# Patient Record
Sex: FEMALE | Race: WHITE | NOT HISPANIC OR LATINO | Employment: OTHER | URBAN - METROPOLITAN AREA
[De-identification: names, ages, dates, MRNs, and addresses within clinical notes are randomized per-mention and may not be internally consistent; named-entity substitution may affect disease eponyms.]

---

## 2021-01-01 ENCOUNTER — APPOINTMENT (OUTPATIENT)
Dept: GENERAL RADIOLOGY | Facility: HOSPITAL | Age: 86
End: 2021-01-01

## 2021-01-01 ENCOUNTER — HOSPITAL ENCOUNTER (OUTPATIENT)
Facility: HOSPITAL | Age: 86
Setting detail: OBSERVATION
Discharge: HOME | End: 2021-11-01
Attending: EMERGENCY MEDICINE | Admitting: PHYSICIAN ASSISTANT

## 2021-01-01 ENCOUNTER — APPOINTMENT (OUTPATIENT)
Dept: CARDIOLOGY | Facility: HOSPITAL | Age: 86
End: 2021-01-01

## 2021-01-01 VITALS
RESPIRATION RATE: 24 BRPM | OXYGEN SATURATION: 98 % | HEIGHT: 62 IN | SYSTOLIC BLOOD PRESSURE: 163 MMHG | TEMPERATURE: 98.3 F | DIASTOLIC BLOOD PRESSURE: 94 MMHG | HEART RATE: 65 BPM | WEIGHT: 196.21 LBS | BODY MASS INDEX: 36.11 KG/M2

## 2021-01-01 DIAGNOSIS — D64.9 MILD ANEMIA: ICD-10-CM

## 2021-01-01 DIAGNOSIS — R55 SYNCOPE, UNSPECIFIED SYNCOPE TYPE: Primary | ICD-10-CM

## 2021-01-01 LAB
ALBUMIN SERPL-MCNC: 3.8 G/DL (ref 3.5–5.2)
ALBUMIN/GLOB SERPL: 1.2 G/DL
ALP SERPL-CCNC: 125 U/L (ref 39–117)
ALT SERPL W P-5'-P-CCNC: 24 U/L (ref 1–33)
ANION GAP SERPL CALCULATED.3IONS-SCNC: 10.5 MMOL/L (ref 5–15)
ANION GAP SERPL CALCULATED.3IONS-SCNC: 10.9 MMOL/L (ref 5–15)
AORTIC DIMENSIONLESS INDEX: 0.6 (DI)
ASCENDING AORTA: 3.2 CM
AST SERPL-CCNC: 34 U/L (ref 1–32)
BASOPHILS # BLD AUTO: 0.06 10*3/MM3 (ref 0–0.2)
BASOPHILS NFR BLD AUTO: 0.9 % (ref 0–1.5)
BH CV ECHO MEAS - ACS: 1.7 CM
BH CV ECHO MEAS - AO ACC TIME: 0.1 SEC
BH CV ECHO MEAS - AO MAX PG (FULL): 8.6 MMHG
BH CV ECHO MEAS - AO MAX PG: 12.7 MMHG
BH CV ECHO MEAS - AO MEAN PG (FULL): 4.4 MMHG
BH CV ECHO MEAS - AO MEAN PG: 6.2 MMHG
BH CV ECHO MEAS - AO ROOT AREA (BSA CORRECTED): 1.2
BH CV ECHO MEAS - AO ROOT AREA: 4.1 CM^2
BH CV ECHO MEAS - AO ROOT DIAM: 2.3 CM
BH CV ECHO MEAS - AO V2 MAX: 178 CM/SEC
BH CV ECHO MEAS - AO V2 MEAN: 116.6 CM/SEC
BH CV ECHO MEAS - AO V2 VTI: 43.5 CM
BH CV ECHO MEAS - ASC AORTA: 3.2 CM
BH CV ECHO MEAS - AVA(I,A): 1.7 CM^2
BH CV ECHO MEAS - AVA(I,D): 1.7 CM^2
BH CV ECHO MEAS - AVA(V,A): 1.7 CM^2
BH CV ECHO MEAS - AVA(V,D): 1.7 CM^2
BH CV ECHO MEAS - BSA(HAYCOCK): 2 M^2
BH CV ECHO MEAS - BSA: 1.9 M^2
BH CV ECHO MEAS - BZI_BMI: 36.1 KILOGRAMS/M^2
BH CV ECHO MEAS - BZI_METRIC_HEIGHT: 157 CM
BH CV ECHO MEAS - BZI_METRIC_WEIGHT: 89 KG
BH CV ECHO MEAS - EDV(CUBED): 43.2 ML
BH CV ECHO MEAS - EDV(MOD-SP2): 112 ML
BH CV ECHO MEAS - EDV(MOD-SP4): 103 ML
BH CV ECHO MEAS - EDV(TEICH): 51.2 ML
BH CV ECHO MEAS - EF(CUBED): 66.3 %
BH CV ECHO MEAS - EF(MOD-BP): 58.7 %
BH CV ECHO MEAS - EF(MOD-SP2): 59.8 %
BH CV ECHO MEAS - EF(MOD-SP4): 60.2 %
BH CV ECHO MEAS - EF(TEICH): 58.9 %
BH CV ECHO MEAS - ESV(CUBED): 14.6 ML
BH CV ECHO MEAS - ESV(MOD-SP2): 45 ML
BH CV ECHO MEAS - ESV(MOD-SP4): 41 ML
BH CV ECHO MEAS - ESV(TEICH): 21.1 ML
BH CV ECHO MEAS - FS: 30.4 %
BH CV ECHO MEAS - IVS/LVPW: 0.89
BH CV ECHO MEAS - IVSD: 0.98 CM
BH CV ECHO MEAS - LAT PEAK E' VEL: 6.1 CM/SEC
BH CV ECHO MEAS - LV DIASTOLIC VOL/BSA (35-75): 54.4 ML/M^2
BH CV ECHO MEAS - LV MASS(C)D: 110.5 GRAMS
BH CV ECHO MEAS - LV MASS(C)DI: 58.4 GRAMS/M^2
BH CV ECHO MEAS - LV MAX PG: 4.1 MMHG
BH CV ECHO MEAS - LV MEAN PG: 1.9 MMHG
BH CV ECHO MEAS - LV SYSTOLIC VOL/BSA (12-30): 21.7 ML/M^2
BH CV ECHO MEAS - LV V1 MAX: 101 CM/SEC
BH CV ECHO MEAS - LV V1 MEAN: 59.6 CM/SEC
BH CV ECHO MEAS - LV V1 VTI: 25 CM
BH CV ECHO MEAS - LVIDD: 3.5 CM
BH CV ECHO MEAS - LVIDS: 2.4 CM
BH CV ECHO MEAS - LVLD AP2: 8.1 CM
BH CV ECHO MEAS - LVLD AP4: 7.9 CM
BH CV ECHO MEAS - LVLS AP2: 7.3 CM
BH CV ECHO MEAS - LVLS AP4: 6.5 CM
BH CV ECHO MEAS - LVOT AREA (M): 2.8 CM^2
BH CV ECHO MEAS - LVOT AREA: 3 CM^2
BH CV ECHO MEAS - LVOT DIAM: 1.9 CM
BH CV ECHO MEAS - LVPWD: 1.1 CM
BH CV ECHO MEAS - MED PEAK E' VEL: 6 CM/SEC
BH CV ECHO MEAS - MV A DUR: 0.15 SEC
BH CV ECHO MEAS - MV A MAX VEL: 66.7 CM/SEC
BH CV ECHO MEAS - MV DEC SLOPE: 440 CM/SEC^2
BH CV ECHO MEAS - MV DEC TIME: 222 SEC
BH CV ECHO MEAS - MV E MAX VEL: 122 CM/SEC
BH CV ECHO MEAS - MV E/A: 1.8
BH CV ECHO MEAS - MV MAX PG: 6.8 MMHG
BH CV ECHO MEAS - MV MEAN PG: 2 MMHG
BH CV ECHO MEAS - MV P1/2T MAX VEL: 131.3 CM/SEC
BH CV ECHO MEAS - MV P1/2T: 87.4 MSEC
BH CV ECHO MEAS - MV V2 MAX: 130.3 CM/SEC
BH CV ECHO MEAS - MV V2 MEAN: 66.4 CM/SEC
BH CV ECHO MEAS - MV V2 VTI: 41.9 CM
BH CV ECHO MEAS - MVA P1/2T LCG: 1.7 CM^2
BH CV ECHO MEAS - MVA(P1/2T): 2.5 CM^2
BH CV ECHO MEAS - MVA(VTI): 1.8 CM^2
BH CV ECHO MEAS - PA ACC TIME: 0.09 SEC
BH CV ECHO MEAS - PA MAX PG (FULL): 3.4 MMHG
BH CV ECHO MEAS - PA MAX PG: 4.6 MMHG
BH CV ECHO MEAS - PA PR(ACCEL): 37.4 MMHG
BH CV ECHO MEAS - PA V2 MAX: 107.5 CM/SEC
BH CV ECHO MEAS - PI END-D VEL: 132 CM/SEC
BH CV ECHO MEAS - PULM A REVS VEL: 59.5 CM/SEC
BH CV ECHO MEAS - PULM DIAS VEL: 59.5 CM/SEC
BH CV ECHO MEAS - PULM S/D: 0.62
BH CV ECHO MEAS - PULM SYS VEL: 36.7 CM/SEC
BH CV ECHO MEAS - PVA(V,A): 1.4 CM^2
BH CV ECHO MEAS - PVA(V,D): 1.4 CM^2
BH CV ECHO MEAS - QP/QS: 0.53
BH CV ECHO MEAS - RAP SYSTOLE: 3 MMHG
BH CV ECHO MEAS - RV MAX PG: 1.2 MMHG
BH CV ECHO MEAS - RV MEAN PG: 0.71 MMHG
BH CV ECHO MEAS - RV V1 MAX: 55.3 CM/SEC
BH CV ECHO MEAS - RV V1 MEAN: 39.3 CM/SEC
BH CV ECHO MEAS - RV V1 VTI: 14.9 CM
BH CV ECHO MEAS - RVOT AREA: 2.6 CM^2
BH CV ECHO MEAS - RVOT DIAM: 1.8 CM
BH CV ECHO MEAS - RVSP: 38.2 MMHG
BH CV ECHO MEAS - SI(AO): 93.7 ML/M^2
BH CV ECHO MEAS - SI(CUBED): 15.1 ML/M^2
BH CV ECHO MEAS - SI(LVOT): 39.2 ML/M^2
BH CV ECHO MEAS - SI(MOD-SP2): 35.4 ML/M^2
BH CV ECHO MEAS - SI(MOD-SP4): 32.8 ML/M^2
BH CV ECHO MEAS - SI(TEICH): 15.9 ML/M^2
BH CV ECHO MEAS - SUP REN AO DIAM: 1.9 CM
BH CV ECHO MEAS - SV(AO): 177.3 ML
BH CV ECHO MEAS - SV(CUBED): 28.7 ML
BH CV ECHO MEAS - SV(LVOT): 74.1 ML
BH CV ECHO MEAS - SV(MOD-SP2): 67 ML
BH CV ECHO MEAS - SV(MOD-SP4): 62 ML
BH CV ECHO MEAS - SV(RVOT): 39.6 ML
BH CV ECHO MEAS - SV(TEICH): 30.1 ML
BH CV ECHO MEAS - TAPSE (>1.6): 2 CM
BH CV ECHO MEAS - TR MAX VEL: 296.8 CM/SEC
BH CV ECHO MEASUREMENTS AVERAGE E/E' RATIO: 20.17
BH CV VAS BP LEFT ARM: NORMAL MMHG
BH CV XLRA - RV BASE: 2.6 CM
BH CV XLRA - RV LENGTH: 7.7 CM
BH CV XLRA - RV MID: 2.2 CM
BH CV XLRA - TDI S': 11 CM/SEC
BH CV XLRA MEAS LEFT CCA RATIO VEL: -59.8 CM/SEC
BH CV XLRA MEAS LEFT DIST CCA EDV: -11 CM/SEC
BH CV XLRA MEAS LEFT DIST CCA PSV: -59.8 CM/SEC
BH CV XLRA MEAS LEFT DIST ICA EDV: 15.4 CM/SEC
BH CV XLRA MEAS LEFT DIST ICA PSV: 51.9 CM/SEC
BH CV XLRA MEAS LEFT ICA RATIO VEL: 80.4 CM/SEC
BH CV XLRA MEAS LEFT ICA/CCA RATIO: -1.3
BH CV XLRA MEAS LEFT MID ICA EDV: -14.1 CM/SEC
BH CV XLRA MEAS LEFT MID ICA PSV: -52.9 CM/SEC
BH CV XLRA MEAS LEFT PROX CCA EDV: 9.3 CM/SEC
BH CV XLRA MEAS LEFT PROX CCA PSV: 67.5 CM/SEC
BH CV XLRA MEAS LEFT PROX ECA EDV: -21.7 CM/SEC
BH CV XLRA MEAS LEFT PROX ECA PSV: -129 CM/SEC
BH CV XLRA MEAS LEFT PROX ICA EDV: 18.9 CM/SEC
BH CV XLRA MEAS LEFT PROX ICA PSV: 80.4 CM/SEC
BH CV XLRA MEAS LEFT PROX SCLA PSV: 128.8 CM/SEC
BH CV XLRA MEAS LEFT VERTEBRAL A EDV: 8.8 CM/SEC
BH CV XLRA MEAS LEFT VERTEBRAL A PSV: 56 CM/SEC
BH CV XLRA MEAS RIGHT CCA RATIO VEL: -55.7 CM/SEC
BH CV XLRA MEAS RIGHT DIST CCA EDV: -5.5 CM/SEC
BH CV XLRA MEAS RIGHT DIST CCA PSV: -55.7 CM/SEC
BH CV XLRA MEAS RIGHT DIST ICA EDV: -8.3 CM/SEC
BH CV XLRA MEAS RIGHT DIST ICA PSV: -37.3 CM/SEC
BH CV XLRA MEAS RIGHT ICA RATIO VEL: 83.1 CM/SEC
BH CV XLRA MEAS RIGHT ICA/CCA RATIO: -1.5
BH CV XLRA MEAS RIGHT MID ICA EDV: -7.5 CM/SEC
BH CV XLRA MEAS RIGHT MID ICA PSV: -42.1 CM/SEC
BH CV XLRA MEAS RIGHT PROX CCA EDV: 9.8 CM/SEC
BH CV XLRA MEAS RIGHT PROX CCA PSV: 71.3 CM/SEC
BH CV XLRA MEAS RIGHT PROX ECA PSV: -79.1 CM/SEC
BH CV XLRA MEAS RIGHT PROX ICA EDV: 11.3 CM/SEC
BH CV XLRA MEAS RIGHT PROX ICA PSV: 83.1 CM/SEC
BH CV XLRA MEAS RIGHT PROX SCLA PSV: 192.9 CM/SEC
BH CV XLRA MEAS RIGHT VERTEBRAL A EDV: 9.8 CM/SEC
BH CV XLRA MEAS RIGHT VERTEBRAL A PSV: 27.6 CM/SEC
BILIRUB SERPL-MCNC: 1.2 MG/DL (ref 0–1.2)
BILIRUB UR QL STRIP: NEGATIVE
BUN SERPL-MCNC: 16 MG/DL (ref 8–23)
BUN SERPL-MCNC: 16 MG/DL (ref 8–23)
BUN/CREAT SERPL: 11 (ref 7–25)
BUN/CREAT SERPL: 11.9 (ref 7–25)
CALCIUM SPEC-SCNC: 8.9 MG/DL (ref 8.6–10.5)
CALCIUM SPEC-SCNC: 9 MG/DL (ref 8.6–10.5)
CHLORIDE SERPL-SCNC: 108 MMOL/L (ref 98–107)
CHLORIDE SERPL-SCNC: 108 MMOL/L (ref 98–107)
CLARITY UR: CLEAR
CO2 SERPL-SCNC: 23.1 MMOL/L (ref 22–29)
CO2 SERPL-SCNC: 24.5 MMOL/L (ref 22–29)
COLOR UR: YELLOW
CREAT SERPL-MCNC: 1.34 MG/DL (ref 0.57–1)
CREAT SERPL-MCNC: 1.45 MG/DL (ref 0.57–1)
DEPRECATED RDW RBC AUTO: 55.4 FL (ref 37–54)
DEPRECATED RDW RBC AUTO: 56.4 FL (ref 37–54)
EOSINOPHIL # BLD AUTO: 0.24 10*3/MM3 (ref 0–0.4)
EOSINOPHIL NFR BLD AUTO: 3.6 % (ref 0.3–6.2)
ERYTHROCYTE [DISTWIDTH] IN BLOOD BY AUTOMATED COUNT: 14.6 % (ref 12.3–15.4)
ERYTHROCYTE [DISTWIDTH] IN BLOOD BY AUTOMATED COUNT: 14.7 % (ref 12.3–15.4)
GFR SERPL CREATININE-BSD FRML MDRD: 34 ML/MIN/1.73
GFR SERPL CREATININE-BSD FRML MDRD: 38 ML/MIN/1.73
GLOBULIN UR ELPH-MCNC: 3.3 GM/DL
GLUCOSE BLDC GLUCOMTR-MCNC: 107 MG/DL (ref 70–130)
GLUCOSE SERPL-MCNC: 117 MG/DL (ref 65–99)
GLUCOSE SERPL-MCNC: 122 MG/DL (ref 65–99)
GLUCOSE UR STRIP-MCNC: NEGATIVE MG/DL
HCT VFR BLD AUTO: 33.5 % (ref 34–46.6)
HCT VFR BLD AUTO: 33.7 % (ref 34–46.6)
HGB BLD-MCNC: 11.4 G/DL (ref 12–15.9)
HGB BLD-MCNC: 11.5 G/DL (ref 12–15.9)
HGB UR QL STRIP.AUTO: NEGATIVE
HOLD SPECIMEN: NORMAL
HOLD SPECIMEN: NORMAL
IMM GRANULOCYTES # BLD AUTO: 0.02 10*3/MM3 (ref 0–0.05)
IMM GRANULOCYTES NFR BLD AUTO: 0.3 % (ref 0–0.5)
KETONES UR QL STRIP: NEGATIVE
LEFT ATRIUM VOLUME INDEX: 19.8 ML/M2
LEUKOCYTE ESTERASE UR QL STRIP.AUTO: NEGATIVE
LYMPHOCYTES # BLD AUTO: 1.54 10*3/MM3 (ref 0.7–3.1)
LYMPHOCYTES NFR BLD AUTO: 23.4 % (ref 19.6–45.3)
MCH RBC QN AUTO: 35.5 PG (ref 26.6–33)
MCH RBC QN AUTO: 35.9 PG (ref 26.6–33)
MCHC RBC AUTO-ENTMCNC: 33.8 G/DL (ref 31.5–35.7)
MCHC RBC AUTO-ENTMCNC: 34.3 G/DL (ref 31.5–35.7)
MCV RBC AUTO: 104.7 FL (ref 79–97)
MCV RBC AUTO: 105 FL (ref 79–97)
MONOCYTES # BLD AUTO: 0.76 10*3/MM3 (ref 0.1–0.9)
MONOCYTES NFR BLD AUTO: 11.5 % (ref 5–12)
NEUTROPHILS NFR BLD AUTO: 3.97 10*3/MM3 (ref 1.7–7)
NEUTROPHILS NFR BLD AUTO: 60.3 % (ref 42.7–76)
NITRITE UR QL STRIP: NEGATIVE
NRBC BLD AUTO-RTO: 0.2 /100 WBC (ref 0–0.2)
NT-PROBNP SERPL-MCNC: 603.4 PG/ML (ref 0–1800)
PH UR STRIP.AUTO: 6 [PH] (ref 5–8)
PLATELET # BLD AUTO: 169 10*3/MM3 (ref 140–450)
PLATELET # BLD AUTO: 176 10*3/MM3 (ref 140–450)
PMV BLD AUTO: 9.4 FL (ref 6–12)
PMV BLD AUTO: 9.7 FL (ref 6–12)
POTASSIUM SERPL-SCNC: 4 MMOL/L (ref 3.5–5.2)
POTASSIUM SERPL-SCNC: 4.4 MMOL/L (ref 3.5–5.2)
PROT SERPL-MCNC: 7.1 G/DL (ref 6–8.5)
PROT UR QL STRIP: ABNORMAL
QT INTERVAL: 439 MS
RBC # BLD AUTO: 3.2 10*6/MM3 (ref 3.77–5.28)
RBC # BLD AUTO: 3.21 10*6/MM3 (ref 3.77–5.28)
SARS-COV-2 RNA PNL SPEC NAA+PROBE: NOT DETECTED
SINUS: 2.7 CM
SODIUM SERPL-SCNC: 142 MMOL/L (ref 136–145)
SODIUM SERPL-SCNC: 143 MMOL/L (ref 136–145)
SP GR UR STRIP: 1.01 (ref 1–1.03)
STJ: 2.6 CM
TROPONIN T SERPL-MCNC: <0.01 NG/ML (ref 0–0.03)
TROPONIN T SERPL-MCNC: <0.01 NG/ML (ref 0–0.03)
TSH SERPL DL<=0.05 MIU/L-ACNC: 2.93 UIU/ML (ref 0.27–4.2)
UROBILINOGEN UR QL STRIP: ABNORMAL
WBC # BLD AUTO: 5.85 10*3/MM3 (ref 3.4–10.8)
WBC # BLD AUTO: 6.59 10*3/MM3 (ref 3.4–10.8)
WHOLE BLOOD HOLD SPECIMEN: NORMAL
WHOLE BLOOD HOLD SPECIMEN: NORMAL

## 2021-01-01 PROCEDURE — 93306 TTE W/DOPPLER COMPLETE: CPT

## 2021-01-01 PROCEDURE — 93005 ELECTROCARDIOGRAM TRACING: CPT | Performed by: EMERGENCY MEDICINE

## 2021-01-01 PROCEDURE — G0378 HOSPITAL OBSERVATION PER HR: HCPCS

## 2021-01-01 PROCEDURE — 84484 ASSAY OF TROPONIN QUANT: CPT | Performed by: EMERGENCY MEDICINE

## 2021-01-01 PROCEDURE — 85027 COMPLETE CBC AUTOMATED: CPT | Performed by: PHYSICIAN ASSISTANT

## 2021-01-01 PROCEDURE — 71045 X-RAY EXAM CHEST 1 VIEW: CPT

## 2021-01-01 PROCEDURE — 93880 EXTRACRANIAL BILAT STUDY: CPT

## 2021-01-01 PROCEDURE — 84484 ASSAY OF TROPONIN QUANT: CPT | Performed by: PHYSICIAN ASSISTANT

## 2021-01-01 PROCEDURE — 99284 EMERGENCY DEPT VISIT MOD MDM: CPT

## 2021-01-01 PROCEDURE — 80053 COMPREHEN METABOLIC PANEL: CPT | Performed by: EMERGENCY MEDICINE

## 2021-01-01 PROCEDURE — 85025 COMPLETE CBC W/AUTO DIFF WBC: CPT | Performed by: EMERGENCY MEDICINE

## 2021-01-01 PROCEDURE — 93005 ELECTROCARDIOGRAM TRACING: CPT

## 2021-01-01 PROCEDURE — 84443 ASSAY THYROID STIM HORMONE: CPT | Performed by: PHYSICIAN ASSISTANT

## 2021-01-01 PROCEDURE — C9803 HOPD COVID-19 SPEC COLLECT: HCPCS

## 2021-01-01 PROCEDURE — 81003 URINALYSIS AUTO W/O SCOPE: CPT | Performed by: EMERGENCY MEDICINE

## 2021-01-01 PROCEDURE — 99285 EMERGENCY DEPT VISIT HI MDM: CPT

## 2021-01-01 PROCEDURE — 93010 ELECTROCARDIOGRAM REPORT: CPT | Performed by: INTERNAL MEDICINE

## 2021-01-01 PROCEDURE — 87635 SARS-COV-2 COVID-19 AMP PRB: CPT | Performed by: EMERGENCY MEDICINE

## 2021-01-01 PROCEDURE — 83880 ASSAY OF NATRIURETIC PEPTIDE: CPT | Performed by: EMERGENCY MEDICINE

## 2021-01-01 RX ORDER — ATORVASTATIN CALCIUM 80 MG/1
80 TABLET, FILM COATED ORAL NIGHTLY
COMMUNITY

## 2021-01-01 RX ORDER — CHOLECALCIFEROL (VITAMIN D3) 125 MCG
1000 CAPSULE ORAL DAILY
Status: DISCONTINUED | OUTPATIENT
Start: 2021-01-01 | End: 2021-01-01 | Stop reason: HOSPADM

## 2021-01-01 RX ORDER — ATORVASTATIN CALCIUM 80 MG/1
80 TABLET, FILM COATED ORAL NIGHTLY
Status: DISCONTINUED | OUTPATIENT
Start: 2021-01-01 | End: 2021-01-01 | Stop reason: HOSPADM

## 2021-01-01 RX ORDER — SODIUM CHLORIDE 0.9 % (FLUSH) 0.9 %
10 SYRINGE (ML) INJECTION EVERY 12 HOURS SCHEDULED
Status: DISCONTINUED | OUTPATIENT
Start: 2021-01-01 | End: 2021-01-01 | Stop reason: HOSPADM

## 2021-01-01 RX ORDER — SODIUM CHLORIDE 0.9 % (FLUSH) 0.9 %
10 SYRINGE (ML) INJECTION AS NEEDED
Status: DISCONTINUED | OUTPATIENT
Start: 2021-01-01 | End: 2021-01-01 | Stop reason: HOSPADM

## 2021-01-01 RX ORDER — POTASSIUM CHLORIDE 750 MG/1
20 TABLET, FILM COATED, EXTENDED RELEASE ORAL DAILY
COMMUNITY

## 2021-01-01 RX ORDER — FUROSEMIDE 40 MG/1
40 TABLET ORAL DAILY
Status: DISCONTINUED | OUTPATIENT
Start: 2021-01-01 | End: 2021-01-01 | Stop reason: HOSPADM

## 2021-01-01 RX ORDER — LEVOTHYROXINE SODIUM 0.05 MG/1
50 TABLET ORAL
Status: DISCONTINUED | OUTPATIENT
Start: 2021-01-01 | End: 2021-01-01 | Stop reason: HOSPADM

## 2021-01-01 RX ORDER — METOPROLOL SUCCINATE 25 MG/1
25 TABLET, EXTENDED RELEASE ORAL EVERY 12 HOURS SCHEDULED
Status: DISCONTINUED | OUTPATIENT
Start: 2021-01-01 | End: 2021-01-01 | Stop reason: HOSPADM

## 2021-01-01 RX ORDER — ALBUTEROL SULFATE 2.5 MG/3ML
2.5 SOLUTION RESPIRATORY (INHALATION) EVERY 6 HOURS PRN
Status: DISCONTINUED | OUTPATIENT
Start: 2021-01-01 | End: 2021-01-01 | Stop reason: HOSPADM

## 2021-01-01 RX ORDER — NITROGLYCERIN 0.4 MG/1
0.4 TABLET SUBLINGUAL
Status: DISCONTINUED | OUTPATIENT
Start: 2021-01-01 | End: 2021-01-01 | Stop reason: HOSPADM

## 2021-01-01 RX ORDER — PAROXETINE 10 MG/1
10 TABLET, FILM COATED ORAL DAILY
Status: DISCONTINUED | OUTPATIENT
Start: 2021-01-01 | End: 2021-01-01 | Stop reason: HOSPADM

## 2021-01-01 RX ORDER — POTASSIUM CHLORIDE 750 MG/1
20 TABLET, FILM COATED, EXTENDED RELEASE ORAL DAILY
Status: DISCONTINUED | OUTPATIENT
Start: 2021-01-01 | End: 2021-01-01 | Stop reason: HOSPADM

## 2021-01-01 RX ORDER — ALLOPURINOL 300 MG/1
300 TABLET ORAL DAILY
Status: DISCONTINUED | OUTPATIENT
Start: 2021-01-01 | End: 2021-01-01 | Stop reason: HOSPADM

## 2021-01-01 RX ADMIN — ATORVASTATIN CALCIUM 80 MG: 80 TABLET, FILM COATED ORAL at 23:21

## 2021-01-01 RX ADMIN — LEVOTHYROXINE SODIUM 50 MCG: 50 TABLET ORAL at 06:13

## 2021-01-01 RX ADMIN — METOPROLOL SUCCINATE 25 MG: 25 TABLET, EXTENDED RELEASE ORAL at 12:31

## 2021-01-01 RX ADMIN — APIXABAN 5 MG: 5 TABLET, FILM COATED ORAL at 12:30

## 2021-01-01 RX ADMIN — POTASSIUM CHLORIDE 20 MEQ: 750 TABLET, EXTENDED RELEASE ORAL at 12:31

## 2021-01-01 RX ADMIN — APIXABAN 5 MG: 5 TABLET, FILM COATED ORAL at 23:21

## 2021-01-01 RX ADMIN — SODIUM CHLORIDE, PRESERVATIVE FREE 10 ML: 5 INJECTION INTRAVENOUS at 23:21

## 2021-01-01 RX ADMIN — PAROXETINE HYDROCHLORIDE HEMIHYDRATE 10 MG: 10 TABLET, FILM COATED ORAL at 12:30

## 2021-01-01 RX ADMIN — SODIUM CHLORIDE, PRESERVATIVE FREE 10 ML: 5 INJECTION INTRAVENOUS at 12:31

## 2021-01-01 RX ADMIN — ALLOPURINOL 300 MG: 300 TABLET ORAL at 12:30

## 2021-01-01 RX ADMIN — FUROSEMIDE 40 MG: 40 TABLET ORAL at 12:30

## 2021-01-01 RX ADMIN — Medication 1000 MCG: at 12:30

## 2021-04-04 ENCOUNTER — APPOINTMENT (OUTPATIENT)
Dept: CT IMAGING | Facility: HOSPITAL | Age: 86
End: 2021-04-04

## 2021-04-04 ENCOUNTER — HOSPITAL ENCOUNTER (OUTPATIENT)
Facility: HOSPITAL | Age: 86
Setting detail: OBSERVATION
Discharge: HOME | End: 2021-04-07
Attending: EMERGENCY MEDICINE | Admitting: INTERNAL MEDICINE

## 2021-04-04 ENCOUNTER — APPOINTMENT (OUTPATIENT)
Dept: GENERAL RADIOLOGY | Facility: HOSPITAL | Age: 86
End: 2021-04-04

## 2021-04-04 DIAGNOSIS — J96.01 ACUTE RESPIRATORY FAILURE WITH HYPOXIA (HCC): ICD-10-CM

## 2021-04-04 DIAGNOSIS — I50.9 ACUTE ON CHRONIC CONGESTIVE HEART FAILURE, UNSPECIFIED HEART FAILURE TYPE (HCC): Primary | ICD-10-CM

## 2021-04-04 DIAGNOSIS — N18.9 CHRONIC RENAL IMPAIRMENT, UNSPECIFIED CKD STAGE: ICD-10-CM

## 2021-04-04 PROBLEM — I48.91 A-FIB: Status: ACTIVE | Noted: 2021-04-04

## 2021-04-04 PROBLEM — R09.02 HYPOXIA: Status: ACTIVE | Noted: 2021-04-04

## 2021-04-04 PROBLEM — N18.32 STAGE 3B CHRONIC KIDNEY DISEASE: Status: ACTIVE | Noted: 2021-04-04

## 2021-04-04 PROBLEM — Z79.01 CHRONIC ANTICOAGULATION: Status: ACTIVE | Noted: 2021-04-04

## 2021-04-04 PROBLEM — E03.9 HYPOTHYROIDISM (ACQUIRED): Status: ACTIVE | Noted: 2021-04-04

## 2021-04-04 LAB
ALBUMIN SERPL-MCNC: 3.2 G/DL (ref 3.5–5.2)
ALBUMIN/GLOB SERPL: 0.9 G/DL
ALP SERPL-CCNC: 69 U/L (ref 39–117)
ALT SERPL W P-5'-P-CCNC: 13 U/L (ref 1–33)
ANION GAP SERPL CALCULATED.3IONS-SCNC: 10.1 MMOL/L (ref 5–15)
ANION GAP SERPL CALCULATED.3IONS-SCNC: 13.5 MMOL/L (ref 5–15)
AST SERPL-CCNC: 20 U/L (ref 1–32)
BASOPHILS # BLD MANUAL: 0.08 10*3/MM3 (ref 0–0.2)
BASOPHILS NFR BLD AUTO: 1 % (ref 0–1.5)
BILIRUB SERPL-MCNC: 2.2 MG/DL (ref 0–1.2)
BUN SERPL-MCNC: 23 MG/DL (ref 8–23)
BUN SERPL-MCNC: 25 MG/DL (ref 8–23)
BUN/CREAT SERPL: 14.8 (ref 7–25)
BUN/CREAT SERPL: 16.8 (ref 7–25)
CALCIUM SPEC-SCNC: 8.6 MG/DL (ref 8.6–10.5)
CALCIUM SPEC-SCNC: 8.7 MG/DL (ref 8.6–10.5)
CHLORIDE SERPL-SCNC: 104 MMOL/L (ref 98–107)
CHLORIDE SERPL-SCNC: 105 MMOL/L (ref 98–107)
CO2 SERPL-SCNC: 22.5 MMOL/L (ref 22–29)
CO2 SERPL-SCNC: 24.9 MMOL/L (ref 22–29)
CREAT SERPL-MCNC: 1.49 MG/DL (ref 0.57–1)
CREAT SERPL-MCNC: 1.55 MG/DL (ref 0.57–1)
D DIMER PPP FEU-MCNC: 0.73 MCGFEU/ML (ref 0–0.49)
D-LACTATE SERPL-SCNC: 1.4 MMOL/L (ref 0.5–2)
DEPRECATED RDW RBC AUTO: 58.4 FL (ref 37–54)
ERYTHROCYTE [DISTWIDTH] IN BLOOD BY AUTOMATED COUNT: 14.6 % (ref 12.3–15.4)
GFR SERPL CREATININE-BSD FRML MDRD: 32 ML/MIN/1.73
GFR SERPL CREATININE-BSD FRML MDRD: 33 ML/MIN/1.73
GLOBULIN UR ELPH-MCNC: 3.4 GM/DL
GLUCOSE SERPL-MCNC: 117 MG/DL (ref 65–99)
GLUCOSE SERPL-MCNC: 125 MG/DL (ref 65–99)
HCT VFR BLD AUTO: 33.3 % (ref 34–46.6)
HGB BLD-MCNC: 11.4 G/DL (ref 12–15.9)
INR PPP: 1.34 (ref 0.9–1.1)
LYMPHOCYTES # BLD MANUAL: 0.53 10*3/MM3 (ref 0.7–3.1)
LYMPHOCYTES NFR BLD MANUAL: 7 % (ref 19.6–45.3)
LYMPHOCYTES NFR BLD MANUAL: 7 % (ref 5–12)
MACROCYTES BLD QL SMEAR: ABNORMAL
MCH RBC QN AUTO: 37.4 PG (ref 26.6–33)
MCHC RBC AUTO-ENTMCNC: 34.2 G/DL (ref 31.5–35.7)
MCV RBC AUTO: 109.2 FL (ref 79–97)
MONOCYTES # BLD AUTO: 0.53 10*3/MM3 (ref 0.1–0.9)
NEUTROPHILS # BLD AUTO: 6.42 10*3/MM3 (ref 1.7–7)
NEUTROPHILS NFR BLD MANUAL: 85 % (ref 42.7–76)
NRBC BLD AUTO-RTO: 0 /100 WBC (ref 0–0.2)
NT-PROBNP SERPL-MCNC: 1206 PG/ML (ref 0–1800)
PLAT MORPH BLD: NORMAL
PLATELET # BLD AUTO: 151 10*3/MM3 (ref 140–450)
PMV BLD AUTO: 9.7 FL (ref 6–12)
POTASSIUM SERPL-SCNC: 4 MMOL/L (ref 3.5–5.2)
POTASSIUM SERPL-SCNC: 4.2 MMOL/L (ref 3.5–5.2)
PROCALCITONIN SERPL-MCNC: 0.12 NG/ML (ref 0–0.25)
PROT SERPL-MCNC: 6.6 G/DL (ref 6–8.5)
PROTHROMBIN TIME: 16.4 SECONDS (ref 11.7–14.2)
QT INTERVAL: 477 MS
RBC # BLD AUTO: 3.05 10*6/MM3 (ref 3.77–5.28)
SARS-COV-2 RNA RESP QL NAA+PROBE: NOT DETECTED
SODIUM SERPL-SCNC: 140 MMOL/L (ref 136–145)
SODIUM SERPL-SCNC: 140 MMOL/L (ref 136–145)
TROPONIN T SERPL-MCNC: <0.01 NG/ML (ref 0–0.03)
WBC # BLD AUTO: 7.55 10*3/MM3 (ref 3.4–10.8)
WBC MORPH BLD: NORMAL

## 2021-04-04 PROCEDURE — 85025 COMPLETE CBC W/AUTO DIFF WBC: CPT | Performed by: EMERGENCY MEDICINE

## 2021-04-04 PROCEDURE — C9803 HOPD COVID-19 SPEC COLLECT: HCPCS

## 2021-04-04 PROCEDURE — G0378 HOSPITAL OBSERVATION PER HR: HCPCS

## 2021-04-04 PROCEDURE — 83605 ASSAY OF LACTIC ACID: CPT | Performed by: EMERGENCY MEDICINE

## 2021-04-04 PROCEDURE — 99285 EMERGENCY DEPT VISIT HI MDM: CPT

## 2021-04-04 PROCEDURE — 80053 COMPREHEN METABOLIC PANEL: CPT | Performed by: EMERGENCY MEDICINE

## 2021-04-04 PROCEDURE — 85379 FIBRIN DEGRADATION QUANT: CPT | Performed by: EMERGENCY MEDICINE

## 2021-04-04 PROCEDURE — 71045 X-RAY EXAM CHEST 1 VIEW: CPT

## 2021-04-04 PROCEDURE — 25010000002 FUROSEMIDE PER 20 MG: Performed by: EMERGENCY MEDICINE

## 2021-04-04 PROCEDURE — 94640 AIRWAY INHALATION TREATMENT: CPT

## 2021-04-04 PROCEDURE — 96374 THER/PROPH/DIAG INJ IV PUSH: CPT

## 2021-04-04 PROCEDURE — 83880 ASSAY OF NATRIURETIC PEPTIDE: CPT | Performed by: EMERGENCY MEDICINE

## 2021-04-04 PROCEDURE — 36415 COLL VENOUS BLD VENIPUNCTURE: CPT | Performed by: HOSPITALIST

## 2021-04-04 PROCEDURE — 85610 PROTHROMBIN TIME: CPT | Performed by: EMERGENCY MEDICINE

## 2021-04-04 PROCEDURE — 96376 TX/PRO/DX INJ SAME DRUG ADON: CPT

## 2021-04-04 PROCEDURE — 85007 BL SMEAR W/DIFF WBC COUNT: CPT | Performed by: EMERGENCY MEDICINE

## 2021-04-04 PROCEDURE — 84145 PROCALCITONIN (PCT): CPT | Performed by: EMERGENCY MEDICINE

## 2021-04-04 PROCEDURE — 87040 BLOOD CULTURE FOR BACTERIA: CPT | Performed by: EMERGENCY MEDICINE

## 2021-04-04 PROCEDURE — 71275 CT ANGIOGRAPHY CHEST: CPT

## 2021-04-04 PROCEDURE — 93005 ELECTROCARDIOGRAM TRACING: CPT | Performed by: EMERGENCY MEDICINE

## 2021-04-04 PROCEDURE — 25010000002 FUROSEMIDE PER 20 MG: Performed by: HOSPITALIST

## 2021-04-04 PROCEDURE — U0005 INFEC AGEN DETEC AMPLI PROBE: HCPCS | Performed by: EMERGENCY MEDICINE

## 2021-04-04 PROCEDURE — 94799 UNLISTED PULMONARY SVC/PX: CPT

## 2021-04-04 PROCEDURE — 0 IOPAMIDOL PER 1 ML: Performed by: EMERGENCY MEDICINE

## 2021-04-04 PROCEDURE — 84484 ASSAY OF TROPONIN QUANT: CPT | Performed by: EMERGENCY MEDICINE

## 2021-04-04 PROCEDURE — 93010 ELECTROCARDIOGRAM REPORT: CPT | Performed by: INTERNAL MEDICINE

## 2021-04-04 PROCEDURE — U0003 INFECTIOUS AGENT DETECTION BY NUCLEIC ACID (DNA OR RNA); SEVERE ACUTE RESPIRATORY SYNDROME CORONAVIRUS 2 (SARS-COV-2) (CORONAVIRUS DISEASE [COVID-19]), AMPLIFIED PROBE TECHNIQUE, MAKING USE OF HIGH THROUGHPUT TECHNOLOGIES AS DESCRIBED BY CMS-2020-01-R: HCPCS | Performed by: EMERGENCY MEDICINE

## 2021-04-04 RX ORDER — ACETAMINOPHEN 325 MG/1
650 TABLET ORAL EVERY 6 HOURS PRN
Status: DISCONTINUED | OUTPATIENT
Start: 2021-04-04 | End: 2021-04-07 | Stop reason: HOSPADM

## 2021-04-04 RX ORDER — ONDANSETRON 4 MG/1
4 TABLET, FILM COATED ORAL EVERY 6 HOURS PRN
Status: DISCONTINUED | OUTPATIENT
Start: 2021-04-04 | End: 2021-04-07 | Stop reason: HOSPADM

## 2021-04-04 RX ORDER — ATORVASTATIN CALCIUM 20 MG/1
10 TABLET, FILM COATED ORAL DAILY
Status: DISCONTINUED | OUTPATIENT
Start: 2021-04-04 | End: 2021-04-07 | Stop reason: HOSPADM

## 2021-04-04 RX ORDER — CHOLECALCIFEROL (VITAMIN D3) 125 MCG
1000 CAPSULE ORAL DAILY
Status: DISCONTINUED | OUTPATIENT
Start: 2021-04-04 | End: 2021-04-07 | Stop reason: HOSPADM

## 2021-04-04 RX ORDER — LEVOTHYROXINE SODIUM 0.05 MG/1
50 TABLET ORAL
Status: DISCONTINUED | OUTPATIENT
Start: 2021-04-05 | End: 2021-04-07 | Stop reason: HOSPADM

## 2021-04-04 RX ORDER — ALLOPURINOL 300 MG/1
300 TABLET ORAL DAILY
COMMUNITY

## 2021-04-04 RX ORDER — LANOLIN ALCOHOL/MO/W.PET/CERES
1000 CREAM (GRAM) TOPICAL DAILY
COMMUNITY
End: 2022-01-01 | Stop reason: HOSPADM

## 2021-04-04 RX ORDER — FUROSEMIDE 10 MG/ML
40 INJECTION INTRAMUSCULAR; INTRAVENOUS ONCE
Status: COMPLETED | OUTPATIENT
Start: 2021-04-04 | End: 2021-04-04

## 2021-04-04 RX ORDER — IPRATROPIUM BROMIDE AND ALBUTEROL SULFATE 2.5; .5 MG/3ML; MG/3ML
3 SOLUTION RESPIRATORY (INHALATION) EVERY 6 HOURS PRN
Status: DISCONTINUED | OUTPATIENT
Start: 2021-04-04 | End: 2021-04-07 | Stop reason: HOSPADM

## 2021-04-04 RX ORDER — LEVOTHYROXINE SODIUM 0.05 MG/1
50 TABLET ORAL DAILY
COMMUNITY

## 2021-04-04 RX ORDER — SODIUM CHLORIDE 0.9 % (FLUSH) 0.9 %
10 SYRINGE (ML) INJECTION EVERY 12 HOURS SCHEDULED
Status: DISCONTINUED | OUTPATIENT
Start: 2021-04-04 | End: 2021-04-07 | Stop reason: HOSPADM

## 2021-04-04 RX ORDER — SIMVASTATIN 10 MG
10 TABLET ORAL NIGHTLY
COMMUNITY
End: 2021-04-24 | Stop reason: HOSPADM

## 2021-04-04 RX ORDER — SODIUM CHLORIDE 0.9 % (FLUSH) 0.9 %
10 SYRINGE (ML) INJECTION AS NEEDED
Status: DISCONTINUED | OUTPATIENT
Start: 2021-04-04 | End: 2021-04-07 | Stop reason: HOSPADM

## 2021-04-04 RX ORDER — ALBUTEROL SULFATE 90 UG/1
2 AEROSOL, METERED RESPIRATORY (INHALATION) EVERY 4 HOURS PRN
COMMUNITY

## 2021-04-04 RX ORDER — ALLOPURINOL 300 MG/1
300 TABLET ORAL DAILY
Status: DISCONTINUED | OUTPATIENT
Start: 2021-04-04 | End: 2021-04-07 | Stop reason: HOSPADM

## 2021-04-04 RX ORDER — IPRATROPIUM BROMIDE AND ALBUTEROL SULFATE 2.5; .5 MG/3ML; MG/3ML
3 SOLUTION RESPIRATORY (INHALATION) ONCE
Status: COMPLETED | OUTPATIENT
Start: 2021-04-04 | End: 2021-04-04

## 2021-04-04 RX ORDER — FUROSEMIDE 10 MG/ML
40 INJECTION INTRAMUSCULAR; INTRAVENOUS EVERY 12 HOURS
Status: DISCONTINUED | OUTPATIENT
Start: 2021-04-04 | End: 2021-04-06

## 2021-04-04 RX ORDER — PAROXETINE 10 MG/1
10 TABLET, FILM COATED ORAL DAILY
COMMUNITY

## 2021-04-04 RX ORDER — ONDANSETRON 2 MG/ML
4 INJECTION INTRAMUSCULAR; INTRAVENOUS EVERY 6 HOURS PRN
Status: DISCONTINUED | OUTPATIENT
Start: 2021-04-04 | End: 2021-04-07 | Stop reason: HOSPADM

## 2021-04-04 RX ORDER — PAROXETINE 10 MG/1
10 TABLET, FILM COATED ORAL DAILY
Status: DISCONTINUED | OUTPATIENT
Start: 2021-04-04 | End: 2021-04-07 | Stop reason: HOSPADM

## 2021-04-04 RX ADMIN — SODIUM CHLORIDE, PRESERVATIVE FREE 10 ML: 5 INJECTION INTRAVENOUS at 20:48

## 2021-04-04 RX ADMIN — APIXABAN 2.5 MG: 2.5 TABLET, FILM COATED ORAL at 20:47

## 2021-04-04 RX ADMIN — PAROXETINE HYDROCHLORIDE HEMIHYDRATE 10 MG: 10 TABLET, FILM COATED ORAL at 20:47

## 2021-04-04 RX ADMIN — IPRATROPIUM BROMIDE AND ALBUTEROL SULFATE 3 ML: 2.5; .5 SOLUTION RESPIRATORY (INHALATION) at 08:53

## 2021-04-04 RX ADMIN — IOPAMIDOL 100 ML: 755 INJECTION, SOLUTION INTRAVENOUS at 11:04

## 2021-04-04 RX ADMIN — FUROSEMIDE 40 MG: 10 INJECTION, SOLUTION INTRAMUSCULAR; INTRAVENOUS at 11:45

## 2021-04-04 RX ADMIN — METOPROLOL TARTRATE 25 MG: 25 TABLET, FILM COATED ORAL at 18:34

## 2021-04-04 RX ADMIN — ALLOPURINOL 300 MG: 300 TABLET ORAL at 20:47

## 2021-04-04 RX ADMIN — ATORVASTATIN CALCIUM 10 MG: 20 TABLET, FILM COATED ORAL at 17:54

## 2021-04-04 RX ADMIN — Medication 1000 MCG: at 20:47

## 2021-04-04 RX ADMIN — FUROSEMIDE 40 MG: 10 INJECTION, SOLUTION INTRAMUSCULAR; INTRAVENOUS at 17:56

## 2021-04-04 RX ADMIN — SODIUM CHLORIDE 500 ML: 9 INJECTION, SOLUTION INTRAVENOUS at 11:20

## 2021-04-05 ENCOUNTER — APPOINTMENT (OUTPATIENT)
Dept: CARDIOLOGY | Facility: HOSPITAL | Age: 86
End: 2021-04-05

## 2021-04-05 LAB
ANION GAP SERPL CALCULATED.3IONS-SCNC: 12.6 MMOL/L (ref 5–15)
AORTIC DIMENSIONLESS INDEX: 0.6 (DI)
BH CV ECHO MEAS - AO MAX PG (FULL): 5.7 MMHG
BH CV ECHO MEAS - AO MAX PG: 8.9 MMHG
BH CV ECHO MEAS - AO MEAN PG (FULL): 3 MMHG
BH CV ECHO MEAS - AO MEAN PG: 5 MMHG
BH CV ECHO MEAS - AO ROOT AREA (BSA CORRECTED): 1.5
BH CV ECHO MEAS - AO ROOT AREA: 6.6 CM^2
BH CV ECHO MEAS - AO ROOT DIAM: 2.9 CM
BH CV ECHO MEAS - AO V2 MAX: 149 CM/SEC
BH CV ECHO MEAS - AO V2 MEAN: 106 CM/SEC
BH CV ECHO MEAS - AO V2 VTI: 34.9 CM
BH CV ECHO MEAS - ASC AORTA: 3 CM
BH CV ECHO MEAS - AVA(I,A): 1.6 CM^2
BH CV ECHO MEAS - AVA(I,D): 1.6 CM^2
BH CV ECHO MEAS - AVA(V,A): 1.7 CM^2
BH CV ECHO MEAS - AVA(V,D): 1.7 CM^2
BH CV ECHO MEAS - BSA(HAYCOCK): 2 M^2
BH CV ECHO MEAS - BSA: 1.9 M^2
BH CV ECHO MEAS - BZI_BMI: 35.7 KILOGRAMS/M^2
BH CV ECHO MEAS - BZI_METRIC_HEIGHT: 157.5 CM
BH CV ECHO MEAS - BZI_METRIC_WEIGHT: 88.5 KG
BH CV ECHO MEAS - EDV(CUBED): 91.1 ML
BH CV ECHO MEAS - EDV(MOD-SP2): 67 ML
BH CV ECHO MEAS - EDV(MOD-SP4): 47 ML
BH CV ECHO MEAS - EDV(TEICH): 92.4 ML
BH CV ECHO MEAS - EF(CUBED): 80.7 %
BH CV ECHO MEAS - EF(MOD-BP): 60.6 %
BH CV ECHO MEAS - EF(MOD-SP2): 64.2 %
BH CV ECHO MEAS - EF(MOD-SP4): 57.4 %
BH CV ECHO MEAS - EF(TEICH): 73.4 %
BH CV ECHO MEAS - ESV(CUBED): 17.6 ML
BH CV ECHO MEAS - ESV(MOD-SP2): 24 ML
BH CV ECHO MEAS - ESV(MOD-SP4): 20 ML
BH CV ECHO MEAS - ESV(TEICH): 24.6 ML
BH CV ECHO MEAS - FS: 42.2 %
BH CV ECHO MEAS - IVS/LVPW: 1.1
BH CV ECHO MEAS - IVSD: 1 CM
BH CV ECHO MEAS - LAT PEAK E' VEL: 4.7 CM/SEC
BH CV ECHO MEAS - LV DIASTOLIC VOL/BSA (35-75): 24.9 ML/M^2
BH CV ECHO MEAS - LV MASS(C)D: 142.9 GRAMS
BH CV ECHO MEAS - LV MASS(C)DI: 75.6 GRAMS/M^2
BH CV ECHO MEAS - LV MAX PG: 3.2 MMHG
BH CV ECHO MEAS - LV MEAN PG: 2 MMHG
BH CV ECHO MEAS - LV SYSTOLIC VOL/BSA (12-30): 10.6 ML/M^2
BH CV ECHO MEAS - LV V1 MAX: 89.7 CM/SEC
BH CV ECHO MEAS - LV V1 MEAN: 62.5 CM/SEC
BH CV ECHO MEAS - LV V1 VTI: 20.1 CM
BH CV ECHO MEAS - LVIDD: 4.5 CM
BH CV ECHO MEAS - LVIDS: 2.6 CM
BH CV ECHO MEAS - LVLD AP2: 6.9 CM
BH CV ECHO MEAS - LVLD AP4: 6.9 CM
BH CV ECHO MEAS - LVLS AP2: 6.1 CM
BH CV ECHO MEAS - LVLS AP4: 5.9 CM
BH CV ECHO MEAS - LVOT AREA (M): 2.8 CM^2
BH CV ECHO MEAS - LVOT AREA: 2.8 CM^2
BH CV ECHO MEAS - LVOT DIAM: 1.9 CM
BH CV ECHO MEAS - LVPWD: 0.9 CM
BH CV ECHO MEAS - MED PEAK E' VEL: 7.4 CM/SEC
BH CV ECHO MEAS - MV A MAX VEL: 41.9 CM/SEC
BH CV ECHO MEAS - MV DEC SLOPE: 458 CM/SEC^2
BH CV ECHO MEAS - MV DEC TIME: 224 SEC
BH CV ECHO MEAS - MV E MAX VEL: 128 CM/SEC
BH CV ECHO MEAS - MV E/A: 3.1
BH CV ECHO MEAS - MV MAX PG: 5.6 MMHG
BH CV ECHO MEAS - MV MEAN PG: 2 MMHG
BH CV ECHO MEAS - MV P1/2T MAX VEL: 118 CM/SEC
BH CV ECHO MEAS - MV P1/2T: 75.5 MSEC
BH CV ECHO MEAS - MV V2 MAX: 118 CM/SEC
BH CV ECHO MEAS - MV V2 MEAN: 63.4 CM/SEC
BH CV ECHO MEAS - MV V2 VTI: 33.8 CM
BH CV ECHO MEAS - MVA P1/2T LCG: 1.9 CM^2
BH CV ECHO MEAS - MVA(P1/2T): 2.9 CM^2
BH CV ECHO MEAS - MVA(VTI): 1.7 CM^2
BH CV ECHO MEAS - PA ACC TIME: 0.06 SEC
BH CV ECHO MEAS - PA MAX PG (FULL): 3.4 MMHG
BH CV ECHO MEAS - PA MAX PG: 4.8 MMHG
BH CV ECHO MEAS - PA PR(ACCEL): 51.6 MMHG
BH CV ECHO MEAS - PA V2 MAX: 109 CM/SEC
BH CV ECHO MEAS - RAP SYSTOLE: 3 MMHG
BH CV ECHO MEAS - RV MAX PG: 1.3 MMHG
BH CV ECHO MEAS - RV MEAN PG: 1 MMHG
BH CV ECHO MEAS - RV V1 MAX: 57.4 CM/SEC
BH CV ECHO MEAS - RV V1 MEAN: 47.4 CM/SEC
BH CV ECHO MEAS - RV V1 VTI: 12.6 CM
BH CV ECHO MEAS - SI(AO): 121.9 ML/M^2
BH CV ECHO MEAS - SI(CUBED): 38.9 ML/M^2
BH CV ECHO MEAS - SI(LVOT): 30.1 ML/M^2
BH CV ECHO MEAS - SI(MOD-SP2): 22.7 ML/M^2
BH CV ECHO MEAS - SI(MOD-SP4): 14.3 ML/M^2
BH CV ECHO MEAS - SI(TEICH): 35.9 ML/M^2
BH CV ECHO MEAS - SV(AO): 230.5 ML
BH CV ECHO MEAS - SV(CUBED): 73.5 ML
BH CV ECHO MEAS - SV(LVOT): 57 ML
BH CV ECHO MEAS - SV(MOD-SP2): 43 ML
BH CV ECHO MEAS - SV(MOD-SP4): 27 ML
BH CV ECHO MEAS - SV(TEICH): 67.8 ML
BH CV ECHO MEAS - TAPSE (>1.6): 1.7 CM
BH CV ECHO MEASUREMENTS AVERAGE E/E' RATIO: 21.16
BH CV LOWER VASCULAR LEFT COMMON FEMORAL AUGMENT: NORMAL
BH CV LOWER VASCULAR LEFT COMMON FEMORAL COMPETENT: NORMAL
BH CV LOWER VASCULAR LEFT COMMON FEMORAL COMPRESS: NORMAL
BH CV LOWER VASCULAR LEFT COMMON FEMORAL PHASIC: NORMAL
BH CV LOWER VASCULAR LEFT COMMON FEMORAL SPONT: NORMAL
BH CV LOWER VASCULAR LEFT DISTAL FEMORAL COMPRESS: NORMAL
BH CV LOWER VASCULAR LEFT GASTRONEMIUS COMPRESS: NORMAL
BH CV LOWER VASCULAR LEFT GREATER SAPH AK COMPRESS: NORMAL
BH CV LOWER VASCULAR LEFT GREATER SAPH BK COMPRESS: NORMAL
BH CV LOWER VASCULAR LEFT LESSER SAPH COMPRESS: NORMAL
BH CV LOWER VASCULAR LEFT MID FEMORAL AUGMENT: NORMAL
BH CV LOWER VASCULAR LEFT MID FEMORAL COMPETENT: NORMAL
BH CV LOWER VASCULAR LEFT MID FEMORAL COMPRESS: NORMAL
BH CV LOWER VASCULAR LEFT MID FEMORAL PHASIC: NORMAL
BH CV LOWER VASCULAR LEFT MID FEMORAL SPONT: NORMAL
BH CV LOWER VASCULAR LEFT PERONEAL COMPRESS: NORMAL
BH CV LOWER VASCULAR LEFT POPLITEAL AUGMENT: NORMAL
BH CV LOWER VASCULAR LEFT POPLITEAL COMPETENT: NORMAL
BH CV LOWER VASCULAR LEFT POPLITEAL COMPRESS: NORMAL
BH CV LOWER VASCULAR LEFT POPLITEAL PHASIC: NORMAL
BH CV LOWER VASCULAR LEFT POPLITEAL SPONT: NORMAL
BH CV LOWER VASCULAR LEFT POSTERIOR TIBIAL COMPRESS: NORMAL
BH CV LOWER VASCULAR LEFT PROFUNDA FEMORAL COMPRESS: NORMAL
BH CV LOWER VASCULAR LEFT PROXIMAL FEMORAL COMPRESS: NORMAL
BH CV LOWER VASCULAR LEFT SAPHENOFEMORAL JUNCTION COMPRESS: NORMAL
BH CV LOWER VASCULAR RIGHT COMMON FEMORAL AUGMENT: NORMAL
BH CV LOWER VASCULAR RIGHT COMMON FEMORAL COMPETENT: NORMAL
BH CV LOWER VASCULAR RIGHT COMMON FEMORAL COMPRESS: NORMAL
BH CV LOWER VASCULAR RIGHT COMMON FEMORAL PHASIC: NORMAL
BH CV LOWER VASCULAR RIGHT COMMON FEMORAL SPONT: NORMAL
BH CV LOWER VASCULAR RIGHT DISTAL FEMORAL COMPRESS: NORMAL
BH CV LOWER VASCULAR RIGHT GASTRONEMIUS COMPRESS: NORMAL
BH CV LOWER VASCULAR RIGHT GREATER SAPH AK COMPRESS: NORMAL
BH CV LOWER VASCULAR RIGHT GREATER SAPH BK COMPRESS: NORMAL
BH CV LOWER VASCULAR RIGHT LESSER SAPH COMPRESS: NORMAL
BH CV LOWER VASCULAR RIGHT MID FEMORAL AUGMENT: NORMAL
BH CV LOWER VASCULAR RIGHT MID FEMORAL COMPETENT: NORMAL
BH CV LOWER VASCULAR RIGHT MID FEMORAL COMPRESS: NORMAL
BH CV LOWER VASCULAR RIGHT MID FEMORAL PHASIC: NORMAL
BH CV LOWER VASCULAR RIGHT MID FEMORAL SPONT: NORMAL
BH CV LOWER VASCULAR RIGHT PERONEAL COMPRESS: NORMAL
BH CV LOWER VASCULAR RIGHT POPLITEAL AUGMENT: NORMAL
BH CV LOWER VASCULAR RIGHT POPLITEAL COMPETENT: NORMAL
BH CV LOWER VASCULAR RIGHT POPLITEAL COMPRESS: NORMAL
BH CV LOWER VASCULAR RIGHT POPLITEAL PHASIC: NORMAL
BH CV LOWER VASCULAR RIGHT POPLITEAL SPONT: NORMAL
BH CV LOWER VASCULAR RIGHT POSTERIOR TIBIAL COMPRESS: NORMAL
BH CV LOWER VASCULAR RIGHT PROFUNDA FEMORAL COMPRESS: NORMAL
BH CV LOWER VASCULAR RIGHT PROXIMAL FEMORAL COMPRESS: NORMAL
BH CV LOWER VASCULAR RIGHT SAPHENOFEMORAL JUNCTION COMPRESS: NORMAL
BH CV XLRA - RV BASE: 3.1 CM
BH CV XLRA - RV LENGTH: 6.7 CM
BH CV XLRA - RV MID: 2.6 CM
BH CV XLRA - TDI S': 12.7 CM/SEC
BUN SERPL-MCNC: 20 MG/DL (ref 8–23)
BUN/CREAT SERPL: 13.3 (ref 7–25)
CALCIUM SPEC-SCNC: 8.3 MG/DL (ref 8.6–10.5)
CHLORIDE SERPL-SCNC: 100 MMOL/L (ref 98–107)
CO2 SERPL-SCNC: 24.4 MMOL/L (ref 22–29)
CREAT SERPL-MCNC: 1.5 MG/DL (ref 0.57–1)
GFR SERPL CREATININE-BSD FRML MDRD: 33 ML/MIN/1.73
GLUCOSE SERPL-MCNC: 114 MG/DL (ref 65–99)
LEFT ATRIUM VOLUME INDEX: 24 ML/M2
MAXIMAL PREDICTED HEART RATE: 137 BPM
POTASSIUM SERPL-SCNC: 3.2 MMOL/L (ref 3.5–5.2)
QT INTERVAL: 477 MS
SODIUM SERPL-SCNC: 137 MMOL/L (ref 136–145)
STRESS TARGET HR: 116 BPM
TSH SERPL DL<=0.05 MIU/L-ACNC: 2.88 UIU/ML (ref 0.27–4.2)

## 2021-04-05 PROCEDURE — 80048 BASIC METABOLIC PNL TOTAL CA: CPT | Performed by: HOSPITALIST

## 2021-04-05 PROCEDURE — 93970 EXTREMITY STUDY: CPT

## 2021-04-05 PROCEDURE — 94762 N-INVAS EAR/PLS OXIMTRY CONT: CPT

## 2021-04-05 PROCEDURE — 93306 TTE W/DOPPLER COMPLETE: CPT

## 2021-04-05 PROCEDURE — G0378 HOSPITAL OBSERVATION PER HR: HCPCS

## 2021-04-05 PROCEDURE — 84443 ASSAY THYROID STIM HORMONE: CPT | Performed by: NURSE PRACTITIONER

## 2021-04-05 PROCEDURE — 93010 ELECTROCARDIOGRAM REPORT: CPT | Performed by: INTERNAL MEDICINE

## 2021-04-05 PROCEDURE — 96376 TX/PRO/DX INJ SAME DRUG ADON: CPT

## 2021-04-05 PROCEDURE — 25010000002 FUROSEMIDE PER 20 MG: Performed by: HOSPITALIST

## 2021-04-05 PROCEDURE — 93005 ELECTROCARDIOGRAM TRACING: CPT | Performed by: NURSE PRACTITIONER

## 2021-04-05 RX ORDER — POTASSIUM CHLORIDE 1.5 G/1.77G
40 POWDER, FOR SOLUTION ORAL AS NEEDED
Status: DISCONTINUED | OUTPATIENT
Start: 2021-04-05 | End: 2021-04-07 | Stop reason: HOSPADM

## 2021-04-05 RX ORDER — POTASSIUM CHLORIDE 750 MG/1
10 TABLET, FILM COATED, EXTENDED RELEASE ORAL DAILY
Status: DISCONTINUED | OUTPATIENT
Start: 2021-04-05 | End: 2021-04-05

## 2021-04-05 RX ORDER — POTASSIUM CHLORIDE 750 MG/1
40 TABLET, FILM COATED, EXTENDED RELEASE ORAL AS NEEDED
Status: DISCONTINUED | OUTPATIENT
Start: 2021-04-05 | End: 2021-04-07 | Stop reason: HOSPADM

## 2021-04-05 RX ORDER — MAGNESIUM SULFATE HEPTAHYDRATE 40 MG/ML
2 INJECTION, SOLUTION INTRAVENOUS AS NEEDED
Status: DISCONTINUED | OUTPATIENT
Start: 2021-04-05 | End: 2021-04-07 | Stop reason: HOSPADM

## 2021-04-05 RX ORDER — POTASSIUM CHLORIDE 750 MG/1
10 TABLET, FILM COATED, EXTENDED RELEASE ORAL 2 TIMES DAILY WITH MEALS
Status: DISCONTINUED | OUTPATIENT
Start: 2021-04-05 | End: 2021-04-07 | Stop reason: HOSPADM

## 2021-04-05 RX ORDER — MAGNESIUM SULFATE HEPTAHYDRATE 40 MG/ML
4 INJECTION, SOLUTION INTRAVENOUS AS NEEDED
Status: DISCONTINUED | OUTPATIENT
Start: 2021-04-05 | End: 2021-04-07 | Stop reason: HOSPADM

## 2021-04-05 RX ORDER — POTASSIUM CHLORIDE 7.45 MG/ML
10 INJECTION INTRAVENOUS
Status: DISCONTINUED | OUTPATIENT
Start: 2021-04-05 | End: 2021-04-07 | Stop reason: HOSPADM

## 2021-04-05 RX ORDER — POTASSIUM CHLORIDE 750 MG/1
20 TABLET, FILM COATED, EXTENDED RELEASE ORAL 2 TIMES DAILY WITH MEALS
Status: DISCONTINUED | OUTPATIENT
Start: 2021-04-05 | End: 2021-04-05

## 2021-04-05 RX ORDER — POTASSIUM CHLORIDE 750 MG/1
30 TABLET, FILM COATED, EXTENDED RELEASE ORAL ONCE
Status: COMPLETED | OUTPATIENT
Start: 2021-04-05 | End: 2021-04-05

## 2021-04-05 RX ADMIN — POTASSIUM CHLORIDE 10 MEQ: 750 TABLET, EXTENDED RELEASE ORAL at 17:10

## 2021-04-05 RX ADMIN — APIXABAN 2.5 MG: 2.5 TABLET, FILM COATED ORAL at 20:00

## 2021-04-05 RX ADMIN — ALLOPURINOL 300 MG: 300 TABLET ORAL at 08:41

## 2021-04-05 RX ADMIN — ATORVASTATIN CALCIUM 10 MG: 20 TABLET, FILM COATED ORAL at 08:41

## 2021-04-05 RX ADMIN — Medication 1000 MCG: at 08:41

## 2021-04-05 RX ADMIN — FUROSEMIDE 40 MG: 10 INJECTION, SOLUTION INTRAMUSCULAR; INTRAVENOUS at 19:54

## 2021-04-05 RX ADMIN — SODIUM CHLORIDE, PRESERVATIVE FREE 10 ML: 5 INJECTION INTRAVENOUS at 08:41

## 2021-04-05 RX ADMIN — LEVOTHYROXINE SODIUM 50 MCG: 0.05 TABLET ORAL at 08:41

## 2021-04-05 RX ADMIN — POTASSIUM CHLORIDE 30 MEQ: 750 TABLET, EXTENDED RELEASE ORAL at 12:59

## 2021-04-05 RX ADMIN — FUROSEMIDE 40 MG: 10 INJECTION, SOLUTION INTRAMUSCULAR; INTRAVENOUS at 08:40

## 2021-04-05 RX ADMIN — METOPROLOL TARTRATE 25 MG: 25 TABLET, FILM COATED ORAL at 20:00

## 2021-04-05 RX ADMIN — METOPROLOL TARTRATE 25 MG: 25 TABLET, FILM COATED ORAL at 08:41

## 2021-04-05 RX ADMIN — SODIUM CHLORIDE, PRESERVATIVE FREE 10 ML: 5 INJECTION INTRAVENOUS at 20:00

## 2021-04-05 RX ADMIN — PAROXETINE HYDROCHLORIDE HEMIHYDRATE 10 MG: 10 TABLET, FILM COATED ORAL at 08:41

## 2021-04-05 RX ADMIN — APIXABAN 2.5 MG: 2.5 TABLET, FILM COATED ORAL at 08:41

## 2021-04-06 LAB
ANION GAP SERPL CALCULATED.3IONS-SCNC: 14.8 MMOL/L (ref 5–15)
BASOPHILS # BLD AUTO: 0.04 10*3/MM3 (ref 0–0.2)
BASOPHILS NFR BLD AUTO: 0.7 % (ref 0–1.5)
BUN SERPL-MCNC: 29 MG/DL (ref 8–23)
BUN/CREAT SERPL: 19.6 (ref 7–25)
CALCIUM SPEC-SCNC: 8.8 MG/DL (ref 8.6–10.5)
CHLORIDE SERPL-SCNC: 99 MMOL/L (ref 98–107)
CO2 SERPL-SCNC: 25.2 MMOL/L (ref 22–29)
CREAT SERPL-MCNC: 1.48 MG/DL (ref 0.57–1)
DEPRECATED RDW RBC AUTO: 55 FL (ref 37–54)
EOSINOPHIL # BLD AUTO: 0.23 10*3/MM3 (ref 0–0.4)
EOSINOPHIL NFR BLD AUTO: 4 % (ref 0.3–6.2)
ERYTHROCYTE [DISTWIDTH] IN BLOOD BY AUTOMATED COUNT: 14.1 % (ref 12.3–15.4)
GFR SERPL CREATININE-BSD FRML MDRD: 34 ML/MIN/1.73
GLUCOSE SERPL-MCNC: 82 MG/DL (ref 65–99)
HCT VFR BLD AUTO: 33.7 % (ref 34–46.6)
HGB BLD-MCNC: 11.7 G/DL (ref 12–15.9)
LYMPHOCYTES # BLD AUTO: 1.2 10*3/MM3 (ref 0.7–3.1)
LYMPHOCYTES NFR BLD AUTO: 20.6 % (ref 19.6–45.3)
MCH RBC QN AUTO: 36.9 PG (ref 26.6–33)
MCHC RBC AUTO-ENTMCNC: 34.7 G/DL (ref 31.5–35.7)
MCV RBC AUTO: 106.3 FL (ref 79–97)
MONOCYTES # BLD AUTO: 0.78 10*3/MM3 (ref 0.1–0.9)
MONOCYTES NFR BLD AUTO: 13.4 % (ref 5–12)
NEUTROPHILS NFR BLD AUTO: 3.54 10*3/MM3 (ref 1.7–7)
NEUTROPHILS NFR BLD AUTO: 60.8 % (ref 42.7–76)
PLATELET # BLD AUTO: 174 10*3/MM3 (ref 140–450)
PMV BLD AUTO: 10.4 FL (ref 6–12)
POTASSIUM SERPL-SCNC: 3.7 MMOL/L (ref 3.5–5.2)
RBC # BLD AUTO: 3.17 10*6/MM3 (ref 3.77–5.28)
SODIUM SERPL-SCNC: 139 MMOL/L (ref 136–145)
WBC # BLD AUTO: 5.82 10*3/MM3 (ref 3.4–10.8)

## 2021-04-06 PROCEDURE — 94618 PULMONARY STRESS TESTING: CPT

## 2021-04-06 PROCEDURE — 96376 TX/PRO/DX INJ SAME DRUG ADON: CPT

## 2021-04-06 PROCEDURE — 80048 BASIC METABOLIC PNL TOTAL CA: CPT | Performed by: HOSPITALIST

## 2021-04-06 PROCEDURE — 25010000002 FUROSEMIDE PER 20 MG: Performed by: HOSPITALIST

## 2021-04-06 PROCEDURE — 85025 COMPLETE CBC W/AUTO DIFF WBC: CPT | Performed by: INTERNAL MEDICINE

## 2021-04-06 PROCEDURE — G0378 HOSPITAL OBSERVATION PER HR: HCPCS

## 2021-04-06 RX ORDER — FUROSEMIDE 40 MG/1
40 TABLET ORAL DAILY
Status: DISCONTINUED | OUTPATIENT
Start: 2021-04-06 | End: 2021-04-07 | Stop reason: HOSPADM

## 2021-04-06 RX ORDER — FUROSEMIDE 40 MG/1
40 TABLET ORAL DAILY
Qty: 30 TABLET | Refills: 0 | Status: SHIPPED | OUTPATIENT
Start: 2021-04-07 | End: 2022-01-01 | Stop reason: HOSPADM

## 2021-04-06 RX ORDER — POTASSIUM CHLORIDE 20 MEQ/1
20 TABLET, EXTENDED RELEASE ORAL DAILY
Qty: 30 TABLET | Refills: 0 | Status: SHIPPED | OUTPATIENT
Start: 2021-04-06 | End: 2021-05-06

## 2021-04-06 RX ADMIN — METOPROLOL TARTRATE 25 MG: 25 TABLET, FILM COATED ORAL at 08:21

## 2021-04-06 RX ADMIN — ATORVASTATIN CALCIUM 10 MG: 20 TABLET, FILM COATED ORAL at 08:21

## 2021-04-06 RX ADMIN — SODIUM CHLORIDE, PRESERVATIVE FREE 10 ML: 5 INJECTION INTRAVENOUS at 08:22

## 2021-04-06 RX ADMIN — APIXABAN 2.5 MG: 2.5 TABLET, FILM COATED ORAL at 08:22

## 2021-04-06 RX ADMIN — METOPROLOL TARTRATE 25 MG: 25 TABLET, FILM COATED ORAL at 20:41

## 2021-04-06 RX ADMIN — POTASSIUM CHLORIDE 10 MEQ: 750 TABLET, EXTENDED RELEASE ORAL at 08:21

## 2021-04-06 RX ADMIN — ALLOPURINOL 300 MG: 300 TABLET ORAL at 08:22

## 2021-04-06 RX ADMIN — Medication 1000 MCG: at 08:22

## 2021-04-06 RX ADMIN — POTASSIUM CHLORIDE 10 MEQ: 750 TABLET, EXTENDED RELEASE ORAL at 17:05

## 2021-04-06 RX ADMIN — LEVOTHYROXINE SODIUM 50 MCG: 0.05 TABLET ORAL at 05:07

## 2021-04-06 RX ADMIN — FUROSEMIDE 40 MG: 10 INJECTION, SOLUTION INTRAMUSCULAR; INTRAVENOUS at 08:22

## 2021-04-06 RX ADMIN — APIXABAN 2.5 MG: 2.5 TABLET, FILM COATED ORAL at 20:41

## 2021-04-06 RX ADMIN — PAROXETINE HYDROCHLORIDE HEMIHYDRATE 10 MG: 10 TABLET, FILM COATED ORAL at 08:22

## 2021-04-07 VITALS
WEIGHT: 193.12 LBS | RESPIRATION RATE: 18 BRPM | OXYGEN SATURATION: 92 % | TEMPERATURE: 97.8 F | SYSTOLIC BLOOD PRESSURE: 132 MMHG | DIASTOLIC BLOOD PRESSURE: 97 MMHG | HEIGHT: 62 IN | HEART RATE: 55 BPM | BODY MASS INDEX: 35.54 KG/M2

## 2021-04-07 LAB
ANION GAP SERPL CALCULATED.3IONS-SCNC: 10.5 MMOL/L (ref 5–15)
ANISOCYTOSIS BLD QL: ABNORMAL
BUN SERPL-MCNC: 31 MG/DL (ref 8–23)
BUN/CREAT SERPL: 18.8 (ref 7–25)
CALCIUM SPEC-SCNC: 8.8 MG/DL (ref 8.6–10.5)
CHLORIDE SERPL-SCNC: 101 MMOL/L (ref 98–107)
CO2 SERPL-SCNC: 26.5 MMOL/L (ref 22–29)
CREAT SERPL-MCNC: 1.65 MG/DL (ref 0.57–1)
DEPRECATED RDW RBC AUTO: 59 FL (ref 37–54)
EOSINOPHIL # BLD MANUAL: 0.22 10*3/MM3 (ref 0–0.4)
EOSINOPHIL NFR BLD MANUAL: 4 % (ref 0.3–6.2)
ERYTHROCYTE [DISTWIDTH] IN BLOOD BY AUTOMATED COUNT: 14.5 % (ref 12.3–15.4)
GFR SERPL CREATININE-BSD FRML MDRD: 30 ML/MIN/1.73
GLUCOSE SERPL-MCNC: 105 MG/DL (ref 65–99)
HCT VFR BLD AUTO: 37.5 % (ref 34–46.6)
HGB BLD-MCNC: 12.6 G/DL (ref 12–15.9)
LYMPHOCYTES # BLD MANUAL: 0.76 10*3/MM3 (ref 0.7–3.1)
LYMPHOCYTES NFR BLD MANUAL: 14.1 % (ref 19.6–45.3)
LYMPHOCYTES NFR BLD MANUAL: 6.1 % (ref 5–12)
MCH RBC QN AUTO: 37.1 PG (ref 26.6–33)
MCHC RBC AUTO-ENTMCNC: 33.6 G/DL (ref 31.5–35.7)
MCV RBC AUTO: 110.3 FL (ref 79–97)
MONOCYTES # BLD AUTO: 0.33 10*3/MM3 (ref 0.1–0.9)
NEUTROPHILS # BLD AUTO: 4.11 10*3/MM3 (ref 1.7–7)
NEUTROPHILS NFR BLD MANUAL: 75.8 % (ref 42.7–76)
PLAT MORPH BLD: NORMAL
PLATELET # BLD AUTO: 193 10*3/MM3 (ref 140–450)
PMV BLD AUTO: 9.6 FL (ref 6–12)
POTASSIUM SERPL-SCNC: 3.8 MMOL/L (ref 3.5–5.2)
RBC # BLD AUTO: 3.4 10*6/MM3 (ref 3.77–5.28)
SODIUM SERPL-SCNC: 138 MMOL/L (ref 136–145)
WBC # BLD AUTO: 5.42 10*3/MM3 (ref 3.4–10.8)
WBC MORPH BLD: NORMAL

## 2021-04-07 PROCEDURE — 85025 COMPLETE CBC W/AUTO DIFF WBC: CPT | Performed by: INTERNAL MEDICINE

## 2021-04-07 PROCEDURE — 80048 BASIC METABOLIC PNL TOTAL CA: CPT | Performed by: HOSPITALIST

## 2021-04-07 PROCEDURE — G0378 HOSPITAL OBSERVATION PER HR: HCPCS

## 2021-04-07 PROCEDURE — 97161 PT EVAL LOW COMPLEX 20 MIN: CPT

## 2021-04-07 PROCEDURE — 85007 BL SMEAR W/DIFF WBC COUNT: CPT | Performed by: INTERNAL MEDICINE

## 2021-04-07 PROCEDURE — 97110 THERAPEUTIC EXERCISES: CPT

## 2021-04-07 PROCEDURE — 36415 COLL VENOUS BLD VENIPUNCTURE: CPT | Performed by: HOSPITALIST

## 2021-04-07 RX ADMIN — ALLOPURINOL 300 MG: 300 TABLET ORAL at 09:05

## 2021-04-07 RX ADMIN — FUROSEMIDE 40 MG: 40 TABLET ORAL at 09:05

## 2021-04-07 RX ADMIN — METOPROLOL TARTRATE 25 MG: 25 TABLET, FILM COATED ORAL at 09:06

## 2021-04-07 RX ADMIN — APIXABAN 2.5 MG: 2.5 TABLET, FILM COATED ORAL at 09:05

## 2021-04-07 RX ADMIN — PAROXETINE HYDROCHLORIDE HEMIHYDRATE 10 MG: 10 TABLET, FILM COATED ORAL at 13:04

## 2021-04-07 RX ADMIN — Medication 1000 MCG: at 09:05

## 2021-04-07 RX ADMIN — SODIUM CHLORIDE, PRESERVATIVE FREE 10 ML: 5 INJECTION INTRAVENOUS at 09:06

## 2021-04-07 RX ADMIN — POTASSIUM CHLORIDE 10 MEQ: 750 TABLET, EXTENDED RELEASE ORAL at 09:05

## 2021-04-07 RX ADMIN — ATORVASTATIN CALCIUM 10 MG: 20 TABLET, FILM COATED ORAL at 09:05

## 2021-04-07 RX ADMIN — LEVOTHYROXINE SODIUM 50 MCG: 0.05 TABLET ORAL at 06:15

## 2021-04-08 ENCOUNTER — READMISSION MANAGEMENT (OUTPATIENT)
Dept: CALL CENTER | Facility: HOSPITAL | Age: 86
End: 2021-04-08

## 2021-04-08 NOTE — OUTREACH NOTE
Prep Survey      Responses   [facility] patient discharged from?  [place]   Is LACE score < 7 ?  No   Emergency Room discharge w/ pulse ox?  No   Eligibility  Not Eligible   What are the reasons patient is not eligible?  [facility]   Does the patient have one of the following disease processes/diagnoses(primary or secondary)?  CHF   Prep survey completed?  Yes          Carol Patel RN

## 2021-04-09 LAB
BACTERIA SPEC AEROBE CULT: NORMAL
BACTERIA SPEC AEROBE CULT: NORMAL

## 2021-04-21 ENCOUNTER — HOSPITAL ENCOUNTER (INPATIENT)
Facility: HOSPITAL | Age: 86
LOS: 2 days | Discharge: HOME HEALTH | End: 2021-04-24
Attending: EMERGENCY MEDICINE | Admitting: HOSPITALIST

## 2021-04-21 ENCOUNTER — APPOINTMENT (OUTPATIENT)
Dept: GENERAL RADIOLOGY | Facility: HOSPITAL | Age: 86
End: 2021-04-21

## 2021-04-21 DIAGNOSIS — Z79.01 ON ANTICOAGULANT THERAPY: ICD-10-CM

## 2021-04-21 DIAGNOSIS — R29.898 RIGHT ARM WEAKNESS: Primary | ICD-10-CM

## 2021-04-21 DIAGNOSIS — I48.19 PERSISTENT ATRIAL FIBRILLATION (HCC): ICD-10-CM

## 2021-04-21 PROCEDURE — 82962 GLUCOSE BLOOD TEST: CPT

## 2021-04-21 PROCEDURE — 84484 ASSAY OF TROPONIN QUANT: CPT | Performed by: PHYSICIAN ASSISTANT

## 2021-04-21 PROCEDURE — 85730 THROMBOPLASTIN TIME PARTIAL: CPT | Performed by: PHYSICIAN ASSISTANT

## 2021-04-21 PROCEDURE — 80053 COMPREHEN METABOLIC PANEL: CPT | Performed by: PHYSICIAN ASSISTANT

## 2021-04-21 PROCEDURE — 93005 ELECTROCARDIOGRAM TRACING: CPT | Performed by: PHYSICIAN ASSISTANT

## 2021-04-21 PROCEDURE — 71045 X-RAY EXAM CHEST 1 VIEW: CPT

## 2021-04-21 PROCEDURE — 85025 COMPLETE CBC W/AUTO DIFF WBC: CPT | Performed by: PHYSICIAN ASSISTANT

## 2021-04-21 PROCEDURE — 93010 ELECTROCARDIOGRAM REPORT: CPT | Performed by: INTERNAL MEDICINE

## 2021-04-21 PROCEDURE — 99284 EMERGENCY DEPT VISIT MOD MDM: CPT

## 2021-04-21 PROCEDURE — 99285 EMERGENCY DEPT VISIT HI MDM: CPT

## 2021-04-21 PROCEDURE — 85610 PROTHROMBIN TIME: CPT | Performed by: PHYSICIAN ASSISTANT

## 2021-04-21 RX ORDER — SODIUM CHLORIDE 0.9 % (FLUSH) 0.9 %
10 SYRINGE (ML) INJECTION AS NEEDED
Status: DISCONTINUED | OUTPATIENT
Start: 2021-04-21 | End: 2021-04-24 | Stop reason: HOSPADM

## 2021-04-22 ENCOUNTER — APPOINTMENT (OUTPATIENT)
Dept: CT IMAGING | Facility: HOSPITAL | Age: 86
End: 2021-04-22

## 2021-04-22 ENCOUNTER — APPOINTMENT (OUTPATIENT)
Dept: CARDIOLOGY | Facility: HOSPITAL | Age: 86
End: 2021-04-22

## 2021-04-22 ENCOUNTER — APPOINTMENT (OUTPATIENT)
Dept: MRI IMAGING | Facility: HOSPITAL | Age: 86
End: 2021-04-22

## 2021-04-22 PROBLEM — I50.32 CHRONIC DIASTOLIC CHF (CONGESTIVE HEART FAILURE) (HCC): Status: ACTIVE | Noted: 2021-04-22

## 2021-04-22 PROBLEM — R29.898 RIGHT ARM WEAKNESS: Status: ACTIVE | Noted: 2021-04-22

## 2021-04-22 LAB
ALBUMIN SERPL-MCNC: 3.6 G/DL (ref 3.5–5.2)
ALBUMIN SERPL-MCNC: 3.7 G/DL (ref 3.5–5.2)
ALBUMIN/GLOB SERPL: 0.9 G/DL
ALBUMIN/GLOB SERPL: 0.9 G/DL
ALP SERPL-CCNC: 78 U/L (ref 39–117)
ALP SERPL-CCNC: 81 U/L (ref 39–117)
ALT SERPL W P-5'-P-CCNC: 12 U/L (ref 1–33)
ALT SERPL W P-5'-P-CCNC: 15 U/L (ref 1–33)
ANION GAP SERPL CALCULATED.3IONS-SCNC: 11.5 MMOL/L (ref 5–15)
ANION GAP SERPL CALCULATED.3IONS-SCNC: 15.2 MMOL/L (ref 5–15)
AORTIC DIMENSIONLESS INDEX: 0.7 (DI)
APTT PPP: 39.2 SECONDS (ref 22.7–35.4)
AST SERPL-CCNC: 21 U/L (ref 1–32)
AST SERPL-CCNC: 25 U/L (ref 1–32)
BASOPHILS # BLD AUTO: 0.06 10*3/MM3 (ref 0–0.2)
BASOPHILS NFR BLD AUTO: 0.9 % (ref 0–1.5)
BH CV ECHO MEAS - ACS: 1.4 CM
BH CV ECHO MEAS - AO MAX PG (FULL): 4.4 MMHG
BH CV ECHO MEAS - AO MAX PG: 6.4 MMHG
BH CV ECHO MEAS - AO MEAN PG (FULL): 3 MMHG
BH CV ECHO MEAS - AO MEAN PG: 4 MMHG
BH CV ECHO MEAS - AO ROOT AREA (BSA CORRECTED): 1.4
BH CV ECHO MEAS - AO ROOT AREA: 5.7 CM^2
BH CV ECHO MEAS - AO ROOT DIAM: 2.7 CM
BH CV ECHO MEAS - AO V2 MAX: 126 CM/SEC
BH CV ECHO MEAS - AO V2 MEAN: 89.6 CM/SEC
BH CV ECHO MEAS - AO V2 VTI: 25.1 CM
BH CV ECHO MEAS - AVA(I,A): 1.9 CM^2
BH CV ECHO MEAS - AVA(I,D): 1.9 CM^2
BH CV ECHO MEAS - AVA(V,A): 1.6 CM^2
BH CV ECHO MEAS - AVA(V,D): 1.6 CM^2
BH CV ECHO MEAS - BSA(HAYCOCK): 2 M^2
BH CV ECHO MEAS - BSA: 1.9 M^2
BH CV ECHO MEAS - BZI_BMI: 33.8 KILOGRAMS/M^2
BH CV ECHO MEAS - BZI_METRIC_HEIGHT: 160 CM
BH CV ECHO MEAS - BZI_METRIC_WEIGHT: 86.6 KG
BH CV ECHO MEAS - EDV(CUBED): 42.9 ML
BH CV ECHO MEAS - EDV(MOD-SP2): 61 ML
BH CV ECHO MEAS - EDV(MOD-SP4): 76 ML
BH CV ECHO MEAS - EDV(TEICH): 50.9 ML
BH CV ECHO MEAS - EF(CUBED): 59 %
BH CV ECHO MEAS - EF(MOD-BP): 56 %
BH CV ECHO MEAS - EF(MOD-SP2): 59 %
BH CV ECHO MEAS - EF(MOD-SP4): 52.6 %
BH CV ECHO MEAS - EF(TEICH): 51.6 %
BH CV ECHO MEAS - ESV(CUBED): 17.6 ML
BH CV ECHO MEAS - ESV(MOD-SP2): 25 ML
BH CV ECHO MEAS - ESV(MOD-SP4): 36 ML
BH CV ECHO MEAS - ESV(TEICH): 24.6 ML
BH CV ECHO MEAS - FS: 25.7 %
BH CV ECHO MEAS - IVS/LVPW: 0.83
BH CV ECHO MEAS - IVSD: 1 CM
BH CV ECHO MEAS - LAT PEAK E' VEL: 12.7 CM/SEC
BH CV ECHO MEAS - LV DIASTOLIC VOL/BSA (35-75): 40.1 ML/M^2
BH CV ECHO MEAS - LV MASS(C)D: 119 GRAMS
BH CV ECHO MEAS - LV MASS(C)DI: 62.8 GRAMS/M^2
BH CV ECHO MEAS - LV MAX PG: 1.9 MMHG
BH CV ECHO MEAS - LV MEAN PG: 1 MMHG
BH CV ECHO MEAS - LV SYSTOLIC VOL/BSA (12-30): 19 ML/M^2
BH CV ECHO MEAS - LV V1 MAX: 69.8 CM/SEC
BH CV ECHO MEAS - LV V1 MEAN: 55.6 CM/SEC
BH CV ECHO MEAS - LV V1 VTI: 16.7 CM
BH CV ECHO MEAS - LVIDD: 3.5 CM
BH CV ECHO MEAS - LVIDS: 2.6 CM
BH CV ECHO MEAS - LVLD AP2: 6.9 CM
BH CV ECHO MEAS - LVLD AP4: 6.9 CM
BH CV ECHO MEAS - LVLS AP2: 5.8 CM
BH CV ECHO MEAS - LVLS AP4: 5.9 CM
BH CV ECHO MEAS - LVOT AREA (M): 2.8 CM^2
BH CV ECHO MEAS - LVOT AREA: 2.8 CM^2
BH CV ECHO MEAS - LVOT DIAM: 1.9 CM
BH CV ECHO MEAS - LVPWD: 1.2 CM
BH CV ECHO MEAS - MED PEAK E' VEL: 9.7 CM/SEC
BH CV ECHO MEAS - MV A DUR: 0.09 SEC
BH CV ECHO MEAS - MV A MAX VEL: 44.1 CM/SEC
BH CV ECHO MEAS - MV DEC SLOPE: 454 CM/SEC^2
BH CV ECHO MEAS - MV DEC TIME: 250 SEC
BH CV ECHO MEAS - MV E MAX VEL: 98.1 CM/SEC
BH CV ECHO MEAS - MV E/A: 2.2
BH CV ECHO MEAS - MV MAX PG: 5.3 MMHG
BH CV ECHO MEAS - MV MEAN PG: 1 MMHG
BH CV ECHO MEAS - MV P1/2T MAX VEL: 118 CM/SEC
BH CV ECHO MEAS - MV P1/2T: 76.1 MSEC
BH CV ECHO MEAS - MV V2 MAX: 115 CM/SEC
BH CV ECHO MEAS - MV V2 MEAN: 53.7 CM/SEC
BH CV ECHO MEAS - MV V2 VTI: 26.3 CM
BH CV ECHO MEAS - MVA P1/2T LCG: 1.9 CM^2
BH CV ECHO MEAS - MVA(P1/2T): 2.9 CM^2
BH CV ECHO MEAS - MVA(VTI): 1.8 CM^2
BH CV ECHO MEAS - PA ACC TIME: 0.06 SEC
BH CV ECHO MEAS - PA MAX PG (FULL): 3.2 MMHG
BH CV ECHO MEAS - PA MAX PG: 4.2 MMHG
BH CV ECHO MEAS - PA PR(ACCEL): 50.7 MMHG
BH CV ECHO MEAS - PA V2 MAX: 103 CM/SEC
BH CV ECHO MEAS - PVA(V,A): 2.5 CM^2
BH CV ECHO MEAS - PVA(V,D): 2.5 CM^2
BH CV ECHO MEAS - QP/QS: 1.2
BH CV ECHO MEAS - RAP SYSTOLE: 3 MMHG
BH CV ECHO MEAS - RV MAX PG: 1.1 MMHG
BH CV ECHO MEAS - RV MEAN PG: 1 MMHG
BH CV ECHO MEAS - RV V1 MAX: 52.2 CM/SEC
BH CV ECHO MEAS - RV V1 MEAN: 37 CM/SEC
BH CV ECHO MEAS - RV V1 VTI: 11.3 CM
BH CV ECHO MEAS - RVOT AREA: 4.9 CM^2
BH CV ECHO MEAS - RVOT DIAM: 2.5 CM
BH CV ECHO MEAS - RVSP: 32 MMHG
BH CV ECHO MEAS - SI(AO): 75.8 ML/M^2
BH CV ECHO MEAS - SI(CUBED): 13.3 ML/M^2
BH CV ECHO MEAS - SI(LVOT): 25 ML/M^2
BH CV ECHO MEAS - SI(MOD-SP2): 19 ML/M^2
BH CV ECHO MEAS - SI(MOD-SP4): 21.1 ML/M^2
BH CV ECHO MEAS - SI(TEICH): 13.8 ML/M^2
BH CV ECHO MEAS - SV(AO): 143.7 ML
BH CV ECHO MEAS - SV(CUBED): 25.3 ML
BH CV ECHO MEAS - SV(LVOT): 47.3 ML
BH CV ECHO MEAS - SV(MOD-SP2): 36 ML
BH CV ECHO MEAS - SV(MOD-SP4): 40 ML
BH CV ECHO MEAS - SV(RVOT): 55.5 ML
BH CV ECHO MEAS - SV(TEICH): 26.3 ML
BH CV ECHO MEAS - TAPSE (>1.6): 1.3 CM
BH CV ECHO MEAS - TR MAX VEL: 269 CM/SEC
BH CV ECHO MEASUREMENTS AVERAGE E/E' RATIO: 8.76
BH CV VAS BP RIGHT ARM: NORMAL MMHG
BH CV XLRA - RV BASE: 3.2 CM
BH CV XLRA - RV LENGTH: 6.3 CM
BH CV XLRA - RV MID: 2.4 CM
BH CV XLRA - TDI S': 8.4 CM/SEC
BILIRUB SERPL-MCNC: 0.7 MG/DL (ref 0–1.2)
BILIRUB SERPL-MCNC: 0.7 MG/DL (ref 0–1.2)
BUN SERPL-MCNC: 30 MG/DL (ref 8–23)
BUN SERPL-MCNC: 30 MG/DL (ref 8–23)
BUN/CREAT SERPL: 16.7 (ref 7–25)
BUN/CREAT SERPL: 18.4 (ref 7–25)
CALCIUM SPEC-SCNC: 9.5 MG/DL (ref 8.6–10.5)
CALCIUM SPEC-SCNC: 9.7 MG/DL (ref 8.6–10.5)
CHLORIDE SERPL-SCNC: 104 MMOL/L (ref 98–107)
CHLORIDE SERPL-SCNC: 106 MMOL/L (ref 98–107)
CHOLEST SERPL-MCNC: 156 MG/DL (ref 0–200)
CO2 SERPL-SCNC: 20.8 MMOL/L (ref 22–29)
CO2 SERPL-SCNC: 26.5 MMOL/L (ref 22–29)
CREAT SERPL-MCNC: 1.63 MG/DL (ref 0.57–1)
CREAT SERPL-MCNC: 1.8 MG/DL (ref 0.57–1)
DEPRECATED RDW RBC AUTO: 53 FL (ref 37–54)
DEPRECATED RDW RBC AUTO: 56.4 FL (ref 37–54)
EOSINOPHIL # BLD AUTO: 0.17 10*3/MM3 (ref 0–0.4)
EOSINOPHIL NFR BLD AUTO: 2.6 % (ref 0.3–6.2)
ERYTHROCYTE [DISTWIDTH] IN BLOOD BY AUTOMATED COUNT: 13.6 % (ref 12.3–15.4)
ERYTHROCYTE [DISTWIDTH] IN BLOOD BY AUTOMATED COUNT: 14.4 % (ref 12.3–15.4)
GFR SERPL CREATININE-BSD FRML MDRD: 27 ML/MIN/1.73
GFR SERPL CREATININE-BSD FRML MDRD: 30 ML/MIN/1.73
GLOBULIN UR ELPH-MCNC: 3.8 GM/DL
GLOBULIN UR ELPH-MCNC: 3.9 GM/DL
GLUCOSE BLDC GLUCOMTR-MCNC: 107 MG/DL (ref 70–130)
GLUCOSE BLDC GLUCOMTR-MCNC: 119 MG/DL (ref 70–130)
GLUCOSE SERPL-MCNC: 101 MG/DL (ref 65–99)
GLUCOSE SERPL-MCNC: 115 MG/DL (ref 65–99)
HBA1C MFR BLD: 5.87 % (ref 4.8–5.6)
HCT VFR BLD AUTO: 40.8 % (ref 34–46.6)
HCT VFR BLD AUTO: 41 % (ref 34–46.6)
HDLC SERPL-MCNC: 37 MG/DL (ref 40–60)
HGB BLD-MCNC: 13.6 G/DL (ref 12–15.9)
HGB BLD-MCNC: 14.1 G/DL (ref 12–15.9)
INR PPP: 1.29 (ref 0.9–1.1)
LDLC SERPL CALC-MCNC: 97 MG/DL (ref 0–100)
LDLC/HDLC SERPL: 2.57 {RATIO}
LEFT ATRIUM VOLUME INDEX: 20 ML/M2
LYMPHOCYTES # BLD AUTO: 1.82 10*3/MM3 (ref 0.7–3.1)
LYMPHOCYTES NFR BLD AUTO: 27.7 % (ref 19.6–45.3)
MAXIMAL PREDICTED HEART RATE: 137 BPM
MCH RBC QN AUTO: 35.9 PG (ref 26.6–33)
MCH RBC QN AUTO: 37.5 PG (ref 26.6–33)
MCHC RBC AUTO-ENTMCNC: 33.2 G/DL (ref 31.5–35.7)
MCHC RBC AUTO-ENTMCNC: 34.6 G/DL (ref 31.5–35.7)
MCV RBC AUTO: 108.2 FL (ref 79–97)
MCV RBC AUTO: 108.5 FL (ref 79–97)
MONOCYTES # BLD AUTO: 0.77 10*3/MM3 (ref 0.1–0.9)
MONOCYTES NFR BLD AUTO: 11.7 % (ref 5–12)
NEUTROPHILS NFR BLD AUTO: 3.72 10*3/MM3 (ref 1.7–7)
NEUTROPHILS NFR BLD AUTO: 56.8 % (ref 42.7–76)
PLATELET # BLD AUTO: 225 10*3/MM3 (ref 140–450)
PLATELET # BLD AUTO: 266 10*3/MM3 (ref 140–450)
PMV BLD AUTO: 9.6 FL (ref 6–12)
PMV BLD AUTO: 9.6 FL (ref 6–12)
POTASSIUM SERPL-SCNC: 4.3 MMOL/L (ref 3.5–5.2)
POTASSIUM SERPL-SCNC: 4.3 MMOL/L (ref 3.5–5.2)
PROT SERPL-MCNC: 7.4 G/DL (ref 6–8.5)
PROT SERPL-MCNC: 7.6 G/DL (ref 6–8.5)
PROTHROMBIN TIME: 15.9 SECONDS (ref 11.7–14.2)
QT INTERVAL: 455 MS
RBC # BLD AUTO: 3.76 10*6/MM3 (ref 3.77–5.28)
RBC # BLD AUTO: 3.79 10*6/MM3 (ref 3.77–5.28)
SARS-COV-2 ORF1AB RESP QL NAA+PROBE: NOT DETECTED
SODIUM SERPL-SCNC: 142 MMOL/L (ref 136–145)
SODIUM SERPL-SCNC: 142 MMOL/L (ref 136–145)
STRESS TARGET HR: 116 BPM
TRIGL SERPL-MCNC: 119 MG/DL (ref 0–150)
TROPONIN T SERPL-MCNC: <0.01 NG/ML (ref 0–0.03)
TSH SERPL DL<=0.05 MIU/L-ACNC: 2.79 UIU/ML (ref 0.27–4.2)
VLDLC SERPL-MCNC: 22 MG/DL (ref 5–40)
WBC # BLD AUTO: 6.56 10*3/MM3 (ref 3.4–10.8)
WBC # BLD AUTO: 6.88 10*3/MM3 (ref 3.4–10.8)

## 2021-04-22 PROCEDURE — U0004 COV-19 TEST NON-CDC HGH THRU: HCPCS | Performed by: HOSPITALIST

## 2021-04-22 PROCEDURE — 80061 LIPID PANEL: CPT | Performed by: NURSE PRACTITIONER

## 2021-04-22 PROCEDURE — U0005 INFEC AGEN DETEC AMPLI PROBE: HCPCS | Performed by: HOSPITALIST

## 2021-04-22 PROCEDURE — 83036 HEMOGLOBIN GLYCOSYLATED A1C: CPT | Performed by: NURSE PRACTITIONER

## 2021-04-22 PROCEDURE — 93306 TTE W/DOPPLER COMPLETE: CPT

## 2021-04-22 PROCEDURE — 80053 COMPREHEN METABOLIC PANEL: CPT | Performed by: NURSE PRACTITIONER

## 2021-04-22 PROCEDURE — 85027 COMPLETE CBC AUTOMATED: CPT | Performed by: NURSE PRACTITIONER

## 2021-04-22 PROCEDURE — 97535 SELF CARE MNGMENT TRAINING: CPT

## 2021-04-22 PROCEDURE — 70450 CT HEAD/BRAIN W/O DYE: CPT

## 2021-04-22 PROCEDURE — 70551 MRI BRAIN STEM W/O DYE: CPT

## 2021-04-22 PROCEDURE — 84443 ASSAY THYROID STIM HORMONE: CPT | Performed by: NURSE PRACTITIONER

## 2021-04-22 PROCEDURE — 82962 GLUCOSE BLOOD TEST: CPT

## 2021-04-22 PROCEDURE — 36415 COLL VENOUS BLD VENIPUNCTURE: CPT | Performed by: NURSE PRACTITIONER

## 2021-04-22 PROCEDURE — 99221 1ST HOSP IP/OBS SF/LOW 40: CPT | Performed by: PSYCHIATRY & NEUROLOGY

## 2021-04-22 PROCEDURE — 97165 OT EVAL LOW COMPLEX 30 MIN: CPT

## 2021-04-22 PROCEDURE — 93306 TTE W/DOPPLER COMPLETE: CPT | Performed by: INTERNAL MEDICINE

## 2021-04-22 RX ORDER — ASPIRIN 81 MG/1
81 TABLET ORAL DAILY
Status: DISCONTINUED | OUTPATIENT
Start: 2021-04-23 | End: 2021-04-23

## 2021-04-22 RX ORDER — ACETAMINOPHEN 650 MG/1
650 SUPPOSITORY RECTAL EVERY 4 HOURS PRN
Status: DISCONTINUED | OUTPATIENT
Start: 2021-04-22 | End: 2021-04-24 | Stop reason: HOSPADM

## 2021-04-22 RX ORDER — LEVOTHYROXINE SODIUM 0.05 MG/1
50 TABLET ORAL
Status: DISCONTINUED | OUTPATIENT
Start: 2021-04-22 | End: 2021-04-24 | Stop reason: HOSPADM

## 2021-04-22 RX ORDER — BISACODYL 10 MG
10 SUPPOSITORY, RECTAL RECTAL DAILY PRN
Status: DISCONTINUED | OUTPATIENT
Start: 2021-04-22 | End: 2021-04-24 | Stop reason: HOSPADM

## 2021-04-22 RX ORDER — ACETAMINOPHEN 325 MG/1
650 TABLET ORAL EVERY 4 HOURS PRN
Status: DISCONTINUED | OUTPATIENT
Start: 2021-04-22 | End: 2021-04-24 | Stop reason: HOSPADM

## 2021-04-22 RX ORDER — FUROSEMIDE 40 MG/1
40 TABLET ORAL DAILY
Status: DISCONTINUED | OUTPATIENT
Start: 2021-04-22 | End: 2021-04-24 | Stop reason: HOSPADM

## 2021-04-22 RX ORDER — ONDANSETRON 2 MG/ML
4 INJECTION INTRAMUSCULAR; INTRAVENOUS EVERY 6 HOURS PRN
Status: DISCONTINUED | OUTPATIENT
Start: 2021-04-22 | End: 2021-04-24 | Stop reason: HOSPADM

## 2021-04-22 RX ORDER — ALBUTEROL SULFATE 2.5 MG/3ML
2.5 SOLUTION RESPIRATORY (INHALATION) EVERY 6 HOURS PRN
Status: DISCONTINUED | OUTPATIENT
Start: 2021-04-22 | End: 2021-04-24 | Stop reason: HOSPADM

## 2021-04-22 RX ORDER — PAROXETINE 10 MG/1
10 TABLET, FILM COATED ORAL DAILY
Status: DISCONTINUED | OUTPATIENT
Start: 2021-04-22 | End: 2021-04-24 | Stop reason: HOSPADM

## 2021-04-22 RX ORDER — SODIUM CHLORIDE 9 MG/ML
75 INJECTION, SOLUTION INTRAVENOUS CONTINUOUS
Status: DISCONTINUED | OUTPATIENT
Start: 2021-04-22 | End: 2021-04-22

## 2021-04-22 RX ORDER — ASPIRIN 300 MG/1
300 SUPPOSITORY RECTAL DAILY
Status: DISCONTINUED | OUTPATIENT
Start: 2021-04-22 | End: 2021-04-22

## 2021-04-22 RX ORDER — ATORVASTATIN CALCIUM 80 MG/1
80 TABLET, FILM COATED ORAL NIGHTLY
Status: DISCONTINUED | OUTPATIENT
Start: 2021-04-22 | End: 2021-04-24 | Stop reason: HOSPADM

## 2021-04-22 RX ORDER — ASPIRIN 325 MG
325 TABLET ORAL ONCE
Status: DISCONTINUED | OUTPATIENT
Start: 2021-04-22 | End: 2021-04-22

## 2021-04-22 RX ORDER — ALLOPURINOL 300 MG/1
300 TABLET ORAL DAILY
Status: DISCONTINUED | OUTPATIENT
Start: 2021-04-22 | End: 2021-04-24 | Stop reason: HOSPADM

## 2021-04-22 RX ORDER — ASPIRIN 325 MG
325 TABLET ORAL DAILY
Status: DISCONTINUED | OUTPATIENT
Start: 2021-04-22 | End: 2021-04-22

## 2021-04-22 RX ORDER — POTASSIUM CHLORIDE 750 MG/1
20 TABLET, FILM COATED, EXTENDED RELEASE ORAL DAILY
Refills: 0 | Status: DISCONTINUED | OUTPATIENT
Start: 2021-04-22 | End: 2021-04-24 | Stop reason: HOSPADM

## 2021-04-22 RX ADMIN — ATORVASTATIN CALCIUM 80 MG: 80 TABLET, FILM COATED ORAL at 20:07

## 2021-04-22 RX ADMIN — PAROXETINE HYDROCHLORIDE HEMIHYDRATE 10 MG: 10 TABLET, FILM COATED ORAL at 10:01

## 2021-04-22 RX ADMIN — ALLOPURINOL 300 MG: 300 TABLET ORAL at 10:01

## 2021-04-22 RX ADMIN — APIXABAN 2.5 MG: 2.5 TABLET, FILM COATED ORAL at 08:57

## 2021-04-22 RX ADMIN — METOPROLOL TARTRATE 25 MG: 25 TABLET, FILM COATED ORAL at 08:57

## 2021-04-22 RX ADMIN — FUROSEMIDE 40 MG: 40 TABLET ORAL at 08:57

## 2021-04-22 RX ADMIN — POTASSIUM CHLORIDE 20 MEQ: 750 TABLET, EXTENDED RELEASE ORAL at 08:57

## 2021-04-22 RX ADMIN — ASPIRIN 325 MG: 325 TABLET ORAL at 08:57

## 2021-04-22 RX ADMIN — SODIUM CHLORIDE 75 ML/HR: 9 INJECTION, SOLUTION INTRAVENOUS at 04:15

## 2021-04-22 RX ADMIN — METOPROLOL TARTRATE 25 MG: 25 TABLET, FILM COATED ORAL at 20:07

## 2021-04-22 RX ADMIN — APIXABAN 5 MG: 5 TABLET, FILM COATED ORAL at 20:07

## 2021-04-22 NOTE — PLAN OF CARE
Goal Outcome Evaluation:  Plan of Care Reviewed With: patient     Outcome Summary: Pt is an 86 y/o F admit for R hand weakness. CT HEAD (-). MRI BRAIN pending. Pt was recently admitted for CHF exacerbation, hypoxia and SOA on 4/4/21. Pt resides at [facility]isted [facility] and reports indep with ADLs/mobility with RW. Pt appears mildly below ADL baseline, limited by impaired activity tolerance, impaired balance, generalized weakness and acuity of illness. OT recommends continued treatment at 5x/week and d/c home with HH OT.    Patient was wearing a face mask during this therapy encounter. Therapist used appropriate personal protective equipment including mask, gloves, and eye protection.  Mask used was standard procedure mask. Appropriate PPE was worn during the entire therapy session. Hand hygiene was completed before and after therapy session. Patient is not in enhanced droplet precautions.

## 2021-04-22 NOTE — ED TRIAGE NOTES
To ER via EMS from [facility].  Approx 1 1/2 hrs ago suddenly felt weak and unable to  with right hand.  No other neuro deficits      Pt in mask at time of triage.  Triage staff in appropriate PPE.

## 2021-04-22 NOTE — ED PROVIDER NOTES
The LETY and I have discussed this patients history, physical exam, and treatment plan. I have reviewed the documentation and personally had a face to face interaction with the patient. I affirm the documentation and agree with the treatment and plan.  The following note describes my personal findings    This patient is an 85-year-old female presenting to the emergency room today secondary to sudden onset of right hand weakness that began just before arrival to the emergency room.  The patient is anticoagulated with Eliquis.  She denies any other extremity weakness or sensory changes, or any difficulty with speech.    Exam: This patient is resting comfortably and in no distress, without gross neurological deficit.  On my examination I cannot elicit any motor nor sensory deficits.  The patient is speaking clearly without signs of aphasia and without cranial nerve deficit.  Cardiovascular exam shows a regular rate and rhythm without murmur.    Plan: We will obtain labs as well as a noncontrast CT scan of the head.  We will discuss the case with neurology/stroke on-call.  We will reassess following.      The patient was wearing a facemask upon entrance into the room and remained in such throughout their visit.  I was wearing PPE including a facemask, eye protection, as well as gloves at any point entering the room and throughout the visit     Esteban Sutton MD  04/22/21 6196

## 2021-04-22 NOTE — ED NOTES
"Nursing report ED to floor  Yazmin Javier  85 y.o.  female    HPI (triage note):   Chief Complaint   Patient presents with   • rt hand weakness       Admitting doctor:   Jr Montes De Oca MD    Admitting diagnosis:   The primary encounter diagnosis was Right arm weakness. Diagnoses of On anticoagulant therapy and Persistent atrial fibrillation (CMS/HCC) were also pertinent to this visit.    Code status:   Current Code Status     Date Active Code Status Order ID Comments User Context       4/22/2021 0115 CPR [id number]  Liz Mackey APRN ED     Advance Care Planning Activity      Questions for Current Code Status     Question Answer Comment    Code Status CPR     Medical Interventions (Level of Support Prior to Arrest) Full     Level Of Support Discussed With Patient           Allergies:   Patient has no known allergies.    Weight:       04/21/21 2314   Weight: 87 kg (191 lb 11.2 oz)       Most recent vitals:   Vitals:    04/21/21 2314 04/21/21 2315 04/21/21 2330 04/22/21 0030   BP:    143/91   Pulse:  71 70 69   Resp:       Temp: 97.3 °F (36.3 °C)      TempSrc: Tympanic      SpO2:  98% 97% 98%   Weight: 87 kg (191 lb 11.2 oz)      Height: 160 cm (63\")          Active LDAs/IV Access:   Lines, Drains & Airways    Active LDAs     Name:   Placement date:   Placement time:   Site:   Days:    Peripheral IV 04/21/21 Right Antecubital   04/21/21    --    Antecubital   1    Peripheral IV 04/21/21 2311 Left Antecubital   04/21/21 2311    Antecubital   less than 1                Labs (abnormal labs have a star):   Labs Reviewed   COMPREHENSIVE METABOLIC PANEL - Abnormal; Notable for the following components:       Result Value    Glucose 115 (*)     BUN 30 (*)     Creatinine 1.80 (*)     eGFR Non  Amer 27 (*)     All other components within normal limits    Narrative:     GFR Normal >60  Chronic Kidney Disease <60  Kidney Failure <15     CBC WITH AUTO DIFFERENTIAL - Abnormal; Notable for the following " components:    .2 (*)     MCH 35.9 (*)     RDW-SD 56.4 (*)     All other components within normal limits   APTT - Abnormal; Notable for the following components:    PTT 39.2 (*)     All other components within normal limits   PROTIME-INR - Abnormal; Notable for the following components:    Protime 15.9 (*)     INR 1.29 (*)     All other components within normal limits   TROPONIN (IN-HOUSE) - Normal    Narrative:     Troponin T Reference Range:  <= 0.03 ng/mL-   Negative for AMI  >0.03 ng/mL-     Abnormal for myocardial necrosis.  Clinicians would have to utilize clinical acumen, EKG, Troponin and serial changes to determine if it is an Acute Myocardial Infarction or myocardial injury due to an underlying chronic condition.       Results may be falsely decreased if patient taking Biotin.     CBC (NO DIFF)   COMPREHENSIVE METABOLIC PANEL   HEMOGLOBIN A1C   LIPID PANEL   TSH   POCT GLUCOSE FINGERSTICK   POCT GLUCOSE FINGERSTICK   POCT GLUCOSE FINGERSTICK   POCT GLUCOSE FINGERSTICK   CBC AND DIFFERENTIAL    Narrative:     The following orders were created for panel order CBC & Differential.  Procedure                               Abnormality         Status                     ---------                               -----------         ------                     CBC Auto Differential[[id number]]        Abnormal            Final result                 Please view results for these tests on the individual orders.       EKG:   ECG 12 Lead   Preliminary Result   HEART RATE= 68  bpm   RR Interval= 882  ms   [place] Interval=   ms   P Horizontal Axis=   deg   P Front Axis=   deg   QRSD Interval= 100  ms   QT Interval= 455  ms   QRS Axis= 46  deg   T Wave Axis= 19  deg   - ABNORMAL ECG -   Atrial fibrillation   Minimal ST depression, inferior leads   Electronically Signed By:    Date and Time of Study: 2021-04-21 23:39:00          Meds given in ED:   Medications   sodium chloride 0.9 % flush 10 mL (has no administration in  time range)   aspirin tablet 325 mg (has no administration in time range)   sodium chloride 0.9 % infusion (has no administration in time range)   acetaminophen (TYLENOL) tablet 650 mg (has no administration in time range)     Or   acetaminophen (TYLENOL) suppository 650 mg (has no administration in time range)   aspirin tablet 325 mg (has no administration in time range)     Or   aspirin suppository 300 mg (has no administration in time range)   atorvastatin (LIPITOR) tablet 80 mg (has no administration in time range)   ondansetron (ZOFRAN) injection 4 mg (has no administration in time range)   bisacodyl (DULCOLAX) suppository 10 mg (has no administration in time range)       Imaging results:  CT Head Without Contrast    Result Date: 4/22/2021   1. No acute intracranial findings. 2. Air-fluid level noted within the right sphenoid sinus. Correlation with any evidence of sinusitis is recommended.  Radiation dose reduction techniques were utilized, including automated exposure control and exposure modulation based on body size.  This report was finalized on 4/22/2021 12:30 AM by Dr. Joanna Gracia M.D.      XR Chest 1 View    Result Date: 4/22/2021  No acute findings.  This report was finalized on 4/22/2021 12:19 AM by Dr. Joanna Gracia M.D.        Ambulatory status:   - walker    Social issues:   Social History     Socioeconomic History   • Marital status: Single     Spouse name: Not on file   • Number of children: Not on file   • Years of education: Not on file   • Highest education level: Not on file   Tobacco Use   • Smoking status: Never Smoker   • Smokeless tobacco: Never Used    Nursing report ED to floor     Shantell Otero RN  04/22/21 0227

## 2021-04-22 NOTE — ED PROVIDER NOTES
EMERGENCY DEPARTMENT ENCOUNTER    Room Number:  07/07  Date of encounter:  4/22/2021  PCP: Morenita Silverio MD  Historian: Patient      HPI:  Chief Complaint: Sudden onset of right hand weakness  A complete HPI/ROS/PMH/PSH/SH/FH are unobtainable due to: Nothing    Context: Yazmin Javier is a 85 y.o. female who presents to the ED c/o sudden onset of right hand weakness that began approximately an hour and a half PTA.  The patient denies headache, palpitations, fever, chills, recent injury.  She further denies aphasia, dysarthria, facial droop, unsteady gait associated with her symptoms.  She does endorse some mild tingling in the right upper extremity this seems to be improving.  She is anticoagulated with Eliquis for A. Fib.    She was last admitted to this emergency department in 4 April 2021 for shortness of breath and acute on chronic CHF and acute respiratory failure with hypoxia.  She remained in the hospital through 4/6/2021.  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    PAST MEDICAL HISTORY  Active Ambulatory Problems     Diagnosis Date Noted   • Acute on chronic congestive heart failure (CMS/HCC) 04/04/2021   • Hypoxia 04/04/2021   • A-fib (CMS/[facility]) 04/04/2021   • Chronic anticoagulation 04/04/2021   • Hypothyroidism (acquired) 04/04/2021   • Stage 3b chronic kidney disease (CMS/[facility]) 04/04/2021     Resolved Ambulatory Problems     Diagnosis Date Noted   • No Resolved Ambulatory Problems     No Additional Past Medical History         PAST SURGICAL HISTORY  No past surgical history on file.      FAMILY HISTORY  No family history on file.      SOCIAL HISTORY  Social History     Socioeconomic History   • Marital status: Single     Spouse name: Not on file   • Number of children: Not on file   • Years of education: Not on file   • Highest education level: Not on file   Tobacco Use   • Smoking status: Never Smoker   • Smokeless tobacco: Never Used          ALLERGIES  Patient has no known allergies.        REVIEW OF SYSTEMS  Review of Systems   Constitutional: Negative for chills and fever.   HENT: Negative.    Respiratory: Negative for cough and shortness of breath.    Cardiovascular: Negative for chest pain and leg swelling.   Gastrointestinal: Positive for abdominal pain. Negative for nausea and vomiting.   Genitourinary: Negative for dysuria.   Musculoskeletal: Negative for back pain and neck pain.   Skin: Negative for rash.   Neurological: Positive for weakness and numbness.        All systems reviewed and negative except for those discussed in HPI.       PHYSICAL EXAM    I have reviewed the triage vital signs and nursing notes.    ED Triage Vitals   Temp Heart Rate Resp BP SpO2   04/21/21 2314 04/21/21 2309 04/21/21 2309 04/21/21 2309 04/21/21 2309   97.3 °F (36.3 °C) 70 18 130/80 98 %      Temp src Heart Rate Source Patient Position BP Location FiO2 (%)   04/21/21 2314 04/21/21 2309 -- -- --   Tympanic Monitor          Physical Exam  GENERAL: Chronically ill-appearing, anxious  HENT: nares patent, face symmetric  EYES: no scleral icterus, PERRL, EOMs intact, visual fields appear intact  CV: Irregularly irregular and not tachycardic, palpable radial pulses bilaterally, palpable pedal pulses bilaterally.  RESPIRATORY: normal effort, lungs CTA B, SPO2 98% on RA  ABDOMEN: soft, nontender  MUSCULOSKELETAL: no deformity.  Strength of  in the right upper extremity is 4/5 when compared to the left  NEURO: alert and oriented x3, moves all extremities, follows commands, sharp sensation appears intact diffusely, no drift, no ataxia, cranial nerves II through XII are grossly intact.  SKIN: warm, dry        LAB RESULTS  Recent Results (from the past 24 hour(s))   Comprehensive Metabolic Panel    Collection Time: 04/21/21 11:31 PM    Specimen: Blood   Result Value Ref Range    Glucose 115 (H) 65 - 99 mg/dL    BUN 30 (H) 8 - 23 mg/dL    Creatinine 1.80 (H) 0.57 -  1.00 mg/dL    Sodium 142 136 - 145 mmol/L    Potassium 4.3 3.5 - 5.2 mmol/L    Chloride 104 98 - 107 mmol/L    CO2 26.5 22.0 - 29.0 mmol/L    Calcium 9.5 8.6 - 10.5 mg/dL    Total Protein 7.6 6.0 - 8.5 g/dL    Albumin 3.70 3.50 - 5.20 g/dL    ALT (SGPT) 15 1 - 33 U/L    AST (SGOT) 21 1 - 32 U/L    Alkaline Phosphatase 81 39 - 117 U/L    Total Bilirubin 0.7 0.0 - 1.2 mg/dL    eGFR Non African Amer 27 (L) >60 mL/min/1.73    Globulin 3.9 gm/dL    A/G Ratio 0.9 g/dL    BUN/Creatinine Ratio 16.7 7.0 - 25.0    Anion Gap 11.5 5.0 - 15.0 mmol/L   Troponin    Collection Time: 04/21/21 11:31 PM    Specimen: Blood   Result Value Ref Range    Troponin T <0.010 0.000 - 0.030 ng/mL   CBC Auto Differential    Collection Time: 04/21/21 11:31 PM    Specimen: Blood   Result Value Ref Range    WBC 6.56 3.40 - 10.80 10*3/mm3    RBC 3.79 3.77 - 5.28 10*6/mm3    Hemoglobin 13.6 12.0 - 15.9 g/dL    Hematocrit 41.0 34.0 - 46.6 %    .2 (H) 79.0 - 97.0 fL    MCH 35.9 (H) 26.6 - 33.0 pg    MCHC 33.2 31.5 - 35.7 g/dL    RDW 14.4 12.3 - 15.4 %    RDW-SD 56.4 (H) 37.0 - 54.0 fl    MPV 9.6 6.0 - 12.0 fL    Platelets 266 140 - 450 10*3/mm3    Neutrophil % 56.8 42.7 - 76.0 %    Lymphocyte % 27.7 19.6 - 45.3 %    Monocyte % 11.7 5.0 - 12.0 %    Eosinophil % 2.6 0.3 - 6.2 %    Basophil % 0.9 0.0 - 1.5 %    Neutrophils, Absolute 3.72 1.70 - 7.00 10*3/mm3    Lymphocytes, Absolute 1.82 0.70 - 3.10 10*3/mm3    Monocytes, Absolute 0.77 0.10 - 0.90 10*3/mm3    Eosinophils, Absolute 0.17 0.00 - 0.40 10*3/mm3    Basophils, Absolute 0.06 0.00 - 0.20 10*3/mm3   aPTT    Collection Time: 04/21/21 11:31 PM    Specimen: Blood   Result Value Ref Range    PTT 39.2 (H) 22.7 - 35.4 seconds   Protime-INR    Collection Time: 04/21/21 11:31 PM    Specimen: Blood   Result Value Ref Range    Protime 15.9 (H) 11.7 - 14.2 Seconds    INR 1.29 (H) 0.90 - 1.10   ECG 12 Lead    Collection Time: 04/21/21 11:39 PM   Result Value Ref Range    QT Interval 455 ms       Ordered  the above labs and independently reviewed the results.        RADIOLOGY  CT Head Without Contrast    Result Date: 4/22/2021  CT HEAD WITHOUT CONTRAST  HISTORY: Right hand weakness. The patient is on blood thinners.  COMPARISON: None available.  TECHNIQUE: Axial CT imaging was obtained through the brain. No IV contrast was administered.  FINDINGS: There is diffuse atrophy. There is periventricular and deep white matter microangiopathic change. No acute intracranial hemorrhage is seen. There is no midline shift or mass effect. There are bilateral basal ganglia calcifications. Air-fluid level is noted within the right sphenoid sinus. Correlation with any evidence of sinusitis is recommended. There is partial opacification of the left mastoid air cells.       1. No acute intracranial findings. 2. Air-fluid level noted within the right sphenoid sinus. Correlation with any evidence of sinusitis is recommended.  Radiation dose reduction techniques were utilized, including automated exposure control and exposure modulation based on body size.  This report was finalized on 4/22/2021 12:30 AM by Dr. Joanna Gracia M.D.      XR Chest 1 View    Result Date: 4/22/2021  SINGLE VIEW OF THE CHEST  HISTORY: Strokelike symptoms  COMPARISON: 04/04/2021  FINDINGS: Cardiomegaly is present. No pneumothorax, pleural effusion, or acute infiltrate is seen. There is calcification of the aorta.      No acute findings.  This report was finalized on 4/22/2021 12:19 AM by Dr. Joanna Gracia M.D.        I ordered the above noted radiological studies. Reviewed by me and discussed with radiologist.  See dictation for official radiology interpretation.      PROCEDURES    Procedures  Interval: baseline  1a. Level of Consciousness: 0-->Alert, keenly responsive  1b. LOC Questions: 0-->Answers both questions correctly  1c. LOC Commands: 0-->Performs both tasks correctly  2. Best Gaze: 0-->Normal  3. Visual: 0-->No visual loss  4. Facial Palsy:  0-->Normal symmetrical movements  5a. Motor Arm, Left: 0-->No drift, limb holds 90 (or 45) degrees for full 10 secs  5b. Motor Arm, Right: 0-->No drift, limb holds 90 (or 45) degrees for full 10 secs  6a. Motor Leg, Left: 0-->No drift, leg holds 30 degree position for full 5 secs  6b. Motor Leg, Right: 0-->No drift, leg holds 30 degree position for full 5 secs  7. Limb Ataxia: 1-->Present in one limb  8. Sensory: 0-->Normal, no sensory loss  9. Best Language: 0-->No aphasia, normal  10. Dysarthria: 0-->Normal  11. Extinction and Inattention (formerly Neglect): 0-->No abnormality    Total (NIH Stroke Scale): 1    MEDICATIONS GIVEN IN ER    Medications   sodium chloride 0.9 % flush 10 mL (has no administration in time range)   aspirin tablet 325 mg (has no administration in time range)   sodium chloride 0.9 % infusion (has no administration in time range)   acetaminophen (TYLENOL) tablet 650 mg (has no administration in time range)     Or   acetaminophen (TYLENOL) suppository 650 mg (has no administration in time range)   aspirin tablet 325 mg (has no administration in time range)     Or   aspirin suppository 300 mg (has no administration in time range)   atorvastatin (LIPITOR) tablet 80 mg (has no administration in time range)   ondansetron (ZOFRAN) injection 4 mg (has no administration in time range)   bisacodyl (DULCOLAX) suppository 10 mg (has no administration in time range)         PROGRESS, DATA ANALYSIS, CONSULTS, AND MEDICAL DECISION MAKING    All labs have been independently reviewed by me.  All radiology studies have been reviewed by me and discussed with radiologist dictating the report.   EKG's independently viewed and interpreted by me.  Discussion below represents my analysis of pertinent findings related to patient's condition, differential diagnosis, treatment plan and final disposition.    DDx includes but is not limited to: Thrombotic stroke, intercranial hemorrhage, TIA, carpal tunnel, nerve  entrapment.  Please see below for course of care.    ED Course as of Apr 22 0129   Wed Apr 21, 2021 2327 Discussed patient's case with Dr. Martinez to include NIH of 1, vitals, and the patients eliquis.  Patient is not a TPA Candidate.  To obtain non-con CT Head.  If neg to give 325 ASA and admit for obs.  He will se in consult.    [RC]   2332 BP: 130/80 [RC]   2332 Temp: 97.3 °F (36.3 °C) [RC]   2332 Heart Rate: 70 [RC]   2332 Resp: 18 [RC]   2332 RA   SpO2: 98 % [RC]   Thu Apr 22, 2021   0035 Noncon CT of the head shows no acute intercranial hemorrhage.  We will give the patient 325 ASA at this time please call to the hospitalist to discuss observation.    [RC]   0113 Discussed patient's case with JADON Delgado with A.  To place the patient in observation under Dr. Montes De Oca's care.    [RC]      ED Course User Index  [RC] Josh Berry III, PA       Patient was placed in face mask in first look. Patient was wearing facemask when I entered the room and throughout our encounter. I wore full protective equipment throughout this patient encounter including a face mask, eye shield, gown and gloves. Hand hygiene was performed before donning protective equipment and after removal when leaving the room.    AS OF 01:29 EDT VITALS:    BP - 143/91  HR - 69  TEMP - 97.3 °F (36.3 °C) (Tympanic)  O2 SATS - 98%        DIAGNOSIS  Final diagnoses:   Right arm weakness   On anticoagulant therapy   Persistent atrial fibrillation (CMS/HCC)         DISPOSITION  ADMISSION    Discussed treatment plan and reason for admission with pt/family and admitting physician.  Pt/family voiced understanding of the plan for admission for further testing/treatment as needed.              Josh Berry III, PA  04/22/21 0129

## 2021-04-22 NOTE — H&P
Patient Name:  Yazmin Javier  YOB: 1935  MRN:  [id number]  Admit Date:  4/21/2021  Patient Care Team:  Morenita Silverio MD as PCP - General (Internal Medicine)      Subjective   History Present Illness     Chief Complaint   Patient presents with   • rt hand weakness       Ms. Javier is a 85 y.o. non-smoker with a history of chronic diastolic congestive heart failure, atrial fibrillation, hypothyroidism, and stage 3b chronic kidney disease that presents to [facility] complaining of right hand weakness.  She reports a sudden onset generalized weakness at her nursing facility last night around 11 PM.  She reports she felt so weak she had to have assistance to a chair.  She states she then had continued weakness of the right hand.  She reports difficulty grasping and holding items in the right hand and states the symptoms have improved but have not completely resolved.  She denies headache, paresthesias, speech disturbance, visual changes, and facial asymmetry.  She denies fever, chills, chest pain, and shortness of breath.  A CT of the head was negative for an acute intracranial process.  She received Aspirin in the ED.        History of Present Illness  Review of Systems   Constitutional: Negative for chills and fever.   HENT: Negative for congestion and sore throat.    Eyes: Negative for photophobia and visual disturbance.   Respiratory: Negative for cough and shortness of breath.    Cardiovascular: Negative for chest pain, palpitations and leg swelling.   Gastrointestinal: Negative for abdominal pain, nausea and vomiting.   Endocrine: Negative for polydipsia, polyphagia and polyuria.   Genitourinary: Negative for decreased urine volume, dysuria, flank pain, frequency and urgency.   Musculoskeletal: Negative for back pain and neck pain.   Skin: Negative for rash and wound.   Neurological: Positive for weakness. Negative for dizziness, facial asymmetry, speech difficulty,  light-headedness, numbness and headaches.        Personal History     No past medical history on file.  No past surgical history on file.  No family history on file.  Social History     Tobacco Use   • Smoking status: Never Smoker   • Smokeless tobacco: Never Used   Substance Use Topics   • Alcohol use: Not on file   • Drug use: Not on file     Medications Prior to Admission   Medication Sig Dispense Refill Last Dose   • albuterol sulfate  (90 Base) MCG/ACT inhaler Inhale 2 puffs Every 4 (Four) Hours As Needed for Wheezing.      • allopurinol (ZYLOPRIM) 300 MG tablet Take 300 mg by mouth Daily.      • apixaban (ELIQUIS) 2.5 MG tablet tablet Take 2.5 mg by mouth 2 (Two) Times a Day.      • furosemide (LASIX) 40 MG tablet Take 1 tablet by mouth Daily for 30 days. 30 tablet 0    • levothyroxine (SYNTHROID, LEVOTHROID) 50 MCG tablet Take 50 mcg by mouth Daily.      • metoprolol tartrate (LOPRESSOR) 25 MG tablet Take 25 mg by mouth 2 (Two) Times a Day.      • PARoxetine (PAXIL) 10 MG tablet Take 10 mg by mouth Daily.      • potassium chloride (K-DUR,KLOR-CON) 20 MEQ CR tablet Take 1 tablet by mouth Daily for 30 days. 30 tablet 0    • simvastatin (ZOCOR) 10 MG tablet Take 10 mg by mouth Every Night.      • vitamin B-12 (CYANOCOBALAMIN) 1000 MCG tablet Take 1,000 mcg by mouth Daily.        Allergies:  No Known Allergies    Objective    Objective     Vital Signs  Temp:  [97.3 °F (36.3 °C)] 97.3 °F (36.3 °C)  Heart Rate:  [60-72] 62  Resp:  [18] 18  BP: (130-159)/(80-91) 142/91  SpO2:  [92 %-98 %] 98 %  on   ;      Body mass index is 33.96 kg/m².    Physical Exam  Vitals and nursing note reviewed.   Constitutional:       Appearance: Normal appearance.   HENT:      Head: Normocephalic and atraumatic.      Nose: Nose normal.      Mouth/Throat:      Mouth: Mucous membranes are moist.      Pharynx: Oropharynx is clear.   Eyes:      Extraocular Movements: Extraocular movements intact.      Conjunctiva/sclera: Conjunctivae  normal.   Cardiovascular:      Rate and Rhythm: Normal rate. Rhythm irregularly irregular.      Pulses: Normal pulses.      Heart sounds: Normal heart sounds.   Pulmonary:      Effort: Pulmonary effort is normal.      Breath sounds: Normal breath sounds.   Abdominal:      General: Bowel sounds are normal. There is no distension.      Palpations: Abdomen is soft.      Tenderness: There is no abdominal tenderness.   Musculoskeletal:         General: Normal range of motion.      Cervical back: Normal range of motion and neck supple.   Skin:     General: Skin is warm and dry.   Neurological:      General: No focal deficit present.      Mental Status: She is alert and oriented to person, place, and time.      Motor: Weakness (right hand) present.   Psychiatric:         Mood and Affect: Mood normal.         Behavior: Behavior normal.         Results Review:  I reviewed the patient's new clinical results.  I reviewed the patient's new imaging results and agree with the interpretation.  I reviewed the patient's other test results and agree with the interpretation  I personally viewed and interpreted the patient's EKG/Telemetry data  Discussed with ED provider.    Lab Results (last 24 hours)     Procedure Component Value Units Date/Time    CBC & Differential [[id number]]  (Abnormal) Collected: 04/21/21 2331    Specimen: Blood Updated: 04/22/21 0011    Narrative:      The following orders were created for panel order CBC & Differential.  Procedure                               Abnormality         Status                     ---------                               -----------         ------                     CBC Auto Differential[[id number]]        Abnormal            Final result                 Please view results for these tests on the individual orders.    Comprehensive Metabolic Panel [[id number]]  (Abnormal) Collected: 04/21/21 2331    Specimen: Blood Updated: 04/22/21 0031     Glucose 115 mg/dL      BUN 30 mg/dL       Creatinine 1.80 mg/dL      Sodium 142 mmol/L      Potassium 4.3 mmol/L      Comment: Slight hemolysis detected by analyzer. Results may be affected.        Chloride 104 mmol/L      CO2 26.5 mmol/L      Calcium 9.5 mg/dL      Total Protein 7.6 g/dL      Albumin 3.70 g/dL      ALT (SGPT) 15 U/L      AST (SGOT) 21 U/L      Alkaline Phosphatase 81 U/L      Total Bilirubin 0.7 mg/dL      eGFR Non African Amer 27 mL/min/1.73      Globulin 3.9 gm/dL      A/G Ratio 0.9 g/dL      BUN/Creatinine Ratio 16.7     Anion Gap 11.5 mmol/L     Narrative:      GFR Normal >60  Chronic Kidney Disease <60  Kidney Failure <15      Troponin [[id number]]  (Normal) Collected: 04/21/21 2331    Specimen: Blood Updated: 04/22/21 0031     Troponin T <0.010 ng/mL     Narrative:      Troponin T Reference Range:  <= 0.03 ng/mL-   Negative for AMI  >0.03 ng/mL-     Abnormal for myocardial necrosis.  Clinicians would have to utilize clinical acumen, EKG, Troponin and serial changes to determine if it is an Acute Myocardial Infarction or myocardial injury due to an underlying chronic condition.       Results may be falsely decreased if patient taking Biotin.      CBC Auto Differential [[id number]]  (Abnormal) Collected: 04/21/21 2331    Specimen: Blood Updated: 04/22/21 0011     WBC 6.56 10*3/mm3      RBC 3.79 10*6/mm3      Hemoglobin 13.6 g/dL      Hematocrit 41.0 %      .2 fL      MCH 35.9 pg      MCHC 33.2 g/dL      RDW 14.4 %      RDW-SD 56.4 fl      MPV 9.6 fL      Platelets 266 10*3/mm3      Neutrophil % 56.8 %      Lymphocyte % 27.7 %      Monocyte % 11.7 %      Eosinophil % 2.6 %      Basophil % 0.9 %      Neutrophils, Absolute 3.72 10*3/mm3      Lymphocytes, Absolute 1.82 10*3/mm3      Monocytes, Absolute 0.77 10*3/mm3      Eosinophils, Absolute 0.17 10*3/mm3      Basophils, Absolute 0.06 10*3/mm3     aPTT [[id number]]  (Abnormal) Collected: 04/21/21 2331    Specimen: Blood Updated: 04/22/21 0019     PTT 39.2 seconds     Protime-INR  [[id number]]  (Abnormal) Collected: 04/21/21 2331    Specimen: Blood Updated: 04/22/21 0019     Protime 15.9 Seconds      INR 1.29    COVID PRE-OP / PRE-PROCEDURE SCREENING ORDER (NO ISOLATION) - Swab, Nasopharynx [[id number]] Collected: 04/22/21 0236    Specimen: Swab from Nasopharynx Updated: 04/22/21 0258    Narrative:      The following orders were created for panel order COVID PRE-OP / PRE-PROCEDURE SCREENING ORDER (NO ISOLATION) - Swab, Nasopharynx.  Procedure                               Abnormality         Status                     ---------                               -----------         ------                     COVID-19,APTIMA PANTHER,...[[id number]]                      In process                   Please view results for these tests on the individual orders.    COVID-19,APTIMA PANTHER,[facility] IN-HOUSE, NP/OP SWAB IN UTM/VTM/SALINE TRANSPORT MEDIA,24 HR TAT - Swab, Nasopharynx [[id number]] Collected: 04/22/21 0236    Specimen: Swab from Nasopharynx Updated: 04/22/21 0258          Imaging Results (Last 24 Hours)     Procedure Component Value Units Date/Time    CT Head Without Contrast [[id number]] Collected: 04/22/21 0027     Updated: 04/22/21 0033    Narrative:      CT HEAD WITHOUT CONTRAST     HISTORY: Right hand weakness. The patient is on blood thinners.     COMPARISON: None available.     TECHNIQUE: Axial CT imaging was obtained through the brain. No IV  contrast was administered.      FINDINGS:  There is diffuse atrophy. There is periventricular and deep white matter  microangiopathic change. No acute intracranial hemorrhage is seen. There  is no midline shift or mass effect. There are bilateral basal ganglia  calcifications. Air-fluid level is noted within the right sphenoid  sinus. Correlation with any evidence of sinusitis is recommended. There  is partial opacification of the left mastoid air cells.       Impression:         1. No acute intracranial findings.  2. Air-fluid level noted within the  right sphenoid sinus. Correlation  with any evidence of sinusitis is recommended.     Radiation dose reduction techniques were utilized, including automated  exposure control and exposure modulation based on body size.     This report was finalized on 4/22/2021 12:30 AM by Dr. Joanna Gracia M.D.       XR Chest 1 View [[id number]] Collected: 04/22/21 0019     Updated: 04/22/21 0022    Narrative:      SINGLE VIEW OF THE CHEST     HISTORY: Strokelike symptoms     COMPARISON: 04/04/2021     FINDINGS:  Cardiomegaly is present. No pneumothorax, pleural effusion, or acute  infiltrate is seen. There is calcification of the aorta.       Impression:      No acute findings.     This report was finalized on 4/22/2021 12:19 AM by Dr. Joanna Gracia M.D.             Results for orders placed during the hospital encounter of 04/04/21    Adult Transthoracic Echo Complete W/ Cont if Necessary Per Protocol    Interpretation Summary  · Estimated left ventricular EF was in disagreement with the calculated left ventricular EF. Left ventricular ejection fraction appears to be 46 - 50%. Left ventricular systolic function is low normal.  · Left ventricular diastolic function is consistent with (grade II w/high LAP) pseudonormalization.    Low normal biventricular function may be seen with LILIANA.  EF less accurate, due to significant ectopy.  Likely high LVEDP.    Mild MR, TR, AI, with normal right-sided pressures.      ECG 12 Lead   Preliminary Result   HEART RATE= 68  bpm   RR Interval= 882  ms   NM Interval=   ms   P Horizontal Axis=   deg   P Front Axis=   deg   QRSD Interval= 100  ms   QT Interval= 455  ms   QRS Axis= 46  deg   T Wave Axis= 19  deg   - ABNORMAL ECG -   Atrial fibrillation   Minimal ST depression, inferior leads   Electronically Signed By:    Date and Time of Study: 2021-04-21 23:39:00           Assessment/Plan     Active Hospital Problems    Diagnosis  POA   • **Right arm weakness [R29.898]  Yes   • Chronic  diastolic CHF (congestive heart failure) (CMS/[facility]) [I50.32]  Unknown   • A-fib (CMS/[facility]) [I48.91]  Yes   • Chronic anticoagulation [Z79.01]  Not Applicable   • Hypothyroidism (acquired) [E03.9]  Yes   • Stage 3b chronic kidney disease (CMS/[facility]) [N18.32]  Yes       Right Arm Weakness  -She has residual weakness of the right hand on exam  -Admit to a neuro telemetry unit and work up for stroke  -CT head negative. Check MRI, CT head/neck, and echo  -Check hgb A1C, TSH, and lipid panel to assess for secondary risk factors  -Neurology consult  -Neuro checks/NIHSS  -Initiate   -PT/OT consults    Atrial Fibrillation  -Rate controlled. Continue Metoprolol for rate control and Eliquis for stroke prophylaxis    Chronic Diastolic Congestive Heart Failure  -She does not appear volume overloaded on exam, no pulmonary edema seen on chest x-ray  -Continue home diuretic  -Daily weights  -Strict I&O    Hypothyroidism  -Check TSH  -Continue Levothyroxine    Stage 3b Chronic Kidney Disease  -Creat 1.80, baseline around 1.56  -Slow IVF overnight  -Avoid nephrotoxins  -Repeat lab work in AM    -Will address and continue any further appropriate home medications once med rec is completed    -I discussed the patients findings and my recommendations with patient.    VTE Prophylaxis - Eliquis (home med).  Code Status - Full code.       JADON Vera  [facility]  04/22/21  03:24 EDT

## 2021-04-22 NOTE — CONSULTS
Neurology Note    Patient:  Yazmin Javier    YOB: 1935    REFERRING PHYSICIAN:  Jr Montes De Oca MD    CHIEF COMPLAINT:    Right arm weakness    HISTORY OF PRESENT ILLNESS:   The patient is a 85 y.o. female with PAF, on Eliquis 2.5 mg bid, developed sudden onset of right arm weakness yday early am, no other focal symptoms. In ED /80, in AF R68, T97.3, NIHSS 1, CTH with chronic changes, started on aspirin. Her arm weakness has improved, able to eat, get around with a walker.    Past Medical History:  PAF, CHF, CKD, hypothyroidism    Past Surgical History:  No past surgical history on file.    Social History:   Social History     Socioeconomic History   • Marital status: Single     Spouse name: Not on file   • Number of children: Not on file   • Years of education: Not on file   • Highest education level: Not on file   Tobacco Use   • Smoking status: Never Smoker   • Smokeless tobacco: Never Used        Family History:   No family history on file.    Medications Prior to Admission:    Prior to Admission medications    Medication Sig Start Date End Date Taking? Authorizing Provider   allopurinol (ZYLOPRIM) 300 MG tablet Take 300 mg by mouth Daily.   Yes Case Buchanan MD   apixaban (ELIQUIS) 2.5 MG tablet tablet Take 2.5 mg by mouth 2 (Two) Times a Day.   Yes Case Buchanan MD   furosemide (LASIX) 40 MG tablet Take 1 tablet by mouth Daily for 30 days. 4/7/21 5/19/21 Yes Jeremiah Cartagena MD   levothyroxine (SYNTHROID, LEVOTHROID) 50 MCG tablet Take 50 mcg by mouth Daily.   Yes Case Buchanan MD   metoprolol tartrate (LOPRESSOR) 25 MG tablet Take 25 mg by mouth 2 (Two) Times a Day.   Yes Case Buchanan MD   PARoxetine (PAXIL) 10 MG tablet Take 10 mg by mouth Daily.   Yes Case Buchanan MD   potassium chloride (K-DUR,KLOR-CON) 20 MEQ CR tablet Take 1 tablet by mouth Daily for 30 days. 4/6/21 5/19/21 Yes Jeremiah Cartagena MD   simvastatin (ZOCOR) 10 MG  tablet Take 10 mg by mouth Every Night.   Yes ProviderCase MD   vitamin B-12 (CYANOCOBALAMIN) 1000 MCG tablet Take 1,000 mcg by mouth Daily.   Yes ProviderCase MD   albuterol sulfate  (90 Base) MCG/ACT inhaler Inhale 2 puffs Every 4 (Four) Hours As Needed for Wheezing.    Provider, MD Case       Allergies:  Patient has no known allergies.      Review of system  Review of Systems   Neurological: Positive for weakness.   All other systems reviewed and are negative.      Vitals:    04/22/21 1317   BP: 114/89   Pulse: 66   Resp: 18   Temp: 98 °F (36.7 °C)   SpO2: 91%       Physical exam  Physical Exam  Constitutional:       Appearance: She is well-developed.   HENT:      Head: Normocephalic and atraumatic.   Eyes:      Extraocular Movements: Extraocular movements intact.      Pupils: Pupils are equal, round, and reactive to light.   Cardiovascular:      Rate and Rhythm: Normal rate and regular rhythm.   Pulmonary:      Effort: Pulmonary effort is normal.   Neurological:      Mental Status: She is alert and oriented to person, place, and time.      Deep Tendon Reflexes: Reflexes are normal and symmetric. Babinski sign absent on the right side. Babinski sign absent on the left side.      Comments: Speech clear, VFF, no facial droop, no limb drift, PRINCESS slower in RUE, reports sensation to touch, cold, vibration as equal, gets up with minimal helps.   Psychiatric:         Behavior: Behavior normal.         Thought Content: Thought content normal.           Lab Results   Component Value Date    WBC 6.88 04/22/2021    HGB 14.1 04/22/2021    HCT 40.8 04/22/2021    .5 (H) 04/22/2021     04/22/2021     Lab Results   Component Value Date    GLUCOSE 101 (H) 04/22/2021    BUN 30 (H) 04/22/2021    CREATININE 1.63 (H) 04/22/2021    EGFRIFNONA 30 (L) 04/22/2021    BCR 18.4 04/22/2021    CO2 20.8 (L) 04/22/2021    CALCIUM 9.7 04/22/2021    ALBUMIN 3.60 04/22/2021    LABIL2 1.1 06/12/2020     AST 25 2021    ALT 12 2021     ECG 12 Lead  Order: [id number]  Status:  Final result   Visible to patient:  No (scheduled for 2021  9:48 AM) Next appt:  None  Component   Ref Range & Units 21 2339 21 1254 21 0848   QT Interval   ms 455  477  477       Narrative & Impression    HEART RATE= 68  bpm  RR Interval= 882  ms  ND Interval=   ms  P Horizontal Axis=   deg  P Front Axis=   deg  QRSD Interval= 100  ms  QT Interval= 455  ms  QRS Axis= 46  deg  T Wave Axis= 19  deg  - ABNORMAL ECG -  Atrial fibrillation new vs previous  Minimal ST depression, inferior leads  Electronically Signed By: Jay Jay Nieves ([facility]) 2021 09:47:49  Date and Time of Study: 2021 23:39:00      Specimen Collected: 21 23:39           Echo Complete w/Doppler and Color Flow  Order# [id number]  Reading physician: Kb López MD Ordering physician: Liz Mackey APRN Study date: 21   Patient Information    Patient Name   Yazmin Javier MRN   [id number] Legal Sex   Female  (Age)   1935 (85 y.o.)   Admission Information    Admission Date/Time Discharge Date/Time Room/Bed   21  11:12 PM  P585/1   PACS Images     Show images for Adult transthoracic echo complete    Sedation Narrator Report    Sedation Narrator Report      Interpretation Summary    · Left ventricular ejection fraction appears to be 61 - 65%.  · Left ventricular wall thickness is consistent with borderline concentric hypertrophy.  · Left ventricular diastolic function was normal.  · The right ventricular cavity is mildly dilated. Normal right ventricular systolic function noted.  · There is a prominent moderator band in the right ventricle  · The left atrial cavity is mildly dilated.  · Saline test results are negative.  · The aortic valve leaflets are moderately calcified (aortic sclerosis)  · Mild tricuspid valve regurgitation is present.  · Calculated right ventricular systolic pressure from tricuspid  regurgitation is 32 mmHg.  · There is no evidence of pericardial effusion.            Radiological Studies:    Adult transthoracic echo complete    Result Date: 4/22/2021  · Left ventricular ejection fraction appears to be 61 - 65%. · Left ventricular wall thickness is consistent with borderline concentric hypertrophy. · Left ventricular diastolic function was normal. · The right ventricular cavity is mildly dilated. Normal right ventricular systolic function noted. · There is a prominent moderator band in the right ventricle · The left atrial cavity is mildly dilated. · Saline test results are negative. · The aortic valve leaflets are moderately calcified (aortic sclerosis) · Mild tricuspid valve regurgitation is present. · Calculated right ventricular systolic pressure from tricuspid regurgitation is 32 mmHg. · There is no evidence of pericardial effusion.      Adult Transthoracic Echo Complete W/ Cont if Necessary Per Protocol    Result Date: 4/5/2021  · Estimated left ventricular EF was in disagreement with the calculated left ventricular EF. Left ventricular ejection fraction appears to be 46 - 50%. Left ventricular systolic function is low normal. · Left ventricular diastolic function is consistent with (grade II w/high LAP) pseudonormalization.  Low normal biventricular function may be seen with LILIANA.  EF less accurate, due to significant ectopy.  Likely high LVEDP. Mild MR, TR, AI, with normal right-sided pressures.     CT Head Without Contrast    Result Date: 4/22/2021  CT HEAD WITHOUT CONTRAST  HISTORY: Right hand weakness. The patient is on blood thinners.  COMPARISON: None available.  TECHNIQUE: Axial CT imaging was obtained through the brain. No IV contrast was administered.  FINDINGS: There is diffuse atrophy. There is periventricular and deep white matter microangiopathic change. No acute intracranial hemorrhage is seen. There is no midline shift or mass effect. There are bilateral basal ganglia  calcifications. Air-fluid level is noted within the right sphenoid sinus. Correlation with any evidence of sinusitis is recommended. There is partial opacification of the left mastoid air cells.       1. No acute intracranial findings. 2. Air-fluid level noted within the right sphenoid sinus. Correlation with any evidence of sinusitis is recommended.  Radiation dose reduction techniques were utilized, including automated exposure control and exposure modulation based on body size.  This report was finalized on 4/22/2021 12:30 AM by Dr. Joanna Gracia M.D.      XR Chest 1 View    Result Date: 4/22/2021  SINGLE VIEW OF THE CHEST  HISTORY: Strokelike symptoms  COMPARISON: 04/04/2021  FINDINGS: Cardiomegaly is present. No pneumothorax, pleural effusion, or acute infiltrate is seen. There is calcification of the aorta.      No acute findings.  This report was finalized on 4/22/2021 12:19 AM by Dr. Joanna Gracia M.D.      XR Chest 1 View    Result Date: 4/4/2021  ONE-VIEW PORTABLE CHEST  HISTORY: Shortness of breath and wheezing.  FINDINGS: There is cardiomegaly with some slight interstitial prominence potentially related to borderline CHF. There is no focal infiltrate.  This report was finalized on 4/4/2021 10:58 AM by Dr. Ab Garcia M.D.      Duplex Venous Lower Extremity - Bilateral CAR    Result Date: 4/5/2021  · Normal bilateral lower extremity venous duplex scan.      CT Angiogram Chest    Result Date: 4/4/2021  CT ANGIOGRAPHY CHEST WITH INTRAVENOUS CONTRAST AND 3-D RECONSTRUCTIONS  HISTORY: Shortness of breath. Positive D-dimer.  TECHNIQUE/FINDINGS: The CT scan was performed as an emergency procedure with CT angiography protocol using intravenous contrast and 3-D reconstructions. The following findings are present: 1. The pulmonary arteries are well-opacified and there is no evidence of pulmonary embolus. 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 Allowing for the timing of contrast, no definite aortic dissection is seen. 2. There are small bilateral pleural effusions layering posteriorly, right larger than left, and associated with fairly diffuse scattered ground glass opacity. The findings are nonspecific but most consistent with congestive heart failure. 3. There is no mediastinal or hilar or axillary adenopathy. There is no pericardial effusion. The CT images through the upper liver, spleen, and both adrenal glands are unremarkable. There are multiple small gallstones within the gallbladder.      Radiation dose reduction techniques were utilized, including automated exposure control and exposure modulation based on body size.  This report was finalized on 4/4/2021 3:54 PM by Dr. Ab Garcia M.D.          During this visit the following were done:  Labs Reviewed [x]    Labs Ordered []    Radiology Reports Reviewed [x]    Radiology Ordered []    EKG, echo, and/or stress test reviewed [x]    EEG results reviewed  []    EEG reviewed and interpreted per myself   []    Discussed case with neurointerventionalist or neuroradiologist []    Referring Provider Records Reviewed []    ER Records Reviewed []    Hospital Records Reviewed []    History Obtained From Family []    Radiological images view and Interpreted per myself [x]    Case Discussed with referring provider []     Decision to obtain and request outside records  []        Assessment and Plan     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 No TPA on Eliquis therapy.   - Observation on telemetry.   - Increase Eliquis dose to 5 mg bid.   - Add low dose aspirin.   - Carotid duplex.   - ST, PT, OT.    Thanks,                 Electronically signed by David Martinez MD on 4/22/2021 at 15:33 EDT

## 2021-04-22 NOTE — THERAPY EVALUATION
Patient Name: Yazmin Javier  : 1935    MRN: [id number]                              Today's Date: 2021       Admit Date: 2021    Visit Dx:     ICD-10-CM ICD-9-CM   1. Right arm weakness  R29.898 729.89   2. On anticoagulant therapy  Z79.01 V58.61   3. Persistent atrial fibrillation (CMS/HCC)  I48.19 427.31     Patient Active Problem List   Diagnosis   • Acute on chronic congestive heart failure (CMS/HCC)   • Hypoxia   • A-fib (CMS/HCC)   • Chronic anticoagulation   • Hypothyroidism (acquired)   • Stage 3b chronic kidney disease (CMS/HCC)   • Right arm weakness   • Chronic diastolic CHF (congestive heart failure) (CMS/HCC)     No past medical history on file.  No past surgical history on file.  General Information     Row Name 21 1310          OT Time and Intention    Document Type  evaluation  -DANNY     Mode of Treatment  individual therapy;occupational therapy  -DANNY     Row Name 21 1310          General Information    Patient Profile Reviewed  yes  -DANNY     Prior Level of Function  independent:;ADL's;all household mobility  -DANNY     Existing Precautions/Restrictions  fall  -DANNY     Row Name 21 1310          Occupational Profile    Reason for Services/Referral (Occupational Profile)  Pt is an 84 y/o F admit for R hand weakness. CT HEAD (-). MRI BRAIN pending. Pt was recently admitted for CHF exacerbation, hypoxia and SOA on 21. Pt resides at Assisted Living and reports indep with ADLs/mobility with RW.  -DANNY     Row Name 21 1310          Living Environment    Lives With  alone;other (see comments) assisted living  -DANNY     Row Name 21 131          Cognition    Orientation Status (Cognition)  oriented x 4  -DANNY     Row Name 21 1310          Safety Issues, Functional Mobility    Impairments Affecting Function (Mobility)  endurance/activity tolerance;strength;balance  -DANNY     Comment, Safety Issues/Impairments (Mobility)  gait belt and non skid socks used for safety   -       User Key  (r) = Recorded By, (t) = Taken By, (c) = Cosigned By    Initials Name Provider Type    Neela Rivas OT Occupational Therapist          Mobility/ADL's     Row Name 04/22/21 1313          Bed Mobility    Bed Mobility  supine-sit  -     Supine-Sit [place] (Bed Mobility)  contact guard  -     Row Name 04/22/21 1313          Transfers    Transfers  toilet transfer  -     [place] Level (Toilet Transfer)  standby assist  -     Assistive Device (Toilet Transfer)  commode;grab bars/safety frame;walker, front-wheeled  -     Row Name 04/22/21 1313          Toilet Transfer    Type (Toilet Transfer)  stand-sit;sit-stand  -[facility] Name 04/22/21 1313          Functional Mobility    Functional Mobility- Ind. Level  1 person;standby assist  -     Functional Mobility- Device  rolling walker  -     Functional Mobility-Distance (Feet)  15  -[facility] Name 04/22/21 1313          Activities of Daily Living    BADL Assessment/Intervention  toileting;grooming  -[facility] Name 04/22/21 1313          Toileting Assessment/Training    [place] Level (Toileting)  perform perineal hygiene;adjust/manage clothing;change pad/brief;minimum assist (75% patient effort)  -     Assistive Devices (Toileting)  commode;grab bar/safety frame  -     Position (Toileting)  supported sitting;supported standing  -[facility] Name 04/22/21 1313          Grooming Assessment/Training    [place] Level (Grooming)  wash face, hands;set up  -     Position (Grooming)  supported sitting  -       User Key  (r) = Recorded By, (t) = Taken By, (c) = Cosigned By    Initials Name Provider Type    Neela Rivas OT Occupational Therapist        Obj/Interventions     Row Name 04/22/21 1315          Sensory Assessment (Somatosensory)    Sensory Assessment (Somatosensory)  sensation intact  -     Row Name 04/22/21 1315          Vision Assessment/Intervention    Visual Impairment/Limitations   WFL;corrective lenses for reading  -DANNY     Row Name 04/22/21 1315          Range of Motion Comprehensive    General Range of Motion  no range of motion deficits identified  -DANNY     Row Name 04/22/21 1315          Strength Comprehensive (MMT)    General Manual Muscle Testing (MMT) Assessment  no strength deficits identified  -DANNY     Comment, General Manual Muscle Testing (MMT) Assessment  generalized weakness in BUEs (R>L), but WFL.  -DANNY     Row Name 04/22/21 1315          Balance    Balance Assessment  sitting static balance;standing static balance;standing dynamic balance  -DANNY     Static Sitting Balance  WFL;unsupported;sitting, edge of bed  -DANNY     Static Standing Balance  WFL  -DANNY     Dynamic Standing Balance  WFL  -DANNY     Balance Interventions  occupation based/functional task  -DANNY       User Key  (r) = Recorded By, (t) = Taken By, (c) = Cosigned By    Initials Name Provider Type    Neela Rivas, OT Occupational Therapist        Goals/Plan     [facility] Name 04/22/21 1318          Transfer Goal 1 (OT)    Activity/Assistive Device (Transfer Goal 1, OT)  transfers, all  -DANNY     Time Frame (Transfer Goal 1, OT)  short term goal (STG);2 weeks  -[facility] Name 04/22/21 1318          Bathing Goal 1 (OT)    Activity/Device (Bathing Goal 1, OT)  bathing skills, all  -DANNY     [place] Level/Cues Needed (Bathing Goal 1, OT)  modified independence  -DANNY     Time Frame (Bathing Goal 1, OT)  short term goal (STG);2 weeks  -[facility] Name 04/22/21 1318          Dressing Goal 1 (OT)    Activity/Device (Dressing Goal 1, OT)  dressing skills, all  -DANNY     [place]/Cues Needed (Dressing Goal 1, OT)  modified independence  -DANNY     Time Frame (Dressing Goal 1, OT)  short term goal (STG);2 weeks  -[facility] Name 04/22/21 1318          Therapy Assessment/Plan (OT)    Planned Therapy Interventions (OT)  activity tolerance training;functional balance retraining;occupation/activity based interventions;ROM/therapeutic  exercise;strengthening exercise;transfer/mobility retraining;patient/caregiver education/training;neuromuscular control/coordination retraining;cognitive/visual perception retraining;edema control/reduction;BADL retraining;adaptive equipment training  -       User Key  (r) = Recorded By, (t) = Taken By, (c) = Cosigned By    Initials Name Provider Type    Neela Rivas, TOM Occupational Therapist        Clinical Impression     [facility] Name 04/22/21 1316          Pain Assessment    Additional Documentation  Pain Scale: FACES Pre/Post-Treatment (Group)  -[facility] Name 04/22/21 1316          Pain Scale: Numbers Pre/Post-Treatment    Pain Intervention(s)  Repositioned;Ambulation/increased activity;Emotional support  -[facility] Name 04/22/21 1316          Pain Scale: FACES Pre/Post-Treatment    Pain: FACES Scale, Pretreatment  0-->no hurt  -     Posttreatment Pain Rating  0-->no hurt  -[facility] Name 04/22/21 1316          Plan of Care Review    Plan of Care Reviewed With  patient  -     Outcome Summary  Pt is an 84 y/o F admit for R hand weakness. CT HEAD (-). MRI BRAIN pending. Pt was recently admitted for CHF exacerbation, hypoxia and SOA on 4/4/21. Pt resides at Assisted Living and reports indep with ADLs/mobility with RW. Pt appears mildly below ADL baseline, limited by impaired activity tolerance, impaired balance, generalized weakness and acuity of illness. OT recommends continued treatment at 5x/week and d/c home with  OT.  -[facility] Name 04/22/21 1316          Therapy Assessment/Plan (OT)    Criteria for Skilled Therapeutic Interventions Met (OT)  yes;skilled treatment is necessary  -     Therapy Frequency (OT)  5 times/wk  -[facility] Name 04/22/21 1316          Therapy Plan Review/Discharge Plan (OT)    Anticipated Discharge Disposition (OT)  home with home health  -[facility] Name 04/22/21 1316          Positioning and Restraints    Pre-Treatment Position  in bed  -     Post Treatment  Position  chair  -DANNY     In Chair  notified nsg;call light within reach;encouraged to call for assist;exit alarm on;reclined  -       User Key  (r) = Recorded By, (t) = Taken By, (c) = Cosigned By    Initials Name Provider Type    Neela Rivas OT Occupational Therapist        Outcome Measures     Row Name 04/22/21 1319          How much help from another is currently needed...    Putting on and taking off regular lower body clothing?  3  -DANNY     Bathing (including washing, rinsing, and drying)  3  -DANNY     Toileting (which includes using toilet bed pan or urinal)  3  -DANNY     Putting on and taking off regular upper body clothing  4  -DANNY     Taking care of personal grooming (such as brushing teeth)  4  -DANNY     Eating meals  4  -DANNY     AM-PAC 6 Clicks Score (OT)  21  -     Row Name 04/22/21 1319          Modified [place] Scale    Modified [place] Scale  3 - Moderate disability.  Requiring some help, but able to walk without assistance.  -     Row Name 04/22/21 1319          Functional Assessment    Outcome Measure Options  AM-PAC 6 Clicks Daily Activity (OT);Modified [place]  -       User Key  (r) = Recorded By, (t) = Taken By, (c) = Cosigned By    Initials Name Provider Type    Neela Rivas OT Occupational Therapist        Occupational Therapy Education                 Title: PT OT SLP Therapies (Done)     Topic: Occupational Therapy (Done)     Point: ADL training (Done)     Description:   Instruct learner(s) on proper safety adaptation and remediation techniques during self care or transfers.   Instruct in proper use of assistive devices.              Learning Progress Summary           Patient Acceptance, E,TB, VU by DANNY at 4/22/2021 1320                               User Key     Initials Effective Dates Name Provider Type Discipline    DANNY 07/15/20 -  Neela Thorpe OT Occupational Therapist OT              OT Recommendation and Plan  Planned Therapy Interventions (OT): activity  tolerance training, functional balance retraining, occupation/activity based interventions, ROM/therapeutic exercise, strengthening exercise, transfer/mobility retraining, patient/caregiver education/training, neuromuscular control/coordination retraining, cognitive/visual perception retraining, edema control/reduction, BADL retraining, adaptive equipment training  Therapy Frequency (OT): 5 times/wk  Plan of Care Review  Plan of Care Reviewed With: patient  Outcome Summary: Pt is an 84 y/o F admit for R hand weakness. CT HEAD (-). MRI BRAIN pending. Pt was recently admitted for CHF exacerbation, hypoxia and SOA on 4/4/21. Pt resides at Assisted Living and reports indep with ADLs/mobility with RW. Pt appears mildly below ADL baseline, limited by impaired activity tolerance, impaired balance, generalized weakness and acuity of illness. OT recommends continued treatment at 5x/week and d/c home with  OT.     Time Calculation:   Time Calculation- OT     Row Name 04/22/21 1320             Time Calculation- OT    OT Start Time  0922  -DANNY      OT Stop Time  1016  -DANNY      OT Time Calculation (min)  54 min  -DANNY      Total Timed Code Minutes- OT  44 minute(s)  -DANNY      OT Received On  04/22/21  -DANNY      OT - Next Appointment  04/23/21  -DANNY      OT Goal Re-Cert Due Date  05/06/21  -DANNY         Timed Charges    44322 - OT Self Care/Mgmt Minutes  44  -DANNY         Total Minutes    Timed Charges Total Minutes  44  -DANNY       Total Minutes  44  -DANNY        User Key  (r) = Recorded By, (t) = Taken By, (c) = Cosigned By    Initials Name Provider Type    DANNY Neela Thorpe OT Occupational Therapist        Therapy Charges for Today     Code Description Service Date Service Provider Modifiers Qty    [id number]  OT EVAL LOW COMPLEXITY 2 4/22/2021 Neela Thorpe OT GO 1    [id number]  OT SELF CARE/MGMT/TRAIN EA 15 MIN 4/22/2021 Neela Thorpe OT GO 3               Neela Thorpe OT  4/22/2021

## 2021-04-22 NOTE — PROGRESS NOTES
Discharge Planning Assessment  [facility]     Patient Name: Yazmin Javier  MRN: [id number]  Today's Date: 4/22/2021    Admit Date: 4/21/2021    Discharge Needs Assessment     Row Name 04/22/21 1551       Living Environment    Lives With  facility resident    Current Living Arrangements  independent/assisted living facility    Provides Primary Care For  no one    Family Caregiver if Needed  other relative(s)    Family Caregiver Names  [facility]ath[employer] surrogate on file, Anant Mulligan, [phone]    Quality of Family Relationships  helpful;involved;supportive    Able to Return to Prior Arrangements  yes       Resource/Environmental Concerns    Resource/Environmental Concerns  none       Transition Planning    Patient/Family Anticipates Transition to  home with help/services    Patient/Family Anticipated Services at Transition  home health care    Transportation Anticipated  family or friend will provide       Discharge Needs Assessment    Current Outpatient/Agency/Support Group  homecare agency;assisted living facility    Equipment Currently Used at Home  walker, rolling;commode;shower chair    Concerns to be Addressed  discharge planning    Outpatient/Agency/Support Group Needs  assisted living facility;homecare agency    Discharge Facility/Level of Care Needs  home with home health;assisted living facility    Provided Post Acute Provider List?  Refused    Refused Provider List Comment  Pt reports current with HH, awaiting agency name    Discharge Coordination/Progress  Return to [facility] with continued HH        Discharge Plan     Row Name 04/22/21 1557       Plan    Plan  Return to [facility] with continued HH    Patient/Family in Agreement with Plan  yes    Plan Comments  CCP met with pt at bedside to verify information and discuss d/c planning. Pt resides in assisted living at [facility] and reports she is current with home health but cannot recall agency name (awaiting return contact from [facility],  Stephy, [phone] to confirm HH and also pharmacy used). Pt reports recently completing sub-acute rehab at [facility], and currently uses walker, shower chair, and 3-in-1 commode at AL. Pt hopes to return to [facility] with continued HH. CCP awaiting return contact from [facility] to confirm pt may return. Rose Marie Tsai LCSW        Continued Care and Services - Admitted Since 4/21/2021    Coordination has not been started for this encounter.     Selected Continued Care - Prior Encounters Includes selections from prior encounters from 1/21/2021 to 4/22/2021    Discharged on 4/7/2021 Admission date: 4/4/2021 - Discharge disposition: Home or Self Care    Destination     Service Provider Selected Services Address Phone Fax Patient Preferred    [facility]  Skilled Nursing [address] [address] [phone] --          Durable Medical Equipment     Service Provider Selected Services Address Phone Fax Patient Preferred    [facility]  Durable Medical Equipment [address] [address] [phone] --                      Demographic Summary     Row Name 04/22/21 1552       General Information    Admission Type  observation    Arrived From  home    Required Notices Provided  Observation Status Notice    Referral Source  admission list    Reason for Consult  discharge planning    Preferred Language  English        Functional Status     Row Name 04/22/21 1552       Functional Status    Usual Activity Tolerance  good    Current Activity Tolerance  moderate       Functional Status, IADL    Medications  assistive equipment and person    Meal Preparation  assistive equipment and person    Housekeeping  assistive equipment and person    Laundry  assistive equipment and person    Shopping  assistive equipment and person       Mental Status Summary    Recent Changes in Mental Status/Cognitive Functioning  no changes        Psychosocial     No documentation.       Abuse/Neglect    No documentation.       Legal    No documentation.       Substance Abuse    No documentation.       Patient Forms    No documentation.           Neela Tsai LCSW

## 2021-04-22 NOTE — CONSULTS
Acute rehab referral received via stroke order set. Patient symptoms limited to right hand. Will sign off.    Thank you!    Tevin Veronica RN  p

## 2021-04-23 ENCOUNTER — APPOINTMENT (OUTPATIENT)
Dept: CARDIOLOGY | Facility: HOSPITAL | Age: 86
End: 2021-04-23

## 2021-04-23 LAB
ANION GAP SERPL CALCULATED.3IONS-SCNC: 11.6 MMOL/L (ref 5–15)
BH CV XLRA MEAS LEFT DIST CCA EDV: -12.3 CM/SEC
BH CV XLRA MEAS LEFT DIST CCA PSV: -36.5 CM/SEC
BH CV XLRA MEAS LEFT DIST ICA EDV: -20.1 CM/SEC
BH CV XLRA MEAS LEFT DIST ICA PSV: -50.6 CM/SEC
BH CV XLRA MEAS LEFT MID ICA EDV: -16.7 CM/SEC
BH CV XLRA MEAS LEFT MID ICA PSV: -40.4 CM/SEC
BH CV XLRA MEAS LEFT PROX CCA EDV: 8.1 CM/SEC
BH CV XLRA MEAS LEFT PROX CCA PSV: 44.6 CM/SEC
BH CV XLRA MEAS LEFT PROX ECA EDV: -7.6 CM/SEC
BH CV XLRA MEAS LEFT PROX ECA PSV: -47.4 CM/SEC
BH CV XLRA MEAS LEFT PROX ICA EDV: 22.3 CM/SEC
BH CV XLRA MEAS LEFT PROX ICA PSV: 55.5 CM/SEC
BH CV XLRA MEAS LEFT PROX SCLA PSV: 81.6 CM/SEC
BH CV XLRA MEAS LEFT VERTEBRAL A EDV: 5.9 CM/SEC
BH CV XLRA MEAS LEFT VERTEBRAL A PSV: 28.4 CM/SEC
BH CV XLRA MEAS RIGHT DIST CCA EDV: -12.8 CM/SEC
BH CV XLRA MEAS RIGHT DIST CCA PSV: -44.6 CM/SEC
BH CV XLRA MEAS RIGHT DIST ICA EDV: -14.8 CM/SEC
BH CV XLRA MEAS RIGHT DIST ICA PSV: -42.8 CM/SEC
BH CV XLRA MEAS RIGHT MID ICA EDV: -14.2 CM/SEC
BH CV XLRA MEAS RIGHT MID ICA PSV: -42 CM/SEC
BH CV XLRA MEAS RIGHT PROX CCA EDV: 17 CM/SEC
BH CV XLRA MEAS RIGHT PROX CCA PSV: 85.6 CM/SEC
BH CV XLRA MEAS RIGHT PROX ECA EDV: -9.5 CM/SEC
BH CV XLRA MEAS RIGHT PROX ECA PSV: -73.5 CM/SEC
BH CV XLRA MEAS RIGHT PROX ICA EDV: -16.1 CM/SEC
BH CV XLRA MEAS RIGHT PROX ICA PSV: -45.5 CM/SEC
BH CV XLRA MEAS RIGHT PROX SCLA PSV: 98.6 CM/SEC
BH CV XLRA MEAS RIGHT VERTEBRAL A EDV: 11.4 CM/SEC
BH CV XLRA MEAS RIGHT VERTEBRAL A PSV: 28.4 CM/SEC
BUN SERPL-MCNC: 33 MG/DL (ref 8–23)
BUN/CREAT SERPL: 22.9 (ref 7–25)
CALCIUM SPEC-SCNC: 8.9 MG/DL (ref 8.6–10.5)
CHLORIDE SERPL-SCNC: 106 MMOL/L (ref 98–107)
CO2 SERPL-SCNC: 22.4 MMOL/L (ref 22–29)
CREAT SERPL-MCNC: 1.44 MG/DL (ref 0.57–1)
DEPRECATED RDW RBC AUTO: 54.6 FL (ref 37–54)
ERYTHROCYTE [DISTWIDTH] IN BLOOD BY AUTOMATED COUNT: 14.1 % (ref 12.3–15.4)
GFR SERPL CREATININE-BSD FRML MDRD: 35 ML/MIN/1.73
GLUCOSE BLDC GLUCOMTR-MCNC: 117 MG/DL (ref 70–130)
GLUCOSE SERPL-MCNC: 107 MG/DL (ref 65–99)
HCT VFR BLD AUTO: 36.4 % (ref 34–46.6)
HGB BLD-MCNC: 12.3 G/DL (ref 12–15.9)
LEFT ARM BP: NORMAL MMHG
MCH RBC QN AUTO: 35.9 PG (ref 26.6–33)
MCHC RBC AUTO-ENTMCNC: 33.8 G/DL (ref 31.5–35.7)
MCV RBC AUTO: 106.1 FL (ref 79–97)
PLATELET # BLD AUTO: 202 10*3/MM3 (ref 140–450)
PMV BLD AUTO: 9.5 FL (ref 6–12)
POTASSIUM SERPL-SCNC: 3.8 MMOL/L (ref 3.5–5.2)
RBC # BLD AUTO: 3.43 10*6/MM3 (ref 3.77–5.28)
RIGHT ARM BP: NORMAL MMHG
SODIUM SERPL-SCNC: 140 MMOL/L (ref 136–145)
WBC # BLD AUTO: 5.55 10*3/MM3 (ref 3.4–10.8)

## 2021-04-23 PROCEDURE — 97535 SELF CARE MNGMENT TRAINING: CPT

## 2021-04-23 PROCEDURE — 97162 PT EVAL MOD COMPLEX 30 MIN: CPT

## 2021-04-23 PROCEDURE — 82962 GLUCOSE BLOOD TEST: CPT

## 2021-04-23 PROCEDURE — 85027 COMPLETE CBC AUTOMATED: CPT | Performed by: STUDENT IN AN ORGANIZED HEALTH CARE EDUCATION/TRAINING PROGRAM

## 2021-04-23 PROCEDURE — 80048 BASIC METABOLIC PNL TOTAL CA: CPT | Performed by: STUDENT IN AN ORGANIZED HEALTH CARE EDUCATION/TRAINING PROGRAM

## 2021-04-23 PROCEDURE — 99232 SBSQ HOSP IP/OBS MODERATE 35: CPT | Performed by: NURSE PRACTITIONER

## 2021-04-23 PROCEDURE — 97530 THERAPEUTIC ACTIVITIES: CPT

## 2021-04-23 PROCEDURE — 93880 EXTRACRANIAL BILAT STUDY: CPT

## 2021-04-23 RX ADMIN — ALLOPURINOL 300 MG: 300 TABLET ORAL at 09:14

## 2021-04-23 RX ADMIN — APIXABAN 5 MG: 5 TABLET, FILM COATED ORAL at 09:14

## 2021-04-23 RX ADMIN — LEVOTHYROXINE SODIUM 50 MCG: 0.05 TABLET ORAL at 05:38

## 2021-04-23 RX ADMIN — PAROXETINE HYDROCHLORIDE HEMIHYDRATE 10 MG: 10 TABLET, FILM COATED ORAL at 09:13

## 2021-04-23 RX ADMIN — APIXABAN 5 MG: 5 TABLET, FILM COATED ORAL at 20:09

## 2021-04-23 RX ADMIN — METOPROLOL TARTRATE 25 MG: 25 TABLET ORAL at 20:22

## 2021-04-23 RX ADMIN — ASPIRIN 81 MG: 81 TABLET, COATED ORAL at 09:14

## 2021-04-23 RX ADMIN — METOPROLOL TARTRATE 25 MG: 25 TABLET ORAL at 13:50

## 2021-04-23 RX ADMIN — FUROSEMIDE 40 MG: 40 TABLET ORAL at 09:14

## 2021-04-23 RX ADMIN — POTASSIUM CHLORIDE 20 MEQ: 750 TABLET, EXTENDED RELEASE ORAL at 09:13

## 2021-04-23 RX ADMIN — METOPROLOL TARTRATE 25 MG: 25 TABLET, FILM COATED ORAL at 09:14

## 2021-04-23 RX ADMIN — ATORVASTATIN CALCIUM 80 MG: 80 TABLET, FILM COATED ORAL at 20:09

## 2021-04-23 NOTE — DISCHARGE PLACEMENT REQUEST
"Yazmin Javier (85 y.o. Female)     Date of Birth Social Security Number Address Home Phone MRN    1935  [id number]    [place] [address] [phone] [id number]    Latter day Marital Status          Mosque Single       Admission Date Admission Type Admitting Provider Attending Provider Department, Room/Bed    4/21/21 Emergency Jr Montes De Oca MD Snyder, Perry, MD [address], P585/1    Discharge Date Discharge Disposition Discharge Destination                       Attending Provider: Norbert Hidalgo MD    Allergies: No Known Allergies    Isolation: None   Infection: None   Code Status: CPR    Ht: 160 cm (63\")   Wt: 86.6 kg (191 lb)    Admission Cmt: None   Principal Problem: Right arm weakness [R29.898]                 Active Insurance as of 4/21/2021     Primary Coverage     Payor Plan Insurance Group Employer/Plan Group    MEDICARE MEDICARE A & B      Payor Plan Address Payor Plan Phone Number Payor Plan Fax Number Effective Dates    [address] [phone]  6/1/2002 - None Entered    [facility] [id number]       Subscriber Name Subscriber Birth Date Member ID       YAZMIN JAVIER 1935 [id number]           Secondary Coverage     Payor Plan Insurance Group Employer/Plan Group    ANTH BLUE CROSS [facility] SUPP [id number]     Payor Plan Address Payor Plan Phone Number Payor Plan Fax Number Effective Dates    [address]   12/1/2016 - None Entered    [facility] [id number]       Subscriber Name Subscriber Birth Date Member ID       YAZMIN JAVIER 1935 [id number]                 Emergency Contacts      (Rel.) Home Phone Work Phone Mobile Phone    WALTER SERVIN (Surrogate) [phone] -- [phone]            "

## 2021-04-23 NOTE — PLAN OF CARE
Goal Outcome Evaluation:  Plan of Care Reviewed With: patient  Progress: improving  Outcome Summary: Pt is stable.  NIH 0.  Denies any pain.  Will continue to work PT OT.

## 2021-04-23 NOTE — PROGRESS NOTES
"DOS: 2021  NAME: Yazmin Javier   : 1935  PCP: Morenita Silverio MD  Chief Complaint   Patient presents with   • rt hand weakness     Stroke    Subjective: Patient notes ongoing r hand weakness but no arm/leg weakness or speech difficulty. Pt seen in follow up today, however the problem is new to the examiner.      Objective:  Vital signs: /89 (BP Location: Left arm, Patient Position: Sitting)   Pulse 118   Temp 97.4 °F (36.3 °C) (Oral)   Resp 18   Ht 160 cm (63\")   Wt 86.6 kg (191 lb)   SpO2 99%   BMI 33.83 kg/m²       General appearance: Well developed, well nourished, well groomed, alert and cooperative.Obese.  HEENT: Normocephalic.   Neck and spine: Normal range of motion. Normal alignment. No mass or tenderness.    Cardiac: Regular rate and rhythm.  Peripheral Vasculature: Radial pulses are equal and symmetric.  Chest Exam: Clear to auscultation bilaterally, no wheezes, no rhonchi.  Extremities: Normal, no edema.   Skin: No rashes or birthmarks.     Higher integrative function: Oriented to time, place, person, intact recent and remote memory, attention span, concentration and language. Spontaneous speech, fund of vocabulary are normal.   CN II: Normal visual fields.   CN III IV VI: Extraocular movements are full without nystagmus. Pupils are equal, round, and reactive to light.  CN V: Normal facial sensation.  CN VII: Facial movements are symmetric, no weakness.   CN VIII: Auditory acuity is normal.   CN IX & X: Symmetric palatal movement.   CN XI: Sternocleidomastoid and trapezius are normal. No weakness.   CN XII: The tongue is midline. No atrophy or fasciculations.   Motor: Normal muscle strength, bulk, and tone in upper and lower extremities except mild weakness noted in R hand grasp. No fasciculations, rigidity, spasticity or abnormal movements.   Sensation: Intact to LT in all extremities.   Station and gait: N/A  Muscle stretch reflexes: Plantar reflexes are flexor bilaterally. "   Coordination: Finger to nose test showed no dysmetria.       Scheduled Meds:allopurinol, 300 mg, Oral, Daily  apixaban, 5 mg, Oral, Q12H  aspirin, 81 mg, Oral, Daily  atorvastatin, 80 mg, Oral, Nightly  furosemide, 40 mg, Oral, Daily  levothyroxine, 50 mcg, Oral, Q AM  metoprolol tartrate, 25 mg, Oral, Q6H  PARoxetine, 10 mg, Oral, Daily  potassium chloride, 20 mEq, Oral, Daily      Continuous Infusions:   PRN Meds:.•  acetaminophen **OR** acetaminophen  •  albuterol  •  bisacodyl  •  ondansetron  •  [COMPLETED] Insert peripheral IV **AND** sodium chloride    Laboratory results:  Lab Results   Component Value Date    GLUCOSE 107 (H) 04/23/2021    CALCIUM 8.9 04/23/2021     04/23/2021    K 3.8 04/23/2021    CO2 22.4 04/23/2021     04/23/2021    BUN 33 (H) 04/23/2021    CREATININE 1.44 (H) 04/23/2021    EGFRIFNONA 35 (L) 04/23/2021    BCR 22.9 04/23/2021    ANIONGAP 11.6 04/23/2021     Lab Results   Component Value Date    WBC 5.55 04/23/2021    HGB 12.3 04/23/2021    HCT 36.4 04/23/2021    .1 (H) 04/23/2021     04/23/2021     Lab Results   Component Value Date    CHOL 156 04/22/2021     Lab Results   Component Value Date    HDL 37 (L) 04/22/2021     Lab Results   Component Value Date    LDL 97 04/22/2021     Lab Results   Component Value Date    TRIG 119 04/22/2021         Lab 04/22/21  0353   HEMOGLOBIN A1C 5.87*      Review and interpretation of imaging: MRI brain images viewed by me, shows few scattered small left MCA strokes.  MRI OF THE BRAIN WITHOUT CONTRAST     CLINICAL HISTORY: Right upper extremity weakness which is improving.      TECHNIQUE: MRI of the brain was obtained with sagittal T1, axial T1,  axial FLAIR, axial T2, axial diffusion, and axial gradient echo images.     FINDINGS:     There are foci of restricted diffusion involving the lateral aspect of  the left posterior frontal lobe including the left post central gyrus,  compatible with areas of acute infarction within  the left MCA  distribution. The largest of these foci of restricted diffusion  measuring up to approximately 11 mm in diameter. There are no abnormal  areas of susceptibility artifact. There are mild-to-moderate changes of  chronic small vessel ischemic phenomena. There are small foci of  encephalomalacia within the lateral aspect of the left occipital lobe  measuring up to 5 mm in diameter and the lateral aspect of the left  middle frontal gyrus also measuring to 5 mm in diameter, compatible with  small chronic infarcts within the left MCA distribution.     Incidental note is made of bilateral mastoid effusions, left larger than  right. Mucosal thickening is appreciated within the sphenoid sinus and  the ethmoid air cells.     IMPRESSION:     There are small foci of acute infarction within the left MCA  distribution involving the lateral aspect of the left frontal lobe  including the left precentral gyrus. The largest of these foci of acute  infarction measures up to 11 mm in diameter. There is no evidence for  hemorrhagic transformation. These findings were discussed with Deysi Sutherland RN, the nurse caring for this patient on 04/20/2021 at  approximately 3:30 PM. She was instructed to notify the physician caring  for this patient with this result     Additionally, there are findings compatible with small foci of chronic  infarction within the lateral aspect of the left frontal and left  occipital lobes within the left MCA distribution.     Mild changes of chronic small vessel ischemic phenomena are identified.     Echocardiogram Findings    Left Ventricle Calculated left ventricular EF = 56% Estimated left ventricular EF was in disagreement with the calculated left ventricular EF. Left ventricular ejection fraction appears to be 61 - 65%. Left ventricular systolic function is normal.   Normal left ventricular cavity size noted. Left ventricular wall thickness is consistent with borderline concentric  hypertrophy. All left ventricular wall segments contract normally. Left ventricular diastolic function was normal. No evidence of a left ventricular thrombus present.   Right Ventricle The right ventricular cavity is mildly dilated. Normal right ventricular systolic function noted. There is a prominent moderator band in the right ventricle   Left Atrium The left atrial cavity is mildly dilated.  Saline test results are negative.   Right Atrium The right atrial cavity is mildly dilated.   Aortic Valve The aortic valve is abnormal in structure. The aortic valve appears trileaflet. Trace aortic valve regurgitation is present. No aortic valve stenosis is present. The aortic valve leaflets are moderately calcified (aortic sclerosis)   Mitral Valve Trace mitral valve regurgitation is present. No significant mitral valve stenosis is present. There is severe mitral annular calcification   Tricuspid Valve The tricuspid valve is structurally normal with no significant stenosis present. Mild tricuspid valve regurgitation is present. Estimated right ventricular systolic pressure from tricuspid regurgitation is normal (<35 mmHg). Calculated right ventricular systolic pressure from tricuspid regurgitation is 32 mmHg.   Pulmonic Valve The pulmonic valve is structurally normal with no significant stenosis present. There is trace pulmonic valve regurgitation present.   Greater Vessels No dilation of the aortic root is present.   Pericardium There is no evidence of pericardial effusion. .     Study Findings    •     Right CCA Prox:  No plaque visualized.    •      Right CCA Mid:  No plaque visualized.   •     Right ICA Prox:  No plaque visualized.   •     Right ECA:  No plaque visualized.   •     Right Vertebral:  Antegrade flow noted.       •     Left CCA Prox:  No plaque visualized.   •    Left CCA Mid:  No plaque visualized.   •     Left ICA Prox:  Plaque present.   •     Left ECA:  No plaque visualized.   •     Left Vertebral:   Antegrade flow noted.   Study Impression    •    Right CCA Dist:  Imaging indicates patent flow.   •    Right ICA Prox:  Imaging indicates patent flow.    •    Right ICA Mid:  Imaging indicates patent flow.     •    Left ICA Prox:  Imaging indicates 16-49% stenosis.       Impression:  This patient is an 85-year-old female with PAF, on Eliquis 2.5 mg twice daily, CHF, CKD, and hypothyroidism who presented 4/21 with acute onset of right arm weakness without other focal symptoms.  In the ED /80, and A. fib R 68, T 97.3, NIHSS 1, CT head showed chronic changes.  She was on antiplatelet or statin prior to arrival      Work-up:  4/22 MRI brain without: Small foci of acute infarct in left MCA distribution involving the left frontal lobe including left precentral gyrus without hemorrhagic transformation.  4/20 two 2D echo: EF 61 to 65%, prominent moderator band in the right ventricle, mildly dilated left atrial cavity, saline test results are negative, moderately calcified aortic valve leaflets.  Bilateral carotid ultrasound: Left ICA 16 to 49% stenosis, right ICA without significant stenosis.  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  Follow-up outpatient as needed.  Discussed with Dr. Martinez today.  Will sign off, please call further questions or concerns.

## 2021-04-23 NOTE — PLAN OF CARE
Goal Outcome Evaluation:  Plan of Care Reviewed With: patient  Progress: improving  Outcome Summary: Patient is a 84 yo female who presented  with R arm weakness and was found to have small foci acute infarct L MCA. The patient states she lives in [place] and is independent with rwx at baseline. She states she is ind with ADLs. The patient presents today with decreased activity tolerance, decreased strength, decreased standing balance. The patient completed supine to sitting with S, STS with SBA to rwx, and ambulated 15ft with rwx and CGA. The patient was able to don socks ind at EOB but required assist with lower body dressing. The patient will likely benefit from skilled PT to increase level of independence with bed mobility, trasnfers, and gait. Anticipate return to [facility] with home health PT.Patient was not wearing a face mask during this therapy encounter. Therapist used appropriate personal protective equipment including eye protection, mask, and gloves.  Mask used was standard procedure mask. Appropriate PPE was worn during the entire therapy session. Hand hygiene was completed before and after therapy session. Patient is not in enhanced droplet precautions.

## 2021-04-23 NOTE — THERAPY EVALUATION
Patient Name: Yazmin Javier  : 1935    MRN: [id number]                              Today's Date: 2021       Admit Date: 2021    Visit Dx:     ICD-10-CM ICD-9-CM   1. Right arm weakness  R29.898 729.89   2. On anticoagulant therapy  Z79.01 V58.61   3. Persistent atrial fibrillation (CMS/HCC)  I48.19 427.31     Patient Active Problem List   Diagnosis   • Acute on chronic congestive heart failure (CMS/HCC)   • Hypoxia   • A-fib (CMS/HCC)   • Chronic anticoagulation   • Hypothyroidism (acquired)   • Stage 3b chronic kidney disease (CMS/HCC)   • Right arm weakness   • Chronic diastolic CHF (congestive heart failure) (CMS/HCC)     History reviewed. No pertinent past medical history.  History reviewed. No pertinent surgical history.  General Information     Row Name 21 1130          Physical Therapy Time and Intention    Document Type  evaluation  -CB     Mode of Treatment  individual therapy;physical therapy  -CB     Row Name 21 1130          General Information    Patient Profile Reviewed  yes  -CB     Prior Level of Function  independent: rwx at baseline  -CB     Existing Precautions/Restrictions  fall  -CB     Barriers to Rehab  none identified  -CB     Row Name 21 1130          Living Environment    Lives With  facility resident [facility]  -CB     Row Name 21 1130          Home Main Entrance    Number of Stairs, Main Entrance  none  -CB     Row Name 21 1130          Cognition    Orientation Status (Cognition)  oriented x 4  -CB     Row Name 21 1130          Safety Issues, Functional Mobility    Impairments Affecting Function (Mobility)  endurance/activity tolerance;strength;balance  -CB     Comment, Safety Issues/Impairments (Mobility)  gait belt and non skid socks  -CB       User Key  (r) = Recorded By, (t) = Taken By, (c) = Cosigned By    Initials Name Provider Type    CB Patti Beavers Physical Therapist        Mobility     Row Name 21 1206          Bed  Mobility    Bed Mobility  supine-sit  -CB     Supine-Sit Hood (Bed Mobility)  supervision  -CB     Assistive Device (Bed Mobility)  head of bed elevated  -CB     Row Name 04/23/21 1206          Bed-Chair Transfer    Bed-Chair Hood (Transfers)  contact guard  -CB     Assistive Device (Bed-Chair Transfers)  walker, front-wheeled  -CB     Row Name 04/23/21 1206          Sit-Stand Transfer    Sit-Stand Hood (Transfers)  standby assist  -CB     Assistive Device (Sit-Stand Transfers)  walker, front-wheeled  -CB     Row Name 04/23/21 1206          Gait/Stairs (Locomotion)    Hood Level (Gait)  contact guard  -CB     Assistive Device (Gait)  walker, front-wheeled  -CB     Distance in Feet (Gait)  15ft  -CB     Deviations/Abnormal Patterns (Gait)  vinicius decreased;gait speed decreased;stride length decreased;antalgic  -CB     Bilateral Gait Deviations  forward flexed posture;heel strike decreased  -CB       User Key  (r) = Recorded By, (t) = Taken By, (c) = Cosigned By    Initials Name Provider Type    CB Patti Beavers Physical Therapist        Obj/Interventions     Row Name 04/23/21 1202          Range of Motion Comprehensive    General Range of Motion  bilateral lower extremity ROM WFL  -CB     Row Name 04/23/21 1202          Strength Comprehensive (MMT)    Comment, General Manual Muscle Testing (MMT) Assessment  generalized weakness  -CB     Row Name 04/23/21 1202          Motor Skills    Therapeutic Exercise  -- seated ther ex x 10 reps  -CB     Row Name 04/23/21 1202          Balance    Balance Assessment  standing static balance;standing dynamic balance  -CB     Static Standing Balance  WFL;supported;standing  -CB     Dynamic Standing Balance  mild impairment;standing;supported;unsupported  -CB     Balance Interventions  sitting;standing;sit to stand;supported;static;dynamic  -CB     Row Name 04/23/21 1202          Sensory Assessment (Somatosensory)    Sensory Assessment  (Somatosensory)  LE sensation intact  -CB       User Key  (r) = Recorded By, (t) = Taken By, (c) = Cosigned By    Initials Name Provider Type    Patti Morgan Physical Therapist        Goals/Plan     Row Name 04/23/21 1203          Bed Mobility Goal 1 (PT)    Activity/Assistive Device (Bed Mobility Goal 1, PT)  bed mobility activities, all  -CB     Aladdin Level/Cues Needed (Bed Mobility Goal 1, PT)  modified independence  -CB     Time Frame (Bed Mobility Goal 1, PT)  long term goal (LTG);1 week  -CB     Row Name 04/23/21 1203          Transfer Goal 1 (PT)    Activity/Assistive Device (Transfer Goal 1, PT)  transfers, all;walker, rolling  -CB     Aladdin Level/Cues Needed (Transfer Goal 1, PT)  modified independence  -CB     Time Frame (Transfer Goal 1, PT)  long term goal (LTG);1 week  -CB     Row Name 04/23/21 1203          Gait Training Goal 1 (PT)    Activity/Assistive Device (Gait Training Goal 1, PT)  gait (walking locomotion);walker, rolling  -CB     Aladdin Level (Gait Training Goal 1, PT)  contact guard assist  -CB     Distance (Gait Training Goal 1, PT)  150ft  -CB     Time Frame (Gait Training Goal 1, PT)  long term goal (LTG);1 week  -CB       User Key  (r) = Recorded By, (t) = Taken By, (c) = Cosigned By    Initials Name Provider Type    Patti Morgan Physical Therapist        Clinical Impression     Row Name 04/23/21 1202          Pain    Additional Documentation  Pain Scale: FACES Pre/Post-Treatment (Group)  -CB     Row Name 04/23/21 1202          Pain Scale: FACES Pre/Post-Treatment    Pain: FACES Scale, Pretreatment  0-->no hurt  -CB     Posttreatment Pain Rating  2-->hurts little bit  -CB     Pain Location - Side  Left  -CB     Pain Location  hand;knee  -CB     Row Name 04/23/21 1202          Plan of Care Review    Plan of Care Reviewed With  patient  -CB     Progress  improving  -CB     Outcome Summary  Patient is a 84 yo female who presented  with R arm weakness and was  found to have small foci acute infarct L MCA. The patient states she lives in longterm and is independent with rwx at baseline. She states she is ind with ADLs. The patient presents today with decreased activity tolerance, decreased strength, decreased standing balance. The patient completed supine to sitting with S, STS with SBA to rwx, and ambulated 15ft with rwx and CGA. The patient was able to don socks ind at EOB but required assist with lower body dressing. The patient will likely benefit from skilled PT to increase level of independence with bed mobility, trasnfers, and gait. Anticipate return to longterm with home health PT.  -CB     Row Name 04/23/21 1206          Therapy Assessment/Plan (PT)    Patient/Family Therapy Goals Statement (PT)  to go back to longterm  -CB     Rehab Potential (PT)  good, to achieve stated therapy goals  -CB     Criteria for Skilled Interventions Met (PT)  yes  -CB     Row Name 04/23/21 1206          Positioning and Restraints    Pre-Treatment Position  in bed  -CB     Post Treatment Position  chair  -CB     In Chair  notified nsg;reclined;call light within reach;encouraged to call for assist;exit alarm on  -CB       User Key  (r) = Recorded By, (t) = Taken By, (c) = Cosigned By    Initials Name Provider Type    CB Patti Beavers Physical Therapist        Outcome Measures     Row Name 04/23/21 1209          How much help from another person do you currently need...    Turning from your back to your side while in flat bed without using bedrails?  3  -CB     Moving from lying on back to sitting on the side of a flat bed without bedrails?  3  -CB     Moving to and from a bed to a chair (including a wheelchair)?  3  -CB     Standing up from a chair using your arms (e.g., wheelchair, bedside chair)?  3  -CB     Climbing 3-5 steps with a railing?  2  -CB     To walk in hospital room?  3  -CB     AM-PAC 6 Clicks Score (PT)  17  -CB     Row Name 04/23/21 1203          Modified Burke Scale    Modified  Bagley Scale  3 - Moderate disability.  Requiring some help, but able to walk without assistance.  -CB     Row Name 04/23/21 1204          Functional Assessment    Outcome Measure Options  AM-PAC 6 Clicks Basic Mobility (PT)  -CB       User Key  (r) = Recorded By, (t) = Taken By, (c) = Cosigned By    Initials Name Provider Type    CB Patti Beavers Physical Therapist        Physical Therapy Education                 Title: PT OT SLP Therapies (Done)     Topic: Physical Therapy (Done)     Point: Mobility training (Done)     Learning Progress Summary           Patient Acceptance, E,TB,D, VU,NR by CB at 4/23/2021 1204                   Point: Home exercise program (Done)     Learning Progress Summary           Patient Acceptance, E,TB,D, VU,NR by CB at 4/23/2021 1204                   Point: Body mechanics (Done)     Learning Progress Summary           Patient Acceptance, E,TB,D, VU,NR by CB at 4/23/2021 1204                   Point: Precautions (Done)     Learning Progress Summary           Patient Acceptance, E,TB,D, VU,NR by  at 4/23/2021 1204                               User Key     Initials Effective Dates Name Provider Type Discipline     12/30/20 -  Patti Beavers Physical Therapist PT              PT Recommendation and Plan  Planned Therapy Interventions (PT): balance training, bed mobility training, gait training, home exercise program, patient/family education, postural re-education, transfer training, strengthening, ROM (range of motion)  Plan of Care Reviewed With: patient  Progress: improving  Outcome Summary: Patient is a 86 yo female who presented  with R arm weakness and was found to have small foci acute infarct L MCA. The [facility] and is independent with rwx at baseline. She states she is ind with ADLs. The patient presents today with decreased activity tolerance, decreased strength, decreased standing balance. The patient completed supine to sitting with S, STS with SBA  to rwx, and ambulated 15ft with rwx and CGA. The patient was able to don socks ind at EOB but required assist with lower body dressing. The patient will likely benefit from skilled PT to increase level of independence with bed mobility, trasnfers, and gait. Anticipate return to [facility] with home health PT.     Time Calculation:   PT Charges     Row Name 04/23/21 1208             Time Calculation    Start Time  1100  -CB      Stop Time  1117  -CB      Time Calculation (min)  17 min  -CB      PT Received On  04/23/21  -CB      PT - Next Appointment  04/25/21  -CB      PT Goal Re-Cert Due Date  04/30/21  -CB         Time Calculation- PT    Total Timed Code Minutes- PT  10 minute(s)  -CB         Timed Charges    53289 - PT Therapeutic Activity Minutes  10  -CB         Untimed Charges    PT Eval/Re-eval Minutes  7  -CB         Total Minutes    Timed Charges Total Minutes  10  -CB      Untimed Charges Total Minutes  7  -CB       Total Minutes  17  -CB        User Key  (r) = Recorded By, (t) = Taken By, (c) = Cosigned By    Initials Name Provider Type    Patti Morgan Physical Therapist        Therapy Charges for Today     Code Description Service Date Service Provider Modifiers Qty    52168513414 HC PT THERAPEUTIC ACT EA 15 MIN 4/23/2021 Patti Beavers GP 1    82821025545 HC PT EVAL MOD COMPLEXITY 2 4/23/2021 Patti Beavers GP 1          PT G-Codes  Outcome Measure Options: AM-PAC 6 Clicks Basic Mobility (PT)  AM-PAC 6 Clicks Score (PT): 17  AM-PAC 6 Clicks Score (OT): 21  Modified Burke Scale: 3 - Moderate disability.  Requiring some help, but able to walk without assistance.    Patti Beavers  4/23/2021

## 2021-04-23 NOTE — PLAN OF CARE
Goal Outcome Evaluation:  Plan of Care Reviewed With: patient  Progress: improving  Outcome Summary: Pt seen by OT for treatment this date. Upon arrival pt sitting up in chair requesting to use bathroom, no c.o pain, A&O x4. Pt performed sit>stand transition with use of rolling walker and SBA to transfer from chair to toilet. Pt completed all parts of toileting task with SBA and ambulated to sink to complete hand washing task with Mod I. Pt ambulated back to bed with SBA to perform sit>supine in bed with Supervision>SBA. Pt left in supine, needs in reach, alarm on. Pt to continue with skilled OT services to address goals and deficits. OT wore mask, gloves,glasses, hand hygiene performed.

## 2021-04-23 NOTE — PROGRESS NOTES
Name: Yazmin Javier ADMIT: 2021   : 1935  PCP: Morenita Silverio MD    MRN: [id number] LOS: 1 days   AGE/SEX: 85 y.o. female  ROOM: [facility]     Subjective   Subjective   Called by RN earlier because pt was in RVR. No hypotension. HR better now that BB frequency has been increased. Pt reports that she had no symptoms with accelerated HR.       Objective   Objective   Vital Signs  Temp:  [97.4 °F (36.3 °C)-97.8 °F (36.6 °C)] 97.5 °F (36.4 °C)  Heart Rate:  [] 80  Resp:  [18] 18  BP: (107-135)/(54-95) 115/77  SpO2:  [90 %-99 %] 94 %  on   ;   Device (Oxygen Therapy): room air  Body mass index is 33.83 kg/m².  Physical Exam  Constitutional:       General: She is not in acute distress.     Appearance: She is obese.   HENT:      Head: Normocephalic and atraumatic.   Cardiovascular:      Rate and Rhythm: Normal rate. Rhythm irregular.      Heart sounds: Normal heart sounds.   Pulmonary:      Effort: Pulmonary effort is normal.      Breath sounds: Normal breath sounds.   Abdominal:      General: Bowel sounds are normal.      Palpations: Abdomen is soft.   Musculoskeletal:      Right lower leg: No edema.      Left lower leg: No edema.   Neurological:      Mental Status: She is alert.   Psychiatric:         Mood and Affect: Mood normal.         Behavior: Behavior normal.         Results Review     I reviewed the patient's new clinical results.  Results from last 7 days   Lab Units 21  0353 21  0353 21  2331   WBC 10*3/mm3 5.55 6.88 6.56   HEMOGLOBIN g/dL 12.3 14.1 13.6   PLATELETS 10*3/mm3 202 225 266     Results from last 7 days   Lab Units 21  0353 21  0353 21  2331   SODIUM mmol/L 140 142 142   POTASSIUM mmol/L 3.8 4.3 4.3   CHLORIDE mmol/L 106 106 104   CO2 mmol/L 22.4 20.8* 26.5   BUN mg/dL 33* 30* 30*   CREATININE mg/dL 1.44* 1.63* 1.80*   GLUCOSE mg/dL 107* 101* 115*   Estimated Creatinine Clearance: 30.9 mL/min (A) (by C-G formula based on SCr of 1.44 mg/dL  (H)).  Results from last 7 days   Lab Units 04/22/21  0353 04/21/21  2331   ALBUMIN g/dL 3.60 3.70   BILIRUBIN mg/dL 0.7 0.7   ALK PHOS U/L 78 81   AST (SGOT) U/L 25 21   ALT (SGPT) U/L 12 15     Results from last 7 days   Lab Units 04/23/21  0353 04/22/21  0353 04/21/21  2331   CALCIUM mg/dL 8.9 9.7 9.5   ALBUMIN g/dL  --  3.60 3.70       COVID19   Date Value Ref Range Status   04/22/2021 Not Detected Not Detected - Ref. Range Final   04/04/2021 Not Detected Not Detected - Ref. Range Final     Hemoglobin A1C   Date/Time Value Ref Range Status   04/22/2021 0353 5.87 (H) 4.80 - 5.60 % Final     Glucose   Date/Time Value Ref Range Status   04/23/2021 0117 117 70 - 130 mg/dL Final   04/22/2021 1807 119 70 - 130 mg/dL Final   04/22/2021 1216 107 70 - 130 mg/dL Final       MRI Brain Without Contrast  Narrative: MRI OF THE BRAIN WITHOUT CONTRAST     CLINICAL HISTORY: Right upper extremity weakness which is improving.      TECHNIQUE: MRI of the brain was obtained with sagittal T1, axial T1,  axial FLAIR, axial T2, axial diffusion, and axial gradient echo images.     FINDINGS:     There are foci of restricted diffusion involving the lateral aspect of  the left posterior frontal lobe including the left post central gyrus,  compatible with areas of acute infarction within the left MCA  distribution. The largest of these foci of restricted diffusion  measuring up to approximately 11 mm in diameter. There are no abnormal  areas of susceptibility artifact. There are mild-to-moderate changes of  chronic small vessel ischemic phenomena. There are small foci of  encephalomalacia within the lateral aspect of the left occipital lobe  measuring up to 5 mm in diameter and the lateral aspect of the left  middle frontal gyrus also measuring to 5 mm in diameter, compatible with  small chronic infarcts within the left MCA distribution.     Incidental note is made of bilateral mastoid effusions, left larger than  right. Mucosal thickening  is appreciated within the sphenoid sinus and  the ethmoid air cells.     Impression:    There are small foci of acute infarction within the left MCA  distribution involving the lateral aspect of the left frontal lobe  including the left precentral gyrus. The largest of these foci of acute  infarction measures up to 11 mm in diameter. There is no evidence for  hemorrhagic transformation. These findings were discussed with Deysi Sutherland RN, the nurse caring for this patient on 04/20/2021 at  approximately 3:30 PM. She was instructed to notify the physician caring  for this patient with this result.     Additionally, there are findings compatible with small foci of chronic  infarction within the lateral aspect of the left frontal and left  occipital lobes within the left MCA distribution.     Mild changes of chronic small vessel ischemic phenomena are identified.     This report was finalized on 4/23/2021 2:41 PM by Dr. Emile Pelayo M.D.     Duplex Carotid Ultrasound CAR  · Proximal right internal carotid artery is normal.  · Mid right internal carotid artery is normal.  · Proximal left internal carotid artery mild stenosis.       Scheduled Medications  allopurinol, 300 mg, Oral, Daily  apixaban, 5 mg, Oral, Q12H  atorvastatin, 80 mg, Oral, Nightly  furosemide, 40 mg, Oral, Daily  levothyroxine, 50 mcg, Oral, Q AM  metoprolol tartrate, 25 mg, Oral, Q6H  PARoxetine, 10 mg, Oral, Daily  potassium chloride, 20 mEq, Oral, Daily    Infusions   Diet  Diet Regular; Cardiac       Assessment/Plan     Active Hospital Problems    Diagnosis  POA   • **Right arm weakness [R29.898]  Yes   • Chronic diastolic CHF (congestive heart failure) (CMS/HCC) [I50.32]  Yes   • A-fib (CMS/HCC) [I48.91]  Yes   • Chronic anticoagulation [Z79.01]  Not Applicable   • Hypothyroidism (acquired) [E03.9]  Yes   • Stage 3b chronic kidney disease (CMS/HCC) [N18.32]  Yes      Resolved Hospital Problems   No resolved problems to display.       85 y.o.  female admitted with Right arm weakness.    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 D/c asa per neurology recommendations. eliquis increased to 5 mg bid. lipitor 80 mg daily added. Follow up with neuro as needed.    Paroxysmal afib with rvr-HR high today. Changed metoprolol to q6h to get control. If <110 through tomorrow, then can switch to long acting formulation prior to discharge.    Chronic diastolic heart failure-euvolemic; continue lasix  Hypothyroidism-continue synthroid  CKD 3b-creatinine stable    · Eliquis (home med) for DVT prophylaxis.  · Full code.  · Discussed with patient and nursing staff.  · Anticipate discharge to SNU facility in 1-2 days. when her HR is controlled, possibly tomorrow      Norbert Hidalgo MD  [facility]  04/23/21  17:32 EDT     I wore protective equipment throughout this patient encounter including a face mask, gloves and protective eyewear.  Hand hygiene was performed before donning protective equipment and after removal when leaving the room.

## 2021-04-23 NOTE — PROGRESS NOTES
Continued Stay Note  [facility]     Patient Name: Yazmin Javier  MRN: [id number]  Today's Date: 4/23/2021    Admit Date: 4/21/2021    Discharge Plan     Row Name 04/23/21 1004       Plan    Plan  Return to [facility] with continued [facility]    Patient/Family in Agreement with Plan  yes    Plan Comments  CCP received return contact from [facility] (Stephy, [phone]) who confirms pt is from assisted living where she receives meals, bathing assistance, and pre-packaged medication planner via [facility]. Pt is approved to return as long as she is mobile with walker (her baseline), and [facility] requests nursing report at d/c (PH: [phone], no packet/fax requested). CCP to follow for any additional needs pending treatment course. Rose Marie Tsai LCSW        Discharge Codes    No documentation.             Neela Tsai LCSW

## 2021-04-23 NOTE — THERAPY TREATMENT NOTE
Patient Name: Yazmin Javier  : 1935    MRN: [id number]                              Today's Date: 2021       Admit Date: 2021    Visit Dx:     ICD-10-CM ICD-9-CM   1. Right arm weakness  R29.898 729.89   2. On anticoagulant therapy  Z79.01 V58.61   3. Persistent atrial fibrillation (CMS/HCC)  I48.19 427.31     Patient Active Problem List   Diagnosis   • Acute on chronic congestive heart failure (CMS/HCC)   • Hypoxia   • A-fib (CMS/HCC)   • Chronic anticoagulation   • Hypothyroidism (acquired)   • Stage 3b chronic kidney disease (CMS/HCC)   • Right arm weakness   • Chronic diastolic CHF (congestive heart failure) (CMS/HCC)     History reviewed. No pertinent past medical history.  History reviewed. No pertinent surgical history.  General Information     Row Name 21 1524          OT Time and Intention    Document Type  therapy note (daily note)  -     Mode of Treatment  occupational therapy  -     Row Name 21 1524          General Information    Patient Profile Reviewed  yes  -     Existing Precautions/Restrictions  fall  -     Row Name 21 1524          Cognition    Orientation Status (Cognition)  oriented x 4  -     Row Name 21 1524          Safety Issues, Functional Mobility    Safety Issues Affecting Function (Mobility)  insight into deficits/self-awareness  -       User Key  (r) = Recorded By, (t) = Taken By, (c) = Cosigned By    Initials Name Provider Type     Kassy Bahena OT Occupational Therapist          Mobility/ADL's     Row Name 21 1524          Bed Mobility    Bed Mobility  sit-supine  -     Sit-Supine [place] (Bed Mobility)  supervision  -     Row Name 21 1524          Transfers    Transfers  sit-stand transfer;toilet transfer;bed-chair transfer  -     Comment (Transfers)  Pt performed sit>stand transition with use of rolling walker and SBA to transfer to toilet with SBA then to bed.  -     Bed-Chair [place]  (Transfers)  standby assist  -BL     Assistive Device (Bed-Chair Transfers)  walker, front-wheeled  -BL     Sit-Stand [place] (Transfers)  standby assist  -BL     [place] Level (Toilet Transfer)  standby assist  -BL     Assistive Device (Toilet Transfer)  commode;walker, front-wheeled  -BL     Row Name 04/23/21 1524          Sit-Stand Transfer    Assistive Device (Sit-Stand Transfers)  walker, front-wheeled  -BL     Row Name 04/23/21 1524          Toilet Transfer    Type (Toilet Transfer)  sit-stand;stand-sit  -BL     Row Name 04/23/21 1524          Activities of Daily Living    BADL Assessment/Intervention  grooming;toileting  -BL     Row Name 04/23/21 1524          Toileting Assessment/Training    [place] Level (Toileting)  adjust/manage clothing;change pad/brief;perform perineal hygiene;standby assist  -BL     Assistive Devices (Toileting)  commode  -BL     Position (Toileting)  supported standing;supported sitting  -BL     Row Name 04/23/21 1524          Grooming Assessment/Training    [place] Level (Grooming)  wash face, hands;modified independence  -BL     Position (Grooming)  supported standing  -BL       User Key  (r) = Recorded By, (t) = Taken By, (c) = Cosigned By    Initials Name Provider Type    Kassy [place] OT Occupational Therapist        Obj/Interventions     Row Name 04/23/21 1526          Balance    Balance Assessment  sitting static balance;sitting dynamic balance;sit to stand dynamic balance;standing static balance;standing dynamic balance  -BL     Static Sitting Balance  WFL;supported;sitting in chair  -BL     Dynamic Sitting Balance  WFL;supported;sitting in chair  -BL     Sit to Stand Dynamic Balance  WFL;supported;standing  -BL     Static Standing Balance  WFL;supported;standing  -BL     Dynamic Standing Balance  WFL;supported;standing  -BL     Balance Interventions  sitting;standing;sit to stand;supported;static;dynamic;occupation based/functional task  -BL        User Key  (r) = Recorded By, (t) = Taken By, (c) = Cosigned By    Initials Name Provider Type    BL Kassy Bahena OT Occupational Therapist        Goals/Plan    No documentation.       Clinical Impression     Row Name 04/23/21 1526          Pain Assessment    Additional Documentation  Pain Scale: Numbers Pre/Post-Treatment (Group)  -BL     Row Name 04/23/21 1526          Pain Scale: Numbers Pre/Post-Treatment    Pretreatment Pain Rating  0/10 - no pain  -BL     Posttreatment Pain Rating  0/10 - no pain  -BL     Row Name 04/23/21 1526          Plan of Care Review    Plan of Care Reviewed With  patient  -BL     Progress  improving  -BL     Outcome Summary  Pt seen by OT for treatment this date. Upon arrival pt sitting up in chair requesting to use bathroom, no c.o pain, A&O x4. Pt performed sit>stand transition with use of rolling walker and SBA to transfer from chair to toilet. Pt completed all parts of toileting task with SBA and ambulated to sink to complete hand washing task with Mod I. Pt ambulated back to bed with SBA to perform sit>supine in bed with Supervision>SBA. Pt left in supine, needs in reach, alarm on. Pt to continue with skilled OT services to address goals and deficits. OT wore mask, gloves,glasses, hand hygiene performed.  -BL     Row Name 04/23/21 1526          Vital Signs    Pre Patient Position  Sitting  -BL     Intra Patient Position  Standing  -BL     Post Patient Position  Supine  -BL     Row Name 04/23/21 1526          Positioning and Restraints    Pre-Treatment Position  sitting in chair/recliner  -BL     Post Treatment Position  bed  -BL     In Bed  notified nsg;supine;call light within reach;encouraged to call for assist;exit alarm on  -BL       User Key  (r) = Recorded By, (t) = Taken By, (c) = Cosigned By    Initials Name Provider Type    BL Kassy Bahena OT Occupational Therapist        Outcome Measures     Row Name 04/23/21 1530          How much help from another is  currently needed...    Putting on and taking off regular lower body clothing?  3  -BL     Bathing (including washing, rinsing, and drying)  3  -BL     Toileting (which includes using toilet bed pan or urinal)  3  -BL     Putting on and taking off regular upper body clothing  4  -BL     Taking care of personal grooming (such as brushing teeth)  4  -BL     Eating meals  4  -BL     AM-PAC 6 Clicks Score (OT)  21  -BL     Row Name 04/23/21 1530          Modified [place] Scale    Modified Burke Scale  2 - Slight disability.  Unable to carry out all previous activities but able to look after own affairs without assistance.  -BL     Row Name 04/23/21 1530          Functional Assessment    Outcome Measure Options  AM-PAC 6 Clicks Daily Activity (OT);Modified [place]  -BL       User Key  (r) = Recorded By, (t) = Taken By, (c) = Cosigned By    Initials Name Provider Type     Kassy Bahena OT Occupational Therapist        Occupational Therapy Education                 Title: PT OT SLP Therapies (Done)     Topic: Occupational Therapy (Done)     Point: ADL training (Done)     Description:   Instruct learner(s) on proper safety adaptation and remediation techniques during self care or transfers.   Instruct in proper use of assistive devices.              Learning Progress Summary           Patient Acceptance, E,TB, VU by DANNY at 4/22/2021 1320                               User Key     Initials Effective Dates Name Provider Type Discipline    DANNY 07/15/20 -  Neela Thorpe OT Occupational Therapist OT              OT Recommendation and Plan     Plan of Care Review  Plan of Care Reviewed With: patient  Progress: improving  Outcome Summary: Pt seen by OT for treatment this date. Upon arrival pt sitting up in chair requesting to use bathroom, no c.o pain, A&O x4. Pt performed sit>stand transition with use of rolling walker and SBA to transfer from chair to toilet. Pt completed all parts of toileting task with SBA and  ambulated to sink to complete hand washing task with Mod I. Pt ambulated back to bed with SBA to perform sit>supine in bed with Supervision>SBA. Pt left in supine, needs in reach, alarm on. Pt to continue with skilled OT services to address goals and deficits. OT wore mask, gloves,glasses, hand hygiene performed.     Time Calculation:   Time Calculation- OT     Row Name 04/23/21 1530             Time Calculation- OT    OT Start Time  1505  -BL      OT Stop Time  1522  -BL      OT Time Calculation (min)  17 min  -BL      Total Timed Code Minutes- OT  17 minute(s)  -BL      OT Received On  04/23/21  -      OT - Next Appointment  04/26/21  -        User Key  (r) = Recorded By, (t) = Taken By, (c) = Cosigned By    Initials Name Provider Type    Kassy Cuba OT Occupational Therapist        Therapy Charges for Today     Code Description Service Date Service Provider Modifiers Qty    [id number] HC OT SELF CARE/MGMT/TRAIN EA 15 MIN 4/23/2021 Kassy Bahena OT GO 1               Kassy Bahena OT  4/23/2021

## 2021-04-23 NOTE — PLAN OF CARE
Goal Outcome Evaluation:        Outcome Summary: Patient AOX4.  Heart rate is currently staying <100.  MD aware.  Plans to D/C patient is heart rate remains under 110.  Patient understood.  Will continue to monitor.

## 2021-04-24 VITALS
DIASTOLIC BLOOD PRESSURE: 79 MMHG | HEART RATE: 70 BPM | HEIGHT: 63 IN | RESPIRATION RATE: 20 BRPM | OXYGEN SATURATION: 96 % | BODY MASS INDEX: 33.84 KG/M2 | TEMPERATURE: 98 F | WEIGHT: 191 LBS | SYSTOLIC BLOOD PRESSURE: 119 MMHG

## 2021-04-24 PROBLEM — I63.9 ACUTE EMBOLIC STROKE (HCC): Status: RESOLVED | Noted: 2021-04-24 | Resolved: 2021-04-24

## 2021-04-24 PROBLEM — I63.9 ACUTE EMBOLIC STROKE (HCC): Status: ACTIVE | Noted: 2021-04-24

## 2021-04-24 LAB
ANION GAP SERPL CALCULATED.3IONS-SCNC: 12.3 MMOL/L (ref 5–15)
BUN SERPL-MCNC: 31 MG/DL (ref 8–23)
BUN/CREAT SERPL: 19.9 (ref 7–25)
CALCIUM SPEC-SCNC: 9.1 MG/DL (ref 8.6–10.5)
CHLORIDE SERPL-SCNC: 105 MMOL/L (ref 98–107)
CO2 SERPL-SCNC: 23.7 MMOL/L (ref 22–29)
CREAT SERPL-MCNC: 1.56 MG/DL (ref 0.57–1)
DEPRECATED RDW RBC AUTO: 54.4 FL (ref 37–54)
ERYTHROCYTE [DISTWIDTH] IN BLOOD BY AUTOMATED COUNT: 14.1 % (ref 12.3–15.4)
GFR SERPL CREATININE-BSD FRML MDRD: 32 ML/MIN/1.73
GLUCOSE SERPL-MCNC: 110 MG/DL (ref 65–99)
HCT VFR BLD AUTO: 37.2 % (ref 34–46.6)
HGB BLD-MCNC: 12.8 G/DL (ref 12–15.9)
MCH RBC QN AUTO: 37 PG (ref 26.6–33)
MCHC RBC AUTO-ENTMCNC: 34.4 G/DL (ref 31.5–35.7)
MCV RBC AUTO: 107.5 FL (ref 79–97)
PLATELET # BLD AUTO: 192 10*3/MM3 (ref 140–450)
PMV BLD AUTO: 9.4 FL (ref 6–12)
POTASSIUM SERPL-SCNC: 3.7 MMOL/L (ref 3.5–5.2)
RBC # BLD AUTO: 3.46 10*6/MM3 (ref 3.77–5.28)
SODIUM SERPL-SCNC: 141 MMOL/L (ref 136–145)
WBC # BLD AUTO: 5.9 10*3/MM3 (ref 3.4–10.8)

## 2021-04-24 PROCEDURE — 80048 BASIC METABOLIC PNL TOTAL CA: CPT | Performed by: STUDENT IN AN ORGANIZED HEALTH CARE EDUCATION/TRAINING PROGRAM

## 2021-04-24 PROCEDURE — 85027 COMPLETE CBC AUTOMATED: CPT | Performed by: STUDENT IN AN ORGANIZED HEALTH CARE EDUCATION/TRAINING PROGRAM

## 2021-04-24 RX ORDER — ATORVASTATIN CALCIUM 80 MG/1
80 TABLET, FILM COATED ORAL NIGHTLY
Qty: 30 TABLET | Refills: 0 | Status: SHIPPED | OUTPATIENT
Start: 2021-04-24 | End: 2021-05-24

## 2021-04-24 RX ORDER — METOPROLOL SUCCINATE 25 MG/1
75 TABLET, EXTENDED RELEASE ORAL
Qty: 90 TABLET | Refills: 0 | Status: SHIPPED | OUTPATIENT
Start: 2021-04-25 | End: 2021-01-01

## 2021-04-24 RX ADMIN — POTASSIUM CHLORIDE 20 MEQ: 750 TABLET, EXTENDED RELEASE ORAL at 08:58

## 2021-04-24 RX ADMIN — PAROXETINE HYDROCHLORIDE HEMIHYDRATE 10 MG: 10 TABLET, FILM COATED ORAL at 08:59

## 2021-04-24 RX ADMIN — FUROSEMIDE 40 MG: 40 TABLET ORAL at 08:58

## 2021-04-24 RX ADMIN — APIXABAN 5 MG: 5 TABLET, FILM COATED ORAL at 08:58

## 2021-04-24 RX ADMIN — METOPROLOL SUCCINATE 75 MG: 25 TABLET, EXTENDED RELEASE ORAL at 08:58

## 2021-04-24 RX ADMIN — LEVOTHYROXINE SODIUM 50 MCG: 0.05 TABLET ORAL at 06:43

## 2021-04-24 RX ADMIN — ALLOPURINOL 300 MG: 300 TABLET ORAL at 08:59

## 2021-04-24 NOTE — PLAN OF CARE
Goal Outcome Evaluation:         Patient's heart rate improved, heart rate controlled between 60s-90s. BP's low subsequently from po lopressor. NIH 2. Anticipate discharge later today. Will continue to monitor.

## 2021-04-24 NOTE — DISCHARGE SUMMARY
Patient Name: Yazmin Javier  : 1935  MRN: [id number]    Date of Admission: 2021  Date of Discharge:  2021  Primary Care Physician: Morenita Silverio MD      Chief Complaint:   rt hand weakness      Discharge Diagnoses     Active Hospital Problems    Diagnosis  POA   • Right arm weakness [R29.898]  Yes   • Chronic diastolic CHF (congestive heart failure) (CMS/HCC) [I50.32]  Yes   • A-fib (CMS/HCC) [I48.91]  Yes   • Chronic anticoagulation [Z79.01]  Not Applicable   • Hypothyroidism (acquired) [E03.9]  Yes   • Stage 3b chronic kidney disease (CMS/HCC) [N18.32]  Yes      Resolved Hospital Problems    Diagnosis Date Resolved POA   • **Acute embolic stroke (CMS/HCC) [I63.9] 2021 Yes        Hospital Course     Ms. Javier is a 85 y.o. female with a history of paroxysmal A. fib on low-dose Eliquis, chronic diastolic heart failure, hypothyroidism, CKD 3B who presented to [facility] initially complaining of right arm/hand weakness.  Please see the admitting history and physical for further details.  She was found to have a small acute embolic left frontal cortical stroke attributed to her paroxysmal A. fib and was admitted to the hospital for further evaluation and treatment.      She was seen in consultation by neurology.  MRI showed a small foci of acute infarction within the left MCA distribution involving the lateral aspect of the left frontal lobe including the left precentral gyrus.  Echocardiogram showed EF of 61 to 65%, mildly dilated left atrial cavity, negative bubble study.  Carotid ultrasound showed left ICA 16 to 49% stenosis, right ICA without significant stenosis.  Neurology recommended that she increase her Eliquis to 5 mg twice daily, change her simvastatin to high-dose atorvastatin.  No need for aspirin.    Her course was complicated by A. fib with RVR, and so her metoprolol dose was increased and then switch to long-acting agent.    Day of Discharge      Subjective:  Doing well this morning. No complaints. Good spirits. Says that she thinks she's getting some coordination back in her right hand. HR controlled on increased/long acting formulation on metoprolol.    Physical Exam:  Temp:  [97.4 °F (36.3 °C)-97.8 °F (36.6 °C)] 97.8 °F (36.6 °C)  Heart Rate:  [] 76  Resp:  [18-19] 19  BP: ()/(52-89) 129/81  Body mass index is 33.83 kg/m².  Physical Exam  Constitutional:       General: She is not in acute distress.     Appearance: She is obese.   HENT:      Head: Normocephalic and atraumatic.   Cardiovascular:      Rate and Rhythm: Normal rate and regular rhythm.      Heart sounds: Normal heart sounds.   Pulmonary:      Effort: Pulmonary effort is normal.      Breath sounds: Normal breath sounds.   Abdominal:      General: Bowel sounds are normal.      Palpations: Abdomen is soft.   Musculoskeletal:         General: No tenderness.      Right lower leg: No edema.      Left lower leg: No edema.   Neurological:      Mental Status: She is alert.      Motor: Weakness present.      Comments: Right hand weakness only. Good right arm strength. Sensation normal.    Psychiatric:         Mood and Affect: Mood normal.         Behavior: Behavior normal.         Consultants     Consult Orders (all) (From admission, onward)     Start     Ordered    04/22/21 0114  Notify Stroke Coordinator  Once     Provider:  (Not yet assigned)    04/22/21 0114 04/22/21 0114  Inpatient Rehab Admission Consult  Once     Provider:  (Not yet assigned)    04/22/21 0114 04/22/21 0114  Consult to Case Management   Once     Provider:  (Not yet assigned)    04/22/21 0114 04/22/21 0114  Consult to Diabetes Educator  Once,   Status:  Canceled     Provider:  (Not yet assigned)    04/22/21 0114 04/22/21 0114  Inpatient Neurology Consult Stroke  Once     Specialty:  Neurology  Provider:  David Martinez MD    04/22/21 0114 04/22/21 0049  A (on-call MD unless  specified) Details  Once     Specialty:  Hospitalist  Provider:  (Not yet assigned)    04/22/21 0048              Procedures     Imaging Results (All)     Procedure Component Value Units Date/Time    MRI Brain Without Contrast [[id number]] Collected: 04/22/21 1608     Updated: 04/23/21 1444    Narrative:      MRI OF THE BRAIN WITHOUT CONTRAST     CLINICAL HISTORY: Right upper extremity weakness which is improving.      TECHNIQUE: MRI of the brain was obtained with sagittal T1, axial T1,  axial FLAIR, axial T2, axial diffusion, and axial gradient echo images.     FINDINGS:     There are foci of restricted diffusion involving the lateral aspect of  the left posterior frontal lobe including the left post central gyrus,  compatible with areas of acute infarction within the left MCA  distribution. The largest of these foci of restricted diffusion  measuring up to approximately 11 mm in diameter. There are no abnormal  areas of susceptibility artifact. There are mild-to-moderate changes of  chronic small vessel ischemic phenomena. There are small foci of  encephalomalacia within the lateral aspect of the left occipital lobe  measuring up to 5 mm in diameter and the lateral aspect of the left  middle frontal gyrus also measuring to 5 mm in diameter, compatible with  small chronic infarcts within the left MCA distribution.     Incidental note is made of bilateral mastoid effusions, left larger than  right. Mucosal thickening is appreciated within the sphenoid sinus and  the ethmoid air cells.       Impression:         There are small foci of acute infarction within the left MCA  distribution involving the lateral aspect of the left frontal lobe  including the left precentral gyrus. The largest of these foci of acute  infarction measures up to 11 mm in diameter. There is no evidence for  hemorrhagic transformation. These findings were discussed with Deysi Sutherland RN, the nurse caring for this patient on 04/20/2021  at  approximately 3:30 PM. She was instructed to notify the physician caring  for this patient with this result.     Additionally, there are findings compatible with small foci of chronic  infarction within the lateral aspect of the left frontal and left  occipital lobes within the left MCA distribution.     Mild changes of chronic small vessel ischemic phenomena are identified.     This report was finalized on 4/23/2021 2:41 PM by Dr. Emile Pelayo M.D.       CT Head Without Contrast [[id number]] Collected: 04/22/21 0027     Updated: 04/22/21 0033    Narrative:      CT HEAD WITHOUT CONTRAST     HISTORY: Right hand weakness. The patient is on blood thinners.     COMPARISON: None available.     TECHNIQUE: Axial CT imaging was obtained through the brain. No IV  contrast was administered.      FINDINGS:  There is diffuse atrophy. There is periventricular and deep white matter  microangiopathic change. No acute intracranial hemorrhage is seen. There  is no midline shift or mass effect. There are bilateral basal ganglia  calcifications. Air-fluid level is noted within the right sphenoid  sinus. Correlation with any evidence of sinusitis is recommended. There  is partial opacification of the left mastoid air cells.       Impression:         1. No acute intracranial findings.  2. Air-fluid level noted within the right sphenoid sinus. Correlation  with any evidence of sinusitis is recommended.     Radiation dose reduction techniques were utilized, including automated  exposure control and exposure modulation based on body size.     This report was finalized on 4/22/2021 12:30 AM by Dr. Joanna Gracia M.D.       XR Chest 1 View [[id number]] Collected: 04/22/21 0019     Updated: 04/22/21 0022    Narrative:      SINGLE VIEW OF THE CHEST     HISTORY: Strokelike symptoms     COMPARISON: 04/04/2021     FINDINGS:  Cardiomegaly is present. No pneumothorax, pleural effusion, or acute  infiltrate is seen. There is calcification of  the aorta.       Impression:      No acute findings.     This report was finalized on 4/22/2021 12:19 AM by Dr. Joanna Gracia M.D.             Pertinent Labs     Results from last 7 days   Lab Units 04/24/21 0721 04/23/21 0353 04/22/21 0353 04/21/21  2331   WBC 10*3/mm3 5.90 5.55 6.88 6.56   HEMOGLOBIN g/dL 12.8 12.3 14.1 13.6   PLATELETS 10*3/mm3 192 202 225 266     Results from last 7 days   Lab Units 04/24/21 0721 04/23/21 0353 04/22/21 0353 04/21/21  2331   SODIUM mmol/L 141 140 142 142   POTASSIUM mmol/L 3.7 3.8 4.3 4.3   CHLORIDE mmol/L 105 106 106 104   CO2 mmol/L 23.7 22.4 20.8* 26.5   BUN mg/dL 31* 33* 30* 30*   CREATININE mg/dL 1.56* 1.44* 1.63* 1.80*   GLUCOSE mg/dL 110* 107* 101* 115*   Estimated Creatinine Clearance: 28.5 mL/min (A) (by C-G formula based on SCr of 1.56 mg/dL (H)).  Results from last 7 days   Lab Units 04/22/21 0353 04/21/21  2331   ALBUMIN g/dL 3.60 3.70   BILIRUBIN mg/dL 0.7 0.7   ALK PHOS U/L 78 81   AST (SGOT) U/L 25 21   ALT (SGPT) U/L 12 15     Results from last 7 days   Lab Units 04/24/21 0721 04/23/21 0353 04/22/21 0353 04/21/21  2331   CALCIUM mg/dL 9.1 8.9 9.7 9.5   ALBUMIN g/dL  --   --  3.60 3.70       Results from last 7 days   Lab Units 04/21/21  2331   TROPONIN T ng/mL <0.010       Results from last 7 days   Lab Units 04/22/21  0353   CHOLESTEROL mg/dL 156   TRIGLYCERIDES mg/dL 119   HDL CHOL mg/dL 37*   LDL CHOL mg/dL 97           Test Results Pending at Discharge       Discharge Details        Discharge Medications      New Medications      Instructions Start Date   atorvastatin 80 MG tablet  Commonly known as: LIPITOR   80 mg, Oral, Nightly      metoprolol succinate XL 25 MG 24 hr tablet  Commonly known as: TOPROL-XL   75 mg, Oral, Every 24 Hours Scheduled   Start Date: April 25, 2021        Changes to Medications      Instructions Start Date   apixaban 5 MG tablet tablet  Commonly known as: HÉCTOR  What changed:   medication strength  how much to  take  when to take this   5 mg, Oral, Every 12 Hours Scheduled         Continue These Medications      Instructions Start Date   albuterol sulfate  (90 Base) MCG/ACT inhaler  Commonly known as: PROVENTIL HFA;VENTOLIN HFA;PROAIR HFA   2 puffs, Inhalation, Every 4 Hours PRN      allopurinol 300 MG tablet  Commonly known as: ZYLOPRIM   300 mg, Oral, Daily      furosemide 40 MG tablet  Commonly known as: LASIX   40 mg, Oral, Daily      levothyroxine 50 MCG tablet  Commonly known as: SYNTHROID, LEVOTHROID   50 mcg, Oral, Daily      PARoxetine 10 MG tablet  Commonly known as: PAXIL   10 mg, Oral, Daily      potassium chloride 20 MEQ CR tablet  Commonly known as: K-DUR,KLOR-CON   20 mEq, Oral, Daily      vitamin B-12 1000 MCG tablet  Commonly known as: CYANOCOBALAMIN   1,000 mcg, Oral, Daily         Stop These Medications    metoprolol tartrate 25 MG tablet  Commonly known as: LOPRESSOR     simvastatin 10 MG tablet  Commonly known as: ZOCOR            No Known Allergies      Discharge Disposition:  Skilled Nursing Facility (DC - External)    Discharge Diet:  Diet Order   Procedures   • Diet Regular; Cardiac       Discharge Activity:   Activity Instructions     Activity as Tolerated            CODE STATUS:    Code Status and Medical Interventions:   Ordered at: 04/22/21 0115     Level Of Support Discussed With:    Patient     Code Status:    CPR     Medical Interventions (Level of Support Prior to Arrest):    Full       No future appointments.  Additional Instructions for the Follow-ups that You Need to Schedule     Call MD With Problems / Concerns   As directed      Instructions: call your primary care physician if you experience chest pain, shortness of breath, abdominal pain, nausea, vomiting, fevers, sweats, chills, new weakness, or worsening of your symptoms    Order Comments: Instructions: call your primary care physician if you experience chest pain, shortness of breath, abdominal pain, nausea, vomiting,  fevers, sweats, chills, new weakness, or worsening of your symptoms          Discharge Follow-up with PCP   As directed       Currently Documented PCP:    Morenita Silverio MD    PCP Phone Number:    [phone]     Follow Up Details: within a week of discharge           Follow-up Information     Morenita Silverio MD .    Specialty: Internal Medicine  Why: within a week of discharge  Contact information:  [address] [facility] [address]  [place] [id number] [phone]                   Additional Instructions for the Follow-ups that You Need to Schedule     Call MD With Problems / Concerns   As directed      Instructions: call your primary care physician if you experience chest pain, shortness of breath, abdominal pain, nausea, vomiting, fevers, sweats, chills, new weakness, or worsening of your symptoms    Order Comments: Instructions: call your primary care physician if you experience chest pain, shortness of breath, abdominal pain, nausea, vomiting, fevers, sweats, chills, new weakness, or worsening of your symptoms          Discharge Follow-up with PCP   As directed       Currently Documented PCP:    Morenita Silveroi MD    PCP Phone Number:    [phone]     Follow Up Details: within a week of discharge           Time Spent on Discharge:  Greater than 30 minutes      Norbert Hidalgo MD  [facility]  04/24/21  09:35 EDT

## 2021-04-26 NOTE — PROGRESS NOTES
Case Management Discharge Note      Final Note: Lauren STALLWORTH with [facility]     Provided Post Acute Provider List?: Refused  Refused Provider List Comment: Pt reports current with HH, awaiting agency name    Selected Continued Care - Discharged on 4/24/2021 Admission date: 4/21/2021 - Discharge disposition: Skilled Nursing Facility (DC - External)    Destination    No services have been selected for the patient.              Durable Medical Equipment    No services have been selected for the patient.              Dialysis/Infusion    No services have been selected for the patient.              Home Medical Care Coordination complete    Service Provider Selected Services Address Phone Fax Patient Preferred    [facility] - [facility] [address] [id number] [phone] --          Therapy    No services have been selected for the patient.              Community Resources    No services have been selected for the patient.                Selected Continued Care - Prior Encounters Includes selections from prior encounters from 1/21/2021 to 4/24/2021    Discharged on 4/7/2021 Admission date: 4/4/2021 - Discharge disposition: Home or Self Care    Destination     Service Provider Selected Services Address Phone Fax Patient Preferred    [facility]  Skilled Nursing [address] [address] [phone] --          Durable Medical Equipment     Service Provider Selected Services Address Phone Fax Patient Preferred    [facility] - [facility]  Durable Medical Equipment [address] [address] [phone] --                    Transportation Services  Private: Car    Final Discharge Disposition Code: 06 - home with home health care

## 2021-10-31 PROBLEM — R55 SYNCOPE: Status: ACTIVE | Noted: 2021-01-01

## 2021-11-01 NOTE — CONSULTS
"Yazmin Javier   86 y.o.  female    LOS: 0 days   Patient Care Team:  Morenita Silverio MD as PCP - General (Internal Medicine)      Subjective     Patient Complaints: Syncope    History of Present Illness:   This is a 86-year-old female who follows with Dr. Sparrow  She lives in assisted living.  She has a past medical history of paroxysmal atrial fibrillation on Eliquis, chronic diastolic heart failure, CVA, CKD, obstructive sleep apnea, left ICA stenosis 16-49%, hypertension, and hypothyroidism.  She had a 72-hour Holter monitor June 2021 which showed atrial fibrillation with rates 87 with pauses 2.6 seconds at 5:30 AM and 2.6 seconds at 1 AM.     She states she walked to the dinner area at her assisted living with her walker and was seated for approximately 10 minutes.  She states \"the lights went out\" she thinks the episode was less than 1 minute.  She did not hit her head on the table or injure herself.  She does state this happened approximately 9 years ago when her medications had to be adjusted.  He denies any chest pain, shortness of breath, palpitations or bilateral lower extremity edema.  She does note that she gets winded at times when getting up and moving around.    In the ED, /89, HR 58, Troponin <0.01, , EKG normal sinus rhythm with no acute ischemic changes. CXR Bibasilar atelectasis and scarring.    Review of Systems:   All systems were reviewed and negative except for:  Neurological: positive for  Syncope    Medication Review:   Prior to Admission medications    Medication Sig Start Date End Date Taking? Authorizing Provider   albuterol sulfate  (90 Base) MCG/ACT inhaler Inhale 2 puffs Every 4 (Four) Hours As Needed for Wheezing.   Yes Case Buchanan MD   allopurinol (ZYLOPRIM) 300 MG tablet Take 300 mg by mouth Daily.   Yes Case Buchanan MD   apixaban (ELIQUIS) 5 MG tablet tablet Take 5 mg by mouth 2 (Two) Times a Day.   Yes Case Buchanan MD "   atorvastatin (LIPITOR) 80 MG tablet Take 80 mg by mouth Every Night.   Yes Case Buchanan MD   furosemide (LASIX) 40 MG tablet Take 1 tablet by mouth Daily for 30 days. 4/7/21 10/31/21 Yes Jeremiah Cartagena MD   levothyroxine (SYNTHROID, LEVOTHROID) 50 MCG tablet Take 50 mcg by mouth Daily.   Yes Case Buchanan MD   metoprolol succinate XL (TOPROL-XL) 25 MG 24 hr tablet Take 3 tablets by mouth Daily for 30 days.  Patient taking differently: Take 75 mg by mouth Daily. 4/25/21 10/31/21 Yes Norebrt Hidalgo MD   PARoxetine (PAXIL) 10 MG tablet Take 10 mg by mouth Daily.   Yes Case Buchanan MD   potassium chloride 10 MEQ CR tablet Take 20 mEq by mouth Daily.   Yes Case Buchanan MD   vitamin B-12 (CYANOCOBALAMIN) 1000 MCG tablet Take 1,000 mcg by mouth Daily.   Yes Case Buchanan MD         History reviewed. No pertinent family history.  Social History     Socioeconomic History   • Marital status: Single   Tobacco Use   • Smoking status: Never Smoker   • Smokeless tobacco: Never Used   Substance and Sexual Activity   • Alcohol use: Never   • Sexual activity: Defer     Objective   History reviewed. No pertinent surgical history.  History reviewed. No pertinent past medical history.    Vital Sign Min/Max for last 24 hours  Temp  Min: 97.5 °F (36.4 °C)  Max: 98.3 °F (36.8 °C)   BP  Min: 109/88  Max: 187/117    Pulse  Min: 56  Max: 66         11/01/21  0100   Weight: 89.4 kg (197 lb)        Physical Exam:      General Appearance:   Obese female resting in bed in no acute distress   Head:    Normocephalic, without obvious abnormality, atraumatic   Eyes:            Conjunctivae normal, no   icterus   Neck:   Supple, no carotid bruit, no JVD   Lungs:     Clear to auscultation,respirations regular, even and         unlabored    Heart:    Regular rhythm and normal rate, normal S1 and S2,         No murmur, no gallop, no rub, no click   Chest Wall:    No abnormalities observed   Abdomen:      Normal bowel sounds,  soft non-tender, non-distended   Extremities:   No edema. Moves all extremities well, no cyanosis, no erythema   Pulses:   Pulses palpable and equal bilaterally   Skin:   No bleeding, bruising or rash   Neurologic:   Alert and oriented x 3 with no acute deficits noted         Results Review:     I reviewed the patient's new clinical results.  Potassium   Date Value Ref Range Status   10/31/2021 4.0 3.5 - 5.2 mmol/L Final     Creatinine   Date Value Ref Range Status   10/31/2021 1.34 (H) 0.57 - 1.00 mg/dL Final     Troponin T   Date Value Ref Range Status   10/31/2021 <0.010 0.000 - 0.030 ng/mL Final        2D echocardiogram 4/22/2021: EF 61 to 65%, LV wall thickness is consistent with borderline concentric hypertrophy.  LV diastolic function was normal.  RV cavity is mildly dilated, prominent moderator band in RV.  LA cavity is mildly dilated.  Saline test results are negative.  Aortic valve leaflets are moderately calcified.  Mild TR.  RVSP 32 mmHg.      Assessment/Plan   1.  Syncope         -troponin <0.01 x 2   2.  Paroxysmal atrial fibrillation, on Eliquis  3.  Chronic HFpEF  4.  HTN  5.  CKD  6.  Hypothyroidism,TSH WNL   7.  LILIANA     Plan:   Orthostatic blood pressures done in show a significant drop from lying to standing. Change toprol to 25 BID from 75 mg daily.   2D echo and carotid US pending   Of note when getting up and moving around in the emergency department her oxygen level dropped to the 80s.  Discussed with nurse at bedside currently and she does state when patient gets up and moves around oxygen level 87%.  No evidence of heart failure on exam.   In normal sinus rhythm with PACs  MD to follow with further recommendations    JADON Marrero  11/01/21  08:59 EDT    Dictated portions using Dragon dictation software.     During the entire encounter, I was wearing recommended PPE including face mask and eye protection. Hand sanitization was performed prior to entering room  and upon exit.    Munir Sparrow M.D.   Reviewed our cardiology group Nurse Practitioner's note.    Pt interviewed and examined.   Findings verified.   Reviewed and agree with corrections or modifications of history, physical, and plans as indicated:     Echo shows EF 50-55% with RV function low normal, and no significant valve disease.  Carotids were normal.  She has untreated LILIANA.  My last visit with her several months ago she told me that she was going to get the CPAP.  In the absence of injury, it is more likely that the patient fell asleep briefly than that she actually had a cardiac or neurologically related syncopal episode.  Without dyspnea, doubt pulmonary emboli.  We will decrease metoprolol.  The reason for her orthostasis is likely hypovolemia, rather than medication related.  Her LV size on the echo was low normal.  Okay to discharge back home.  Will see her during her next regularly scheduled appointment.  Do not think she needs to make another earlier appointment.    EMR Dragon/Transcription disclaimer:   Much of this encounter note is an electronic transcription/translation of spoken language to printed text. The electronic translation of spoken language may permit erroneous, or at times, nonsensical words or phrases to be inadvertently transcribed.  Although I have reviewed the note for such errors, some may still exist. Please contact me if needed for clarification or correction.  Munir pSarrow M.D.

## 2021-11-01 NOTE — PLAN OF CARE
Goal Outcome Evaluation: Patient VSS, no more drops on oxygen.  She does get winded and SOA with exertion but she reports this is normal for her.  Cardiology decreased her metoprolol dose and recommended she keep her schedule appointment to serve as a follow up appointment. She will be DC back to [facility].  Ninorma Anant to transport her back to [facility] around 3:30 pm.

## 2021-11-01 NOTE — H&P
"[facility]   HISTORY AND PHYSICAL    Patient Name: Yazmin Javier  : 1935  MRN: [id number]  Primary Care Physician:  Morenita Silverio MD  Date of admission: 10/31/2021    Subjective   Subjective     Chief Complaint: syncope    History of Present Illness     Patient is an 85 YO female with PAF on Eliquis, CHF, hypothyroidism, and CKD, who presented to the ED today from assisted living via EMS for a syncopal episode. Patient states she was sitting at dinner when \"the lights went out\". She states she \"blacked out\" for a few seconds and then quickly came back to. She states she told staff what happened and they took her back to her room to lie down and wait for EMS. She did not hit her head or fall from her chair. She remembers event clearly.  No witnessed seizure-like activity. Patient did not have any preceding symptoms such as lightheadedness, dizziness, chest pain, or palpitations. At time of my exam patient does not have any complaints. Denies recent illness, cough, fever, chills, nausea, vomiting, and diarrhea. She does state that she began seeing a new nephrologist and one of her blood pressure medications was increased a few weeks ago but she cannot recall what the medication was.     Review of Systems   All other systems reviewed and are negative.       Personal History     History reviewed. No pertinent past medical history.    History reviewed. No pertinent surgical history.    Family History: family history is not on file. Otherwise pertinent FHx was reviewed and not pertinent to current issue.    Social History:  reports that she has never smoked. She has never used smokeless tobacco.    Home Medications:  PARoxetine, albuterol sulfate HFA, allopurinol, furosemide, levothyroxine, metoprolol succinate XL, and vitamin B-12    Allergies:  Allergies   Allergen Reactions   • Cefazolin Other (See Comments)       Objective    Objective     Vitals:   Temp:  [97.5 °F (36.4 °C)] 97.5 °F (36.4 °C)  Heart " Rate:  [58-64] 58  Resp:  [20] 20  BP: (155-187)/() 187/117    Physical Exam    GENERAL: 85 YO female, a/o x 4, NAD  SKIN: Warm pink and dry   HEENT:  PERRLA, EOM intact, conjunctiva normal, sclera clear  NECK: supple, no JVD  LUNGS: Clear to auscultation bilaterally without wheezes, rales or rhonchi.  No accessory muscle use and no nasal flaring.  CARDIAC:  Irregularly irregular.  No murmurs, rubs or gallops.  No peripheral edema.  Equal pulses bilaterally.  ABDOMEN: Soft, nontender, nondistended.  No guarding or rebound tenderness.  Normal bowel sounds.  MUSCULOSKELETAL: Moves all extremities well.  No deformity.  NEURO: Cranial nerves II through XII grossly intact.  No gross focal deficits.  Alert.  Normal speech and motor.  PSYCH: Normal mood and affect      Result Review    Result Review:  I have personally reviewed the results from the time of this admission to 10/31/2021 21:58 EDT and agree with these findings:  [x]  Laboratory  []  Microbiology  [x]  Radiology  []  EKG/Telemetry   []  Cardiology/Vascular   []  Pathology  []  Old records  []  Other:  Most notable findings include: creatinine 1.45- baseline, Hgb 11.4, chest x ray shows bibasilar atelectasis and scarring     Assessment/Plan   Assessment / Plan     Brief Patient Summary:  Yazmin Javier is a 86 y.o. female who presented to ED after syncopal event while sitting down eating dinner.     Active Hospital Problems:  Active Hospital Problems    Diagnosis    • Syncope      Plan:     1. Syncope  -telemetry monitoring  -consult cardiology   -Echo ordered     2. Paroxysmal atrial fibrillation  -in afib without RVR, patient states she is always in a fib with rate control  -continue rate control, anticoagulation  -consult cardiology     3. CHF  -no acute exacerbation at time of admission  -continue home medications as prescribed     4. CKD  -creatinine 1.45, this appears baseline  -monitor kidney function with AM BMP    5. Hypothyroidism  -continue  levothyroxine  -check TSH      DVT prophylaxis:  Medical and mechanical DVT prophylaxis orders are present.    CODE STATUS:    Level Of Support Discussed With: Patient  Code Status: CPR  Medical Interventions (Level of Support Prior to Arrest): Full    Admission Status:  I believe this patient meets observation status.    Electronically signed by BRYSON Medellin, 10/31/21, 9:58 PM EDT.

## 2021-11-01 NOTE — ED NOTES
This RN spoke with pt's niece, Anant Mulligan. Updated her on pt's status and plan for pt to stay overnight.      Trish Zhu RN  10/31/21 5612

## 2021-11-01 NOTE — PROGRESS NOTES
Hospitalist Daily Progress Note    Date of Admission: 10/31/2021   LOS: 0 days   PCP: Morenita Silverio MD    Chief Complaint: syncopal episode  Patient seen at: 1200  Subjective   HPI: The patient presented to the emergency department for evaluation following a syncopal episode which occurred yesterday.  Apparently, the patient was sitting at the dinner table when she had a syncopal episode.  The patient denies falling.  The patient denies having any preceding symptoms which warned her that the syncopal episode was about to occur.  There was no witnessed seizure-like activity.  Currently, the patient has no complaints.  She specifically denies chest pain, shortness of breath or lightheadedness.    ROS:  General: no fevers, chills  Respiratory: no cough, dyspnea  Cardiovascular: no chest pain, palpitations  Abdomen: No abdominal pain, nausea, vomiting, or diarrhea  Neurologic: No focal weakness    Objective   Physical Exam:  I have reviewed the vital signs.  Temp:  [97.5 °F (36.4 °C)-98.3 °F (36.8 °C)] 98.3 °F (36.8 °C)  Heart Rate:  [56-66] 59  Resp:  [20-24] 24  BP: (109-187)/() 152/70  General Appearance:    Alert, cooperative, no distress  Head:    Normocephalic, atraumatic  Eyes:    Sclerae anicteric  Neck:   Supple, no mass  Lungs: Clear to auscultation bilaterally, respirations unlabored  Heart: Regular rate and rhythm, S1 and S2 normal, no murmur, rub or gallop  Abdomen:  Soft, non-tender, bowel sounds active, nondistended  Extremities: No clubbing, cyanosis, or edema to lower extremities  Pulses:  2+ and symmetric in distal lower extremities  Skin: No rashes   Neurologic: Oriented x3, Normal strength to extremities    Results Review:    I have reviewed the labs, radiology results and diagnostic studies.    Results from last 7 days   Lab Units 11/01/21  0559   WBC 10*3/mm3 5.85   HEMOGLOBIN g/dL 11.5*   HEMATOCRIT % 33.5*   PLATELETS 10*3/mm3 169     Results from last 7 days   Lab Units 10/31/21  3806  10/31/21  1907   SODIUM mmol/L 142 143   POTASSIUM mmol/L 4.0 4.4   CHLORIDE mmol/L 108* 108*   CO2 mmol/L 23.1 24.5   BUN mg/dL 16 16   CREATININE mg/dL 1.34* 1.45*   CALCIUM mg/dL 8.9 9.0   BILIRUBIN mg/dL  --  1.2   ALK PHOS U/L  --  125*   ALT (SGPT) U/L  --  24   AST (SGOT) U/L  --  34*   GLUCOSE mg/dL 117* 122*     Imaging Results (Last 24 Hours)     Procedure Component Value Units Date/Time    XR Chest 1 View [[id number]] Collected: 10/31/21 2145     Updated: 10/31/21 2149    Narrative:      SINGLE VIEW OF THE CHEST     HISTORY: Shortness of breath     COMPARISON: 04/21/2021     FINDINGS:  Cardiomegaly is present. There is no vascular congestion. There is  calcification of the aorta. There is bibasilar atelectasis and scarring.  No pneumothorax or pleural effusion is seen. No definite acute  infiltrates are identified.       Impression:      Bibasilar atelectasis and scarring.     This report was finalized on 10/31/2021 9:46 PM by Dr. Joanna Gracia M.D.             I have reviewed the medications.  ---------------------------------------------------------------------------------------------  Assessment/Plan   Assessment/Problem List    Syncope      Plan    1. Syncope  -telemetry monitoring  -Cardiology has seen the patient in consultation.  Echocardiogram and carotid ultrasound results are pending.  They have recommended reducing the patient's metoprolol dosing to 2 times daily, opposed to 3 times daily.  Further recommendations are pending after review of diagnostic testing.     2. Paroxysmal atrial fibrillation  -in afib without RVR, patient states she is always in a fib with rate control  -continue rate control, anticoagulation     3. CHF  -no acute exacerbation at time of admission  -continue home medications as prescribed      4. CKD  -creatinine 1.45, this appears baseline  -monitor kidney function with AM BMP     5. Hypothyroidism  -continue levothyroxine  -check TSH    DVT prophylaxis:  Discharge  Planning: I expect patient to be discharged today after further cardiology recommendations are made.    BRYSON River 11/01/21 12:11 EDT    ADDENDUM:     I discussed the patient's care with Dr. Sparrow, cardiologist.  He is okay for the patient to be discharged home.  He does recommend that the patient decrease her metoprolol dose from 3 times daily to 2 times daily.  He will plan to follow-up with her in the office.  He has no further recommendations at this time.

## 2021-11-01 NOTE — PLAN OF CARE
Problem: Adult Inpatient Plan of Care  Goal: Plan of Care Review  Outcome: Ongoing, Progressing  Flowsheets (Taken 11/1/2021 3534)  Progress: improving  Plan of Care Reviewed With: patient  Outcome Summary: A&Ox4 but forgetful at times, BP running on the high side, orthostatics done and charted, afib on the monitor, rate 50-60's, patient gets winded when up and ambulating, but recovers quickly, sats in the mid 80's when getting back to bed but recovers quickly also, up with assist, gait is unsteady, c/o right knee pain when up, will continue to monitor.

## 2021-11-01 NOTE — ED NOTES
Nursing report ED to floor  Yazmin Javier  86 y.o.  female    HPI (triage note):   Chief Complaint   Patient presents with   • Syncope       Admitting doctor:   Stas Hector MD    Admitting diagnosis:   The primary encounter diagnosis was Syncope, unspecified syncope type. A diagnosis of Mild anemia was also pertinent to this visit.    Code status:   Current Code Status     Date Active Code Status Order ID Comments User Context       10/31/2021 2157 CPR [id number]  Kristina Clark PA ED     Advance Care Planning Activity      Questions for Current Code Status     Question Answer    Code Status CPR    Medical Interventions (Level of Support Prior to Arrest) Full    Level Of Support Discussed With Patient          Allergies:   Cefazolin    Weight:   There were no vitals filed for this visit.    Most recent vitals:   Vitals:    10/31/21 1836 10/31/21 1851 10/31/21 1856 10/31/21 2021   BP: 155/89 161/75  (!) 187/117   BP Location: Right arm      Patient Position: Sitting      Pulse: 58 64 62 58   Resp: 20      Temp: 97.5 °F (36.4 °C)      TempSrc: Tympanic      SpO2: 97% 95% 97% 94%       Active LDAs/IV Access:   Lines, Drains & Airways     Active LDAs     None                Labs (abnormal labs have a star):   Labs Reviewed   COMPREHENSIVE METABOLIC PANEL - Abnormal; Notable for the following components:       Result Value    Glucose 122 (*)     Creatinine 1.45 (*)     Chloride 108 (*)     AST (SGOT) 34 (*)     Alkaline Phosphatase 125 (*)     eGFR Non  Amer 34 (*)     All other components within normal limits    Narrative:     GFR Normal >60  Chronic Kidney Disease <60  Kidney Failure <15     CBC WITH AUTO DIFFERENTIAL - Abnormal; Notable for the following components:    RBC 3.21 (*)     Hemoglobin 11.4 (*)     Hematocrit 33.7 (*)     .0 (*)     MCH 35.5 (*)     RDW-SD 56.4 (*)     All other components within normal limits   URINALYSIS W/ MICROSCOPIC IF INDICATED (NO CULTURE) - Abnormal; Notable  for the following components:    Protein, UA Trace (*)     All other components within normal limits    Narrative:     Urine microscopic not indicated.   TROPONIN (IN-HOUSE) - Normal    Narrative:     Troponin T Reference Range:  <= 0.03 ng/mL-   Negative for AMI  >0.03 ng/mL-     Abnormal for myocardial necrosis.  Clinicians would have to utilize clinical acumen, EKG, Troponin and serial changes to determine if it is an Acute Myocardial Infarction or myocardial injury due to an underlying chronic condition.       Results may be falsely decreased if patient taking Biotin.     BNP (IN-HOUSE) - Normal    Narrative:     Among patients with dyspnea, NT-proBNP is highly sensitive for the detection of acute congestive heart failure. In addition NT-proBNP of <300 pg/ml effectively rules out acute congestive heart failure with 99% negative predictive value.    Results may be falsely decreased if patient taking Biotin.     COVID PRE-OP / PRE-PROCEDURE SCREENING ORDER (NO ISOLATION)    Narrative:     The following orders were created for panel order COVID PRE-OP / PRE-PROCEDURE SCREENING ORDER (NO ISOLATION) - Swab, Nasopharynx.  Procedure                               Abnormality         Status                     ---------                               -----------         ------                     COVID-19,[facility]HOU[facility]...[[id number]]                      In process                   Please view results for these tests on the individual orders.   COVID-19,[facility] IN[facility]HOU[facility] CEPHEID/MARY ELLEN, NP SWAB IN TRANSPORT MEDIA 8-12 HR TAT   RAINBOW DRAW    Narrative:     The following orders were created for panel order [place] Draw.  Procedure                               Abnormality         Status                     ---------                               -----------         ------                     Green Top (Gel)[[id number]]                                  Final result               Lavender Top[[id number]]                                      Final result               Gold Top - SST[[id number]]                                   Final result               Light Blue Top[[id number]]                                   Final result                 Please view results for these tests on the individual orders.   GREEN TOP   LAVENDER TOP   GOLD TOP - SST   LIGHT BLUE TOP   CBC AND DIFFERENTIAL    Narrative:     The following orders were created for panel order CBC & Differential.  Procedure                               Abnormality         Status                     ---------                               -----------         ------                     CBC Auto Differential[[id number]]        Abnormal            Final result                 Please view results for these tests on the individual orders.       EKG:   ECG 12 Lead   Final Result   HEART RATE= 63  bpm   RR Interval= 956  ms   TX Interval= 206  ms   P Horizontal Axis= 1  deg   P Front Axis= 9  deg   QRSD Interval= 87  ms   QT Interval= 439  ms   QRS Axis= 45  deg   T Wave Axis= 62  deg   - NORMAL ECG -   Sinus rhythm   af resolved   Electronically Signed By: Perry [facility] [place] ([facility] 31-Oct-2021 20:09:04   Date and Time of Study: 2021-10-31 18:52:19          Meds given in ED:   Medications - No data to display    Imaging results:  XR Chest 1 View    Result Date: 10/31/2021  Bibasilar atelectasis and scarring.  This report was finalized on 10/31/2021 9:46 PM by Dr. Joanna Gracia M.D.        Ambulatory status:   - Up with assist, pt walks with walker at home    Social issues:   Social History     Socioeconomic History   • Marital status: Single   Tobacco Use   • Smoking status: Never Smoker   • Smokeless tobacco: Never Used    Nursing report ED to floor       Trish Zhu RN  10/31/21 2559

## 2021-11-01 NOTE — OBED NOTES
The LETY and I have discussed this patients history, physical exam, and treatment plan. I have reviewed the documentation and personally had a face to face interaction with the patient. I affirm the documentation and agree with the treatment and plan.  The following note describes my personal findings    This patient is a 86-year-old female that was admitted overnight to the observation unit following a likely syncopal event at her facility yesterday.  The patient reportedly was eating dinner when she had the syncopal episode.  Currently, she has no physical complaints.  She denies chest pain or shortness of breath.    Exam: This patient is resting comfortably and in no distress, without gross neurological deficits.  She is nontoxic in appearance.  Cardiovascular exam shows a regular rate and rhythm, without murmur or rub.  Abdomen is soft and nontender, without rebound or guarding noted.  There are no palpable masses felt.    Plan: She has been observed overnight and had no further events.  We are currently awaiting cardiology input as well as an echocardiogram and carotid Dopplers.  We will continue to monitor and reassess.        The patient was wearing a facemask upon entrance into the room and remained in such throughout their visit.  I was wearing PPE including a facemask, eye protection, as well as gloves at any point entering the room and throughout the visit

## 2021-11-01 NOTE — ED NOTES
Pt's niece, Anant Mulligan, updated on pt's status and POC.          Trish Zhu RN  10/31/21 2007

## 2022-01-01 ENCOUNTER — APPOINTMENT (OUTPATIENT)
Dept: ULTRASOUND IMAGING | Facility: HOSPITAL | Age: 87
End: 2022-01-01

## 2022-01-01 ENCOUNTER — APPOINTMENT (OUTPATIENT)
Dept: GENERAL RADIOLOGY | Facility: HOSPITAL | Age: 87
End: 2022-01-01

## 2022-01-01 ENCOUNTER — READMISSION MANAGEMENT (OUTPATIENT)
Dept: CALL CENTER | Facility: HOSPITAL | Age: 87
End: 2022-01-01

## 2022-01-01 ENCOUNTER — APPOINTMENT (OUTPATIENT)
Dept: CT IMAGING | Facility: HOSPITAL | Age: 87
End: 2022-01-01

## 2022-01-01 ENCOUNTER — APPOINTMENT (OUTPATIENT)
Dept: CARDIOLOGY | Facility: HOSPITAL | Age: 87
End: 2022-01-01

## 2022-01-01 ENCOUNTER — HOSPITAL ENCOUNTER (INPATIENT)
Facility: HOSPITAL | Age: 87
LOS: 4 days | Discharge: HOME HEALTH | End: 2022-02-18
Attending: EMERGENCY MEDICINE | Admitting: HOSPITALIST

## 2022-01-01 ENCOUNTER — HOSPITAL ENCOUNTER (INPATIENT)
Facility: HOSPITAL | Age: 87
LOS: 1 days | End: 2022-05-30
Attending: EMERGENCY MEDICINE | Admitting: HOSPITALIST

## 2022-01-01 ENCOUNTER — ANESTHESIA EVENT (OUTPATIENT)
Dept: GASTROENTEROLOGY | Facility: HOSPITAL | Age: 87
End: 2022-01-01

## 2022-01-01 ENCOUNTER — HOSPITAL ENCOUNTER (INPATIENT)
Facility: HOSPITAL | Age: 87
LOS: 8 days | Discharge: HOME | End: 2022-04-09
Attending: EMERGENCY MEDICINE | Admitting: STUDENT IN AN ORGANIZED HEALTH CARE EDUCATION/TRAINING PROGRAM

## 2022-01-01 ENCOUNTER — ANESTHESIA (OUTPATIENT)
Dept: GASTROENTEROLOGY | Facility: HOSPITAL | Age: 87
End: 2022-01-01

## 2022-01-01 ENCOUNTER — HOSPITAL ENCOUNTER (INPATIENT)
Facility: HOSPITAL | Age: 87
LOS: 10 days | Discharge: NURSING, REHAB OR LONG-TERM CARE | End: 2022-05-16
Attending: EMERGENCY MEDICINE | Admitting: INTERNAL MEDICINE

## 2022-01-01 VITALS
OXYGEN SATURATION: 95 % | WEIGHT: 173.9 LBS | RESPIRATION RATE: 16 BRPM | TEMPERATURE: 97.4 F | BODY MASS INDEX: 27.95 KG/M2 | DIASTOLIC BLOOD PRESSURE: 82 MMHG | SYSTOLIC BLOOD PRESSURE: 147 MMHG | HEIGHT: 66 IN | HEART RATE: 76 BPM

## 2022-01-01 VITALS
BODY MASS INDEX: 28.28 KG/M2 | WEIGHT: 176 LBS | OXYGEN SATURATION: 90 % | RESPIRATION RATE: 18 BRPM | HEIGHT: 66 IN | SYSTOLIC BLOOD PRESSURE: 92 MMHG | HEART RATE: 112 BPM | DIASTOLIC BLOOD PRESSURE: 53 MMHG | TEMPERATURE: 97.1 F

## 2022-01-01 VITALS
SYSTOLIC BLOOD PRESSURE: 142 MMHG | HEIGHT: 62 IN | DIASTOLIC BLOOD PRESSURE: 83 MMHG | WEIGHT: 194.1 LBS | RESPIRATION RATE: 22 BRPM | HEART RATE: 118 BPM | OXYGEN SATURATION: 95 % | TEMPERATURE: 97.5 F | BODY MASS INDEX: 35.72 KG/M2

## 2022-01-01 VITALS
TEMPERATURE: 98.3 F | WEIGHT: 187.8 LBS | OXYGEN SATURATION: 95 % | HEIGHT: 62 IN | HEART RATE: 71 BPM | DIASTOLIC BLOOD PRESSURE: 59 MMHG | SYSTOLIC BLOOD PRESSURE: 115 MMHG | RESPIRATION RATE: 18 BRPM | BODY MASS INDEX: 34.56 KG/M2

## 2022-01-01 DIAGNOSIS — N39.0 URINARY TRACT INFECTION WITHOUT HEMATURIA, SITE UNSPECIFIED: Primary | ICD-10-CM

## 2022-01-01 DIAGNOSIS — N17.9 AKI (ACUTE KIDNEY INJURY): ICD-10-CM

## 2022-01-01 DIAGNOSIS — J18.9 PNEUMONIA OF RIGHT LOWER LOBE DUE TO INFECTIOUS ORGANISM: Primary | ICD-10-CM

## 2022-01-01 DIAGNOSIS — R41.82 ALTERED MENTAL STATUS, UNSPECIFIED ALTERED MENTAL STATUS TYPE: ICD-10-CM

## 2022-01-01 DIAGNOSIS — R50.9 FEVER AND CHILLS: ICD-10-CM

## 2022-01-01 DIAGNOSIS — J18.9 PNEUMONIA DUE TO INFECTIOUS ORGANISM, UNSPECIFIED LATERALITY, UNSPECIFIED PART OF LUNG: Primary | ICD-10-CM

## 2022-01-01 DIAGNOSIS — J96.02 ACUTE RESPIRATORY FAILURE WITH HYPOXIA AND HYPERCAPNIA: ICD-10-CM

## 2022-01-01 DIAGNOSIS — E87.5 HYPERKALEMIA: ICD-10-CM

## 2022-01-01 DIAGNOSIS — J96.01 ACUTE RESPIRATORY FAILURE WITH HYPOXIA: ICD-10-CM

## 2022-01-01 DIAGNOSIS — R74.8 ELEVATED LIVER ENZYMES: ICD-10-CM

## 2022-01-01 DIAGNOSIS — I48.91 ATRIAL FIBRILLATION WITH RAPID VENTRICULAR RESPONSE: ICD-10-CM

## 2022-01-01 DIAGNOSIS — J96.01 ACUTE RESPIRATORY FAILURE WITH HYPOXIA AND HYPERCAPNIA: ICD-10-CM

## 2022-01-01 DIAGNOSIS — I48.91 ATRIAL FIBRILLATION WITH RVR: Primary | ICD-10-CM

## 2022-01-01 DIAGNOSIS — U07.1 COVID-19: ICD-10-CM

## 2022-01-01 DIAGNOSIS — I50.33 DIASTOLIC CHF, ACUTE ON CHRONIC: ICD-10-CM

## 2022-01-01 DIAGNOSIS — J18.9 PNEUMONIA OF BOTH LOWER LOBES DUE TO INFECTIOUS ORGANISM: ICD-10-CM

## 2022-01-01 DIAGNOSIS — R04.2 HEMOPTYSIS: ICD-10-CM

## 2022-01-01 LAB
ABO GROUP BLD: NORMAL
ALBUMIN SERPL-MCNC: 2.8 G/DL (ref 3.5–5.2)
ALBUMIN SERPL-MCNC: 2.9 G/DL (ref 3.5–5.2)
ALBUMIN SERPL-MCNC: 3.1 G/DL (ref 3.5–5.2)
ALBUMIN SERPL-MCNC: 3.3 G/DL (ref 3.5–5.2)
ALBUMIN SERPL-MCNC: 3.4 G/DL (ref 3.5–5.2)
ALBUMIN SERPL-MCNC: 3.5 G/DL (ref 3.5–5.2)
ALBUMIN SERPL-MCNC: 3.6 G/DL (ref 3.5–5.2)
ALBUMIN SERPL-MCNC: 3.8 G/DL (ref 3.5–5.2)
ALBUMIN/GLOB SERPL: 0.9 G/DL
ALBUMIN/GLOB SERPL: 1 G/DL
ALBUMIN/GLOB SERPL: 1.1 G/DL
ALBUMIN/GLOB SERPL: 1.4 G/DL
ALP SERPL-CCNC: 107 U/L (ref 39–117)
ALP SERPL-CCNC: 107 U/L (ref 39–117)
ALP SERPL-CCNC: 112 U/L (ref 39–117)
ALP SERPL-CCNC: 113 U/L (ref 39–117)
ALP SERPL-CCNC: 117 U/L (ref 39–117)
ALP SERPL-CCNC: 126 U/L (ref 39–117)
ALP SERPL-CCNC: 89 U/L (ref 39–117)
ALP SERPL-CCNC: 89 U/L (ref 39–117)
ALP SERPL-CCNC: 90 U/L (ref 39–117)
ALP SERPL-CCNC: 93 U/L (ref 39–117)
ALP SERPL-CCNC: 97 U/L (ref 39–117)
ALP SERPL-CCNC: 97 U/L (ref 39–117)
ALP SERPL-CCNC: 99 U/L (ref 39–117)
ALT SERPL W P-5'-P-CCNC: 108 U/L (ref 1–33)
ALT SERPL W P-5'-P-CCNC: 114 U/L (ref 1–33)
ALT SERPL W P-5'-P-CCNC: 1280 U/L (ref 1–33)
ALT SERPL W P-5'-P-CCNC: 1323 U/L (ref 1–33)
ALT SERPL W P-5'-P-CCNC: 19 U/L (ref 1–33)
ALT SERPL W P-5'-P-CCNC: 20 U/L (ref 1–33)
ALT SERPL W P-5'-P-CCNC: 27 U/L (ref 1–33)
ALT SERPL W P-5'-P-CCNC: 31 U/L (ref 1–33)
ALT SERPL W P-5'-P-CCNC: 58 U/L (ref 1–33)
ALT SERPL W P-5'-P-CCNC: 61 U/L (ref 1–33)
ALT SERPL W P-5'-P-CCNC: 79 U/L (ref 1–33)
ALT SERPL W P-5'-P-CCNC: 83 U/L (ref 1–33)
ALT SERPL W P-5'-P-CCNC: 93 U/L (ref 1–33)
ANA HOMOGEN TITR SER: NORMAL {TITER}
ANA SER QL IF: POSITIVE
ANION GAP SERPL CALCULATED.3IONS-SCNC: 10 MMOL/L (ref 5–15)
ANION GAP SERPL CALCULATED.3IONS-SCNC: 10.4 MMOL/L (ref 5–15)
ANION GAP SERPL CALCULATED.3IONS-SCNC: 10.6 MMOL/L (ref 5–15)
ANION GAP SERPL CALCULATED.3IONS-SCNC: 11 MMOL/L (ref 5–15)
ANION GAP SERPL CALCULATED.3IONS-SCNC: 11 MMOL/L (ref 5–15)
ANION GAP SERPL CALCULATED.3IONS-SCNC: 11.6 MMOL/L (ref 5–15)
ANION GAP SERPL CALCULATED.3IONS-SCNC: 11.9 MMOL/L (ref 5–15)
ANION GAP SERPL CALCULATED.3IONS-SCNC: 12 MMOL/L (ref 5–15)
ANION GAP SERPL CALCULATED.3IONS-SCNC: 12.2 MMOL/L (ref 5–15)
ANION GAP SERPL CALCULATED.3IONS-SCNC: 12.6 MMOL/L (ref 5–15)
ANION GAP SERPL CALCULATED.3IONS-SCNC: 12.6 MMOL/L (ref 5–15)
ANION GAP SERPL CALCULATED.3IONS-SCNC: 12.8 MMOL/L (ref 5–15)
ANION GAP SERPL CALCULATED.3IONS-SCNC: 13 MMOL/L (ref 5–15)
ANION GAP SERPL CALCULATED.3IONS-SCNC: 13 MMOL/L (ref 5–15)
ANION GAP SERPL CALCULATED.3IONS-SCNC: 13.5 MMOL/L (ref 5–15)
ANION GAP SERPL CALCULATED.3IONS-SCNC: 13.8 MMOL/L (ref 5–15)
ANION GAP SERPL CALCULATED.3IONS-SCNC: 14 MMOL/L (ref 5–15)
ANION GAP SERPL CALCULATED.3IONS-SCNC: 15.2 MMOL/L (ref 5–15)
ANION GAP SERPL CALCULATED.3IONS-SCNC: 16.6 MMOL/L (ref 5–15)
ANION GAP SERPL CALCULATED.3IONS-SCNC: 16.8 MMOL/L (ref 5–15)
ANION GAP SERPL CALCULATED.3IONS-SCNC: 16.9 MMOL/L (ref 5–15)
ANION GAP SERPL CALCULATED.3IONS-SCNC: 17.3 MMOL/L (ref 5–15)
ANION GAP SERPL CALCULATED.3IONS-SCNC: 18.8 MMOL/L (ref 5–15)
ANION GAP SERPL CALCULATED.3IONS-SCNC: 8.7 MMOL/L (ref 5–15)
ANION GAP SERPL CALCULATED.3IONS-SCNC: 9 MMOL/L (ref 5–15)
ANION GAP SERPL CALCULATED.3IONS-SCNC: 9 MMOL/L (ref 5–15)
ANION GAP SERPL CALCULATED.3IONS-SCNC: 9.8 MMOL/L (ref 5–15)
ANISOCYTOSIS BLD QL: ABNORMAL
AORTIC DIMENSIONLESS INDEX: 0.7 (DI)
APPEARANCE FLD: ABNORMAL
APTT PPP: 30.5 SECONDS (ref 22.7–35.4)
APTT PPP: 41 SECONDS (ref 22.7–35.4)
ARTERIAL PATENCY WRIST A: ABNORMAL
ARTERIAL PATENCY WRIST A: POSITIVE
ASCENDING AORTA: 3.1 CM
AST SERPL-CCNC: 24 U/L (ref 1–32)
AST SERPL-CCNC: 27 U/L (ref 1–32)
AST SERPL-CCNC: 32 U/L (ref 1–32)
AST SERPL-CCNC: 37 U/L (ref 1–32)
AST SERPL-CCNC: 37 U/L (ref 1–32)
AST SERPL-CCNC: 41 U/L (ref 1–32)
AST SERPL-CCNC: 47 U/L (ref 1–32)
AST SERPL-CCNC: 52 U/L (ref 1–32)
AST SERPL-CCNC: 58 U/L (ref 1–32)
AST SERPL-CCNC: 67 U/L (ref 1–32)
AST SERPL-CCNC: 684 U/L (ref 1–32)
AST SERPL-CCNC: 693 U/L (ref 1–32)
AST SERPL-CCNC: 74 U/L (ref 1–32)
ATMOSPHERIC PRESS: 751.8 MMHG
ATMOSPHERIC PRESS: 756.7 MMHG
ATMOSPHERIC PRESS: 759 MMHG
ATMOSPHERIC PRESS: 759.4 MMHG
B PARAPERT DNA SPEC QL NAA+PROBE: NOT DETECTED
B PERT DNA SPEC QL NAA+PROBE: NOT DETECTED
BACTERIA BLD CULT: ABNORMAL
BACTERIA SPEC AEROBE CULT: ABNORMAL
BACTERIA SPEC AEROBE CULT: NORMAL
BACTERIA SPEC RESP CULT: ABNORMAL
BACTERIA SPEC RESP CULT: ABNORMAL
BACTERIA SPEC RESP CULT: NORMAL
BACTERIA UR QL AUTO: ABNORMAL /HPF
BACTERIA UR QL AUTO: ABNORMAL /HPF
BACTERIA UR QL AUTO: NORMAL /HPF
BASE EXCESS BLDA CALC-SCNC: -0.2 MMOL/L (ref 0–2)
BASE EXCESS BLDA CALC-SCNC: -1.6 MMOL/L (ref 0–2)
BASE EXCESS BLDA CALC-SCNC: 0.4 MMOL/L (ref 0–2)
BASE EXCESS BLDA CALC-SCNC: 1.6 MMOL/L (ref 0–2)
BASOPHILS # BLD AUTO: 0.01 10*3/MM3 (ref 0–0.2)
BASOPHILS # BLD AUTO: 0.01 10*3/MM3 (ref 0–0.2)
BASOPHILS # BLD AUTO: 0.02 10*3/MM3 (ref 0–0.2)
BASOPHILS # BLD AUTO: 0.03 10*3/MM3 (ref 0–0.2)
BASOPHILS # BLD AUTO: 0.03 10*3/MM3 (ref 0–0.2)
BASOPHILS # BLD AUTO: 0.04 10*3/MM3 (ref 0–0.2)
BASOPHILS # BLD AUTO: 0.05 10*3/MM3 (ref 0–0.2)
BASOPHILS # BLD AUTO: 0.06 10*3/MM3 (ref 0–0.2)
BASOPHILS NFR BLD AUTO: 0.1 % (ref 0–1.5)
BASOPHILS NFR BLD AUTO: 0.2 % (ref 0–1.5)
BASOPHILS NFR BLD AUTO: 0.3 % (ref 0–1.5)
BASOPHILS NFR BLD AUTO: 0.5 % (ref 0–1.5)
BASOPHILS NFR BLD AUTO: 0.5 % (ref 0–1.5)
BASOPHILS NFR BLD AUTO: 0.7 % (ref 0–1.5)
BASOPHILS NFR BLD AUTO: 0.9 % (ref 0–1.5)
BDY SITE: ABNORMAL
BH CV ECHO MEAS - ACS: 1.62 CM
BH CV ECHO MEAS - ACS: 1.8 CM
BH CV ECHO MEAS - AO MAX PG (FULL): 4.4 MMHG
BH CV ECHO MEAS - AO MAX PG: 8.1 MMHG
BH CV ECHO MEAS - AO MAX PG: 9.2 MMHG
BH CV ECHO MEAS - AO MEAN PG (FULL): 2.2 MMHG
BH CV ECHO MEAS - AO MEAN PG: 4.1 MMHG
BH CV ECHO MEAS - AO MEAN PG: 5.2 MMHG
BH CV ECHO MEAS - AO ROOT AREA (BSA CORRECTED): 1.3
BH CV ECHO MEAS - AO ROOT AREA: 4.8 CM^2
BH CV ECHO MEAS - AO ROOT DIAM: 2.32 CM
BH CV ECHO MEAS - AO ROOT DIAM: 2.5 CM
BH CV ECHO MEAS - AO V2 MAX: 142.5 CM/SEC
BH CV ECHO MEAS - AO V2 MAX: 151.3 CM/SEC
BH CV ECHO MEAS - AO V2 MEAN: 107.3 CM/SEC
BH CV ECHO MEAS - AO V2 VTI: 27 CM
BH CV ECHO MEAS - AO V2 VTI: 39.3 CM
BH CV ECHO MEAS - ASC AORTA: 3.1 CM
BH CV ECHO MEAS - AVA(I,A): 2 CM^2
BH CV ECHO MEAS - AVA(I,D): 2 CM^2
BH CV ECHO MEAS - AVA(I,D): 2.27 CM2
BH CV ECHO MEAS - AVA(V,A): 2 CM^2
BH CV ECHO MEAS - AVA(V,D): 2 CM^2
BH CV ECHO MEAS - BSA(HAYCOCK): 2 M^2
BH CV ECHO MEAS - BSA(HAYCOCK): 2 M^2
BH CV ECHO MEAS - BSA: 1.9 M^2
BH CV ECHO MEAS - BSA: 1.9 M^2
BH CV ECHO MEAS - BZI_BMI: 34.6 KILOGRAMS/M^2
BH CV ECHO MEAS - BZI_BMI: 35.8 KILOGRAMS/M^2
BH CV ECHO MEAS - BZI_METRIC_HEIGHT: 157.5 CM
BH CV ECHO MEAS - BZI_METRIC_HEIGHT: 157.5 CM
BH CV ECHO MEAS - BZI_METRIC_WEIGHT: 85.7 KG
BH CV ECHO MEAS - BZI_METRIC_WEIGHT: 88.9 KG
BH CV ECHO MEAS - EDV(CUBED): 41.7 ML
BH CV ECHO MEAS - EDV(CUBED): 88.6 ML
BH CV ECHO MEAS - EDV(MOD-SP2): 53 ML
BH CV ECHO MEAS - EDV(MOD-SP2): 69 ML
BH CV ECHO MEAS - EDV(MOD-SP4): 64 ML
BH CV ECHO MEAS - EDV(MOD-SP4): 64 ML
BH CV ECHO MEAS - EDV(TEICH): 90.4 ML
BH CV ECHO MEAS - EF(CUBED): 61.9 %
BH CV ECHO MEAS - EF(MOD-BP): 57.8 %
BH CV ECHO MEAS - EF(MOD-BP): 60.1 %
BH CV ECHO MEAS - EF(MOD-SP2): 54.7 %
BH CV ECHO MEAS - EF(MOD-SP2): 58 %
BH CV ECHO MEAS - EF(MOD-SP4): 54.7 %
BH CV ECHO MEAS - EF(MOD-SP4): 67.2 %
BH CV ECHO MEAS - EF(TEICH): 53.6 %
BH CV ECHO MEAS - ESV(CUBED): 17.1 ML
BH CV ECHO MEAS - ESV(CUBED): 33.7 ML
BH CV ECHO MEAS - ESV(MOD-SP2): 24 ML
BH CV ECHO MEAS - ESV(MOD-SP2): 29 ML
BH CV ECHO MEAS - ESV(MOD-SP4): 21 ML
BH CV ECHO MEAS - ESV(MOD-SP4): 29 ML
BH CV ECHO MEAS - ESV(TEICH): 41.9 ML
BH CV ECHO MEAS - FS: 25.7 %
BH CV ECHO MEAS - FS: 27.5 %
BH CV ECHO MEAS - IVS/LVPW: 0.92 CM
BH CV ECHO MEAS - IVS/LVPW: 0.96
BH CV ECHO MEAS - IVSD: 1 CM
BH CV ECHO MEAS - IVSD: 1.46 CM
BH CV ECHO MEAS - LAT PEAK E' VEL: 12.7 CM/SEC
BH CV ECHO MEAS - LAT PEAK E' VEL: 8.3 CM/SEC
BH CV ECHO MEAS - LV DIASTOLIC VOL/BSA (35-75): 33.8 ML/M^2
BH CV ECHO MEAS - LV DIASTOLIC VOL/BSA (35-75): 34.1 CM2
BH CV ECHO MEAS - LV MASS(C)D: 162.3 GRAMS
BH CV ECHO MEAS - LV MASS(C)D: 196.5 GRAMS
BH CV ECHO MEAS - LV MASS(C)DI: 85.7 GRAMS/M^2
BH CV ECHO MEAS - LV MAX PG: 4.2 MMHG
BH CV ECHO MEAS - LV MAX PG: 4.7 MMHG
BH CV ECHO MEAS - LV MEAN PG: 2.27 MMHG
BH CV ECHO MEAS - LV MEAN PG: 2.9 MMHG
BH CV ECHO MEAS - LV SYSTOLIC VOL/BSA (12-30): 11.2 CM2
BH CV ECHO MEAS - LV SYSTOLIC VOL/BSA (12-30): 15.3 ML/M^2
BH CV ECHO MEAS - LV V1 MAX: 102.8 CM/SEC
BH CV ECHO MEAS - LV V1 MAX: 108.9 CM/SEC
BH CV ECHO MEAS - LV V1 MEAN: 81.3 CM/SEC
BH CV ECHO MEAS - LV V1 VTI: 18.1 CM
BH CV ECHO MEAS - LV V1 VTI: 28.1 CM
BH CV ECHO MEAS - LVIDD: 3.5 CM
BH CV ECHO MEAS - LVIDD: 4.5 CM
BH CV ECHO MEAS - LVIDS: 2.6 CM
BH CV ECHO MEAS - LVIDS: 3.2 CM
BH CV ECHO MEAS - LVLD AP2: 7.2 CM
BH CV ECHO MEAS - LVLD AP4: 7.7 CM
BH CV ECHO MEAS - LVLS AP2: 6.2 CM
BH CV ECHO MEAS - LVLS AP4: 6.2 CM
BH CV ECHO MEAS - LVOT AREA (M): 2.8 CM^2
BH CV ECHO MEAS - LVOT AREA: 2.8 CM^2
BH CV ECHO MEAS - LVOT AREA: 3.4 CM2
BH CV ECHO MEAS - LVOT DIAM: 1.9 CM
BH CV ECHO MEAS - LVOT DIAM: 2.08 CM
BH CV ECHO MEAS - LVPWD: 1.1 CM
BH CV ECHO MEAS - LVPWD: 1.59 CM
BH CV ECHO MEAS - MED PEAK E' VEL: 6.7 CM/SEC
BH CV ECHO MEAS - MED PEAK E' VEL: 8.8 CM/SEC
BH CV ECHO MEAS - MR MAX PG: 61.1 MMHG
BH CV ECHO MEAS - MR MAX PG: 71 MMHG
BH CV ECHO MEAS - MR MAX VEL: 390.9 CM/SEC
BH CV ECHO MEAS - MR MAX VEL: 421.3 CM/SEC
BH CV ECHO MEAS - MV A DUR: 0.12 SEC
BH CV ECHO MEAS - MV A MAX VEL: 50.7 CM/SEC
BH CV ECHO MEAS - MV DEC SLOPE: 1075 CM/SEC2
BH CV ECHO MEAS - MV DEC SLOPE: 812.8 CM/SEC^2
BH CV ECHO MEAS - MV DEC TIME: 0.18 MSEC
BH CV ECHO MEAS - MV DEC TIME: 0.18 SEC
BH CV ECHO MEAS - MV E MAX VEL: 162 CM/SEC
BH CV ECHO MEAS - MV E MAX VEL: 175 CM/SEC
BH CV ECHO MEAS - MV E/A: 3.2
BH CV ECHO MEAS - MV MAX PG: 12 MMHG
BH CV ECHO MEAS - MV MAX PG: 14.9 MMHG
BH CV ECHO MEAS - MV MEAN PG: 3.5 MMHG
BH CV ECHO MEAS - MV MEAN PG: 6.3 MMHG
BH CV ECHO MEAS - MV P1/2T MAX VEL: 178 CM/SEC
BH CV ECHO MEAS - MV P1/2T: 64.1 MSEC
BH CV ECHO MEAS - MV V2 MAX: 173.4 CM/SEC
BH CV ECHO MEAS - MV V2 MEAN: 82.5 CM/SEC
BH CV ECHO MEAS - MV V2 VTI: 32.4 CM
BH CV ECHO MEAS - MV V2 VTI: 41.8 CM
BH CV ECHO MEAS - MVA P1/2T LCG: 1.2 CM^2
BH CV ECHO MEAS - MVA(P1/2T): 3.4 CM^2
BH CV ECHO MEAS - MVA(VTI): 1.9 CM2
BH CV ECHO MEAS - MVA(VTI): 1.9 CM^2
BH CV ECHO MEAS - PA ACC TIME: 0.05 SEC
BH CV ECHO MEAS - PA MAX PG (FULL): 2.8 MMHG
BH CV ECHO MEAS - PA MAX PG: 4.8 MMHG
BH CV ECHO MEAS - PA PR(ACCEL): 56.4 MMHG
BH CV ECHO MEAS - PA V2 MAX: 109.9 CM/SEC
BH CV ECHO MEAS - PA V2 MAX: 88.2 CM/SEC
BH CV ECHO MEAS - PULM A REVS DUR: 0.19 SEC
BH CV ECHO MEAS - PULM A REVS VEL: 35.9 CM/SEC
BH CV ECHO MEAS - PULM DIAS VEL: 47.4 CM/SEC
BH CV ECHO MEAS - PULM S/D: 0.56
BH CV ECHO MEAS - PULM SYS VEL: 26.6 CM/SEC
BH CV ECHO MEAS - PVA(V,A): 2.1 CM^2
BH CV ECHO MEAS - PVA(V,D): 2.1 CM^2
BH CV ECHO MEAS - QP/QS: 0.65
BH CV ECHO MEAS - RAP SYSTOLE: 3 MMHG
BH CV ECHO MEAS - RAP SYSTOLE: 3 MMHG
BH CV ECHO MEAS - RAP SYSTOLE: 8 MMHG
BH CV ECHO MEAS - RV MAX PG: 2 MMHG
BH CV ECHO MEAS - RV MAX PG: 2.8 MMHG
BH CV ECHO MEAS - RV MEAN PG: 1.1 MMHG
BH CV ECHO MEAS - RV V1 MAX: 70.7 CM/SEC
BH CV ECHO MEAS - RV V1 MAX: 83.1 CM/SEC
BH CV ECHO MEAS - RV V1 MEAN: 50.4 CM/SEC
BH CV ECHO MEAS - RV V1 VTI: 15.6 CM
BH CV ECHO MEAS - RV V1 VTI: 16 CM
BH CV ECHO MEAS - RVOT AREA: 3.2 CM^2
BH CV ECHO MEAS - RVOT DIAM: 1.77 CM
BH CV ECHO MEAS - RVOT DIAM: 2 CM
BH CV ECHO MEAS - RVSP: 56 MMHG
BH CV ECHO MEAS - RVSP: 69.3 MMHG
BH CV ECHO MEAS - RVSP: 76.4 MMHG
BH CV ECHO MEAS - SI(AO): 99 ML/M^2
BH CV ECHO MEAS - SI(CUBED): 29 ML/M^2
BH CV ECHO MEAS - SI(LVOT): 41.9 ML/M^2
BH CV ECHO MEAS - SI(MOD-SP2): 15.4 ML/M2
BH CV ECHO MEAS - SI(MOD-SP2): 21.1 ML/M^2
BH CV ECHO MEAS - SI(MOD-SP4): 18.5 ML/M^2
BH CV ECHO MEAS - SI(MOD-SP4): 22.9 ML/M2
BH CV ECHO MEAS - SI(TEICH): 25.6 ML/M^2
BH CV ECHO MEAS - SUP REN AO DIAM: 1.5 CM
BH CV ECHO MEAS - SV(AO): 187.6 ML
BH CV ECHO MEAS - SV(CUBED): 54.9 ML
BH CV ECHO MEAS - SV(LVOT): 61.4 ML
BH CV ECHO MEAS - SV(LVOT): 79.4 ML
BH CV ECHO MEAS - SV(MOD-SP2): 29 ML
BH CV ECHO MEAS - SV(MOD-SP2): 40 ML
BH CV ECHO MEAS - SV(MOD-SP4): 35 ML
BH CV ECHO MEAS - SV(MOD-SP4): 43 ML
BH CV ECHO MEAS - SV(RVOT): 38.1 ML
BH CV ECHO MEAS - SV(RVOT): 51.9 ML
BH CV ECHO MEAS - SV(TEICH): 48.5 ML
BH CV ECHO MEAS - TAPSE (>1.6): 1.8 CM
BH CV ECHO MEAS - TAPSE (>1.6): 2.5 CM
BH CV ECHO MEAS - TR MAX PG: 53.3 MMHG
BH CV ECHO MEAS - TR MAX VEL: 365.1 CM/SEC
BH CV ECHO MEAS - TR MAX VEL: 407.2 CM/SEC
BH CV ECHO MEAS - TR MAX VEL: 413.4 CM/SEC
BH CV ECHO MEASUREMENTS AVERAGE E/E' RATIO: 16.28
BH CV ECHO MEASUREMENTS AVERAGE E/E' RATIO: 21.6
BH CV VAS BP RIGHT ARM: NORMAL MMHG
BH CV XLRA - RV BASE: 3.4 CM
BH CV XLRA - RV BASE: 3.5 CM
BH CV XLRA - RV LENGTH: 2.7 CM
BH CV XLRA - RV MID: 7.2 CM
BH CV XLRA - TDI S': 12 CM/SEC
BH CV XLRA - TDI S': 12.9 CM/SEC
BILIRUB SERPL-MCNC: 1.2 MG/DL (ref 0–1.2)
BILIRUB SERPL-MCNC: 1.3 MG/DL (ref 0–1.2)
BILIRUB SERPL-MCNC: 1.5 MG/DL (ref 0–1.2)
BILIRUB SERPL-MCNC: 1.5 MG/DL (ref 0–1.2)
BILIRUB SERPL-MCNC: 1.6 MG/DL (ref 0–1.2)
BILIRUB SERPL-MCNC: 1.7 MG/DL (ref 0–1.2)
BILIRUB SERPL-MCNC: 1.7 MG/DL (ref 0–1.2)
BILIRUB SERPL-MCNC: 1.8 MG/DL (ref 0–1.2)
BILIRUB SERPL-MCNC: 1.8 MG/DL (ref 0–1.2)
BILIRUB SERPL-MCNC: 2.1 MG/DL (ref 0–1.2)
BILIRUB SERPL-MCNC: 2.2 MG/DL (ref 0–1.2)
BILIRUB SERPL-MCNC: 3.8 MG/DL (ref 0–1.2)
BILIRUB SERPL-MCNC: 3.8 MG/DL (ref 0–1.2)
BILIRUB UR QL STRIP: NEGATIVE
BLD GP AB SCN SERPL QL: NEGATIVE
BOTTLE TYPE: ABNORMAL
BUN SERPL-MCNC: 15 MG/DL (ref 8–23)
BUN SERPL-MCNC: 18 MG/DL (ref 8–23)
BUN SERPL-MCNC: 19 MG/DL (ref 8–23)
BUN SERPL-MCNC: 19 MG/DL (ref 8–23)
BUN SERPL-MCNC: 21 MG/DL (ref 8–23)
BUN SERPL-MCNC: 22 MG/DL (ref 8–23)
BUN SERPL-MCNC: 24 MG/DL (ref 8–23)
BUN SERPL-MCNC: 26 MG/DL (ref 8–23)
BUN SERPL-MCNC: 26 MG/DL (ref 8–23)
BUN SERPL-MCNC: 29 MG/DL (ref 8–23)
BUN SERPL-MCNC: 29 MG/DL (ref 8–23)
BUN SERPL-MCNC: 31 MG/DL (ref 8–23)
BUN SERPL-MCNC: 38 MG/DL (ref 8–23)
BUN SERPL-MCNC: 40 MG/DL (ref 8–23)
BUN SERPL-MCNC: 40 MG/DL (ref 8–23)
BUN SERPL-MCNC: 41 MG/DL (ref 8–23)
BUN SERPL-MCNC: 42 MG/DL (ref 8–23)
BUN SERPL-MCNC: 43 MG/DL (ref 8–23)
BUN SERPL-MCNC: 43 MG/DL (ref 8–23)
BUN SERPL-MCNC: 44 MG/DL (ref 8–23)
BUN SERPL-MCNC: 45 MG/DL (ref 8–23)
BUN SERPL-MCNC: 45 MG/DL (ref 8–23)
BUN SERPL-MCNC: 48 MG/DL (ref 8–23)
BUN SERPL-MCNC: 54 MG/DL (ref 8–23)
BUN SERPL-MCNC: 54 MG/DL (ref 8–23)
BUN SERPL-MCNC: 56 MG/DL (ref 8–23)
BUN SERPL-MCNC: 91 MG/DL (ref 8–23)
BUN SERPL-MCNC: 92 MG/DL (ref 8–23)
BUN SERPL-MCNC: 93 MG/DL (ref 8–23)
BUN/CREAT SERPL: 10.2 (ref 7–25)
BUN/CREAT SERPL: 11.1 (ref 7–25)
BUN/CREAT SERPL: 12.2 (ref 7–25)
BUN/CREAT SERPL: 14.8 (ref 7–25)
BUN/CREAT SERPL: 15.3 (ref 7–25)
BUN/CREAT SERPL: 15.6 (ref 7–25)
BUN/CREAT SERPL: 17.2 (ref 7–25)
BUN/CREAT SERPL: 17.3 (ref 7–25)
BUN/CREAT SERPL: 18.2 (ref 7–25)
BUN/CREAT SERPL: 18.2 (ref 7–25)
BUN/CREAT SERPL: 20.7 (ref 7–25)
BUN/CREAT SERPL: 21.3 (ref 7–25)
BUN/CREAT SERPL: 22.3 (ref 7–25)
BUN/CREAT SERPL: 22.4 (ref 7–25)
BUN/CREAT SERPL: 23 (ref 7–25)
BUN/CREAT SERPL: 23.5 (ref 7–25)
BUN/CREAT SERPL: 26 (ref 7–25)
BUN/CREAT SERPL: 26.5 (ref 7–25)
BUN/CREAT SERPL: 27.1 (ref 7–25)
BUN/CREAT SERPL: 27.1 (ref 7–25)
BUN/CREAT SERPL: 28 (ref 7–25)
BUN/CREAT SERPL: 30.5 (ref 7–25)
BUN/CREAT SERPL: 30.6 (ref 7–25)
BUN/CREAT SERPL: 30.9 (ref 7–25)
BUN/CREAT SERPL: 31.5 (ref 7–25)
BUN/CREAT SERPL: 32 (ref 7–25)
BUN/CREAT SERPL: 33.3 (ref 7–25)
BUN/CREAT SERPL: 34.1 (ref 7–25)
BUN/CREAT SERPL: 34.4 (ref 7–25)
BUN/CREAT SERPL: 37 (ref 7–25)
BUN/CREAT SERPL: 38.7 (ref 7–25)
C PNEUM DNA NPH QL NAA+NON-PROBE: NOT DETECTED
C-ANCA TITR SER IF: NORMAL TITER
CALCIUM SPEC-SCNC: 8.1 MG/DL (ref 8.6–10.5)
CALCIUM SPEC-SCNC: 8.2 MG/DL (ref 8.6–10.5)
CALCIUM SPEC-SCNC: 8.3 MG/DL (ref 8.6–10.5)
CALCIUM SPEC-SCNC: 8.5 MG/DL (ref 8.6–10.5)
CALCIUM SPEC-SCNC: 8.6 MG/DL (ref 8.6–10.5)
CALCIUM SPEC-SCNC: 8.7 MG/DL (ref 8.6–10.5)
CALCIUM SPEC-SCNC: 8.8 MG/DL (ref 8.6–10.5)
CALCIUM SPEC-SCNC: 8.9 MG/DL (ref 8.6–10.5)
CALCIUM SPEC-SCNC: 9 MG/DL (ref 8.6–10.5)
CALCIUM SPEC-SCNC: 9 MG/DL (ref 8.6–10.5)
CALCIUM SPEC-SCNC: 9.1 MG/DL (ref 8.6–10.5)
CALCIUM SPEC-SCNC: 9.2 MG/DL (ref 8.6–10.5)
CALCIUM SPEC-SCNC: 9.2 MG/DL (ref 8.6–10.5)
CALCIUM SPEC-SCNC: 9.3 MG/DL (ref 8.6–10.5)
CALCIUM SPEC-SCNC: 9.4 MG/DL (ref 8.6–10.5)
CALCIUM SPEC-SCNC: 9.5 MG/DL (ref 8.6–10.5)
CALCIUM SPEC-SCNC: 9.6 MG/DL (ref 8.6–10.5)
CCP IGA+IGG SERPL IA-ACNC: 4 UNITS (ref 0–19)
CENTROMERE B AB SER-ACNC: <0.2 AI (ref 0–0.9)
CHLORIDE SERPL-SCNC: 100 MMOL/L (ref 98–107)
CHLORIDE SERPL-SCNC: 101 MMOL/L (ref 98–107)
CHLORIDE SERPL-SCNC: 102 MMOL/L (ref 98–107)
CHLORIDE SERPL-SCNC: 103 MMOL/L (ref 98–107)
CHLORIDE SERPL-SCNC: 103 MMOL/L (ref 98–107)
CHLORIDE SERPL-SCNC: 104 MMOL/L (ref 98–107)
CHLORIDE SERPL-SCNC: 105 MMOL/L (ref 98–107)
CHLORIDE SERPL-SCNC: 106 MMOL/L (ref 98–107)
CHLORIDE SERPL-SCNC: 108 MMOL/L (ref 98–107)
CHLORIDE SERPL-SCNC: 95 MMOL/L (ref 98–107)
CHLORIDE SERPL-SCNC: 95 MMOL/L (ref 98–107)
CHLORIDE SERPL-SCNC: 98 MMOL/L (ref 98–107)
CHROMATIN AB SERPL-ACNC: <0.2 AI (ref 0–0.9)
CK SERPL-CCNC: 339 U/L (ref 20–180)
CLARITY UR: ABNORMAL
CLARITY UR: CLEAR
CLARITY UR: CLEAR
CO2 SERPL-SCNC: 16.2 MMOL/L (ref 22–29)
CO2 SERPL-SCNC: 18.1 MMOL/L (ref 22–29)
CO2 SERPL-SCNC: 18.2 MMOL/L (ref 22–29)
CO2 SERPL-SCNC: 19.4 MMOL/L (ref 22–29)
CO2 SERPL-SCNC: 20 MMOL/L (ref 22–29)
CO2 SERPL-SCNC: 20.7 MMOL/L (ref 22–29)
CO2 SERPL-SCNC: 22 MMOL/L (ref 22–29)
CO2 SERPL-SCNC: 22.5 MMOL/L (ref 22–29)
CO2 SERPL-SCNC: 22.8 MMOL/L (ref 22–29)
CO2 SERPL-SCNC: 23 MMOL/L (ref 22–29)
CO2 SERPL-SCNC: 23.2 MMOL/L (ref 22–29)
CO2 SERPL-SCNC: 23.4 MMOL/L (ref 22–29)
CO2 SERPL-SCNC: 23.4 MMOL/L (ref 22–29)
CO2 SERPL-SCNC: 23.8 MMOL/L (ref 22–29)
CO2 SERPL-SCNC: 24 MMOL/L (ref 22–29)
CO2 SERPL-SCNC: 24.4 MMOL/L (ref 22–29)
CO2 SERPL-SCNC: 24.4 MMOL/L (ref 22–29)
CO2 SERPL-SCNC: 24.6 MMOL/L (ref 22–29)
CO2 SERPL-SCNC: 25 MMOL/L (ref 22–29)
CO2 SERPL-SCNC: 25.1 MMOL/L (ref 22–29)
CO2 SERPL-SCNC: 25.2 MMOL/L (ref 22–29)
CO2 SERPL-SCNC: 27 MMOL/L (ref 22–29)
CO2 SERPL-SCNC: 27 MMOL/L (ref 22–29)
CO2 SERPL-SCNC: 28 MMOL/L (ref 22–29)
CO2 SERPL-SCNC: 29.2 MMOL/L (ref 22–29)
CO2 SERPL-SCNC: 31.3 MMOL/L (ref 22–29)
COLOR FLD: ABNORMAL
COLOR UR: YELLOW
CREAT SERPL-MCNC: 1.23 MG/DL (ref 0.57–1)
CREAT SERPL-MCNC: 1.24 MG/DL (ref 0.57–1)
CREAT SERPL-MCNC: 1.3 MG/DL (ref 0.57–1)
CREAT SERPL-MCNC: 1.3 MG/DL (ref 0.57–1)
CREAT SERPL-MCNC: 1.32 MG/DL (ref 0.57–1)
CREAT SERPL-MCNC: 1.35 MG/DL (ref 0.57–1)
CREAT SERPL-MCNC: 1.4 MG/DL (ref 0.57–1)
CREAT SERPL-MCNC: 1.4 MG/DL (ref 0.57–1)
CREAT SERPL-MCNC: 1.41 MG/DL (ref 0.57–1)
CREAT SERPL-MCNC: 1.41 MG/DL (ref 0.57–1)
CREAT SERPL-MCNC: 1.42 MG/DL (ref 0.57–1)
CREAT SERPL-MCNC: 1.43 MG/DL (ref 0.57–1)
CREAT SERPL-MCNC: 1.43 MG/DL (ref 0.57–1)
CREAT SERPL-MCNC: 1.44 MG/DL (ref 0.57–1)
CREAT SERPL-MCNC: 1.47 MG/DL (ref 0.57–1)
CREAT SERPL-MCNC: 1.48 MG/DL (ref 0.57–1)
CREAT SERPL-MCNC: 1.5 MG/DL (ref 0.57–1)
CREAT SERPL-MCNC: 1.69 MG/DL (ref 0.57–1)
CREAT SERPL-MCNC: 1.7 MG/DL (ref 0.57–1)
CREAT SERPL-MCNC: 1.7 MG/DL (ref 0.57–1)
CREAT SERPL-MCNC: 1.71 MG/DL (ref 0.57–1)
CREAT SERPL-MCNC: 1.73 MG/DL (ref 0.57–1)
CREAT SERPL-MCNC: 1.78 MG/DL (ref 0.57–1)
CREAT SERPL-MCNC: 1.8 MG/DL (ref 0.57–1)
CREAT SERPL-MCNC: 1.81 MG/DL (ref 0.57–1)
CREAT SERPL-MCNC: 1.81 MG/DL (ref 0.57–1)
CREAT SERPL-MCNC: 1.87 MG/DL (ref 0.57–1)
CREAT SERPL-MCNC: 1.99 MG/DL (ref 0.57–1)
CREAT SERPL-MCNC: 2.38 MG/DL (ref 0.57–1)
CREAT SERPL-MCNC: 2.46 MG/DL (ref 0.57–1)
CREAT SERPL-MCNC: 2.7 MG/DL (ref 0.57–1)
CYTO UR: NORMAL
D-LACTATE SERPL-SCNC: 1.2 MMOL/L (ref 0.5–2)
D-LACTATE SERPL-SCNC: 1.7 MMOL/L (ref 0.5–2)
D-LACTATE SERPL-SCNC: 2 MMOL/L (ref 0.5–2)
D-LACTATE SERPL-SCNC: 2.4 MMOL/L (ref 0.5–2)
D-LACTATE SERPL-SCNC: 3.6 MMOL/L (ref 0.5–2)
D-LACTATE SERPL-SCNC: 4.1 MMOL/L (ref 0.5–2)
D-LACTATE SERPL-SCNC: 4.1 MMOL/L (ref 0.5–2)
D-LACTATE SERPL-SCNC: 4.8 MMOL/L (ref 0.5–2)
D-LACTATE SERPL-SCNC: 5.4 MMOL/L (ref 0.5–2)
DEPRECATED RDW RBC AUTO: 53.9 FL (ref 37–54)
DEPRECATED RDW RBC AUTO: 54.1 FL (ref 37–54)
DEPRECATED RDW RBC AUTO: 54.9 FL (ref 37–54)
DEPRECATED RDW RBC AUTO: 55.5 FL (ref 37–54)
DEPRECATED RDW RBC AUTO: 55.6 FL (ref 37–54)
DEPRECATED RDW RBC AUTO: 56 FL (ref 37–54)
DEPRECATED RDW RBC AUTO: 56.1 FL (ref 37–54)
DEPRECATED RDW RBC AUTO: 56.5 FL (ref 37–54)
DEPRECATED RDW RBC AUTO: 56.6 FL (ref 37–54)
DEPRECATED RDW RBC AUTO: 56.7 FL (ref 37–54)
DEPRECATED RDW RBC AUTO: 57 FL (ref 37–54)
DEPRECATED RDW RBC AUTO: 57.5 FL (ref 37–54)
DEPRECATED RDW RBC AUTO: 57.9 FL (ref 37–54)
DEPRECATED RDW RBC AUTO: 57.9 FL (ref 37–54)
DEPRECATED RDW RBC AUTO: 58.3 FL (ref 37–54)
DEPRECATED RDW RBC AUTO: 58.8 FL (ref 37–54)
DEPRECATED RDW RBC AUTO: 59 FL (ref 37–54)
DEPRECATED RDW RBC AUTO: 59.1 FL (ref 37–54)
DEPRECATED RDW RBC AUTO: 59.1 FL (ref 37–54)
DEPRECATED RDW RBC AUTO: 59.3 FL (ref 37–54)
DEPRECATED RDW RBC AUTO: 59.6 FL (ref 37–54)
DEPRECATED RDW RBC AUTO: 60.3 FL (ref 37–54)
DEPRECATED RDW RBC AUTO: 60.3 FL (ref 37–54)
DEPRECATED RDW RBC AUTO: 60.5 FL (ref 37–54)
DEPRECATED RDW RBC AUTO: 60.7 FL (ref 37–54)
DEPRECATED RDW RBC AUTO: 61.2 FL (ref 37–54)
DEPRECATED RDW RBC AUTO: 61.4 FL (ref 37–54)
DEPRECATED RDW RBC AUTO: 62.9 FL (ref 37–54)
DSDNA AB SER-ACNC: <1 IU/ML (ref 0–9)
EGFRCR SERPLBLD CKD-EPI 2021: 16.9 ML/MIN/1.73
EGFRCR SERPLBLD CKD-EPI 2021: 18.9 ML/MIN/1.73
EGFRCR SERPLBLD CKD-EPI 2021: 19.7 ML/MIN/1.73
EGFRCR SERPLBLD CKD-EPI 2021: 24.4 ML/MIN/1.73
EGFRCR SERPLBLD CKD-EPI 2021: 26.3 ML/MIN/1.73
EGFRCR SERPLBLD CKD-EPI 2021: 27.3 ML/MIN/1.73
EGFRCR SERPLBLD CKD-EPI 2021: 27.3 ML/MIN/1.73
EGFRCR SERPLBLD CKD-EPI 2021: 27.5 ML/MIN/1.73
EGFRCR SERPLBLD CKD-EPI 2021: 27.9 ML/MIN/1.73
EGFRCR SERPLBLD CKD-EPI 2021: 28.8 ML/MIN/1.73
EGFRCR SERPLBLD CKD-EPI 2021: 29.2 ML/MIN/1.73
EGFRCR SERPLBLD CKD-EPI 2021: 29.4 ML/MIN/1.73
EGFRCR SERPLBLD CKD-EPI 2021: 29.4 ML/MIN/1.73
EGFRCR SERPLBLD CKD-EPI 2021: 29.7 ML/MIN/1.73
EGFRCR SERPLBLD CKD-EPI 2021: 34.2 ML/MIN/1.73
EGFRCR SERPLBLD CKD-EPI 2021: 34.8 ML/MIN/1.73
EGFRCR SERPLBLD CKD-EPI 2021: 35.1 ML/MIN/1.73
EGFRCR SERPLBLD CKD-EPI 2021: 35.9 ML/MIN/1.73
EGFRCR SERPLBLD CKD-EPI 2021: 36.2 ML/MIN/1.73
EGFRCR SERPLBLD CKD-EPI 2021: 36.5 ML/MIN/1.73
EGFRCR SERPLBLD CKD-EPI 2021: 36.9 ML/MIN/1.73
EGFRCR SERPLBLD CKD-EPI 2021: 37.2 ML/MIN/1.73
EGFRCR SERPLBLD CKD-EPI 2021: 38.8 ML/MIN/1.73
EGFRCR SERPLBLD CKD-EPI 2021: 40.6 ML/MIN/1.73
EGFRCR SERPLBLD CKD-EPI 2021: 43 ML/MIN/1.73
EGFRCR SERPLBLD CKD-EPI 2021: 43.4 ML/MIN/1.73
ENA JO1 AB SER-ACNC: <0.2 AI (ref 0–0.9)
ENA RNP AB SER-ACNC: <0.2 AI (ref 0–0.9)
ENA SCL70 AB SER-ACNC: <0.2 AI (ref 0–0.9)
ENA SM AB SER-ACNC: <0.2 AI (ref 0–0.9)
ENA SS-A AB SER-ACNC: <0.2 AI (ref 0–0.9)
ENA SS-B AB SER-ACNC: <0.2 AI (ref 0–0.9)
EOSINOPHIL # BLD AUTO: 0 10*3/MM3 (ref 0–0.4)
EOSINOPHIL # BLD AUTO: 0.01 10*3/MM3 (ref 0–0.4)
EOSINOPHIL # BLD AUTO: 0.06 10*3/MM3 (ref 0–0.4)
EOSINOPHIL # BLD AUTO: 0.09 10*3/MM3 (ref 0–0.4)
EOSINOPHIL # BLD AUTO: 0.15 10*3/MM3 (ref 0–0.4)
EOSINOPHIL # BLD AUTO: 0.16 10*3/MM3 (ref 0–0.4)
EOSINOPHIL # BLD AUTO: 0.24 10*3/MM3 (ref 0–0.4)
EOSINOPHIL # BLD MANUAL: 0.11 10*3/MM3 (ref 0–0.4)
EOSINOPHIL NFR BLD AUTO: 0 % (ref 0.3–6.2)
EOSINOPHIL NFR BLD AUTO: 0.1 % (ref 0.3–6.2)
EOSINOPHIL NFR BLD AUTO: 0.9 % (ref 0.3–6.2)
EOSINOPHIL NFR BLD AUTO: 1.2 % (ref 0.3–6.2)
EOSINOPHIL NFR BLD AUTO: 1.5 % (ref 0.3–6.2)
EOSINOPHIL NFR BLD AUTO: 2.6 % (ref 0.3–6.2)
EOSINOPHIL NFR BLD AUTO: 2.7 % (ref 0.3–6.2)
EOSINOPHIL NFR BLD MANUAL: 1 % (ref 0.3–6.2)
ERYTHROCYTE [DISTWIDTH] IN BLOOD BY AUTOMATED COUNT: 14.4 % (ref 12.3–15.4)
ERYTHROCYTE [DISTWIDTH] IN BLOOD BY AUTOMATED COUNT: 14.6 % (ref 12.3–15.4)
ERYTHROCYTE [DISTWIDTH] IN BLOOD BY AUTOMATED COUNT: 14.7 % (ref 12.3–15.4)
ERYTHROCYTE [DISTWIDTH] IN BLOOD BY AUTOMATED COUNT: 15.1 % (ref 12.3–15.4)
ERYTHROCYTE [DISTWIDTH] IN BLOOD BY AUTOMATED COUNT: 15.1 % (ref 12.3–15.4)
ERYTHROCYTE [DISTWIDTH] IN BLOOD BY AUTOMATED COUNT: 15.4 % (ref 12.3–15.4)
ERYTHROCYTE [DISTWIDTH] IN BLOOD BY AUTOMATED COUNT: 15.5 % (ref 12.3–15.4)
ERYTHROCYTE [DISTWIDTH] IN BLOOD BY AUTOMATED COUNT: 15.7 % (ref 12.3–15.4)
ERYTHROCYTE [DISTWIDTH] IN BLOOD BY AUTOMATED COUNT: 15.7 % (ref 12.3–15.4)
ERYTHROCYTE [DISTWIDTH] IN BLOOD BY AUTOMATED COUNT: 15.8 % (ref 12.3–15.4)
ERYTHROCYTE [DISTWIDTH] IN BLOOD BY AUTOMATED COUNT: 15.9 % (ref 12.3–15.4)
ERYTHROCYTE [DISTWIDTH] IN BLOOD BY AUTOMATED COUNT: 16 % (ref 12.3–15.4)
ERYTHROCYTE [DISTWIDTH] IN BLOOD BY AUTOMATED COUNT: 16.2 % (ref 12.3–15.4)
ERYTHROCYTE [DISTWIDTH] IN BLOOD BY AUTOMATED COUNT: 16.3 % (ref 12.3–15.4)
ERYTHROCYTE [DISTWIDTH] IN BLOOD BY AUTOMATED COUNT: 17 % (ref 12.3–15.4)
FLUAV AG NPH QL: NEGATIVE
FLUAV SUBTYP SPEC NAA+PROBE: NOT DETECTED
FLUBV AG NPH QL IA: NEGATIVE
FLUBV RNA ISLT QL NAA+PROBE: NOT DETECTED
FOLATE SERPL-MCNC: 3.93 NG/ML (ref 4.78–24.2)
GAS FLOW AIRWAY: 15 LPM
GAS FLOW AIRWAY: 15 LPM
GAS FLOW AIRWAY: 8 LPM
GBM IGG SER-ACNC: 3 UNITS (ref 0–20)
GFR SERPL CREATININE-BSD FRML MDRD: 35 ML/MIN/1.73
GFR SERPL CREATININE-BSD FRML MDRD: 36 ML/MIN/1.73
GFR SERPL CREATININE-BSD FRML MDRD: 36 ML/MIN/1.73
GFR SERPL CREATININE-BSD FRML MDRD: 38 ML/MIN/1.73
GFR SERPL CREATININE-BSD FRML MDRD: 39 ML/MIN/1.73
GLOBULIN UR ELPH-MCNC: 2.6 GM/DL
GLOBULIN UR ELPH-MCNC: 2.7 GM/DL
GLOBULIN UR ELPH-MCNC: 2.8 GM/DL
GLOBULIN UR ELPH-MCNC: 3.1 GM/DL
GLOBULIN UR ELPH-MCNC: 3.2 GM/DL
GLOBULIN UR ELPH-MCNC: 3.3 GM/DL
GLOBULIN UR ELPH-MCNC: 3.5 GM/DL
GLOBULIN UR ELPH-MCNC: 3.7 GM/DL
GLOBULIN UR ELPH-MCNC: 3.8 GM/DL
GLUCOSE BLDC GLUCOMTR-MCNC: 119 MG/DL (ref 70–130)
GLUCOSE BLDC GLUCOMTR-MCNC: 128 MG/DL (ref 70–130)
GLUCOSE BLDC GLUCOMTR-MCNC: 135 MG/DL (ref 70–130)
GLUCOSE BLDC GLUCOMTR-MCNC: 137 MG/DL (ref 70–130)
GLUCOSE BLDC GLUCOMTR-MCNC: 139 MG/DL (ref 70–130)
GLUCOSE BLDC GLUCOMTR-MCNC: 148 MG/DL (ref 70–130)
GLUCOSE BLDC GLUCOMTR-MCNC: 161 MG/DL (ref 70–130)
GLUCOSE BLDC GLUCOMTR-MCNC: 164 MG/DL (ref 70–130)
GLUCOSE BLDC GLUCOMTR-MCNC: 165 MG/DL (ref 70–130)
GLUCOSE BLDC GLUCOMTR-MCNC: 169 MG/DL (ref 70–130)
GLUCOSE BLDC GLUCOMTR-MCNC: 175 MG/DL (ref 70–130)
GLUCOSE BLDC GLUCOMTR-MCNC: 176 MG/DL (ref 70–130)
GLUCOSE BLDC GLUCOMTR-MCNC: 176 MG/DL (ref 70–130)
GLUCOSE BLDC GLUCOMTR-MCNC: 180 MG/DL (ref 70–130)
GLUCOSE BLDC GLUCOMTR-MCNC: 184 MG/DL (ref 70–130)
GLUCOSE BLDC GLUCOMTR-MCNC: 192 MG/DL (ref 70–130)
GLUCOSE BLDC GLUCOMTR-MCNC: 196 MG/DL (ref 70–130)
GLUCOSE BLDC GLUCOMTR-MCNC: 197 MG/DL (ref 70–130)
GLUCOSE BLDC GLUCOMTR-MCNC: 199 MG/DL (ref 70–130)
GLUCOSE BLDC GLUCOMTR-MCNC: 199 MG/DL (ref 70–130)
GLUCOSE BLDC GLUCOMTR-MCNC: 200 MG/DL (ref 70–130)
GLUCOSE BLDC GLUCOMTR-MCNC: 201 MG/DL (ref 70–130)
GLUCOSE BLDC GLUCOMTR-MCNC: 201 MG/DL (ref 70–130)
GLUCOSE BLDC GLUCOMTR-MCNC: 204 MG/DL (ref 70–130)
GLUCOSE BLDC GLUCOMTR-MCNC: 207 MG/DL (ref 70–130)
GLUCOSE BLDC GLUCOMTR-MCNC: 207 MG/DL (ref 70–130)
GLUCOSE BLDC GLUCOMTR-MCNC: 213 MG/DL (ref 70–130)
GLUCOSE BLDC GLUCOMTR-MCNC: 216 MG/DL (ref 70–130)
GLUCOSE BLDC GLUCOMTR-MCNC: 226 MG/DL (ref 70–130)
GLUCOSE BLDC GLUCOMTR-MCNC: 227 MG/DL (ref 70–130)
GLUCOSE BLDC GLUCOMTR-MCNC: 233 MG/DL (ref 70–130)
GLUCOSE BLDC GLUCOMTR-MCNC: 237 MG/DL (ref 70–130)
GLUCOSE BLDC GLUCOMTR-MCNC: 239 MG/DL (ref 70–130)
GLUCOSE BLDC GLUCOMTR-MCNC: 243 MG/DL (ref 70–130)
GLUCOSE BLDC GLUCOMTR-MCNC: 244 MG/DL (ref 70–130)
GLUCOSE BLDC GLUCOMTR-MCNC: 245 MG/DL (ref 70–130)
GLUCOSE BLDC GLUCOMTR-MCNC: 245 MG/DL (ref 70–130)
GLUCOSE BLDC GLUCOMTR-MCNC: 248 MG/DL (ref 70–130)
GLUCOSE BLDC GLUCOMTR-MCNC: 248 MG/DL (ref 70–130)
GLUCOSE BLDC GLUCOMTR-MCNC: 265 MG/DL (ref 70–130)
GLUCOSE BLDC GLUCOMTR-MCNC: 269 MG/DL (ref 70–130)
GLUCOSE BLDC GLUCOMTR-MCNC: 277 MG/DL (ref 70–130)
GLUCOSE BLDC GLUCOMTR-MCNC: 280 MG/DL (ref 70–130)
GLUCOSE BLDC GLUCOMTR-MCNC: 290 MG/DL (ref 70–130)
GLUCOSE BLDC GLUCOMTR-MCNC: 303 MG/DL (ref 70–130)
GLUCOSE BLDC GLUCOMTR-MCNC: 311 MG/DL (ref 70–130)
GLUCOSE BLDC GLUCOMTR-MCNC: 315 MG/DL (ref 70–130)
GLUCOSE BLDC GLUCOMTR-MCNC: 321 MG/DL (ref 70–130)
GLUCOSE BLDC GLUCOMTR-MCNC: 326 MG/DL (ref 70–130)
GLUCOSE SERPL-MCNC: 106 MG/DL (ref 65–99)
GLUCOSE SERPL-MCNC: 110 MG/DL (ref 65–99)
GLUCOSE SERPL-MCNC: 111 MG/DL (ref 65–99)
GLUCOSE SERPL-MCNC: 112 MG/DL (ref 65–99)
GLUCOSE SERPL-MCNC: 114 MG/DL (ref 65–99)
GLUCOSE SERPL-MCNC: 115 MG/DL (ref 65–99)
GLUCOSE SERPL-MCNC: 118 MG/DL (ref 65–99)
GLUCOSE SERPL-MCNC: 122 MG/DL (ref 65–99)
GLUCOSE SERPL-MCNC: 127 MG/DL (ref 65–99)
GLUCOSE SERPL-MCNC: 130 MG/DL (ref 65–99)
GLUCOSE SERPL-MCNC: 131 MG/DL (ref 65–99)
GLUCOSE SERPL-MCNC: 146 MG/DL (ref 65–99)
GLUCOSE SERPL-MCNC: 150 MG/DL (ref 65–99)
GLUCOSE SERPL-MCNC: 158 MG/DL (ref 65–99)
GLUCOSE SERPL-MCNC: 161 MG/DL (ref 65–99)
GLUCOSE SERPL-MCNC: 168 MG/DL (ref 65–99)
GLUCOSE SERPL-MCNC: 175 MG/DL (ref 65–99)
GLUCOSE SERPL-MCNC: 181 MG/DL (ref 65–99)
GLUCOSE SERPL-MCNC: 190 MG/DL (ref 65–99)
GLUCOSE SERPL-MCNC: 191 MG/DL (ref 65–99)
GLUCOSE SERPL-MCNC: 194 MG/DL (ref 65–99)
GLUCOSE SERPL-MCNC: 207 MG/DL (ref 65–99)
GLUCOSE SERPL-MCNC: 210 MG/DL (ref 65–99)
GLUCOSE SERPL-MCNC: 222 MG/DL (ref 65–99)
GLUCOSE SERPL-MCNC: 223 MG/DL (ref 65–99)
GLUCOSE SERPL-MCNC: 226 MG/DL (ref 65–99)
GLUCOSE SERPL-MCNC: 251 MG/DL (ref 65–99)
GLUCOSE SERPL-MCNC: 253 MG/DL (ref 65–99)
GLUCOSE SERPL-MCNC: 255 MG/DL (ref 65–99)
GLUCOSE UR STRIP-MCNC: NEGATIVE MG/DL
GRAM STN SPEC: ABNORMAL
GRAM STN SPEC: NORMAL
HADV DNA SPEC NAA+PROBE: NOT DETECTED
HAV IGM SERPL QL IA: NORMAL
HBA1C MFR BLD: 5.4 % (ref 4.8–5.6)
HBA1C MFR BLD: 5.9 % (ref 4.8–5.6)
HBV CORE IGM SERPL QL IA: NORMAL
HBV SURFACE AG SERPL QL IA: NORMAL
HCO3 BLDA-SCNC: 24.7 MMOL/L (ref 22–28)
HCO3 BLDA-SCNC: 25.8 MMOL/L (ref 22–28)
HCO3 BLDA-SCNC: 26.4 MMOL/L (ref 22–28)
HCO3 BLDA-SCNC: 27.1 MMOL/L (ref 22–28)
HCOV 229E RNA SPEC QL NAA+PROBE: NOT DETECTED
HCOV HKU1 RNA SPEC QL NAA+PROBE: NOT DETECTED
HCOV NL63 RNA SPEC QL NAA+PROBE: NOT DETECTED
HCOV OC43 RNA SPEC QL NAA+PROBE: NOT DETECTED
HCT VFR BLD AUTO: 24.9 % (ref 34–46.6)
HCT VFR BLD AUTO: 25.4 % (ref 34–46.6)
HCT VFR BLD AUTO: 25.8 % (ref 34–46.6)
HCT VFR BLD AUTO: 26 % (ref 34–46.6)
HCT VFR BLD AUTO: 26.2 % (ref 34–46.6)
HCT VFR BLD AUTO: 26.4 % (ref 34–46.6)
HCT VFR BLD AUTO: 26.4 % (ref 34–46.6)
HCT VFR BLD AUTO: 27.1 % (ref 34–46.6)
HCT VFR BLD AUTO: 27.3 % (ref 34–46.6)
HCT VFR BLD AUTO: 27.4 % (ref 34–46.6)
HCT VFR BLD AUTO: 27.6 % (ref 34–46.6)
HCT VFR BLD AUTO: 27.6 % (ref 34–46.6)
HCT VFR BLD AUTO: 27.7 % (ref 34–46.6)
HCT VFR BLD AUTO: 27.7 % (ref 34–46.6)
HCT VFR BLD AUTO: 27.9 % (ref 34–46.6)
HCT VFR BLD AUTO: 28.2 % (ref 34–46.6)
HCT VFR BLD AUTO: 28.2 % (ref 34–46.6)
HCT VFR BLD AUTO: 28.7 % (ref 34–46.6)
HCT VFR BLD AUTO: 28.8 % (ref 34–46.6)
HCT VFR BLD AUTO: 29 % (ref 34–46.6)
HCT VFR BLD AUTO: 29.1 % (ref 34–46.6)
HCT VFR BLD AUTO: 29.8 % (ref 34–46.6)
HCT VFR BLD AUTO: 29.9 % (ref 34–46.6)
HCT VFR BLD AUTO: 30 % (ref 34–46.6)
HCT VFR BLD AUTO: 31 % (ref 34–46.6)
HCT VFR BLD AUTO: 31.1 % (ref 34–46.6)
HCT VFR BLD AUTO: 32.4 % (ref 34–46.6)
HCT VFR BLD AUTO: 32.9 % (ref 34–46.6)
HCT VFR BLD AUTO: 33.3 % (ref 34–46.6)
HCT VFR BLD AUTO: 39 % (ref 34–46.6)
HCV AB SER DONR QL: NORMAL
HGB BLD-MCNC: 10.2 G/DL (ref 12–15.9)
HGB BLD-MCNC: 10.3 G/DL (ref 12–15.9)
HGB BLD-MCNC: 10.3 G/DL (ref 12–15.9)
HGB BLD-MCNC: 10.6 G/DL (ref 12–15.9)
HGB BLD-MCNC: 11.2 G/DL (ref 12–15.9)
HGB BLD-MCNC: 12 G/DL (ref 12–15.9)
HGB BLD-MCNC: 7.6 G/DL (ref 12–15.9)
HGB BLD-MCNC: 7.8 G/DL (ref 12–15.9)
HGB BLD-MCNC: 7.9 G/DL (ref 12–15.9)
HGB BLD-MCNC: 8.1 G/DL (ref 12–15.9)
HGB BLD-MCNC: 8.3 G/DL (ref 12–15.9)
HGB BLD-MCNC: 8.4 G/DL (ref 12–15.9)
HGB BLD-MCNC: 8.5 G/DL (ref 12–15.9)
HGB BLD-MCNC: 8.5 G/DL (ref 12–15.9)
HGB BLD-MCNC: 8.7 G/DL (ref 12–15.9)
HGB BLD-MCNC: 8.8 G/DL (ref 12–15.9)
HGB BLD-MCNC: 8.8 G/DL (ref 12–15.9)
HGB BLD-MCNC: 9 G/DL (ref 12–15.9)
HGB BLD-MCNC: 9.1 G/DL (ref 12–15.9)
HGB BLD-MCNC: 9.2 G/DL (ref 12–15.9)
HGB BLD-MCNC: 9.3 G/DL (ref 12–15.9)
HGB BLD-MCNC: 9.4 G/DL (ref 12–15.9)
HGB BLD-MCNC: 9.4 G/DL (ref 12–15.9)
HGB BLD-MCNC: 9.6 G/DL (ref 12–15.9)
HGB UR QL STRIP.AUTO: ABNORMAL
HGB UR QL STRIP.AUTO: ABNORMAL
HGB UR QL STRIP.AUTO: NEGATIVE
HMPV RNA NPH QL NAA+NON-PROBE: NOT DETECTED
HPIV1 RNA ISLT QL NAA+PROBE: NOT DETECTED
HPIV2 RNA SPEC QL NAA+PROBE: NOT DETECTED
HPIV3 RNA NPH QL NAA+PROBE: NOT DETECTED
HPIV4 P GENE NPH QL NAA+PROBE: NOT DETECTED
HYALINE CASTS UR QL AUTO: ABNORMAL /LPF
HYALINE CASTS UR QL AUTO: ABNORMAL /LPF
HYALINE CASTS UR QL AUTO: NORMAL /LPF
IMM GRANULOCYTES # BLD AUTO: 0.01 10*3/MM3 (ref 0–0.05)
IMM GRANULOCYTES # BLD AUTO: 0.02 10*3/MM3 (ref 0–0.05)
IMM GRANULOCYTES # BLD AUTO: 0.02 10*3/MM3 (ref 0–0.05)
IMM GRANULOCYTES # BLD AUTO: 0.04 10*3/MM3 (ref 0–0.05)
IMM GRANULOCYTES # BLD AUTO: 0.04 10*3/MM3 (ref 0–0.05)
IMM GRANULOCYTES # BLD AUTO: 0.06 10*3/MM3 (ref 0–0.05)
IMM GRANULOCYTES # BLD AUTO: 0.06 10*3/MM3 (ref 0–0.05)
IMM GRANULOCYTES # BLD AUTO: 0.09 10*3/MM3 (ref 0–0.05)
IMM GRANULOCYTES # BLD AUTO: 0.16 10*3/MM3 (ref 0–0.05)
IMM GRANULOCYTES # BLD AUTO: 0.19 10*3/MM3 (ref 0–0.05)
IMM GRANULOCYTES # BLD AUTO: 0.22 10*3/MM3 (ref 0–0.05)
IMM GRANULOCYTES # BLD AUTO: 0.24 10*3/MM3 (ref 0–0.05)
IMM GRANULOCYTES # BLD AUTO: 0.26 10*3/MM3 (ref 0–0.05)
IMM GRANULOCYTES # BLD AUTO: 0.26 10*3/MM3 (ref 0–0.05)
IMM GRANULOCYTES NFR BLD AUTO: 0.2 % (ref 0–0.5)
IMM GRANULOCYTES NFR BLD AUTO: 0.3 % (ref 0–0.5)
IMM GRANULOCYTES NFR BLD AUTO: 0.3 % (ref 0–0.5)
IMM GRANULOCYTES NFR BLD AUTO: 0.4 % (ref 0–0.5)
IMM GRANULOCYTES NFR BLD AUTO: 0.4 % (ref 0–0.5)
IMM GRANULOCYTES NFR BLD AUTO: 0.5 % (ref 0–0.5)
IMM GRANULOCYTES NFR BLD AUTO: 0.5 % (ref 0–0.5)
IMM GRANULOCYTES NFR BLD AUTO: 0.8 % (ref 0–0.5)
IMM GRANULOCYTES NFR BLD AUTO: 1.1 % (ref 0–0.5)
IMM GRANULOCYTES NFR BLD AUTO: 1.7 % (ref 0–0.5)
IMM GRANULOCYTES NFR BLD AUTO: 1.9 % (ref 0–0.5)
IMM GRANULOCYTES NFR BLD AUTO: 2 % (ref 0–0.5)
IMM GRANULOCYTES NFR BLD AUTO: 2 % (ref 0–0.5)
IMM GRANULOCYTES NFR BLD AUTO: 2.3 % (ref 0–0.5)
INHALED O2 CONCENTRATION: 80 %
INR PPP: 1.51 (ref 0.9–1.1)
INR PPP: 1.59 (ref 0.9–1.1)
INR PPP: 2.69 (ref 0.9–1.1)
ISOLATED FROM: ABNORMAL
ISOLATED FROM: ABNORMAL
KETONES UR QL STRIP: NEGATIVE
L PNEUMO1 AG UR QL IA: NEGATIVE
L PNEUMO1 AG UR QL IA: NEGATIVE
LAB AP CASE REPORT: NORMAL
LABORATORY COMMENT REPORT: ABNORMAL
LEFT ATRIUM VOLUME INDEX: 24.3 ML/M2
LEFT ATRIUM VOLUME INDEX: 26 ML/M2
LEUKOCYTE ESTERASE UR QL STRIP.AUTO: ABNORMAL
LEUKOCYTE ESTERASE UR QL STRIP.AUTO: NEGATIVE
LEUKOCYTE ESTERASE UR QL STRIP.AUTO: NEGATIVE
LV DP/DT: 1010 MMHG/SEC
LV EF 2D ECHO EST: 60 %
LYMPHOCYTES # BLD AUTO: 0.41 10*3/MM3 (ref 0.7–3.1)
LYMPHOCYTES # BLD AUTO: 0.53 10*3/MM3 (ref 0.7–3.1)
LYMPHOCYTES # BLD AUTO: 0.57 10*3/MM3 (ref 0.7–3.1)
LYMPHOCYTES # BLD AUTO: 0.64 10*3/MM3 (ref 0.7–3.1)
LYMPHOCYTES # BLD AUTO: 0.68 10*3/MM3 (ref 0.7–3.1)
LYMPHOCYTES # BLD AUTO: 0.75 10*3/MM3 (ref 0.7–3.1)
LYMPHOCYTES # BLD AUTO: 0.76 10*3/MM3 (ref 0.7–3.1)
LYMPHOCYTES # BLD AUTO: 1.12 10*3/MM3 (ref 0.7–3.1)
LYMPHOCYTES # BLD AUTO: 1.16 10*3/MM3 (ref 0.7–3.1)
LYMPHOCYTES # BLD AUTO: 1.17 10*3/MM3 (ref 0.7–3.1)
LYMPHOCYTES # BLD AUTO: 1.3 10*3/MM3 (ref 0.7–3.1)
LYMPHOCYTES # BLD AUTO: 1.58 10*3/MM3 (ref 0.7–3.1)
LYMPHOCYTES # BLD AUTO: 1.81 10*3/MM3 (ref 0.7–3.1)
LYMPHOCYTES # BLD AUTO: 3.23 10*3/MM3 (ref 0.7–3.1)
LYMPHOCYTES # BLD MANUAL: 1.18 10*3/MM3 (ref 0.7–3.1)
LYMPHOCYTES NFR BLD AUTO: 12.5 % (ref 19.6–45.3)
LYMPHOCYTES NFR BLD AUTO: 12.9 % (ref 19.6–45.3)
LYMPHOCYTES NFR BLD AUTO: 19.7 % (ref 19.6–45.3)
LYMPHOCYTES NFR BLD AUTO: 21.7 % (ref 19.6–45.3)
LYMPHOCYTES NFR BLD AUTO: 3.4 % (ref 19.6–45.3)
LYMPHOCYTES NFR BLD AUTO: 4.2 % (ref 19.6–45.3)
LYMPHOCYTES NFR BLD AUTO: 4.5 % (ref 19.6–45.3)
LYMPHOCYTES NFR BLD AUTO: 49.4 % (ref 19.6–45.3)
LYMPHOCYTES NFR BLD AUTO: 6.1 % (ref 19.6–45.3)
LYMPHOCYTES NFR BLD AUTO: 7.5 % (ref 19.6–45.3)
LYMPHOCYTES NFR BLD AUTO: 8.1 % (ref 19.6–45.3)
LYMPHOCYTES NFR BLD AUTO: 8.2 % (ref 19.6–45.3)
LYMPHOCYTES NFR BLD AUTO: 9.3 % (ref 19.6–45.3)
LYMPHOCYTES NFR BLD AUTO: 9.7 % (ref 19.6–45.3)
LYMPHOCYTES NFR BLD MANUAL: 3 % (ref 5–12)
LYMPHOCYTES NFR FLD MANUAL: 4 %
Lab: NORMAL
Lab: NORMAL
M PNEUMO IGG SER IA-ACNC: NOT DETECTED
MAGNESIUM SERPL-MCNC: 1.5 MG/DL (ref 1.6–2.4)
MAGNESIUM SERPL-MCNC: 1.8 MG/DL (ref 1.6–2.4)
MAGNESIUM SERPL-MCNC: 1.9 MG/DL (ref 1.6–2.4)
MAGNESIUM SERPL-MCNC: 2.1 MG/DL (ref 1.6–2.4)
MAGNESIUM SERPL-MCNC: 2.3 MG/DL (ref 1.6–2.4)
MAGNESIUM SERPL-MCNC: 2.4 MG/DL (ref 1.6–2.4)
MAGNESIUM SERPL-MCNC: 2.4 MG/DL (ref 1.6–2.4)
MAGNESIUM SERPL-MCNC: 2.6 MG/DL (ref 1.6–2.4)
MAGNESIUM SERPL-MCNC: 2.9 MG/DL (ref 1.6–2.4)
MAXIMAL PREDICTED HEART RATE: 136 BPM
MAXIMAL PREDICTED HEART RATE: 136 BPM
MCH RBC QN AUTO: 30 PG (ref 26.6–33)
MCH RBC QN AUTO: 30.6 PG (ref 26.6–33)
MCH RBC QN AUTO: 30.7 PG (ref 26.6–33)
MCH RBC QN AUTO: 31.2 PG (ref 26.6–33)
MCH RBC QN AUTO: 31.4 PG (ref 26.6–33)
MCH RBC QN AUTO: 31.4 PG (ref 26.6–33)
MCH RBC QN AUTO: 31.5 PG (ref 26.6–33)
MCH RBC QN AUTO: 31.6 PG (ref 26.6–33)
MCH RBC QN AUTO: 31.6 PG (ref 26.6–33)
MCH RBC QN AUTO: 31.8 PG (ref 26.6–33)
MCH RBC QN AUTO: 31.9 PG (ref 26.6–33)
MCH RBC QN AUTO: 32 PG (ref 26.6–33)
MCH RBC QN AUTO: 32.1 PG (ref 26.6–33)
MCH RBC QN AUTO: 32.7 PG (ref 26.6–33)
MCH RBC QN AUTO: 32.8 PG (ref 26.6–33)
MCH RBC QN AUTO: 32.9 PG (ref 26.6–33)
MCH RBC QN AUTO: 33 PG (ref 26.6–33)
MCH RBC QN AUTO: 33.1 PG (ref 26.6–33)
MCH RBC QN AUTO: 33.2 PG (ref 26.6–33)
MCH RBC QN AUTO: 33.2 PG (ref 26.6–33)
MCH RBC QN AUTO: 33.3 PG (ref 26.6–33)
MCH RBC QN AUTO: 33.5 PG (ref 26.6–33)
MCH RBC QN AUTO: 33.7 PG (ref 26.6–33)
MCH RBC QN AUTO: 34.6 PG (ref 26.6–33)
MCH RBC QN AUTO: 34.9 PG (ref 26.6–33)
MCH RBC QN AUTO: 35.2 PG (ref 26.6–33)
MCHC RBC AUTO-ENTMCNC: 30.3 G/DL (ref 31.5–35.7)
MCHC RBC AUTO-ENTMCNC: 30.4 G/DL (ref 31.5–35.7)
MCHC RBC AUTO-ENTMCNC: 30.5 G/DL (ref 31.5–35.7)
MCHC RBC AUTO-ENTMCNC: 30.7 G/DL (ref 31.5–35.7)
MCHC RBC AUTO-ENTMCNC: 30.8 G/DL (ref 31.5–35.7)
MCHC RBC AUTO-ENTMCNC: 30.9 G/DL (ref 31.5–35.7)
MCHC RBC AUTO-ENTMCNC: 31.5 G/DL (ref 31.5–35.7)
MCHC RBC AUTO-ENTMCNC: 31.6 G/DL (ref 31.5–35.7)
MCHC RBC AUTO-ENTMCNC: 31.8 G/DL (ref 31.5–35.7)
MCHC RBC AUTO-ENTMCNC: 32 G/DL (ref 31.5–35.7)
MCHC RBC AUTO-ENTMCNC: 32.1 G/DL (ref 31.5–35.7)
MCHC RBC AUTO-ENTMCNC: 32.3 G/DL (ref 31.5–35.7)
MCHC RBC AUTO-ENTMCNC: 32.4 G/DL (ref 31.5–35.7)
MCHC RBC AUTO-ENTMCNC: 32.4 G/DL (ref 31.5–35.7)
MCHC RBC AUTO-ENTMCNC: 32.5 G/DL (ref 31.5–35.7)
MCHC RBC AUTO-ENTMCNC: 32.6 G/DL (ref 31.5–35.7)
MCHC RBC AUTO-ENTMCNC: 32.7 G/DL (ref 31.5–35.7)
MCHC RBC AUTO-ENTMCNC: 32.9 G/DL (ref 31.5–35.7)
MCHC RBC AUTO-ENTMCNC: 33 G/DL (ref 31.5–35.7)
MCHC RBC AUTO-ENTMCNC: 33 G/DL (ref 31.5–35.7)
MCHC RBC AUTO-ENTMCNC: 33.1 G/DL (ref 31.5–35.7)
MCHC RBC AUTO-ENTMCNC: 33.1 G/DL (ref 31.5–35.7)
MCHC RBC AUTO-ENTMCNC: 33.3 G/DL (ref 31.5–35.7)
MCHC RBC AUTO-ENTMCNC: 33.3 G/DL (ref 31.5–35.7)
MCHC RBC AUTO-ENTMCNC: 33.9 G/DL (ref 31.5–35.7)
MCHC RBC AUTO-ENTMCNC: 34 G/DL (ref 31.5–35.7)
MCV RBC AUTO: 100 FL (ref 79–97)
MCV RBC AUTO: 100 FL (ref 79–97)
MCV RBC AUTO: 100.4 FL (ref 79–97)
MCV RBC AUTO: 100.7 FL (ref 79–97)
MCV RBC AUTO: 100.7 FL (ref 79–97)
MCV RBC AUTO: 100.8 FL (ref 79–97)
MCV RBC AUTO: 100.9 FL (ref 79–97)
MCV RBC AUTO: 101.2 FL (ref 79–97)
MCV RBC AUTO: 102.3 FL (ref 79–97)
MCV RBC AUTO: 102.5 FL (ref 79–97)
MCV RBC AUTO: 102.6 FL (ref 79–97)
MCV RBC AUTO: 102.6 FL (ref 79–97)
MCV RBC AUTO: 103.1 FL (ref 79–97)
MCV RBC AUTO: 103.1 FL (ref 79–97)
MCV RBC AUTO: 103.4 FL (ref 79–97)
MCV RBC AUTO: 104.3 FL (ref 79–97)
MCV RBC AUTO: 104.4 FL (ref 79–97)
MCV RBC AUTO: 104.7 FL (ref 79–97)
MCV RBC AUTO: 107.6 FL (ref 79–97)
MCV RBC AUTO: 96 FL (ref 79–97)
MCV RBC AUTO: 96.8 FL (ref 79–97)
MCV RBC AUTO: 97 FL (ref 79–97)
MCV RBC AUTO: 97.1 FL (ref 79–97)
MCV RBC AUTO: 97.6 FL (ref 79–97)
MCV RBC AUTO: 97.9 FL (ref 79–97)
MCV RBC AUTO: 98.1 FL (ref 79–97)
MCV RBC AUTO: 98.9 FL (ref 79–97)
MCV RBC AUTO: 99.1 FL (ref 79–97)
METHOD: ABNORMAL
MODALITY: ABNORMAL
MONOCYTES # BLD AUTO: 0.15 10*3/MM3 (ref 0.1–0.9)
MONOCYTES # BLD AUTO: 0.16 10*3/MM3 (ref 0.1–0.9)
MONOCYTES # BLD AUTO: 0.28 10*3/MM3 (ref 0.1–0.9)
MONOCYTES # BLD AUTO: 0.34 10*3/MM3 (ref 0.1–0.9)
MONOCYTES # BLD AUTO: 0.41 10*3/MM3 (ref 0.1–0.9)
MONOCYTES # BLD AUTO: 0.42 10*3/MM3 (ref 0.1–0.9)
MONOCYTES # BLD AUTO: 0.44 10*3/MM3 (ref 0.1–0.9)
MONOCYTES # BLD AUTO: 0.59 10*3/MM3 (ref 0.1–0.9)
MONOCYTES # BLD AUTO: 0.62 10*3/MM3 (ref 0.1–0.9)
MONOCYTES # BLD AUTO: 0.67 10*3/MM3 (ref 0.1–0.9)
MONOCYTES # BLD AUTO: 0.68 10*3/MM3 (ref 0.1–0.9)
MONOCYTES # BLD AUTO: 0.85 10*3/MM3 (ref 0.1–0.9)
MONOCYTES # BLD AUTO: 1.1 10*3/MM3 (ref 0.1–0.9)
MONOCYTES # BLD AUTO: 1.29 10*3/MM3 (ref 0.1–0.9)
MONOCYTES # BLD: 0.32 10*3/MM3 (ref 0.1–0.9)
MONOCYTES NFR BLD AUTO: 1.7 % (ref 5–12)
MONOCYTES NFR BLD AUTO: 1.9 % (ref 5–12)
MONOCYTES NFR BLD AUTO: 10.5 % (ref 5–12)
MONOCYTES NFR BLD AUTO: 11.4 % (ref 5–12)
MONOCYTES NFR BLD AUTO: 13 % (ref 5–12)
MONOCYTES NFR BLD AUTO: 2.2 % (ref 5–12)
MONOCYTES NFR BLD AUTO: 2.8 % (ref 5–12)
MONOCYTES NFR BLD AUTO: 3.4 % (ref 5–12)
MONOCYTES NFR BLD AUTO: 3.8 % (ref 5–12)
MONOCYTES NFR BLD AUTO: 4.1 % (ref 5–12)
MONOCYTES NFR BLD AUTO: 5.1 % (ref 5–12)
MONOCYTES NFR BLD AUTO: 6.6 % (ref 5–12)
MONOCYTES NFR BLD AUTO: 7.4 % (ref 5–12)
MONOCYTES NFR BLD AUTO: 8.7 % (ref 5–12)
MONOS+MACROS NFR FLD: 6 %
MRSA DNA SPEC QL NAA+PROBE: NORMAL
MYELOPEROXIDASE AB SER IA-ACNC: <9 U/ML (ref 0–9)
NEUTROPHILS # BLD AUTO: 9.13 10*3/MM3 (ref 1.7–7)
NEUTROPHILS NFR BLD AUTO: 11 10*3/MM3 (ref 1.7–7)
NEUTROPHILS NFR BLD AUTO: 11.51 10*3/MM3 (ref 1.7–7)
NEUTROPHILS NFR BLD AUTO: 11.52 10*3/MM3 (ref 1.7–7)
NEUTROPHILS NFR BLD AUTO: 16.2 10*3/MM3 (ref 1.7–7)
NEUTROPHILS NFR BLD AUTO: 2.32 10*3/MM3 (ref 1.7–7)
NEUTROPHILS NFR BLD AUTO: 3.79 10*3/MM3 (ref 1.7–7)
NEUTROPHILS NFR BLD AUTO: 3.97 10*3/MM3 (ref 1.7–7)
NEUTROPHILS NFR BLD AUTO: 35.5 % (ref 42.7–76)
NEUTROPHILS NFR BLD AUTO: 6.93 10*3/MM3 (ref 1.7–7)
NEUTROPHILS NFR BLD AUTO: 63.2 % (ref 42.7–76)
NEUTROPHILS NFR BLD AUTO: 67.6 % (ref 42.7–76)
NEUTROPHILS NFR BLD AUTO: 7.03 10*3/MM3 (ref 1.7–7)
NEUTROPHILS NFR BLD AUTO: 76.2 % (ref 42.7–76)
NEUTROPHILS NFR BLD AUTO: 76.9 % (ref 42.7–76)
NEUTROPHILS NFR BLD AUTO: 8.3 10*3/MM3 (ref 1.7–7)
NEUTROPHILS NFR BLD AUTO: 8.63 10*3/MM3 (ref 1.7–7)
NEUTROPHILS NFR BLD AUTO: 83.2 % (ref 42.7–76)
NEUTROPHILS NFR BLD AUTO: 84.5 % (ref 42.7–76)
NEUTROPHILS NFR BLD AUTO: 86.3 % (ref 42.7–76)
NEUTROPHILS NFR BLD AUTO: 87.6 % (ref 42.7–76)
NEUTROPHILS NFR BLD AUTO: 88.6 % (ref 42.7–76)
NEUTROPHILS NFR BLD AUTO: 89.6 % (ref 42.7–76)
NEUTROPHILS NFR BLD AUTO: 9.74 10*3/MM3 (ref 1.7–7)
NEUTROPHILS NFR BLD AUTO: 9.8 10*3/MM3 (ref 1.7–7)
NEUTROPHILS NFR BLD AUTO: 9.81 10*3/MM3 (ref 1.7–7)
NEUTROPHILS NFR BLD AUTO: 90.2 % (ref 42.7–76)
NEUTROPHILS NFR BLD AUTO: 91.4 % (ref 42.7–76)
NEUTROPHILS NFR BLD AUTO: 91.6 % (ref 42.7–76)
NEUTROPHILS NFR BLD MANUAL: 85 % (ref 42.7–76)
NEUTROPHILS NFR FLD MANUAL: 90 %
NITRITE UR QL STRIP: NEGATIVE
NRBC BLD AUTO-RTO: 0 /100 WBC (ref 0–0.2)
NRBC BLD AUTO-RTO: 0.1 /100 WBC (ref 0–0.2)
NRBC BLD AUTO-RTO: 0.2 /100 WBC (ref 0–0.2)
NRBC BLD AUTO-RTO: 0.3 /100 WBC (ref 0–0.2)
NRBC BLD AUTO-RTO: 0.3 /100 WBC (ref 0–0.2)
NRBC BLD AUTO-RTO: 0.4 /100 WBC (ref 0–0.2)
NRBC BLD AUTO-RTO: 0.5 /100 WBC (ref 0–0.2)
NRBC BLD AUTO-RTO: 0.6 /100 WBC (ref 0–0.2)
NT-PROBNP SERPL-MCNC: 1091 PG/ML (ref 0–1800)
NT-PROBNP SERPL-MCNC: 2105 PG/ML (ref 0–1800)
NT-PROBNP SERPL-MCNC: 2198 PG/ML (ref 0–1800)
NT-PROBNP SERPL-MCNC: 2248 PG/ML (ref 0–1800)
NT-PROBNP SERPL-MCNC: 2776 PG/ML (ref 0–1800)
NT-PROBNP SERPL-MCNC: 5659 PG/ML (ref 0–1800)
NUC CELL # FLD: 170 /MM3
O2 A-A PPRESDIFF RESPIRATORY: 0.3 MMHG
P-ANCA ATYPICAL TITR SER IF: NORMAL TITER
P-ANCA TITR SER IF: NORMAL TITER
PATH REPORT.FINAL DX SPEC: NORMAL
PATH REPORT.GROSS SPEC: NORMAL
PCO2 BLDA: 32.6 MM HG (ref 35–45)
PCO2 BLDA: 46.9 MM HG (ref 35–45)
PCO2 BLDA: 51.7 MM HG (ref 35–45)
PCO2 BLDA: 60.1 MM HG (ref 35–45)
PH BLDA: 7.25 PH UNITS (ref 7.35–7.45)
PH BLDA: 7.33 PH UNITS (ref 7.35–7.45)
PH BLDA: 7.35 PH UNITS (ref 7.35–7.45)
PH BLDA: 7.49 PH UNITS (ref 7.35–7.45)
PH UR STRIP.AUTO: 5.5 [PH] (ref 5–8)
PH UR STRIP.AUTO: 6 [PH] (ref 5–8)
PH UR STRIP.AUTO: <=5 [PH] (ref 5–8)
PHOSPHATE SERPL-MCNC: 3 MG/DL (ref 2.5–4.5)
PHOSPHATE SERPL-MCNC: 3.2 MG/DL (ref 2.5–4.5)
PHOSPHATE SERPL-MCNC: 3.7 MG/DL (ref 2.5–4.5)
PHOSPHATE SERPL-MCNC: 4.4 MG/DL (ref 2.5–4.5)
PLAT MORPH BLD: NORMAL
PLATELET # BLD AUTO: 134 10*3/MM3 (ref 140–450)
PLATELET # BLD AUTO: 139 10*3/MM3 (ref 140–450)
PLATELET # BLD AUTO: 139 10*3/MM3 (ref 140–450)
PLATELET # BLD AUTO: 145 10*3/MM3 (ref 140–450)
PLATELET # BLD AUTO: 145 10*3/MM3 (ref 140–450)
PLATELET # BLD AUTO: 152 10*3/MM3 (ref 140–450)
PLATELET # BLD AUTO: 164 10*3/MM3 (ref 140–450)
PLATELET # BLD AUTO: 167 10*3/MM3 (ref 140–450)
PLATELET # BLD AUTO: 167 10*3/MM3 (ref 140–450)
PLATELET # BLD AUTO: 173 10*3/MM3 (ref 140–450)
PLATELET # BLD AUTO: 175 10*3/MM3 (ref 140–450)
PLATELET # BLD AUTO: 178 10*3/MM3 (ref 140–450)
PLATELET # BLD AUTO: 186 10*3/MM3 (ref 140–450)
PLATELET # BLD AUTO: 189 10*3/MM3 (ref 140–450)
PLATELET # BLD AUTO: 190 10*3/MM3 (ref 140–450)
PLATELET # BLD AUTO: 193 10*3/MM3 (ref 140–450)
PLATELET # BLD AUTO: 196 10*3/MM3 (ref 140–450)
PLATELET # BLD AUTO: 202 10*3/MM3 (ref 140–450)
PLATELET # BLD AUTO: 205 10*3/MM3 (ref 140–450)
PLATELET # BLD AUTO: 209 10*3/MM3 (ref 140–450)
PLATELET # BLD AUTO: 216 10*3/MM3 (ref 140–450)
PLATELET # BLD AUTO: 216 10*3/MM3 (ref 140–450)
PLATELET # BLD AUTO: 217 10*3/MM3 (ref 140–450)
PLATELET # BLD AUTO: 218 10*3/MM3 (ref 140–450)
PLATELET # BLD AUTO: 222 10*3/MM3 (ref 140–450)
PLATELET # BLD AUTO: 227 10*3/MM3 (ref 140–450)
PLATELET # BLD AUTO: 252 10*3/MM3 (ref 140–450)
PLATELET # BLD AUTO: 272 10*3/MM3 (ref 140–450)
PMV BLD AUTO: 10 FL (ref 6–12)
PMV BLD AUTO: 10 FL (ref 6–12)
PMV BLD AUTO: 10.1 FL (ref 6–12)
PMV BLD AUTO: 10.2 FL (ref 6–12)
PMV BLD AUTO: 8.6 FL (ref 6–12)
PMV BLD AUTO: 8.9 FL (ref 6–12)
PMV BLD AUTO: 8.9 FL (ref 6–12)
PMV BLD AUTO: 9 FL (ref 6–12)
PMV BLD AUTO: 9.2 FL (ref 6–12)
PMV BLD AUTO: 9.2 FL (ref 6–12)
PMV BLD AUTO: 9.3 FL (ref 6–12)
PMV BLD AUTO: 9.4 FL (ref 6–12)
PMV BLD AUTO: 9.5 FL (ref 6–12)
PMV BLD AUTO: 9.6 FL (ref 6–12)
PMV BLD AUTO: 9.7 FL (ref 6–12)
PMV BLD AUTO: 9.8 FL (ref 6–12)
PMV BLD AUTO: 9.9 FL (ref 6–12)
PMV BLD AUTO: 9.9 FL (ref 6–12)
PO2 BLDA: 138.6 MM HG (ref 80–100)
PO2 BLDA: 49.2 MM HG (ref 80–100)
PO2 BLDA: 88.1 MM HG (ref 80–100)
PO2 BLDA: 91.3 MM HG (ref 80–100)
POTASSIUM SERPL-SCNC: 3.2 MMOL/L (ref 3.5–5.2)
POTASSIUM SERPL-SCNC: 3.3 MMOL/L (ref 3.5–5.2)
POTASSIUM SERPL-SCNC: 3.4 MMOL/L (ref 3.5–5.2)
POTASSIUM SERPL-SCNC: 3.6 MMOL/L (ref 3.5–5.2)
POTASSIUM SERPL-SCNC: 3.6 MMOL/L (ref 3.5–5.2)
POTASSIUM SERPL-SCNC: 3.7 MMOL/L (ref 3.5–5.2)
POTASSIUM SERPL-SCNC: 3.7 MMOL/L (ref 3.5–5.2)
POTASSIUM SERPL-SCNC: 3.8 MMOL/L (ref 3.5–5.2)
POTASSIUM SERPL-SCNC: 3.8 MMOL/L (ref 3.5–5.2)
POTASSIUM SERPL-SCNC: 4 MMOL/L (ref 3.5–5.2)
POTASSIUM SERPL-SCNC: 4 MMOL/L (ref 3.5–5.2)
POTASSIUM SERPL-SCNC: 4.1 MMOL/L (ref 3.5–5.2)
POTASSIUM SERPL-SCNC: 4.2 MMOL/L (ref 3.5–5.2)
POTASSIUM SERPL-SCNC: 4.3 MMOL/L (ref 3.5–5.2)
POTASSIUM SERPL-SCNC: 4.3 MMOL/L (ref 3.5–5.2)
POTASSIUM SERPL-SCNC: 4.4 MMOL/L (ref 3.5–5.2)
POTASSIUM SERPL-SCNC: 4.5 MMOL/L (ref 3.5–5.2)
POTASSIUM SERPL-SCNC: 4.6 MMOL/L (ref 3.5–5.2)
POTASSIUM SERPL-SCNC: 4.6 MMOL/L (ref 3.5–5.2)
POTASSIUM SERPL-SCNC: 5.3 MMOL/L (ref 3.5–5.2)
POTASSIUM SERPL-SCNC: 6 MMOL/L (ref 3.5–5.2)
POTASSIUM SERPL-SCNC: 6.1 MMOL/L (ref 3.5–5.2)
PROCALCITONIN SERPL-MCNC: 0.07 NG/ML (ref 0–0.25)
PROCALCITONIN SERPL-MCNC: 0.08 NG/ML (ref 0–0.25)
PROCALCITONIN SERPL-MCNC: 0.08 NG/ML (ref 0–0.25)
PROCALCITONIN SERPL-MCNC: 0.15 NG/ML (ref 0–0.25)
PROCALCITONIN SERPL-MCNC: 0.16 NG/ML (ref 0–0.25)
PROCALCITONIN SERPL-MCNC: 0.2 NG/ML (ref 0–0.25)
PROT SERPL-MCNC: 5.5 G/DL (ref 6–8.5)
PROT SERPL-MCNC: 5.7 G/DL (ref 6–8.5)
PROT SERPL-MCNC: 6.2 G/DL (ref 6–8.5)
PROT SERPL-MCNC: 6.4 G/DL (ref 6–8.5)
PROT SERPL-MCNC: 6.6 G/DL (ref 6–8.5)
PROT SERPL-MCNC: 6.6 G/DL (ref 6–8.5)
PROT SERPL-MCNC: 6.8 G/DL (ref 6–8.5)
PROT SERPL-MCNC: 7 G/DL (ref 6–8.5)
PROT SERPL-MCNC: 7 G/DL (ref 6–8.5)
PROT SERPL-MCNC: 7.1 G/DL (ref 6–8.5)
PROT SERPL-MCNC: 7.3 G/DL (ref 6–8.5)
PROT SERPL-MCNC: 7.3 G/DL (ref 6–8.5)
PROT SERPL-MCNC: 7.4 G/DL (ref 6–8.5)
PROT UR QL STRIP: ABNORMAL
PROTEINASE3 AB SER IA-ACNC: <3.5 U/ML (ref 0–3.5)
PROTHROMBIN TIME: 18.2 SECONDS (ref 11.7–14.2)
PROTHROMBIN TIME: 18.9 SECONDS (ref 11.7–14.2)
PROTHROMBIN TIME: 27.9 SECONDS (ref 11.7–14.2)
QT INTERVAL: 272 MS
QT INTERVAL: 281 MS
QT INTERVAL: 325 MS
QT INTERVAL: 330 MS
QT INTERVAL: 339 MS
QT INTERVAL: 384 MS
QT INTERVAL: 389 MS
QT INTERVAL: 402 MS
QT INTERVAL: 477 MS
RBC # BLD AUTO: 2.48 10*6/MM3 (ref 3.77–5.28)
RBC # BLD AUTO: 2.57 10*6/MM3 (ref 3.77–5.28)
RBC # BLD AUTO: 2.58 10*6/MM3 (ref 3.77–5.28)
RBC # BLD AUTO: 2.6 10*6/MM3 (ref 3.77–5.28)
RBC # BLD AUTO: 2.63 10*6/MM3 (ref 3.77–5.28)
RBC # BLD AUTO: 2.68 10*6/MM3 (ref 3.77–5.28)
RBC # BLD AUTO: 2.69 10*6/MM3 (ref 3.77–5.28)
RBC # BLD AUTO: 2.72 10*6/MM3 (ref 3.77–5.28)
RBC # BLD AUTO: 2.75 10*6/MM3 (ref 3.77–5.28)
RBC # BLD AUTO: 2.76 10*6/MM3 (ref 3.77–5.28)
RBC # BLD AUTO: 2.77 10*6/MM3 (ref 3.77–5.28)
RBC # BLD AUTO: 2.79 10*6/MM3 (ref 3.77–5.28)
RBC # BLD AUTO: 2.79 10*6/MM3 (ref 3.77–5.28)
RBC # BLD AUTO: 2.8 10*6/MM3 (ref 3.77–5.28)
RBC # BLD AUTO: 2.83 10*6/MM3 (ref 3.77–5.28)
RBC # BLD AUTO: 2.89 10*6/MM3 (ref 3.77–5.28)
RBC # BLD AUTO: 2.9 10*6/MM3 (ref 3.77–5.28)
RBC # BLD AUTO: 2.91 10*6/MM3 (ref 3.77–5.28)
RBC # BLD AUTO: 2.96 10*6/MM3 (ref 3.77–5.28)
RBC # BLD AUTO: 2.98 10*6/MM3 (ref 3.77–5.28)
RBC # BLD AUTO: 2.99 10*6/MM3 (ref 3.77–5.28)
RBC # BLD AUTO: 3.01 10*6/MM3 (ref 3.77–5.28)
RBC # BLD AUTO: 3.23 10*6/MM3 (ref 3.77–5.28)
RBC # BLD AUTO: 3.3 10*6/MM3 (ref 3.77–5.28)
RBC # BLD AUTO: 3.32 10*6/MM3 (ref 3.77–5.28)
RBC # BLD AUTO: 3.8 10*6/MM3 (ref 3.77–5.28)
RBC # FLD AUTO: ABNORMAL /MM3
RBC # UR STRIP: ABNORMAL /HPF
RBC # UR STRIP: ABNORMAL /HPF
RBC # UR STRIP: NORMAL /HPF
REF LAB TEST METHOD: ABNORMAL
REF LAB TEST METHOD: ABNORMAL
REF LAB TEST METHOD: NORMAL
RETICS # AUTO: 0.11 10*6/MM3 (ref 0.02–0.13)
RETICS/RBC NFR AUTO: 3.81 % (ref 0.7–1.9)
RH BLD: POSITIVE
RHINOVIRUS RNA SPEC NAA+PROBE: NOT DETECTED
RSV RNA NPH QL NAA+NON-PROBE: NOT DETECTED
S PNEUM AG SPEC QL LA: NEGATIVE
SAO2 % BLDCOA: 87.8 % (ref 92–99)
SAO2 % BLDCOA: 94.8 % (ref 92–99)
SAO2 % BLDCOA: 96.5 % (ref 92–99)
SAO2 % BLDCOA: 98.9 % (ref 92–99)
SARS-COV-2 ORF1AB RESP QL NAA+PROBE: NOT DETECTED
SARS-COV-2 RNA NPH QL NAA+NON-PROBE: NOT DETECTED
SARS-COV-2 RNA PNL SPEC NAA+PROBE: NOT DETECTED
SARS-COV-2 RNA RESP QL NAA+PROBE: DETECTED
SET MECH RESP RATE: 20
SET MECH RESP RATE: 44
SINUS: 2.5 CM
SINUS: 2.6 CM
SODIUM SERPL-SCNC: 131 MMOL/L (ref 136–145)
SODIUM SERPL-SCNC: 135 MMOL/L (ref 136–145)
SODIUM SERPL-SCNC: 135 MMOL/L (ref 136–145)
SODIUM SERPL-SCNC: 136 MMOL/L (ref 136–145)
SODIUM SERPL-SCNC: 137 MMOL/L (ref 136–145)
SODIUM SERPL-SCNC: 138 MMOL/L (ref 136–145)
SODIUM SERPL-SCNC: 139 MMOL/L (ref 136–145)
SODIUM SERPL-SCNC: 140 MMOL/L (ref 136–145)
SODIUM SERPL-SCNC: 141 MMOL/L (ref 136–145)
SODIUM SERPL-SCNC: 142 MMOL/L (ref 136–145)
SODIUM SERPL-SCNC: 143 MMOL/L (ref 136–145)
SODIUM SERPL-SCNC: 143 MMOL/L (ref 136–145)
SODIUM SERPL-SCNC: 144 MMOL/L (ref 136–145)
SODIUM SERPL-SCNC: 145 MMOL/L (ref 136–145)
SP GR UR STRIP: 1.01 (ref 1–1.03)
SP GR UR STRIP: 1.01 (ref 1–1.03)
SP GR UR STRIP: 1.02 (ref 1–1.03)
SQUAMOUS #/AREA URNS HPF: ABNORMAL /HPF
SQUAMOUS #/AREA URNS HPF: ABNORMAL /HPF
SQUAMOUS #/AREA URNS HPF: NORMAL /HPF
STJ: 2.4 CM
STRESS TARGET HR: 116 BPM
STRESS TARGET HR: 116 BPM
T&S EXPIRATION DATE: NORMAL
TOTAL RATE: 22 BREATHS/MINUTE
TOTAL RATE: 28 BREATHS/MINUTE
TOTAL RATE: 44 BREATHS/MINUTE
TROPONIN T SERPL-MCNC: 0.01 NG/ML (ref 0–0.03)
TROPONIN T SERPL-MCNC: 0.02 NG/ML (ref 0–0.03)
TROPONIN T SERPL-MCNC: 0.07 NG/ML (ref 0–0.03)
TROPONIN T SERPL-MCNC: 0.08 NG/ML (ref 0–0.03)
TROPONIN T SERPL-MCNC: <0.01 NG/ML (ref 0–0.03)
TSH SERPL DL<=0.05 MIU/L-ACNC: 1.39 UIU/ML (ref 0.27–4.2)
TSH SERPL DL<=0.05 MIU/L-ACNC: 1.99 UIU/ML (ref 0.27–4.2)
UROBILINOGEN UR QL STRIP: ABNORMAL
VANCOMYCIN SERPL-MCNC: <4 MCG/ML (ref 5–40)
VARIANT LYMPHS NFR BLD MANUAL: 11 % (ref 19.6–45.3)
VIT B12 BLD-MCNC: 1093 PG/ML (ref 211–946)
WBC # UR STRIP: ABNORMAL /HPF
WBC # UR STRIP: ABNORMAL /HPF
WBC # UR STRIP: NORMAL /HPF
WBC MORPH BLD: NORMAL
WBC NRBC COR # BLD: 10 10*3/MM3 (ref 3.4–10.8)
WBC NRBC COR # BLD: 10.74 10*3/MM3 (ref 3.4–10.8)
WBC NRBC COR # BLD: 11.19 10*3/MM3 (ref 3.4–10.8)
WBC NRBC COR # BLD: 11.59 10*3/MM3 (ref 3.4–10.8)
WBC NRBC COR # BLD: 12.01 10*3/MM3 (ref 3.4–10.8)
WBC NRBC COR # BLD: 12.49 10*3/MM3 (ref 3.4–10.8)
WBC NRBC COR # BLD: 12.61 10*3/MM3 (ref 3.4–10.8)
WBC NRBC COR # BLD: 12.66 10*3/MM3 (ref 3.4–10.8)
WBC NRBC COR # BLD: 12.76 10*3/MM3 (ref 3.4–10.8)
WBC NRBC COR # BLD: 19.48 10*3/MM3 (ref 3.4–10.8)
WBC NRBC COR # BLD: 5.7 10*3/MM3 (ref 3.4–10.8)
WBC NRBC COR # BLD: 5.88 10*3/MM3 (ref 3.4–10.8)
WBC NRBC COR # BLD: 5.99 10*3/MM3 (ref 3.4–10.8)
WBC NRBC COR # BLD: 6.07 10*3/MM3 (ref 3.4–10.8)
WBC NRBC COR # BLD: 6.54 10*3/MM3 (ref 3.4–10.8)
WBC NRBC COR # BLD: 6.75 10*3/MM3 (ref 3.4–10.8)
WBC NRBC COR # BLD: 7.22 10*3/MM3 (ref 3.4–10.8)
WBC NRBC COR # BLD: 7.38 10*3/MM3 (ref 3.4–10.8)
WBC NRBC COR # BLD: 7.83 10*3/MM3 (ref 3.4–10.8)
WBC NRBC COR # BLD: 7.85 10*3/MM3 (ref 3.4–10.8)
WBC NRBC COR # BLD: 7.93 10*3/MM3 (ref 3.4–10.8)
WBC NRBC COR # BLD: 8.94 10*3/MM3 (ref 3.4–10.8)
WBC NRBC COR # BLD: 9.08 10*3/MM3 (ref 3.4–10.8)
WBC NRBC COR # BLD: 9.1 10*3/MM3 (ref 3.4–10.8)
WBC NRBC COR # BLD: 9.27 10*3/MM3 (ref 3.4–10.8)
WBC NRBC COR # BLD: 9.49 10*3/MM3 (ref 3.4–10.8)
WBC NRBC COR # BLD: 9.92 10*3/MM3 (ref 3.4–10.8)
WBC NRBC COR # BLD: 9.95 10*3/MM3 (ref 3.4–10.8)
YEAST URNS QL MICRO: ABNORMAL /HPF

## 2022-01-01 PROCEDURE — 94799 UNLISTED PULMONARY SVC/PX: CPT

## 2022-01-01 PROCEDURE — 94664 DEMO&/EVAL PT USE INHALER: CPT

## 2022-01-01 PROCEDURE — 80048 BASIC METABOLIC PNL TOTAL CA: CPT | Performed by: INTERNAL MEDICINE

## 2022-01-01 PROCEDURE — 63710000001 PREDNISONE PER 1 MG: Performed by: INTERNAL MEDICINE

## 2022-01-01 PROCEDURE — 25010000002 FUROSEMIDE PER 20 MG: Performed by: INTERNAL MEDICINE

## 2022-01-01 PROCEDURE — 86235 NUCLEAR ANTIGEN ANTIBODY: CPT | Performed by: INTERNAL MEDICINE

## 2022-01-01 PROCEDURE — 25010000002 METHYLPREDNISOLONE PER 40 MG: Performed by: INTERNAL MEDICINE

## 2022-01-01 PROCEDURE — 86850 RBC ANTIBODY SCREEN: CPT | Performed by: EMERGENCY MEDICINE

## 2022-01-01 PROCEDURE — 85025 COMPLETE CBC W/AUTO DIFF WBC: CPT | Performed by: STUDENT IN AN ORGANIZED HEALTH CARE EDUCATION/TRAINING PROGRAM

## 2022-01-01 PROCEDURE — 85025 COMPLETE CBC W/AUTO DIFF WBC: CPT | Performed by: EMERGENCY MEDICINE

## 2022-01-01 PROCEDURE — 87899 AGENT NOS ASSAY W/OPTIC: CPT | Performed by: STUDENT IN AN ORGANIZED HEALTH CARE EDUCATION/TRAINING PROGRAM

## 2022-01-01 PROCEDURE — 97110 THERAPEUTIC EXERCISES: CPT

## 2022-01-01 PROCEDURE — 0202U NFCT DS 22 TRGT SARS-COV-2: CPT | Performed by: INTERNAL MEDICINE

## 2022-01-01 PROCEDURE — 88305 TISSUE EXAM BY PATHOLOGIST: CPT | Performed by: INTERNAL MEDICINE

## 2022-01-01 PROCEDURE — 82962 GLUCOSE BLOOD TEST: CPT

## 2022-01-01 PROCEDURE — U0003 INFECTIOUS AGENT DETECTION BY NUCLEIC ACID (DNA OR RNA); SEVERE ACUTE RESPIRATORY SYNDROME CORONAVIRUS 2 (SARS-COV-2) (CORONAVIRUS DISEASE [COVID-19]), AMPLIFIED PROBE TECHNIQUE, MAKING USE OF HIGH THROUGHPUT TECHNOLOGIES AS DESCRIBED BY CMS-2020-01-R: HCPCS | Performed by: EMERGENCY MEDICINE

## 2022-01-01 PROCEDURE — 71046 X-RAY EXAM CHEST 2 VIEWS: CPT

## 2022-01-01 PROCEDURE — 36415 COLL VENOUS BLD VENIPUNCTURE: CPT

## 2022-01-01 PROCEDURE — 83735 ASSAY OF MAGNESIUM: CPT | Performed by: EMERGENCY MEDICINE

## 2022-01-01 PROCEDURE — 93005 ELECTROCARDIOGRAM TRACING: CPT | Performed by: PHYSICIAN ASSISTANT

## 2022-01-01 PROCEDURE — 83735 ASSAY OF MAGNESIUM: CPT | Performed by: STUDENT IN AN ORGANIZED HEALTH CARE EDUCATION/TRAINING PROGRAM

## 2022-01-01 PROCEDURE — 80053 COMPREHEN METABOLIC PANEL: CPT | Performed by: PHYSICIAN ASSISTANT

## 2022-01-01 PROCEDURE — 94640 AIRWAY INHALATION TREATMENT: CPT

## 2022-01-01 PROCEDURE — 97530 THERAPEUTIC ACTIVITIES: CPT

## 2022-01-01 PROCEDURE — 84484 ASSAY OF TROPONIN QUANT: CPT | Performed by: EMERGENCY MEDICINE

## 2022-01-01 PROCEDURE — 84100 ASSAY OF PHOSPHORUS: CPT | Performed by: STUDENT IN AN ORGANIZED HEALTH CARE EDUCATION/TRAINING PROGRAM

## 2022-01-01 PROCEDURE — 25010000002 FUROSEMIDE PER 20 MG: Performed by: NURSE PRACTITIONER

## 2022-01-01 PROCEDURE — 94660 CPAP INITIATION&MGMT: CPT

## 2022-01-01 PROCEDURE — 83520 IMMUNOASSAY QUANT NOS NONAB: CPT | Performed by: INTERNAL MEDICINE

## 2022-01-01 PROCEDURE — 94761 N-INVAS EAR/PLS OXIMETRY MLT: CPT

## 2022-01-01 PROCEDURE — 82746 ASSAY OF FOLIC ACID SERUM: CPT | Performed by: HOSPITALIST

## 2022-01-01 PROCEDURE — 87040 BLOOD CULTURE FOR BACTERIA: CPT | Performed by: PHYSICIAN ASSISTANT

## 2022-01-01 PROCEDURE — 25010000002 DIGOXIN PER 500 MCG: Performed by: EMERGENCY MEDICINE

## 2022-01-01 PROCEDURE — 71045 X-RAY EXAM CHEST 1 VIEW: CPT

## 2022-01-01 PROCEDURE — 93005 ELECTROCARDIOGRAM TRACING: CPT | Performed by: EMERGENCY MEDICINE

## 2022-01-01 PROCEDURE — 84443 ASSAY THYROID STIM HORMONE: CPT | Performed by: HOSPITALIST

## 2022-01-01 PROCEDURE — 63710000001 INSULIN LISPRO (HUMAN) PER 5 UNITS: Performed by: NURSE PRACTITIONER

## 2022-01-01 PROCEDURE — 36600 WITHDRAWAL OF ARTERIAL BLOOD: CPT

## 2022-01-01 PROCEDURE — 94669 MECHANICAL CHEST WALL OSCILL: CPT

## 2022-01-01 PROCEDURE — 85027 COMPLETE CBC AUTOMATED: CPT | Performed by: INTERNAL MEDICINE

## 2022-01-01 PROCEDURE — 86900 BLOOD TYPING SEROLOGIC ABO: CPT | Performed by: EMERGENCY MEDICINE

## 2022-01-01 PROCEDURE — 83735 ASSAY OF MAGNESIUM: CPT | Performed by: NURSE PRACTITIONER

## 2022-01-01 PROCEDURE — 80053 COMPREHEN METABOLIC PANEL: CPT | Performed by: STUDENT IN AN ORGANIZED HEALTH CARE EDUCATION/TRAINING PROGRAM

## 2022-01-01 PROCEDURE — 97116 GAIT TRAINING THERAPY: CPT

## 2022-01-01 PROCEDURE — 36415 COLL VENOUS BLD VENIPUNCTURE: CPT | Performed by: NURSE PRACTITIONER

## 2022-01-01 PROCEDURE — G0378 HOSPITAL OBSERVATION PER HR: HCPCS

## 2022-01-01 PROCEDURE — 25010000002 PIPERACILLIN SOD-TAZOBACTAM PER 1 G: Performed by: NURSE PRACTITIONER

## 2022-01-01 PROCEDURE — 84145 PROCALCITONIN (PCT): CPT | Performed by: HOSPITALIST

## 2022-01-01 PROCEDURE — 84145 PROCALCITONIN (PCT): CPT | Performed by: PHYSICIAN ASSISTANT

## 2022-01-01 PROCEDURE — 85027 COMPLETE CBC AUTOMATED: CPT | Performed by: NURSE PRACTITIONER

## 2022-01-01 PROCEDURE — 99285 EMERGENCY DEPT VISIT HI MDM: CPT

## 2022-01-01 PROCEDURE — 86225 DNA ANTIBODY NATIVE: CPT | Performed by: INTERNAL MEDICINE

## 2022-01-01 PROCEDURE — 93306 TTE W/DOPPLER COMPLETE: CPT | Performed by: INTERNAL MEDICINE

## 2022-01-01 PROCEDURE — 25010000002 PIPERACILLIN SOD-TAZOBACTAM PER 1 G: Performed by: INTERNAL MEDICINE

## 2022-01-01 PROCEDURE — 87070 CULTURE OTHR SPECIMN AEROBIC: CPT | Performed by: STUDENT IN AN ORGANIZED HEALTH CARE EDUCATION/TRAINING PROGRAM

## 2022-01-01 PROCEDURE — 25010000002 PROPOFOL 10 MG/ML EMULSION: Performed by: ANESTHESIOLOGY

## 2022-01-01 PROCEDURE — 0B9G8ZX DRAINAGE OF LEFT UPPER LUNG LOBE, VIA NATURAL OR ARTIFICIAL OPENING ENDOSCOPIC, DIAGNOSTIC: ICD-10-PCS | Performed by: INTERNAL MEDICINE

## 2022-01-01 PROCEDURE — 87205 SMEAR GRAM STAIN: CPT | Performed by: INTERNAL MEDICINE

## 2022-01-01 PROCEDURE — 63710000001 INSULIN LISPRO (HUMAN) PER 5 UNITS: Performed by: INTERNAL MEDICINE

## 2022-01-01 PROCEDURE — 87635 SARS-COV-2 COVID-19 AMP PRB: CPT | Performed by: INTERNAL MEDICINE

## 2022-01-01 PROCEDURE — 93321 DOPPLER ECHO F-UP/LMTD STD: CPT

## 2022-01-01 PROCEDURE — 83516 IMMUNOASSAY NONANTIBODY: CPT | Performed by: INTERNAL MEDICINE

## 2022-01-01 PROCEDURE — 0BJ08ZZ INSPECTION OF TRACHEOBRONCHIAL TREE, VIA NATURAL OR ARTIFICIAL OPENING ENDOSCOPIC: ICD-10-PCS | Performed by: INTERNAL MEDICINE

## 2022-01-01 PROCEDURE — 86037 ANCA TITER EACH ANTIBODY: CPT | Performed by: INTERNAL MEDICINE

## 2022-01-01 PROCEDURE — 93010 ELECTROCARDIOGRAM REPORT: CPT | Performed by: INTERNAL MEDICINE

## 2022-01-01 PROCEDURE — 87040 BLOOD CULTURE FOR BACTERIA: CPT | Performed by: EMERGENCY MEDICINE

## 2022-01-01 PROCEDURE — 84145 PROCALCITONIN (PCT): CPT | Performed by: EMERGENCY MEDICINE

## 2022-01-01 PROCEDURE — 25010000002 DIGOXIN PER 500 MCG: Performed by: NURSE PRACTITIONER

## 2022-01-01 PROCEDURE — U0005 INFEC AGEN DETEC AMPLI PROBE: HCPCS | Performed by: EMERGENCY MEDICINE

## 2022-01-01 PROCEDURE — 87071 CULTURE AEROBIC QUANT OTHER: CPT | Performed by: INTERNAL MEDICINE

## 2022-01-01 PROCEDURE — 88112 CYTOPATH CELL ENHANCE TECH: CPT | Performed by: INTERNAL MEDICINE

## 2022-01-01 PROCEDURE — 25010000002 ONDANSETRON PER 1 MG: Performed by: EMERGENCY MEDICINE

## 2022-01-01 PROCEDURE — 82803 BLOOD GASES ANY COMBINATION: CPT

## 2022-01-01 PROCEDURE — 25010000002 MAGNESIUM SULFATE 2 GM/50ML SOLUTION: Performed by: EMERGENCY MEDICINE

## 2022-01-01 PROCEDURE — 70491 CT SOFT TISSUE NECK W/DYE: CPT

## 2022-01-01 PROCEDURE — 94760 N-INVAS EAR/PLS OXIMETRY 1: CPT

## 2022-01-01 PROCEDURE — 87205 SMEAR GRAM STAIN: CPT | Performed by: STUDENT IN AN ORGANIZED HEALTH CARE EDUCATION/TRAINING PROGRAM

## 2022-01-01 PROCEDURE — 85025 COMPLETE CBC W/AUTO DIFF WBC: CPT | Performed by: HOSPITALIST

## 2022-01-01 PROCEDURE — 80053 COMPREHEN METABOLIC PANEL: CPT | Performed by: EMERGENCY MEDICINE

## 2022-01-01 PROCEDURE — 80048 BASIC METABOLIC PNL TOTAL CA: CPT | Performed by: HOSPITALIST

## 2022-01-01 PROCEDURE — 63710000001 PREDNISONE PER 1 MG: Performed by: STUDENT IN AN ORGANIZED HEALTH CARE EDUCATION/TRAINING PROGRAM

## 2022-01-01 PROCEDURE — 25010000002 FUROSEMIDE PER 20 MG: Performed by: HOSPITALIST

## 2022-01-01 PROCEDURE — 83036 HEMOGLOBIN GLYCOSYLATED A1C: CPT | Performed by: NURSE PRACTITIONER

## 2022-01-01 PROCEDURE — 86901 BLOOD TYPING SEROLOGIC RH(D): CPT | Performed by: EMERGENCY MEDICINE

## 2022-01-01 PROCEDURE — 93005 ELECTROCARDIOGRAM TRACING: CPT | Performed by: STUDENT IN AN ORGANIZED HEALTH CARE EDUCATION/TRAINING PROGRAM

## 2022-01-01 PROCEDURE — 83735 ASSAY OF MAGNESIUM: CPT | Performed by: HOSPITALIST

## 2022-01-01 PROCEDURE — 83880 ASSAY OF NATRIURETIC PEPTIDE: CPT | Performed by: EMERGENCY MEDICINE

## 2022-01-01 PROCEDURE — 93306 TTE W/DOPPLER COMPLETE: CPT

## 2022-01-01 PROCEDURE — 93325 DOPPLER ECHO COLOR FLOW MAPG: CPT

## 2022-01-01 PROCEDURE — 36415 COLL VENOUS BLD VENIPUNCTURE: CPT | Performed by: HOSPITALIST

## 2022-01-01 PROCEDURE — 84100 ASSAY OF PHOSPHORUS: CPT | Performed by: HOSPITALIST

## 2022-01-01 PROCEDURE — 36415 COLL VENOUS BLD VENIPUNCTURE: CPT | Performed by: EMERGENCY MEDICINE

## 2022-01-01 PROCEDURE — 99284 EMERGENCY DEPT VISIT MOD MDM: CPT

## 2022-01-01 PROCEDURE — 84443 ASSAY THYROID STIM HORMONE: CPT | Performed by: INTERNAL MEDICINE

## 2022-01-01 PROCEDURE — 83735 ASSAY OF MAGNESIUM: CPT | Performed by: INTERNAL MEDICINE

## 2022-01-01 PROCEDURE — 94618 PULMONARY STRESS TESTING: CPT

## 2022-01-01 PROCEDURE — 25010000002 METHYLPREDNISOLONE PER 125 MG: Performed by: NURSE PRACTITIONER

## 2022-01-01 PROCEDURE — 83605 ASSAY OF LACTIC ACID: CPT | Performed by: EMERGENCY MEDICINE

## 2022-01-01 PROCEDURE — 87147 CULTURE TYPE IMMUNOLOGIC: CPT | Performed by: PHYSICIAN ASSISTANT

## 2022-01-01 PROCEDURE — 82550 ASSAY OF CK (CPK): CPT | Performed by: STUDENT IN AN ORGANIZED HEALTH CARE EDUCATION/TRAINING PROGRAM

## 2022-01-01 PROCEDURE — 85730 THROMBOPLASTIN TIME PARTIAL: CPT | Performed by: EMERGENCY MEDICINE

## 2022-01-01 PROCEDURE — 84484 ASSAY OF TROPONIN QUANT: CPT | Performed by: PHYSICIAN ASSISTANT

## 2022-01-01 PROCEDURE — 87206 SMEAR FLUORESCENT/ACID STAI: CPT | Performed by: INTERNAL MEDICINE

## 2022-01-01 PROCEDURE — 25010000002 CALCIUM GLUCONATE-NACL 1-0.675 GM/50ML-% SOLUTION: Performed by: EMERGENCY MEDICINE

## 2022-01-01 PROCEDURE — 87150 DNA/RNA AMPLIFIED PROBE: CPT | Performed by: PHYSICIAN ASSISTANT

## 2022-01-01 PROCEDURE — 25010000002 MAGNESIUM SULFATE 2 GM/50ML SOLUTION: Performed by: NURSE PRACTITIONER

## 2022-01-01 PROCEDURE — 81001 URINALYSIS AUTO W/SCOPE: CPT | Performed by: EMERGENCY MEDICINE

## 2022-01-01 PROCEDURE — 25010000002 PIPERACILLIN SOD-TAZOBACTAM PER 1 G: Performed by: EMERGENCY MEDICINE

## 2022-01-01 PROCEDURE — 87641 MR-STAPH DNA AMP PROBE: CPT | Performed by: NURSE PRACTITIONER

## 2022-01-01 PROCEDURE — 85014 HEMATOCRIT: CPT | Performed by: STUDENT IN AN ORGANIZED HEALTH CARE EDUCATION/TRAINING PROGRAM

## 2022-01-01 PROCEDURE — 25010000002 DEXAMETHASONE PER 1 MG: Performed by: HOSPITALIST

## 2022-01-01 PROCEDURE — 87186 SC STD MICRODIL/AGAR DIL: CPT | Performed by: INTERNAL MEDICINE

## 2022-01-01 PROCEDURE — 83880 ASSAY OF NATRIURETIC PEPTIDE: CPT | Performed by: INTERNAL MEDICINE

## 2022-01-01 PROCEDURE — 87086 URINE CULTURE/COLONY COUNT: CPT | Performed by: HOSPITALIST

## 2022-01-01 PROCEDURE — 97162 PT EVAL MOD COMPLEX 30 MIN: CPT

## 2022-01-01 PROCEDURE — 25010000002 VANCOMYCIN 10 G RECONSTITUTED SOLUTION: Performed by: HOSPITALIST

## 2022-01-01 PROCEDURE — 80202 ASSAY OF VANCOMYCIN: CPT | Performed by: STUDENT IN AN ORGANIZED HEALTH CARE EDUCATION/TRAINING PROGRAM

## 2022-01-01 PROCEDURE — 87804 INFLUENZA ASSAY W/OPTIC: CPT | Performed by: NURSE PRACTITIONER

## 2022-01-01 PROCEDURE — 93005 ELECTROCARDIOGRAM TRACING: CPT | Performed by: INTERNAL MEDICINE

## 2022-01-01 PROCEDURE — 74176 CT ABD & PELVIS W/O CONTRAST: CPT

## 2022-01-01 PROCEDURE — 94668 MNPJ CHEST WALL SBSQ: CPT

## 2022-01-01 PROCEDURE — 81001 URINALYSIS AUTO W/SCOPE: CPT | Performed by: INTERNAL MEDICINE

## 2022-01-01 PROCEDURE — 84145 PROCALCITONIN (PCT): CPT | Performed by: INTERNAL MEDICINE

## 2022-01-01 PROCEDURE — 85610 PROTHROMBIN TIME: CPT | Performed by: NURSE PRACTITIONER

## 2022-01-01 PROCEDURE — U0004 COV-19 TEST NON-CDC HGH THRU: HCPCS | Performed by: EMERGENCY MEDICINE

## 2022-01-01 PROCEDURE — 0B9J8ZX DRAINAGE OF LEFT LOWER LUNG LOBE, VIA NATURAL OR ARTIFICIAL OPENING ENDOSCOPIC, DIAGNOSTIC: ICD-10-PCS | Performed by: INTERNAL MEDICINE

## 2022-01-01 PROCEDURE — 71250 CT THORAX DX C-: CPT

## 2022-01-01 PROCEDURE — 85045 AUTOMATED RETICULOCYTE COUNT: CPT | Performed by: HOSPITALIST

## 2022-01-01 PROCEDURE — 76705 ECHO EXAM OF ABDOMEN: CPT

## 2022-01-01 PROCEDURE — 85025 COMPLETE CBC W/AUTO DIFF WBC: CPT | Performed by: PHYSICIAN ASSISTANT

## 2022-01-01 PROCEDURE — 93308 TTE F-UP OR LMTD: CPT

## 2022-01-01 PROCEDURE — 89051 BODY FLUID CELL COUNT: CPT | Performed by: INTERNAL MEDICINE

## 2022-01-01 PROCEDURE — 25010000002 VANCOMYCIN 10 G RECONSTITUTED SOLUTION: Performed by: EMERGENCY MEDICINE

## 2022-01-01 PROCEDURE — 87116 MYCOBACTERIA CULTURE: CPT | Performed by: INTERNAL MEDICINE

## 2022-01-01 PROCEDURE — 0202U NFCT DS 22 TRGT SARS-COV-2: CPT | Performed by: PHYSICIAN ASSISTANT

## 2022-01-01 PROCEDURE — 85610 PROTHROMBIN TIME: CPT | Performed by: EMERGENCY MEDICINE

## 2022-01-01 PROCEDURE — 0202U NFCT DS 22 TRGT SARS-COV-2: CPT | Performed by: EMERGENCY MEDICINE

## 2022-01-01 PROCEDURE — 25010000002 ONDANSETRON PER 1 MG

## 2022-01-01 PROCEDURE — 85025 COMPLETE CBC W/AUTO DIFF WBC: CPT | Performed by: NURSE PRACTITIONER

## 2022-01-01 PROCEDURE — 25010000002 IOPAMIDOL 61 % SOLUTION: Performed by: INTERNAL MEDICINE

## 2022-01-01 PROCEDURE — 97166 OT EVAL MOD COMPLEX 45 MIN: CPT

## 2022-01-01 PROCEDURE — 80048 BASIC METABOLIC PNL TOTAL CA: CPT | Performed by: NURSE PRACTITIONER

## 2022-01-01 PROCEDURE — 36415 COLL VENOUS BLD VENIPUNCTURE: CPT | Performed by: INTERNAL MEDICINE

## 2022-01-01 PROCEDURE — 86200 CCP ANTIBODY: CPT | Performed by: INTERNAL MEDICINE

## 2022-01-01 PROCEDURE — 86038 ANTINUCLEAR ANTIBODIES: CPT | Performed by: INTERNAL MEDICINE

## 2022-01-01 PROCEDURE — 25010000002 PIPERACILLIN SOD-TAZOBACTAM PER 1 G

## 2022-01-01 PROCEDURE — 84484 ASSAY OF TROPONIN QUANT: CPT | Performed by: HOSPITALIST

## 2022-01-01 PROCEDURE — 25010000002 IOPAMIDOL 61 % SOLUTION: Performed by: EMERGENCY MEDICINE

## 2022-01-01 PROCEDURE — 83036 HEMOGLOBIN GLYCOSYLATED A1C: CPT | Performed by: INTERNAL MEDICINE

## 2022-01-01 PROCEDURE — 83605 ASSAY OF LACTIC ACID: CPT | Performed by: PHYSICIAN ASSISTANT

## 2022-01-01 PROCEDURE — 74177 CT ABD & PELVIS W/CONTRAST: CPT

## 2022-01-01 PROCEDURE — 83880 ASSAY OF NATRIURETIC PEPTIDE: CPT | Performed by: HOSPITALIST

## 2022-01-01 PROCEDURE — 87077 CULTURE AEROBIC IDENTIFY: CPT | Performed by: INTERNAL MEDICINE

## 2022-01-01 PROCEDURE — 82607 VITAMIN B-12: CPT | Performed by: HOSPITALIST

## 2022-01-01 PROCEDURE — 85018 HEMOGLOBIN: CPT | Performed by: STUDENT IN AN ORGANIZED HEALTH CARE EDUCATION/TRAINING PROGRAM

## 2022-01-01 PROCEDURE — 63710000001 INSULIN LISPRO (HUMAN) PER 5 UNITS

## 2022-01-01 PROCEDURE — 83735 ASSAY OF MAGNESIUM: CPT | Performed by: PHYSICIAN ASSISTANT

## 2022-01-01 PROCEDURE — 80074 ACUTE HEPATITIS PANEL: CPT | Performed by: STUDENT IN AN ORGANIZED HEALTH CARE EDUCATION/TRAINING PROGRAM

## 2022-01-01 PROCEDURE — 63710000001 INSULIN REGULAR HUMAN PER 5 UNITS: Performed by: EMERGENCY MEDICINE

## 2022-01-01 PROCEDURE — 83880 ASSAY OF NATRIURETIC PEPTIDE: CPT | Performed by: PHYSICIAN ASSISTANT

## 2022-01-01 RX ORDER — POTASSIUM CHLORIDE 750 MG/1
20 TABLET, FILM COATED, EXTENDED RELEASE ORAL DAILY
Status: DISCONTINUED | OUTPATIENT
Start: 2022-01-01 | End: 2022-01-01

## 2022-01-01 RX ORDER — TROLAMINE SALICYLATE 10 G/100G
1 CREAM TOPICAL 2 TIMES DAILY PRN
COMMUNITY

## 2022-01-01 RX ORDER — ACETAMINOPHEN 650 MG/1
650 SUPPOSITORY RECTAL EVERY 4 HOURS PRN
Status: DISCONTINUED | OUTPATIENT
Start: 2022-01-01 | End: 2022-01-01 | Stop reason: HOSPADM

## 2022-01-01 RX ORDER — DEXTROSE MONOHYDRATE 25 G/50ML
25 INJECTION, SOLUTION INTRAVENOUS
Status: DISCONTINUED | OUTPATIENT
Start: 2022-01-01 | End: 2022-01-01 | Stop reason: HOSPADM

## 2022-01-01 RX ORDER — CHOLECALCIFEROL (VITAMIN D3) 125 MCG
1000 CAPSULE ORAL DAILY
Status: DISCONTINUED | OUTPATIENT
Start: 2022-01-01 | End: 2022-01-01 | Stop reason: HOSPADM

## 2022-01-01 RX ORDER — ACETAMINOPHEN 160 MG/5ML
650 SOLUTION ORAL EVERY 4 HOURS PRN
Status: DISCONTINUED | OUTPATIENT
Start: 2022-01-01 | End: 2022-01-01 | Stop reason: HOSPADM

## 2022-01-01 RX ORDER — MAGNESIUM SULFATE HEPTAHYDRATE 40 MG/ML
2 INJECTION, SOLUTION INTRAVENOUS AS NEEDED
Status: DISCONTINUED | OUTPATIENT
Start: 2022-01-01 | End: 2022-01-01 | Stop reason: HOSPADM

## 2022-01-01 RX ORDER — SODIUM CHLORIDE 9 MG/ML
75 INJECTION, SOLUTION INTRAVENOUS CONTINUOUS
Status: DISCONTINUED | OUTPATIENT
Start: 2022-01-01 | End: 2022-05-31 | Stop reason: HOSPADM

## 2022-01-01 RX ORDER — OXYBUTYNIN CHLORIDE 10 MG/1
10 TABLET, EXTENDED RELEASE ORAL DAILY
Status: DISCONTINUED | OUTPATIENT
Start: 2022-01-01 | End: 2022-01-01

## 2022-01-01 RX ORDER — LEVOTHYROXINE SODIUM 0.05 MG/1
50 TABLET ORAL DAILY
Status: DISCONTINUED | OUTPATIENT
Start: 2022-01-01 | End: 2022-01-01 | Stop reason: HOSPADM

## 2022-01-01 RX ORDER — DEXTROSE MONOHYDRATE 25 G/50ML
25 INJECTION, SOLUTION INTRAVENOUS
Status: DISCONTINUED | OUTPATIENT
Start: 2022-01-01 | End: 2022-05-31 | Stop reason: HOSPADM

## 2022-01-01 RX ORDER — ACETAMINOPHEN 325 MG/1
650 TABLET ORAL EVERY 4 HOURS PRN
Status: DISCONTINUED | OUTPATIENT
Start: 2022-01-01 | End: 2022-01-01 | Stop reason: HOSPADM

## 2022-01-01 RX ORDER — FLUMAZENIL 0.1 MG/ML
0.2 INJECTION INTRAVENOUS AS NEEDED
Status: DISCONTINUED | OUTPATIENT
Start: 2022-01-01 | End: 2022-01-01 | Stop reason: HOSPADM

## 2022-01-01 RX ORDER — ACETAMINOPHEN 325 MG/1
650 TABLET ORAL EVERY 6 HOURS PRN
Status: ON HOLD
Start: 2022-01-01 | End: 2022-01-01

## 2022-01-01 RX ORDER — ACETAMINOPHEN 325 MG/1
650 TABLET ORAL EVERY 4 HOURS PRN
Status: DISCONTINUED | OUTPATIENT
Start: 2022-01-01 | End: 2022-01-01 | Stop reason: SDUPTHER

## 2022-01-01 RX ORDER — PREDNISONE 20 MG/1
40 TABLET ORAL
Status: DISCONTINUED | OUTPATIENT
Start: 2022-01-01 | End: 2022-01-01

## 2022-01-01 RX ORDER — ATORVASTATIN CALCIUM 80 MG/1
80 TABLET, FILM COATED ORAL NIGHTLY
Status: DISCONTINUED | OUTPATIENT
Start: 2022-01-01 | End: 2022-01-01 | Stop reason: HOSPADM

## 2022-01-01 RX ORDER — IPRATROPIUM BROMIDE AND ALBUTEROL SULFATE 2.5; .5 MG/3ML; MG/3ML
3 SOLUTION RESPIRATORY (INHALATION) EVERY 4 HOURS PRN
Status: DISCONTINUED | OUTPATIENT
Start: 2022-01-01 | End: 2022-01-01 | Stop reason: HOSPADM

## 2022-01-01 RX ORDER — ONDANSETRON 2 MG/ML
4 INJECTION INTRAMUSCULAR; INTRAVENOUS ONCE AS NEEDED
Status: DISCONTINUED | OUTPATIENT
Start: 2022-01-01 | End: 2022-01-01 | Stop reason: HOSPADM

## 2022-01-01 RX ORDER — PREDNISONE 10 MG/1
TABLET ORAL
Qty: 28 TABLET | Refills: 0 | Status: SHIPPED | OUTPATIENT
Start: 2022-01-01 | End: 2022-01-01

## 2022-01-01 RX ORDER — SODIUM CHLORIDE 0.9 % (FLUSH) 0.9 %
10 SYRINGE (ML) INJECTION EVERY 12 HOURS SCHEDULED
Status: DISCONTINUED | OUTPATIENT
Start: 2022-01-01 | End: 2022-01-01 | Stop reason: HOSPADM

## 2022-01-01 RX ORDER — FOLIC ACID 1 MG/1
1 TABLET ORAL DAILY
Qty: 30 TABLET | Refills: 0 | Status: SHIPPED | OUTPATIENT
Start: 2022-01-01

## 2022-01-01 RX ORDER — IPRATROPIUM BROMIDE AND ALBUTEROL SULFATE 2.5; .5 MG/3ML; MG/3ML
3 SOLUTION RESPIRATORY (INHALATION) EVERY 6 HOURS PRN
Status: DISCONTINUED | OUTPATIENT
Start: 2022-01-01 | End: 2022-01-01 | Stop reason: HOSPADM

## 2022-01-01 RX ORDER — FUROSEMIDE 40 MG/1
40 TABLET ORAL 2 TIMES DAILY
Qty: 60 TABLET | Refills: 0 | Status: SHIPPED | OUTPATIENT
Start: 2022-01-01 | End: 2022-01-01 | Stop reason: HOSPADM

## 2022-01-01 RX ORDER — NICOTINE POLACRILEX 4 MG
15 LOZENGE BUCCAL
Status: DISCONTINUED | OUTPATIENT
Start: 2022-01-01 | End: 2022-01-01 | Stop reason: HOSPADM

## 2022-01-01 RX ORDER — POTASSIUM CHLORIDE 750 MG/1
40 TABLET, FILM COATED, EXTENDED RELEASE ORAL ONCE
Status: COMPLETED | OUTPATIENT
Start: 2022-01-01 | End: 2022-01-01

## 2022-01-01 RX ORDER — IPRATROPIUM BROMIDE AND ALBUTEROL SULFATE 2.5; .5 MG/3ML; MG/3ML
3 SOLUTION RESPIRATORY (INHALATION)
Qty: 360 ML
Start: 2022-01-01

## 2022-01-01 RX ORDER — PANTOPRAZOLE SODIUM 40 MG/10ML
40 INJECTION, POWDER, LYOPHILIZED, FOR SOLUTION INTRAVENOUS
Status: DISCONTINUED | OUTPATIENT
Start: 2022-01-01 | End: 2022-05-31 | Stop reason: HOSPADM

## 2022-01-01 RX ORDER — FUROSEMIDE 40 MG/1
40 TABLET ORAL
Status: DISCONTINUED | OUTPATIENT
Start: 2022-01-01 | End: 2022-01-01

## 2022-01-01 RX ORDER — NICOTINE POLACRILEX 4 MG
15 LOZENGE BUCCAL
Status: DISCONTINUED | OUTPATIENT
Start: 2022-01-01 | End: 2022-05-31 | Stop reason: HOSPADM

## 2022-01-01 RX ORDER — NITROGLYCERIN 0.4 MG/1
0.4 TABLET SUBLINGUAL
Status: DISCONTINUED | OUTPATIENT
Start: 2022-01-01 | End: 2022-01-01 | Stop reason: HOSPADM

## 2022-01-01 RX ORDER — ONDANSETRON 4 MG/1
4 TABLET, FILM COATED ORAL EVERY 6 HOURS PRN
Status: DISCONTINUED | OUTPATIENT
Start: 2022-01-01 | End: 2022-05-31 | Stop reason: HOSPADM

## 2022-01-01 RX ORDER — ONDANSETRON 4 MG/1
4 TABLET, FILM COATED ORAL EVERY 6 HOURS PRN
Status: DISCONTINUED | OUTPATIENT
Start: 2022-01-01 | End: 2022-01-01 | Stop reason: HOSPADM

## 2022-01-01 RX ORDER — FUROSEMIDE 10 MG/ML
40 INJECTION INTRAMUSCULAR; INTRAVENOUS EVERY 12 HOURS
Status: DISCONTINUED | OUTPATIENT
Start: 2022-01-01 | End: 2022-01-01

## 2022-01-01 RX ORDER — DOXYCYCLINE 100 MG/1
100 CAPSULE ORAL EVERY 12 HOURS SCHEDULED
Qty: 3 CAPSULE | Refills: 0 | Status: SHIPPED | OUTPATIENT
Start: 2022-01-01 | End: 2022-01-01 | Stop reason: SDUPTHER

## 2022-01-01 RX ORDER — LIDOCAINE HYDROCHLORIDE 10 MG/ML
INJECTION, SOLUTION EPIDURAL; INFILTRATION; INTRACAUDAL; PERINEURAL AS NEEDED
Status: DISCONTINUED | OUTPATIENT
Start: 2022-01-01 | End: 2022-01-01 | Stop reason: HOSPADM

## 2022-01-01 RX ORDER — SODIUM CHLORIDE 0.9 % (FLUSH) 0.9 %
10 SYRINGE (ML) INJECTION AS NEEDED
Status: DISCONTINUED | OUTPATIENT
Start: 2022-01-01 | End: 2022-01-01 | Stop reason: HOSPADM

## 2022-01-01 RX ORDER — FENTANYL CITRATE 50 UG/ML
50 INJECTION, SOLUTION INTRAMUSCULAR; INTRAVENOUS
Status: DISCONTINUED | OUTPATIENT
Start: 2022-01-01 | End: 2022-01-01 | Stop reason: HOSPADM

## 2022-01-01 RX ORDER — PROMETHAZINE HYDROCHLORIDE 25 MG/1
25 TABLET ORAL ONCE AS NEEDED
Status: DISCONTINUED | OUTPATIENT
Start: 2022-01-01 | End: 2022-01-01 | Stop reason: HOSPADM

## 2022-01-01 RX ORDER — ONDANSETRON 2 MG/ML
4 INJECTION INTRAMUSCULAR; INTRAVENOUS ONCE
Status: COMPLETED | OUTPATIENT
Start: 2022-01-01 | End: 2022-01-01

## 2022-01-01 RX ORDER — ALLOPURINOL 300 MG/1
300 TABLET ORAL DAILY
Status: DISCONTINUED | OUTPATIENT
Start: 2022-01-01 | End: 2022-01-01 | Stop reason: HOSPADM

## 2022-01-01 RX ORDER — ALUMINA, MAGNESIA, AND SIMETHICONE 2400; 2400; 240 MG/30ML; MG/30ML; MG/30ML
15 SUSPENSION ORAL EVERY 6 HOURS PRN
Status: DISCONTINUED | OUTPATIENT
Start: 2022-01-01 | End: 2022-01-01 | Stop reason: HOSPADM

## 2022-01-01 RX ORDER — PREDNISONE 20 MG/1
40 TABLET ORAL 2 TIMES DAILY WITH MEALS
Status: DISCONTINUED | OUTPATIENT
Start: 2022-01-01 | End: 2022-01-01 | Stop reason: HOSPADM

## 2022-01-01 RX ORDER — HYDRALAZINE HYDROCHLORIDE 20 MG/ML
5 INJECTION INTRAMUSCULAR; INTRAVENOUS
Status: DISCONTINUED | OUTPATIENT
Start: 2022-01-01 | End: 2022-01-01 | Stop reason: HOSPADM

## 2022-01-01 RX ORDER — DIPHENHYDRAMINE HCL 25 MG
25 CAPSULE ORAL
Status: DISCONTINUED | OUTPATIENT
Start: 2022-01-01 | End: 2022-01-01 | Stop reason: HOSPADM

## 2022-01-01 RX ORDER — LEVOFLOXACIN 500 MG/1
500 TABLET, FILM COATED ORAL EVERY OTHER DAY
Status: DISCONTINUED | OUTPATIENT
Start: 2022-01-01 | End: 2022-01-01 | Stop reason: HOSPADM

## 2022-01-01 RX ORDER — FUROSEMIDE 40 MG/1
40 TABLET ORAL 2 TIMES DAILY
Status: DISCONTINUED | OUTPATIENT
Start: 2022-01-01 | End: 2022-01-01

## 2022-01-01 RX ORDER — AMLODIPINE BESYLATE 5 MG/1
5 TABLET ORAL DAILY
COMMUNITY
Start: 2021-01-01 | End: 2022-01-01 | Stop reason: HOSPADM

## 2022-01-01 RX ORDER — UREA 10 %
3 LOTION (ML) TOPICAL NIGHTLY
Status: DISCONTINUED | OUTPATIENT
Start: 2022-01-01 | End: 2022-01-01 | Stop reason: HOSPADM

## 2022-01-01 RX ORDER — ACETAMINOPHEN 650 MG/1
650 SUPPOSITORY RECTAL EVERY 4 HOURS PRN
Status: DISCONTINUED | OUTPATIENT
Start: 2022-01-01 | End: 2022-01-01 | Stop reason: SDUPTHER

## 2022-01-01 RX ORDER — OXYCODONE AND ACETAMINOPHEN 7.5; 325 MG/1; MG/1
1 TABLET ORAL EVERY 4 HOURS PRN
Status: DISCONTINUED | OUTPATIENT
Start: 2022-01-01 | End: 2022-01-01 | Stop reason: HOSPADM

## 2022-01-01 RX ORDER — METHYLPREDNISOLONE SODIUM SUCCINATE 125 MG/2ML
80 INJECTION, POWDER, LYOPHILIZED, FOR SOLUTION INTRAMUSCULAR; INTRAVENOUS EVERY 8 HOURS
Status: DISCONTINUED | OUTPATIENT
Start: 2022-01-01 | End: 2022-01-01

## 2022-01-01 RX ORDER — METHYLPREDNISOLONE SODIUM SUCCINATE 40 MG/ML
40 INJECTION, POWDER, LYOPHILIZED, FOR SOLUTION INTRAMUSCULAR; INTRAVENOUS EVERY 8 HOURS
Status: DISCONTINUED | OUTPATIENT
Start: 2022-01-01 | End: 2022-01-01

## 2022-01-01 RX ORDER — PAROXETINE 10 MG/1
10 TABLET, FILM COATED ORAL DAILY
Status: DISCONTINUED | OUTPATIENT
Start: 2022-01-01 | End: 2022-01-01 | Stop reason: HOSPADM

## 2022-01-01 RX ORDER — UREA 10 %
3 LOTION (ML) TOPICAL NIGHTLY PRN
Status: DISCONTINUED | OUTPATIENT
Start: 2022-01-01 | End: 2022-01-01

## 2022-01-01 RX ORDER — HYDROMORPHONE HCL 110MG/55ML
0.5 PATIENT CONTROLLED ANALGESIA SYRINGE INTRAVENOUS
Status: DISCONTINUED | OUTPATIENT
Start: 2022-01-01 | End: 2022-01-01 | Stop reason: HOSPADM

## 2022-01-01 RX ORDER — DIGOXIN 0.25 MG/ML
250 INJECTION INTRAMUSCULAR; INTRAVENOUS ONCE
Status: COMPLETED | OUTPATIENT
Start: 2022-01-01 | End: 2022-01-01

## 2022-01-01 RX ORDER — DILTIAZEM HYDROCHLORIDE 120 MG/1
120 CAPSULE, COATED, EXTENDED RELEASE ORAL
Status: DISCONTINUED | OUTPATIENT
Start: 2022-01-01 | End: 2022-01-01

## 2022-01-01 RX ORDER — ACETAMINOPHEN 325 MG/1
650 TABLET ORAL EVERY 4 HOURS PRN
Status: DISCONTINUED | OUTPATIENT
Start: 2022-01-01 | End: 2022-05-31 | Stop reason: HOSPADM

## 2022-01-01 RX ORDER — DEXAMETHASONE SODIUM PHOSPHATE 10 MG/ML
6 INJECTION INTRAMUSCULAR; INTRAVENOUS DAILY
Status: DISCONTINUED | OUTPATIENT
Start: 2022-01-01 | End: 2022-05-31 | Stop reason: HOSPADM

## 2022-01-01 RX ORDER — AMLODIPINE BESYLATE 5 MG/1
2.5 TABLET ORAL DAILY
Status: DISCONTINUED | OUTPATIENT
Start: 2022-01-01 | End: 2022-01-01

## 2022-01-01 RX ORDER — LIDOCAINE HYDROCHLORIDE 20 MG/ML
INJECTION, SOLUTION EPIDURAL; INFILTRATION; INTRACAUDAL; PERINEURAL AS NEEDED
Status: DISCONTINUED | OUTPATIENT
Start: 2022-01-01 | End: 2022-01-01 | Stop reason: HOSPADM

## 2022-01-01 RX ORDER — POTASSIUM CHLORIDE 750 MG/1
20 TABLET, FILM COATED, EXTENDED RELEASE ORAL DAILY
Status: DISCONTINUED | OUTPATIENT
Start: 2022-01-01 | End: 2022-01-01 | Stop reason: HOSPADM

## 2022-01-01 RX ORDER — LANOLIN ALCOHOL/MO/W.PET/CERES
3 CREAM (GRAM) TOPICAL NIGHTLY PRN
Qty: 30 TABLET
Start: 2022-01-01

## 2022-01-01 RX ORDER — INSULIN LISPRO 100 [IU]/ML
0-9 INJECTION, SOLUTION INTRAVENOUS; SUBCUTANEOUS
Status: DISCONTINUED | OUTPATIENT
Start: 2022-01-01 | End: 2022-01-01 | Stop reason: HOSPADM

## 2022-01-01 RX ORDER — DEXTROSE MONOHYDRATE 25 G/50ML
50 INJECTION, SOLUTION INTRAVENOUS ONCE
Status: COMPLETED | OUTPATIENT
Start: 2022-01-01 | End: 2022-01-01

## 2022-01-01 RX ORDER — FUROSEMIDE 40 MG/1
40 TABLET ORAL DAILY
Start: 2022-01-01

## 2022-01-01 RX ORDER — SODIUM CHLORIDE 9 MG/ML
1000 INJECTION, SOLUTION INTRAVENOUS CONTINUOUS
Status: ACTIVE | OUTPATIENT
Start: 2022-01-01 | End: 2022-01-01

## 2022-01-01 RX ORDER — UREA 10 %
3 LOTION (ML) TOPICAL NIGHTLY PRN
Status: DISCONTINUED | OUTPATIENT
Start: 2022-01-01 | End: 2022-05-31 | Stop reason: HOSPADM

## 2022-01-01 RX ORDER — ONDANSETRON 2 MG/ML
4 INJECTION INTRAMUSCULAR; INTRAVENOUS EVERY 6 HOURS PRN
Status: DISCONTINUED | OUTPATIENT
Start: 2022-01-01 | End: 2022-05-31 | Stop reason: HOSPADM

## 2022-01-01 RX ORDER — CALCIUM GLUCONATE 20 MG/ML
1 INJECTION, SOLUTION INTRAVENOUS ONCE
Status: COMPLETED | OUTPATIENT
Start: 2022-01-01 | End: 2022-01-01

## 2022-01-01 RX ORDER — PREDNISONE 10 MG/1
TABLET ORAL
Qty: 28 TABLET | Refills: 0 | Status: SHIPPED | OUTPATIENT
Start: 2022-01-01 | End: 2022-01-01 | Stop reason: SDUPTHER

## 2022-01-01 RX ORDER — POTASSIUM CHLORIDE 1.5 G/1.77G
40 POWDER, FOR SOLUTION ORAL ONCE
Status: COMPLETED | OUTPATIENT
Start: 2022-01-01 | End: 2022-01-01

## 2022-01-01 RX ORDER — INSULIN LISPRO 100 [IU]/ML
0-7 INJECTION, SOLUTION INTRAVENOUS; SUBCUTANEOUS
Status: DISCONTINUED | OUTPATIENT
Start: 2022-01-01 | End: 2022-01-01

## 2022-01-01 RX ORDER — IPRATROPIUM BROMIDE AND ALBUTEROL SULFATE 2.5; .5 MG/3ML; MG/3ML
3 SOLUTION RESPIRATORY (INHALATION)
Status: DISCONTINUED | OUTPATIENT
Start: 2022-01-01 | End: 2022-01-01 | Stop reason: HOSPADM

## 2022-01-01 RX ORDER — PROPOFOL 10 MG/ML
VIAL (ML) INTRAVENOUS AS NEEDED
Status: DISCONTINUED | OUTPATIENT
Start: 2022-01-01 | End: 2022-01-01 | Stop reason: SURG

## 2022-01-01 RX ORDER — NALOXONE HCL 0.4 MG/ML
0.2 VIAL (ML) INJECTION AS NEEDED
Status: DISCONTINUED | OUTPATIENT
Start: 2022-01-01 | End: 2022-01-01 | Stop reason: HOSPADM

## 2022-01-01 RX ORDER — FOLIC ACID 1 MG/1
1 TABLET ORAL DAILY
Status: DISCONTINUED | OUTPATIENT
Start: 2022-01-01 | End: 2022-01-01 | Stop reason: HOSPADM

## 2022-01-01 RX ORDER — FUROSEMIDE 40 MG/1
40 TABLET ORAL DAILY
Status: DISCONTINUED | OUTPATIENT
Start: 2022-01-01 | End: 2022-01-01

## 2022-01-01 RX ORDER — FUROSEMIDE 10 MG/ML
40 INJECTION INTRAMUSCULAR; INTRAVENOUS EVERY 6 HOURS
Status: COMPLETED | OUTPATIENT
Start: 2022-01-01 | End: 2022-01-01

## 2022-01-01 RX ORDER — ONDANSETRON 2 MG/ML
4 INJECTION INTRAMUSCULAR; INTRAVENOUS EVERY 6 HOURS PRN
Status: DISCONTINUED | OUTPATIENT
Start: 2022-01-01 | End: 2022-01-01 | Stop reason: SDUPTHER

## 2022-01-01 RX ORDER — DIPHENHYDRAMINE HYDROCHLORIDE 50 MG/ML
12.5 INJECTION INTRAMUSCULAR; INTRAVENOUS
Status: DISCONTINUED | OUTPATIENT
Start: 2022-01-01 | End: 2022-01-01 | Stop reason: HOSPADM

## 2022-01-01 RX ORDER — FUROSEMIDE 40 MG/1
40 TABLET ORAL DAILY
Status: DISCONTINUED | OUTPATIENT
Start: 2022-01-01 | End: 2022-01-01 | Stop reason: HOSPADM

## 2022-01-01 RX ORDER — POTASSIUM CHLORIDE 750 MG/1
40 TABLET, FILM COATED, EXTENDED RELEASE ORAL AS NEEDED
Status: DISCONTINUED | OUTPATIENT
Start: 2022-01-01 | End: 2022-01-01 | Stop reason: HOSPADM

## 2022-01-01 RX ORDER — GUAIFENESIN 600 MG/1
600 TABLET, EXTENDED RELEASE ORAL EVERY 12 HOURS SCHEDULED
Qty: 6 TABLET | Refills: 0 | Status: SHIPPED | OUTPATIENT
Start: 2022-01-01 | End: 2022-01-01

## 2022-01-01 RX ORDER — ACETYLCYSTEINE 200 MG/ML
4 SOLUTION ORAL; RESPIRATORY (INHALATION)
Status: DISCONTINUED | OUTPATIENT
Start: 2022-01-01 | End: 2022-01-01 | Stop reason: HOSPADM

## 2022-01-01 RX ORDER — INSULIN LISPRO 100 [IU]/ML
0-7 INJECTION, SOLUTION INTRAVENOUS; SUBCUTANEOUS
Status: DISCONTINUED | OUTPATIENT
Start: 2022-01-01 | End: 2022-01-01 | Stop reason: HOSPADM

## 2022-01-01 RX ORDER — LABETALOL HYDROCHLORIDE 5 MG/ML
5 INJECTION, SOLUTION INTRAVENOUS
Status: DISCONTINUED | OUTPATIENT
Start: 2022-01-01 | End: 2022-01-01 | Stop reason: HOSPADM

## 2022-01-01 RX ORDER — ALBUTEROL SULFATE 2.5 MG/3ML
2.5 SOLUTION RESPIRATORY (INHALATION) EVERY 6 HOURS PRN
Status: DISCONTINUED | OUTPATIENT
Start: 2022-01-01 | End: 2022-01-01 | Stop reason: HOSPADM

## 2022-01-01 RX ORDER — IBUPROFEN 600 MG/1
600 TABLET ORAL ONCE AS NEEDED
Status: DISCONTINUED | OUTPATIENT
Start: 2022-01-01 | End: 2022-01-01 | Stop reason: HOSPADM

## 2022-01-01 RX ORDER — EPHEDRINE SULFATE 50 MG/ML
5 INJECTION, SOLUTION INTRAVENOUS ONCE AS NEEDED
Status: DISCONTINUED | OUTPATIENT
Start: 2022-01-01 | End: 2022-01-01 | Stop reason: HOSPADM

## 2022-01-01 RX ORDER — DOXYCYCLINE 100 MG/1
100 CAPSULE ORAL EVERY 12 HOURS SCHEDULED
Qty: 3 CAPSULE | Refills: 0 | Status: SHIPPED | OUTPATIENT
Start: 2022-01-01 | End: 2022-01-01

## 2022-01-01 RX ORDER — FUROSEMIDE 10 MG/ML
80 INJECTION INTRAMUSCULAR; INTRAVENOUS ONCE
Status: COMPLETED | OUTPATIENT
Start: 2022-01-01 | End: 2022-01-01

## 2022-01-01 RX ORDER — ALBUTEROL SULFATE 2.5 MG/3ML
2.5 SOLUTION RESPIRATORY (INHALATION) ONCE
Status: COMPLETED | OUTPATIENT
Start: 2022-01-01 | End: 2022-01-01

## 2022-01-01 RX ORDER — ONDANSETRON 2 MG/ML
4 INJECTION INTRAMUSCULAR; INTRAVENOUS EVERY 6 HOURS PRN
Status: DISCONTINUED | OUTPATIENT
Start: 2022-01-01 | End: 2022-01-01 | Stop reason: HOSPADM

## 2022-01-01 RX ORDER — ACETAMINOPHEN 160 MG/5ML
650 SOLUTION ORAL EVERY 4 HOURS PRN
Status: DISCONTINUED | OUTPATIENT
Start: 2022-01-01 | End: 2022-01-01 | Stop reason: SDUPTHER

## 2022-01-01 RX ORDER — PREDNISONE 20 MG/1
40 TABLET ORAL
Status: DISCONTINUED | OUTPATIENT
Start: 2022-01-01 | End: 2022-01-01 | Stop reason: HOSPADM

## 2022-01-01 RX ORDER — MAGNESIUM SULFATE HEPTAHYDRATE 40 MG/ML
2 INJECTION, SOLUTION INTRAVENOUS ONCE
Status: COMPLETED | OUTPATIENT
Start: 2022-01-01 | End: 2022-01-01

## 2022-01-01 RX ORDER — INSULIN LISPRO 100 [IU]/ML
0-9 INJECTION, SOLUTION INTRAVENOUS; SUBCUTANEOUS
Status: DISCONTINUED | OUTPATIENT
Start: 2022-01-01 | End: 2022-05-31 | Stop reason: HOSPADM

## 2022-01-01 RX ORDER — SODIUM CHLORIDE, SODIUM LACTATE, POTASSIUM CHLORIDE, CALCIUM CHLORIDE 600; 310; 30; 20 MG/100ML; MG/100ML; MG/100ML; MG/100ML
INJECTION, SOLUTION INTRAVENOUS CONTINUOUS PRN
Status: DISCONTINUED | OUTPATIENT
Start: 2022-01-01 | End: 2022-01-01 | Stop reason: SURG

## 2022-01-01 RX ORDER — MAGNESIUM SULFATE HEPTAHYDRATE 40 MG/ML
4 INJECTION, SOLUTION INTRAVENOUS AS NEEDED
Status: DISCONTINUED | OUTPATIENT
Start: 2022-01-01 | End: 2022-01-01 | Stop reason: HOSPADM

## 2022-01-01 RX ORDER — LEVOTHYROXINE SODIUM 0.05 MG/1
50 TABLET ORAL
Status: DISCONTINUED | OUTPATIENT
Start: 2022-01-01 | End: 2022-01-01 | Stop reason: HOSPADM

## 2022-01-01 RX ORDER — POTASSIUM CHLORIDE 750 MG/1
40 TABLET, FILM COATED, EXTENDED RELEASE ORAL 2 TIMES DAILY WITH MEALS
Status: COMPLETED | OUTPATIENT
Start: 2022-01-01 | End: 2022-01-01

## 2022-01-01 RX ORDER — IPRATROPIUM BROMIDE AND ALBUTEROL SULFATE 2.5; .5 MG/3ML; MG/3ML
3 SOLUTION RESPIRATORY (INHALATION)
Status: DISCONTINUED | OUTPATIENT
Start: 2022-01-01 | End: 2022-01-01

## 2022-01-01 RX ORDER — PAROXETINE 10 MG/1
10 TABLET, FILM COATED ORAL DAILY
Status: DISCONTINUED | OUTPATIENT
Start: 2022-01-01 | End: 2022-05-31 | Stop reason: HOSPADM

## 2022-01-01 RX ORDER — LEVOTHYROXINE SODIUM 0.05 MG/1
50 TABLET ORAL
Status: DISCONTINUED | OUTPATIENT
Start: 2022-05-31 | End: 2022-05-31 | Stop reason: HOSPADM

## 2022-01-01 RX ORDER — SODIUM CHLORIDE 0.9 % (FLUSH) 0.9 %
10 SYRINGE (ML) INJECTION AS NEEDED
Status: DISCONTINUED | OUTPATIENT
Start: 2022-01-01 | End: 2022-05-31 | Stop reason: HOSPADM

## 2022-01-01 RX ORDER — AMLODIPINE BESYLATE 5 MG/1
5 TABLET ORAL DAILY
Status: DISCONTINUED | OUTPATIENT
Start: 2022-01-01 | End: 2022-01-01 | Stop reason: HOSPADM

## 2022-01-01 RX ORDER — PANTOPRAZOLE SODIUM 40 MG/10ML
80 INJECTION, POWDER, LYOPHILIZED, FOR SOLUTION INTRAVENOUS ONCE
Status: COMPLETED | OUTPATIENT
Start: 2022-01-01 | End: 2022-01-01

## 2022-01-01 RX ORDER — LEVOFLOXACIN 500 MG/1
500 TABLET, FILM COATED ORAL EVERY OTHER DAY
Qty: 3 TABLET | Refills: 0
Start: 2022-01-01 | End: 2022-01-01

## 2022-01-01 RX ORDER — HYDROCODONE BITARTRATE AND ACETAMINOPHEN 7.5; 325 MG/1; MG/1
1 TABLET ORAL ONCE AS NEEDED
Status: DISCONTINUED | OUTPATIENT
Start: 2022-01-01 | End: 2022-01-01 | Stop reason: HOSPADM

## 2022-01-01 RX ORDER — DOXYCYCLINE 100 MG/1
100 CAPSULE ORAL EVERY 12 HOURS SCHEDULED
Status: DISCONTINUED | OUTPATIENT
Start: 2022-01-01 | End: 2022-01-01 | Stop reason: HOSPADM

## 2022-01-01 RX ORDER — METHYLPREDNISOLONE SODIUM SUCCINATE 40 MG/ML
40 INJECTION, POWDER, LYOPHILIZED, FOR SOLUTION INTRAMUSCULAR; INTRAVENOUS EVERY 8 HOURS
Status: DISCONTINUED | OUTPATIENT
Start: 2022-01-01 | End: 2022-01-01 | Stop reason: HOSPADM

## 2022-01-01 RX ORDER — FUROSEMIDE 40 MG/1
40 TABLET ORAL
Status: DISCONTINUED | OUTPATIENT
Start: 2022-01-01 | End: 2022-01-01 | Stop reason: HOSPADM

## 2022-01-01 RX ORDER — DIGOXIN 0.25 MG/ML
500 INJECTION INTRAMUSCULAR; INTRAVENOUS ONCE
Status: COMPLETED | OUTPATIENT
Start: 2022-01-01 | End: 2022-01-01

## 2022-01-01 RX ORDER — BUDESONIDE AND FORMOTEROL FUMARATE DIHYDRATE 160; 4.5 UG/1; UG/1
2 AEROSOL RESPIRATORY (INHALATION)
Status: DISCONTINUED | OUTPATIENT
Start: 2022-01-01 | End: 2022-01-01

## 2022-01-01 RX ORDER — METHYLPREDNISOLONE SODIUM SUCCINATE 40 MG/ML
40 INJECTION, POWDER, LYOPHILIZED, FOR SOLUTION INTRAMUSCULAR; INTRAVENOUS EVERY 8 HOURS
Status: COMPLETED | OUTPATIENT
Start: 2022-01-01 | End: 2022-01-01

## 2022-01-01 RX ORDER — PREDNISONE 20 MG/1
40 TABLET ORAL 2 TIMES DAILY WITH MEALS
Start: 2022-01-01

## 2022-01-01 RX ORDER — GUAIFENESIN 600 MG/1
600 TABLET, EXTENDED RELEASE ORAL EVERY 12 HOURS SCHEDULED
Status: DISCONTINUED | OUTPATIENT
Start: 2022-01-01 | End: 2022-01-01 | Stop reason: HOSPADM

## 2022-01-01 RX ORDER — ACETAMINOPHEN 500 MG
1000 TABLET ORAL ONCE
Status: COMPLETED | OUTPATIENT
Start: 2022-01-01 | End: 2022-01-01

## 2022-01-01 RX ORDER — POTASSIUM CHLORIDE 1.5 G/1.77G
40 POWDER, FOR SOLUTION ORAL AS NEEDED
Status: DISCONTINUED | OUTPATIENT
Start: 2022-01-01 | End: 2022-01-01 | Stop reason: HOSPADM

## 2022-01-01 RX ORDER — POTASSIUM CHLORIDE 750 MG/1
20 TABLET, FILM COATED, EXTENDED RELEASE ORAL 2 TIMES DAILY WITH MEALS
Status: DISCONTINUED | OUTPATIENT
Start: 2022-01-01 | End: 2022-01-01 | Stop reason: HOSPADM

## 2022-01-01 RX ORDER — NITROGLYCERIN 0.4 MG/1
0.4 TABLET SUBLINGUAL
Status: DISCONTINUED | OUTPATIENT
Start: 2022-01-01 | End: 2022-05-31 | Stop reason: HOSPADM

## 2022-01-01 RX ORDER — AMLODIPINE BESYLATE 5 MG/1
5 TABLET ORAL DAILY
Status: DISCONTINUED | OUTPATIENT
Start: 2022-01-01 | End: 2022-01-01

## 2022-01-01 RX ORDER — LIDOCAINE HYDROCHLORIDE 20 MG/ML
INJECTION, SOLUTION INFILTRATION; PERINEURAL AS NEEDED
Status: DISCONTINUED | OUTPATIENT
Start: 2022-01-01 | End: 2022-01-01 | Stop reason: SURG

## 2022-01-01 RX ORDER — GUAIFENESIN 600 MG/1
600 TABLET, EXTENDED RELEASE ORAL EVERY 12 HOURS SCHEDULED
Qty: 6 TABLET | Refills: 0 | Status: SHIPPED | OUTPATIENT
Start: 2022-01-01 | End: 2022-01-01 | Stop reason: SDUPTHER

## 2022-01-01 RX ORDER — ALBUTEROL SULFATE 2.5 MG/3ML
2.5 SOLUTION RESPIRATORY (INHALATION) EVERY 6 HOURS PRN
Status: DISCONTINUED | OUTPATIENT
Start: 2022-01-01 | End: 2022-01-01

## 2022-01-01 RX ORDER — PROMETHAZINE HYDROCHLORIDE 25 MG/1
25 SUPPOSITORY RECTAL ONCE AS NEEDED
Status: DISCONTINUED | OUTPATIENT
Start: 2022-01-01 | End: 2022-01-01 | Stop reason: HOSPADM

## 2022-01-01 RX ORDER — ONDANSETRON 4 MG/1
4 TABLET, FILM COATED ORAL EVERY 8 HOURS PRN
Start: 2022-01-01

## 2022-01-01 RX ORDER — METHYLPREDNISOLONE SODIUM SUCCINATE 125 MG/2ML
60 INJECTION, POWDER, LYOPHILIZED, FOR SOLUTION INTRAMUSCULAR; INTRAVENOUS EVERY 6 HOURS
Status: DISCONTINUED | OUTPATIENT
Start: 2022-01-01 | End: 2022-01-01

## 2022-01-01 RX ADMIN — INSULIN LISPRO 2 UNITS: 100 INJECTION, SOLUTION INTRAVENOUS; SUBCUTANEOUS at 08:25

## 2022-01-01 RX ADMIN — LEVOTHYROXINE SODIUM 50 MCG: 0.05 TABLET ORAL at 08:31

## 2022-01-01 RX ADMIN — GUAIFENESIN 600 MG: 600 TABLET, EXTENDED RELEASE ORAL at 08:40

## 2022-01-01 RX ADMIN — LEVOTHYROXINE SODIUM 50 MCG: 0.05 TABLET ORAL at 06:33

## 2022-01-01 RX ADMIN — Medication 1000 MCG: at 09:21

## 2022-01-01 RX ADMIN — METOPROLOL TARTRATE 25 MG: 25 TABLET, FILM COATED ORAL at 11:35

## 2022-01-01 RX ADMIN — FUROSEMIDE 40 MG: 40 TABLET ORAL at 21:28

## 2022-01-01 RX ADMIN — IPRATROPIUM BROMIDE AND ALBUTEROL SULFATE 3 ML: .5; 3 SOLUTION RESPIRATORY (INHALATION) at 07:59

## 2022-01-01 RX ADMIN — IOPAMIDOL 75 ML: 612 INJECTION, SOLUTION INTRAVENOUS at 18:39

## 2022-01-01 RX ADMIN — GUAIFENESIN 600 MG: 600 TABLET, EXTENDED RELEASE ORAL at 09:26

## 2022-01-01 RX ADMIN — ACETYLCYSTEINE 4 ML: 200 SOLUTION ORAL; RESPIRATORY (INHALATION) at 16:27

## 2022-01-01 RX ADMIN — IPRATROPIUM BROMIDE AND ALBUTEROL SULFATE 3 ML: .5; 3 SOLUTION RESPIRATORY (INHALATION) at 16:27

## 2022-01-01 RX ADMIN — BUDESONIDE AND FORMOTEROL FUMARATE DIHYDRATE 2 PUFF: 160; 4.5 AEROSOL RESPIRATORY (INHALATION) at 08:03

## 2022-01-01 RX ADMIN — FOLIC ACID 1 MG: 1 TABLET ORAL at 08:38

## 2022-01-01 RX ADMIN — TAZOBACTAM SODIUM AND PIPERACILLIN SODIUM 3.38 G: 375; 3 INJECTION, SOLUTION INTRAVENOUS at 11:59

## 2022-01-01 RX ADMIN — IPRATROPIUM BROMIDE AND ALBUTEROL SULFATE 3 ML: .5; 3 SOLUTION RESPIRATORY (INHALATION) at 11:09

## 2022-01-01 RX ADMIN — INSULIN LISPRO 2 UNITS: 100 INJECTION, SOLUTION INTRAVENOUS; SUBCUTANEOUS at 09:06

## 2022-01-01 RX ADMIN — FOLIC ACID 1 MG: 1 TABLET ORAL at 09:14

## 2022-01-01 RX ADMIN — IPRATROPIUM BROMIDE AND ALBUTEROL SULFATE 3 ML: .5; 3 SOLUTION RESPIRATORY (INHALATION) at 07:17

## 2022-01-01 RX ADMIN — ALLOPURINOL 300 MG: 300 TABLET ORAL at 08:54

## 2022-01-01 RX ADMIN — SODIUM CHLORIDE, PRESERVATIVE FREE 10 ML: 5 INJECTION INTRAVENOUS at 09:14

## 2022-01-01 RX ADMIN — INSULIN LISPRO 4 UNITS: 100 INJECTION, SOLUTION INTRAVENOUS; SUBCUTANEOUS at 20:58

## 2022-01-01 RX ADMIN — INSULIN LISPRO 2 UNITS: 100 INJECTION, SOLUTION INTRAVENOUS; SUBCUTANEOUS at 11:59

## 2022-01-01 RX ADMIN — METOPROLOL TARTRATE 25 MG: 25 TABLET, FILM COATED ORAL at 21:27

## 2022-01-01 RX ADMIN — APIXABAN 5 MG: 5 TABLET, FILM COATED ORAL at 08:24

## 2022-01-01 RX ADMIN — FUROSEMIDE 40 MG: 40 TABLET ORAL at 21:13

## 2022-01-01 RX ADMIN — Medication 1000 MCG: at 09:20

## 2022-01-01 RX ADMIN — FUROSEMIDE 40 MG: 10 INJECTION, SOLUTION INTRAMUSCULAR; INTRAVENOUS at 18:23

## 2022-01-01 RX ADMIN — BUDESONIDE AND FORMOTEROL FUMARATE DIHYDRATE 2 PUFF: 160; 4.5 AEROSOL RESPIRATORY (INHALATION) at 19:20

## 2022-01-01 RX ADMIN — BUDESONIDE AND FORMOTEROL FUMARATE DIHYDRATE 2 PUFF: 160; 4.5 AEROSOL RESPIRATORY (INHALATION) at 07:47

## 2022-01-01 RX ADMIN — APIXABAN 5 MG: 5 TABLET, FILM COATED ORAL at 11:35

## 2022-01-01 RX ADMIN — PAROXETINE HYDROCHLORIDE 10 MG: 10 TABLET, FILM COATED ORAL at 08:40

## 2022-01-01 RX ADMIN — Medication 3 MG: at 20:59

## 2022-01-01 RX ADMIN — SODIUM CHLORIDE, PRESERVATIVE FREE 10 ML: 5 INJECTION INTRAVENOUS at 09:20

## 2022-01-01 RX ADMIN — FOLIC ACID 1 MG: 1 TABLET ORAL at 08:40

## 2022-01-01 RX ADMIN — METHYLPREDNISOLONE SODIUM SUCCINATE 40 MG: 40 INJECTION, POWDER, FOR SOLUTION INTRAMUSCULAR; INTRAVENOUS at 18:48

## 2022-01-01 RX ADMIN — ATORVASTATIN CALCIUM 80 MG: 80 TABLET, FILM COATED ORAL at 20:38

## 2022-01-01 RX ADMIN — ACETYLCYSTEINE 4 ML: 200 SOLUTION ORAL; RESPIRATORY (INHALATION) at 20:13

## 2022-01-01 RX ADMIN — METHYLPREDNISOLONE SODIUM SUCCINATE 40 MG: 40 INJECTION, POWDER, FOR SOLUTION INTRAMUSCULAR; INTRAVENOUS at 13:06

## 2022-01-01 RX ADMIN — Medication 3 MG: at 20:51

## 2022-01-01 RX ADMIN — SODIUM CHLORIDE, PRESERVATIVE FREE 10 ML: 5 INJECTION INTRAVENOUS at 08:32

## 2022-01-01 RX ADMIN — TAZOBACTAM SODIUM AND PIPERACILLIN SODIUM 3.38 G: 375; 3 INJECTION, SOLUTION INTRAVENOUS at 20:00

## 2022-01-01 RX ADMIN — METOPROLOL TARTRATE 25 MG: 25 TABLET, FILM COATED ORAL at 21:05

## 2022-01-01 RX ADMIN — FUROSEMIDE 40 MG: 40 TABLET ORAL at 22:12

## 2022-01-01 RX ADMIN — IPRATROPIUM BROMIDE AND ALBUTEROL SULFATE 3 ML: .5; 3 SOLUTION RESPIRATORY (INHALATION) at 11:27

## 2022-01-01 RX ADMIN — INSULIN LISPRO 2 UNITS: 100 INJECTION, SOLUTION INTRAVENOUS; SUBCUTANEOUS at 09:26

## 2022-01-01 RX ADMIN — PAROXETINE HYDROCHLORIDE HEMIHYDRATE 10 MG: 10 TABLET, FILM COATED ORAL at 09:14

## 2022-01-01 RX ADMIN — IPRATROPIUM BROMIDE AND ALBUTEROL SULFATE 3 ML: .5; 3 SOLUTION RESPIRATORY (INHALATION) at 07:15

## 2022-01-01 RX ADMIN — ALLOPURINOL 300 MG: 300 TABLET ORAL at 08:58

## 2022-01-01 RX ADMIN — POTASSIUM CHLORIDE 20 MEQ: 10 TABLET, EXTENDED RELEASE ORAL at 09:19

## 2022-01-01 RX ADMIN — ALLOPURINOL 300 MG: 300 TABLET ORAL at 10:17

## 2022-01-01 RX ADMIN — PAROXETINE HYDROCHLORIDE 10 MG: 10 TABLET, FILM COATED ORAL at 10:18

## 2022-01-01 RX ADMIN — APIXABAN 5 MG: 5 TABLET, FILM COATED ORAL at 09:21

## 2022-01-01 RX ADMIN — METOPROLOL TARTRATE 25 MG: 25 TABLET, FILM COATED ORAL at 20:38

## 2022-01-01 RX ADMIN — POTASSIUM CHLORIDE 20 MEQ: 10 TABLET, EXTENDED RELEASE ORAL at 08:54

## 2022-01-01 RX ADMIN — METHYLPREDNISOLONE SODIUM SUCCINATE 60 MG: 125 INJECTION, POWDER, FOR SOLUTION INTRAMUSCULAR; INTRAVENOUS at 05:41

## 2022-01-01 RX ADMIN — LEVOTHYROXINE SODIUM 50 MCG: 0.05 TABLET ORAL at 06:20

## 2022-01-01 RX ADMIN — AMLODIPINE BESYLATE 5 MG: 5 TABLET ORAL at 08:32

## 2022-01-01 RX ADMIN — SODIUM CHLORIDE 500 ML: 9 INJECTION, SOLUTION INTRAVENOUS at 21:20

## 2022-01-01 RX ADMIN — FOLIC ACID 1 MG: 1 TABLET ORAL at 09:57

## 2022-01-01 RX ADMIN — APIXABAN 5 MG: 5 TABLET, FILM COATED ORAL at 21:33

## 2022-01-01 RX ADMIN — POTASSIUM CHLORIDE 40 MEQ: 10 TABLET, EXTENDED RELEASE ORAL at 08:34

## 2022-01-01 RX ADMIN — TAZOBACTAM SODIUM AND PIPERACILLIN SODIUM 3.38 G: 375; 3 INJECTION, SOLUTION INTRAVENOUS at 14:42

## 2022-01-01 RX ADMIN — METHYLPREDNISOLONE SODIUM SUCCINATE 40 MG: 40 INJECTION, POWDER, FOR SOLUTION INTRAMUSCULAR; INTRAVENOUS at 04:49

## 2022-01-01 RX ADMIN — IPRATROPIUM BROMIDE AND ALBUTEROL SULFATE 3 ML: 2.5; .5 SOLUTION RESPIRATORY (INHALATION) at 20:13

## 2022-01-01 RX ADMIN — METHYLPREDNISOLONE SODIUM SUCCINATE 40 MG: 40 INJECTION, POWDER, FOR SOLUTION INTRAMUSCULAR; INTRAVENOUS at 23:01

## 2022-01-01 RX ADMIN — IPRATROPIUM BROMIDE AND ALBUTEROL SULFATE 3 ML: .5; 3 SOLUTION RESPIRATORY (INHALATION) at 12:21

## 2022-01-01 RX ADMIN — LEVOTHYROXINE SODIUM 50 MCG: 0.05 TABLET ORAL at 09:06

## 2022-01-01 RX ADMIN — ATORVASTATIN CALCIUM 80 MG: 80 TABLET, FILM COATED ORAL at 20:49

## 2022-01-01 RX ADMIN — FUROSEMIDE 40 MG: 40 TABLET ORAL at 17:37

## 2022-01-01 RX ADMIN — IPRATROPIUM BROMIDE AND ALBUTEROL SULFATE 3 ML: .5; 3 SOLUTION RESPIRATORY (INHALATION) at 15:29

## 2022-01-01 RX ADMIN — APIXABAN 5 MG: 5 TABLET, FILM COATED ORAL at 09:17

## 2022-01-01 RX ADMIN — DEXTROSE MONOHYDRATE 50 ML: 25 INJECTION, SOLUTION INTRAVENOUS at 04:15

## 2022-01-01 RX ADMIN — TAZOBACTAM SODIUM AND PIPERACILLIN SODIUM 3.38 G: 375; 3 INJECTION, SOLUTION INTRAVENOUS at 03:02

## 2022-01-01 RX ADMIN — IPRATROPIUM BROMIDE AND ALBUTEROL SULFATE 3 ML: .5; 3 SOLUTION RESPIRATORY (INHALATION) at 11:48

## 2022-01-01 RX ADMIN — IPRATROPIUM BROMIDE AND ALBUTEROL SULFATE 3 ML: .5; 3 SOLUTION RESPIRATORY (INHALATION) at 12:18

## 2022-01-01 RX ADMIN — INSULIN LISPRO 2 UNITS: 100 INJECTION, SOLUTION INTRAVENOUS; SUBCUTANEOUS at 09:03

## 2022-01-01 RX ADMIN — ATORVASTATIN CALCIUM 80 MG: 80 TABLET, FILM COATED ORAL at 21:51

## 2022-01-01 RX ADMIN — INSULIN LISPRO 3 UNITS: 100 INJECTION, SOLUTION INTRAVENOUS; SUBCUTANEOUS at 11:47

## 2022-01-01 RX ADMIN — ATORVASTATIN CALCIUM 80 MG: 80 TABLET, FILM COATED ORAL at 20:16

## 2022-01-01 RX ADMIN — METOPROLOL TARTRATE 25 MG: 25 TABLET, FILM COATED ORAL at 09:13

## 2022-01-01 RX ADMIN — DEXAMETHASONE SODIUM PHOSPHATE 6 MG: 10 INJECTION INTRAMUSCULAR; INTRAVENOUS at 20:39

## 2022-01-01 RX ADMIN — IPRATROPIUM BROMIDE AND ALBUTEROL SULFATE 3 ML: 2.5; .5 SOLUTION RESPIRATORY (INHALATION) at 19:42

## 2022-01-01 RX ADMIN — METHYLPREDNISOLONE SODIUM SUCCINATE 40 MG: 40 INJECTION, POWDER, FOR SOLUTION INTRAMUSCULAR; INTRAVENOUS at 12:17

## 2022-01-01 RX ADMIN — Medication 3 MG: at 22:50

## 2022-01-01 RX ADMIN — ALLOPURINOL 300 MG: 300 TABLET ORAL at 08:32

## 2022-01-01 RX ADMIN — METOPROLOL TARTRATE 25 MG: 25 TABLET, FILM COATED ORAL at 08:32

## 2022-01-01 RX ADMIN — PAROXETINE HYDROCHLORIDE HEMIHYDRATE 10 MG: 10 TABLET, FILM COATED ORAL at 08:41

## 2022-01-01 RX ADMIN — BUDESONIDE AND FORMOTEROL FUMARATE DIHYDRATE 2 PUFF: 160; 4.5 AEROSOL RESPIRATORY (INHALATION) at 08:11

## 2022-01-01 RX ADMIN — SODIUM CHLORIDE, PRESERVATIVE FREE 10 ML: 5 INJECTION INTRAVENOUS at 20:39

## 2022-01-01 RX ADMIN — METOPROLOL TARTRATE 25 MG: 25 TABLET, FILM COATED ORAL at 22:07

## 2022-01-01 RX ADMIN — IPRATROPIUM BROMIDE AND ALBUTEROL SULFATE 3 ML: .5; 3 SOLUTION RESPIRATORY (INHALATION) at 15:14

## 2022-01-01 RX ADMIN — Medication 1000 MCG: at 08:55

## 2022-01-01 RX ADMIN — APIXABAN 5 MG: 5 TABLET, FILM COATED ORAL at 09:30

## 2022-01-01 RX ADMIN — FUROSEMIDE 40 MG: 40 TABLET ORAL at 09:23

## 2022-01-01 RX ADMIN — PAROXETINE HYDROCHLORIDE 10 MG: 10 TABLET, FILM COATED ORAL at 08:06

## 2022-01-01 RX ADMIN — IPRATROPIUM BROMIDE AND ALBUTEROL SULFATE 3 ML: 2.5; .5 SOLUTION RESPIRATORY (INHALATION) at 16:06

## 2022-01-01 RX ADMIN — OXYBUTYNIN CHLORIDE 10 MG: 10 TABLET, EXTENDED RELEASE ORAL at 10:16

## 2022-01-01 RX ADMIN — IPRATROPIUM BROMIDE AND ALBUTEROL SULFATE 3 ML: .5; 3 SOLUTION RESPIRATORY (INHALATION) at 16:22

## 2022-01-01 RX ADMIN — POTASSIUM CHLORIDE 20 MEQ: 10 TABLET, EXTENDED RELEASE ORAL at 18:01

## 2022-01-01 RX ADMIN — IPRATROPIUM BROMIDE AND ALBUTEROL SULFATE 3 ML: 2.5; .5 SOLUTION RESPIRATORY (INHALATION) at 11:58

## 2022-01-01 RX ADMIN — INSULIN LISPRO 4 UNITS: 100 INJECTION, SOLUTION INTRAVENOUS; SUBCUTANEOUS at 18:06

## 2022-01-01 RX ADMIN — INSULIN LISPRO 4 UNITS: 100 INJECTION, SOLUTION INTRAVENOUS; SUBCUTANEOUS at 16:53

## 2022-01-01 RX ADMIN — AMLODIPINE BESYLATE 5 MG: 5 TABLET ORAL at 08:31

## 2022-01-01 RX ADMIN — Medication 1000 MCG: at 08:06

## 2022-01-01 RX ADMIN — TAZOBACTAM SODIUM AND PIPERACILLIN SODIUM 3.38 G: 375; 3 INJECTION, SOLUTION INTRAVENOUS at 17:46

## 2022-01-01 RX ADMIN — TAZOBACTAM SODIUM AND PIPERACILLIN SODIUM 3.38 G: 375; 3 INJECTION, SOLUTION INTRAVENOUS at 12:17

## 2022-01-01 RX ADMIN — FUROSEMIDE 40 MG: 40 TABLET ORAL at 08:57

## 2022-01-01 RX ADMIN — ATORVASTATIN CALCIUM 80 MG: 80 TABLET, FILM COATED ORAL at 20:39

## 2022-01-01 RX ADMIN — METOPROLOL TARTRATE 25 MG: 25 TABLET, FILM COATED ORAL at 09:26

## 2022-01-01 RX ADMIN — IPRATROPIUM BROMIDE AND ALBUTEROL SULFATE 3 ML: 2.5; .5 SOLUTION RESPIRATORY (INHALATION) at 07:45

## 2022-01-01 RX ADMIN — INSULIN LISPRO 2 UNITS: 100 INJECTION, SOLUTION INTRAVENOUS; SUBCUTANEOUS at 18:47

## 2022-01-01 RX ADMIN — BUDESONIDE AND FORMOTEROL FUMARATE DIHYDRATE 2 PUFF: 160; 4.5 AEROSOL RESPIRATORY (INHALATION) at 19:27

## 2022-01-01 RX ADMIN — LEVOTHYROXINE SODIUM 50 MCG: 0.05 TABLET ORAL at 06:59

## 2022-01-01 RX ADMIN — APIXABAN 5 MG: 5 TABLET, FILM COATED ORAL at 20:38

## 2022-01-01 RX ADMIN — DILTIAZEM HYDROCHLORIDE 30 MG: 30 TABLET, FILM COATED ORAL at 17:30

## 2022-01-01 RX ADMIN — INSULIN LISPRO 3 UNITS: 100 INJECTION, SOLUTION INTRAVENOUS; SUBCUTANEOUS at 13:34

## 2022-01-01 RX ADMIN — PREDNISONE 40 MG: 20 TABLET ORAL at 09:30

## 2022-01-01 RX ADMIN — DIGOXIN 500 MCG: 0.25 INJECTION INTRAMUSCULAR; INTRAVENOUS at 15:23

## 2022-01-01 RX ADMIN — METOPROLOL TARTRATE 25 MG: 25 TABLET, FILM COATED ORAL at 21:14

## 2022-01-01 RX ADMIN — PREDNISONE 40 MG: 20 TABLET ORAL at 16:52

## 2022-01-01 RX ADMIN — FUROSEMIDE 40 MG: 40 TABLET ORAL at 09:18

## 2022-01-01 RX ADMIN — IPRATROPIUM BROMIDE AND ALBUTEROL SULFATE 3 ML: .5; 3 SOLUTION RESPIRATORY (INHALATION) at 08:21

## 2022-01-01 RX ADMIN — DOXYCYCLINE 100 MG: 100 CAPSULE ORAL at 08:38

## 2022-01-01 RX ADMIN — IPRATROPIUM BROMIDE AND ALBUTEROL SULFATE 3 ML: .5; 3 SOLUTION RESPIRATORY (INHALATION) at 20:13

## 2022-01-01 RX ADMIN — APIXABAN 2.5 MG: 2.5 TABLET, FILM COATED ORAL at 08:37

## 2022-01-01 RX ADMIN — FUROSEMIDE 40 MG: 40 TABLET ORAL at 08:55

## 2022-01-01 RX ADMIN — APIXABAN 5 MG: 5 TABLET, FILM COATED ORAL at 09:27

## 2022-01-01 RX ADMIN — IPRATROPIUM BROMIDE AND ALBUTEROL SULFATE 3 ML: .5; 3 SOLUTION RESPIRATORY (INHALATION) at 11:21

## 2022-01-01 RX ADMIN — LEVOTHYROXINE SODIUM 50 MCG: 0.05 TABLET ORAL at 09:27

## 2022-01-01 RX ADMIN — POTASSIUM CHLORIDE 20 MEQ: 10 TABLET, EXTENDED RELEASE ORAL at 17:30

## 2022-01-01 RX ADMIN — PREDNISONE 40 MG: 20 TABLET ORAL at 08:06

## 2022-01-01 RX ADMIN — DILTIAZEM HYDROCHLORIDE 30 MG: 30 TABLET, FILM COATED ORAL at 18:10

## 2022-01-01 RX ADMIN — METHYLPREDNISOLONE SODIUM SUCCINATE 60 MG: 125 INJECTION, POWDER, FOR SOLUTION INTRAMUSCULAR; INTRAVENOUS at 11:52

## 2022-01-01 RX ADMIN — SODIUM CHLORIDE, PRESERVATIVE FREE 10 ML: 5 INJECTION INTRAVENOUS at 11:37

## 2022-01-01 RX ADMIN — AMLODIPINE BESYLATE 5 MG: 5 TABLET ORAL at 08:58

## 2022-01-01 RX ADMIN — IPRATROPIUM BROMIDE AND ALBUTEROL SULFATE 3 ML: .5; 3 SOLUTION RESPIRATORY (INHALATION) at 10:30

## 2022-01-01 RX ADMIN — ATORVASTATIN CALCIUM 80 MG: 80 TABLET, FILM COATED ORAL at 21:13

## 2022-01-01 RX ADMIN — TAZOBACTAM SODIUM AND PIPERACILLIN SODIUM 3.38 G: 375; 3 INJECTION, SOLUTION INTRAVENOUS at 22:56

## 2022-01-01 RX ADMIN — METOPROLOL TARTRATE 25 MG: 25 TABLET, FILM COATED ORAL at 20:39

## 2022-01-01 RX ADMIN — LEVOTHYROXINE SODIUM 50 MCG: 0.05 TABLET ORAL at 08:57

## 2022-01-01 RX ADMIN — IPRATROPIUM BROMIDE AND ALBUTEROL SULFATE 3 ML: 2.5; .5 SOLUTION RESPIRATORY (INHALATION) at 19:51

## 2022-01-01 RX ADMIN — POTASSIUM CHLORIDE 20 MEQ: 750 TABLET, EXTENDED RELEASE ORAL at 08:41

## 2022-01-01 RX ADMIN — APIXABAN 5 MG: 5 TABLET, FILM COATED ORAL at 20:05

## 2022-01-01 RX ADMIN — AMLODIPINE BESYLATE 5 MG: 5 TABLET ORAL at 09:27

## 2022-01-01 RX ADMIN — ALLOPURINOL 300 MG: 300 TABLET ORAL at 08:38

## 2022-01-01 RX ADMIN — IPRATROPIUM BROMIDE AND ALBUTEROL SULFATE 3 ML: .5; 3 SOLUTION RESPIRATORY (INHALATION) at 10:28

## 2022-01-01 RX ADMIN — Medication 1000 MCG: at 09:30

## 2022-01-01 RX ADMIN — ATORVASTATIN CALCIUM 80 MG: 80 TABLET, FILM COATED ORAL at 20:34

## 2022-01-01 RX ADMIN — GUAIFENESIN 600 MG: 600 TABLET, EXTENDED RELEASE ORAL at 20:34

## 2022-01-01 RX ADMIN — METHYLPREDNISOLONE SODIUM SUCCINATE 60 MG: 125 INJECTION, POWDER, FOR SOLUTION INTRAMUSCULAR; INTRAVENOUS at 00:30

## 2022-01-01 RX ADMIN — GUAIFENESIN 600 MG: 600 TABLET, EXTENDED RELEASE ORAL at 21:51

## 2022-01-01 RX ADMIN — INSULIN LISPRO 4 UNITS: 100 INJECTION, SOLUTION INTRAVENOUS; SUBCUTANEOUS at 17:10

## 2022-01-01 RX ADMIN — FUROSEMIDE 40 MG: 40 TABLET ORAL at 08:41

## 2022-01-01 RX ADMIN — INSULIN LISPRO 6 UNITS: 100 INJECTION, SOLUTION INTRAVENOUS; SUBCUTANEOUS at 21:05

## 2022-01-01 RX ADMIN — PREDNISONE 40 MG: 20 TABLET ORAL at 13:34

## 2022-01-01 RX ADMIN — ATORVASTATIN CALCIUM 80 MG: 80 TABLET, FILM COATED ORAL at 21:05

## 2022-01-01 RX ADMIN — IPRATROPIUM BROMIDE AND ALBUTEROL SULFATE 3 ML: .5; 3 SOLUTION RESPIRATORY (INHALATION) at 11:44

## 2022-01-01 RX ADMIN — DILTIAZEM HYDROCHLORIDE 30 MG: 30 TABLET, FILM COATED ORAL at 09:15

## 2022-01-01 RX ADMIN — METHYLPREDNISOLONE SODIUM SUCCINATE 40 MG: 40 INJECTION, POWDER, FOR SOLUTION INTRAMUSCULAR; INTRAVENOUS at 21:51

## 2022-01-01 RX ADMIN — APIXABAN 5 MG: 5 TABLET, FILM COATED ORAL at 21:51

## 2022-01-01 RX ADMIN — DILTIAZEM HYDROCHLORIDE 30 MG: 30 TABLET, FILM COATED ORAL at 17:47

## 2022-01-01 RX ADMIN — METOPROLOL TARTRATE 12.5 MG: 25 TABLET, FILM COATED ORAL at 22:50

## 2022-01-01 RX ADMIN — POTASSIUM CHLORIDE 20 MEQ: 750 TABLET, EXTENDED RELEASE ORAL at 08:32

## 2022-01-01 RX ADMIN — ATORVASTATIN CALCIUM 80 MG: 80 TABLET, FILM COATED ORAL at 20:51

## 2022-01-01 RX ADMIN — ALLOPURINOL 300 MG: 300 TABLET ORAL at 09:18

## 2022-01-01 RX ADMIN — METHYLPREDNISOLONE SODIUM SUCCINATE 40 MG: 40 INJECTION, POWDER, FOR SOLUTION INTRAMUSCULAR; INTRAVENOUS at 14:00

## 2022-01-01 RX ADMIN — PAROXETINE HYDROCHLORIDE 10 MG: 10 TABLET, FILM COATED ORAL at 09:56

## 2022-01-01 RX ADMIN — AMLODIPINE BESYLATE 5 MG: 5 TABLET ORAL at 08:40

## 2022-01-01 RX ADMIN — IPRATROPIUM BROMIDE AND ALBUTEROL SULFATE 3 ML: .5; 3 SOLUTION RESPIRATORY (INHALATION) at 12:13

## 2022-01-01 RX ADMIN — Medication 10 ML: at 20:45

## 2022-01-01 RX ADMIN — LEVOTHYROXINE SODIUM 50 MCG: 0.05 TABLET ORAL at 05:49

## 2022-01-01 RX ADMIN — FOLIC ACID 1 MG: 1 TABLET ORAL at 08:41

## 2022-01-01 RX ADMIN — POTASSIUM CHLORIDE 20 MEQ: 10 TABLET, EXTENDED RELEASE ORAL at 17:44

## 2022-01-01 RX ADMIN — ACETAMINOPHEN 650 MG: 325 TABLET, FILM COATED ORAL at 00:07

## 2022-01-01 RX ADMIN — DILTIAZEM HYDROCHLORIDE 15 MG: 30 TABLET, FILM COATED ORAL at 09:33

## 2022-01-01 RX ADMIN — Medication 1000 MCG: at 10:17

## 2022-01-01 RX ADMIN — DILTIAZEM HYDROCHLORIDE 30 MG: 30 TABLET, FILM COATED ORAL at 01:26

## 2022-01-01 RX ADMIN — FOLIC ACID 1 MG: 1 TABLET ORAL at 09:27

## 2022-01-01 RX ADMIN — Medication 3 MG: at 21:28

## 2022-01-01 RX ADMIN — FUROSEMIDE 40 MG: 40 TABLET ORAL at 10:18

## 2022-01-01 RX ADMIN — SODIUM CHLORIDE, PRESERVATIVE FREE 10 ML: 5 INJECTION INTRAVENOUS at 21:16

## 2022-01-01 RX ADMIN — INSULIN LISPRO 2 UNITS: 100 INJECTION, SOLUTION INTRAVENOUS; SUBCUTANEOUS at 10:05

## 2022-01-01 RX ADMIN — POTASSIUM CHLORIDE 20 MEQ: 10 TABLET, EXTENDED RELEASE ORAL at 09:56

## 2022-01-01 RX ADMIN — IPRATROPIUM BROMIDE AND ALBUTEROL SULFATE 3 ML: .5; 3 SOLUTION RESPIRATORY (INHALATION) at 11:28

## 2022-01-01 RX ADMIN — FUROSEMIDE 40 MG: 10 INJECTION, SOLUTION INTRAMUSCULAR; INTRAVENOUS at 05:51

## 2022-01-01 RX ADMIN — Medication 1000 MCG: at 08:40

## 2022-01-01 RX ADMIN — SODIUM CHLORIDE 1000 ML: 9 INJECTION, SOLUTION INTRAVENOUS at 21:10

## 2022-01-01 RX ADMIN — ACETAMINOPHEN 650 MG: 325 TABLET ORAL at 01:06

## 2022-01-01 RX ADMIN — LEVOTHYROXINE SODIUM 50 MCG: 0.05 TABLET ORAL at 06:04

## 2022-01-01 RX ADMIN — ALLOPURINOL 300 MG: 300 TABLET ORAL at 09:30

## 2022-01-01 RX ADMIN — ALLOPURINOL 300 MG: 300 TABLET ORAL at 08:41

## 2022-01-01 RX ADMIN — LEVOTHYROXINE SODIUM 50 MCG: 0.05 TABLET ORAL at 08:24

## 2022-01-01 RX ADMIN — APIXABAN 5 MG: 5 TABLET, FILM COATED ORAL at 09:23

## 2022-01-01 RX ADMIN — INSULIN LISPRO 5 UNITS: 100 INJECTION, SOLUTION INTRAVENOUS; SUBCUTANEOUS at 12:13

## 2022-01-01 RX ADMIN — FOLIC ACID 1 MG: 1 TABLET ORAL at 10:05

## 2022-01-01 RX ADMIN — GUAIFENESIN 600 MG: 600 TABLET, EXTENDED RELEASE ORAL at 21:05

## 2022-01-01 RX ADMIN — GUAIFENESIN 600 MG: 600 TABLET, EXTENDED RELEASE ORAL at 22:54

## 2022-01-01 RX ADMIN — INSULIN LISPRO 4 UNITS: 100 INJECTION, SOLUTION INTRAVENOUS; SUBCUTANEOUS at 12:16

## 2022-01-01 RX ADMIN — PROPOFOL 120 MCG/KG/MIN: 10 INJECTION, EMULSION INTRAVENOUS at 15:26

## 2022-01-01 RX ADMIN — TAZOBACTAM SODIUM AND PIPERACILLIN SODIUM 3.38 G: 375; 3 INJECTION, SOLUTION INTRAVENOUS at 00:00

## 2022-01-01 RX ADMIN — METOPROLOL TARTRATE 25 MG: 25 TABLET, FILM COATED ORAL at 11:04

## 2022-01-01 RX ADMIN — IPRATROPIUM BROMIDE AND ALBUTEROL SULFATE 3 ML: 2.5; .5 SOLUTION RESPIRATORY (INHALATION) at 15:02

## 2022-01-01 RX ADMIN — INSULIN LISPRO 4 UNITS: 100 INJECTION, SOLUTION INTRAVENOUS; SUBCUTANEOUS at 20:55

## 2022-01-01 RX ADMIN — INSULIN LISPRO 4 UNITS: 100 INJECTION, SOLUTION INTRAVENOUS; SUBCUTANEOUS at 12:23

## 2022-01-01 RX ADMIN — PAROXETINE HYDROCHLORIDE 10 MG: 10 TABLET, FILM COATED ORAL at 09:15

## 2022-01-01 RX ADMIN — ATORVASTATIN CALCIUM 80 MG: 80 TABLET, FILM COATED ORAL at 22:12

## 2022-01-01 RX ADMIN — METOPROLOL TARTRATE 12.5 MG: 25 TABLET, FILM COATED ORAL at 12:14

## 2022-01-01 RX ADMIN — APIXABAN 5 MG: 5 TABLET, FILM COATED ORAL at 20:34

## 2022-01-01 RX ADMIN — APIXABAN 5 MG: 5 TABLET, FILM COATED ORAL at 21:05

## 2022-01-01 RX ADMIN — APIXABAN 5 MG: 5 TABLET, FILM COATED ORAL at 22:12

## 2022-01-01 RX ADMIN — DOXYCYCLINE 100 MG: 100 CAPSULE ORAL at 08:06

## 2022-01-01 RX ADMIN — ACETAMINOPHEN 650 MG: 325 TABLET ORAL at 22:37

## 2022-01-01 RX ADMIN — PAROXETINE HYDROCHLORIDE 10 MG: 10 TABLET, FILM COATED ORAL at 09:22

## 2022-01-01 RX ADMIN — Medication 1000 MCG: at 08:58

## 2022-01-01 RX ADMIN — FOLIC ACID 1 MG: 1 TABLET ORAL at 08:31

## 2022-01-01 RX ADMIN — ATORVASTATIN CALCIUM 80 MG: 80 TABLET, FILM COATED ORAL at 21:06

## 2022-01-01 RX ADMIN — METOPROLOL TARTRATE 25 MG: 25 TABLET, FILM COATED ORAL at 08:41

## 2022-01-01 RX ADMIN — PAROXETINE HYDROCHLORIDE 10 MG: 10 TABLET, FILM COATED ORAL at 09:29

## 2022-01-01 RX ADMIN — Medication 3 MG: at 21:30

## 2022-01-01 RX ADMIN — SODIUM CHLORIDE, PRESERVATIVE FREE 10 ML: 5 INJECTION INTRAVENOUS at 20:47

## 2022-01-01 RX ADMIN — INSULIN LISPRO 5 UNITS: 100 INJECTION, SOLUTION INTRAVENOUS; SUBCUTANEOUS at 20:58

## 2022-01-01 RX ADMIN — LEVOTHYROXINE SODIUM 50 MCG: 0.05 TABLET ORAL at 06:08

## 2022-01-01 RX ADMIN — FUROSEMIDE 40 MG: 40 TABLET ORAL at 14:36

## 2022-01-01 RX ADMIN — APIXABAN 5 MG: 5 TABLET, FILM COATED ORAL at 08:40

## 2022-01-01 RX ADMIN — METOPROLOL TARTRATE 12.5 MG: 25 TABLET, FILM COATED ORAL at 20:52

## 2022-01-01 RX ADMIN — LEVOTHYROXINE SODIUM 50 MCG: 0.05 TABLET ORAL at 05:56

## 2022-01-01 RX ADMIN — FUROSEMIDE 40 MG: 10 INJECTION, SOLUTION INTRAMUSCULAR; INTRAVENOUS at 05:10

## 2022-01-01 RX ADMIN — LEVOTHYROXINE SODIUM 50 MCG: 0.05 TABLET ORAL at 06:31

## 2022-01-01 RX ADMIN — PAROXETINE HYDROCHLORIDE 10 MG: 10 TABLET, FILM COATED ORAL at 08:37

## 2022-01-01 RX ADMIN — FOLIC ACID 1 MG: 1 TABLET ORAL at 09:30

## 2022-01-01 RX ADMIN — IPRATROPIUM BROMIDE AND ALBUTEROL SULFATE 3 ML: .5; 3 SOLUTION RESPIRATORY (INHALATION) at 12:55

## 2022-01-01 RX ADMIN — ATORVASTATIN CALCIUM 80 MG: 80 TABLET, FILM COATED ORAL at 21:14

## 2022-01-01 RX ADMIN — METOPROLOL TARTRATE 25 MG: 25 TABLET, FILM COATED ORAL at 09:20

## 2022-01-01 RX ADMIN — IPRATROPIUM BROMIDE AND ALBUTEROL SULFATE 3 ML: .5; 3 SOLUTION RESPIRATORY (INHALATION) at 00:41

## 2022-01-01 RX ADMIN — FUROSEMIDE 40 MG: 40 TABLET ORAL at 08:31

## 2022-01-01 RX ADMIN — GUAIFENESIN 600 MG: 600 TABLET, EXTENDED RELEASE ORAL at 08:57

## 2022-01-01 RX ADMIN — PREDNISONE 40 MG: 20 TABLET ORAL at 08:54

## 2022-01-01 RX ADMIN — PAROXETINE HYDROCHLORIDE 10 MG: 10 TABLET, FILM COATED ORAL at 09:06

## 2022-01-01 RX ADMIN — ATORVASTATIN CALCIUM 80 MG: 80 TABLET, FILM COATED ORAL at 22:07

## 2022-01-01 RX ADMIN — VANCOMYCIN HYDROCHLORIDE 1750 MG: 10 INJECTION, POWDER, LYOPHILIZED, FOR SOLUTION INTRAVENOUS at 00:35

## 2022-01-01 RX ADMIN — ACETYLCYSTEINE 4 ML: 200 SOLUTION ORAL; RESPIRATORY (INHALATION) at 10:28

## 2022-01-01 RX ADMIN — APIXABAN 5 MG: 5 TABLET, FILM COATED ORAL at 09:20

## 2022-01-01 RX ADMIN — IPRATROPIUM BROMIDE AND ALBUTEROL SULFATE 3 ML: .5; 3 SOLUTION RESPIRATORY (INHALATION) at 15:42

## 2022-01-01 RX ADMIN — APIXABAN 5 MG: 5 TABLET, FILM COATED ORAL at 08:38

## 2022-01-01 RX ADMIN — DOXYCYCLINE 100 MG: 100 CAPSULE ORAL at 22:54

## 2022-01-01 RX ADMIN — FOLIC ACID 1 MG: 1 TABLET ORAL at 09:06

## 2022-01-01 RX ADMIN — METHYLPREDNISOLONE SODIUM SUCCINATE 40 MG: 40 INJECTION, POWDER, FOR SOLUTION INTRAMUSCULAR; INTRAVENOUS at 05:00

## 2022-01-01 RX ADMIN — FOLIC ACID 1 MG: 1 TABLET ORAL at 10:18

## 2022-01-01 RX ADMIN — ACETAMINOPHEN 650 MG: 325 TABLET ORAL at 15:29

## 2022-01-01 RX ADMIN — METHYLPREDNISOLONE SODIUM SUCCINATE 40 MG: 40 INJECTION, POWDER, FOR SOLUTION INTRAMUSCULAR; INTRAVENOUS at 18:10

## 2022-01-01 RX ADMIN — Medication 1000 MCG: at 09:18

## 2022-01-01 RX ADMIN — Medication 1000 MCG: at 09:23

## 2022-01-01 RX ADMIN — METHYLPREDNISOLONE SODIUM SUCCINATE 60 MG: 125 INJECTION, POWDER, FOR SOLUTION INTRAMUSCULAR; INTRAVENOUS at 19:49

## 2022-01-01 RX ADMIN — Medication 1000 MCG: at 09:06

## 2022-01-01 RX ADMIN — IPRATROPIUM BROMIDE AND ALBUTEROL SULFATE 3 ML: .5; 3 SOLUTION RESPIRATORY (INHALATION) at 12:26

## 2022-01-01 RX ADMIN — PREDNISONE 40 MG: 20 TABLET ORAL at 08:25

## 2022-01-01 RX ADMIN — APIXABAN 5 MG: 5 TABLET, FILM COATED ORAL at 22:07

## 2022-01-01 RX ADMIN — METOPROLOL TARTRATE 25 MG: 25 TABLET, FILM COATED ORAL at 20:34

## 2022-01-01 RX ADMIN — SODIUM CHLORIDE, POTASSIUM CHLORIDE, SODIUM LACTATE AND CALCIUM CHLORIDE 1000 ML: 600; 310; 30; 20 INJECTION, SOLUTION INTRAVENOUS at 07:06

## 2022-01-01 RX ADMIN — METHYLPREDNISOLONE SODIUM SUCCINATE 80 MG: 125 INJECTION, POWDER, FOR SOLUTION INTRAMUSCULAR; INTRAVENOUS at 13:30

## 2022-01-01 RX ADMIN — METOPROLOL TARTRATE 25 MG: 25 TABLET, FILM COATED ORAL at 08:31

## 2022-01-01 RX ADMIN — METOPROLOL TARTRATE 25 MG: 25 TABLET, FILM COATED ORAL at 21:51

## 2022-01-01 RX ADMIN — IPRATROPIUM BROMIDE AND ALBUTEROL SULFATE 3 ML: .5; 3 SOLUTION RESPIRATORY (INHALATION) at 14:40

## 2022-01-01 RX ADMIN — PAROXETINE HYDROCHLORIDE 10 MG: 10 TABLET, FILM COATED ORAL at 09:26

## 2022-01-01 RX ADMIN — FUROSEMIDE 40 MG: 40 TABLET ORAL at 08:48

## 2022-01-01 RX ADMIN — ATORVASTATIN CALCIUM 80 MG: 80 TABLET, FILM COATED ORAL at 20:59

## 2022-01-01 RX ADMIN — FUROSEMIDE 40 MG: 40 TABLET ORAL at 21:27

## 2022-01-01 RX ADMIN — IPRATROPIUM BROMIDE AND ALBUTEROL SULFATE 3 ML: .5; 3 SOLUTION RESPIRATORY (INHALATION) at 09:40

## 2022-01-01 RX ADMIN — INSULIN LISPRO 4 UNITS: 100 INJECTION, SOLUTION INTRAVENOUS; SUBCUTANEOUS at 22:49

## 2022-01-01 RX ADMIN — METHYLPREDNISOLONE SODIUM SUCCINATE 40 MG: 40 INJECTION, POWDER, FOR SOLUTION INTRAMUSCULAR; INTRAVENOUS at 04:47

## 2022-01-01 RX ADMIN — ACETAMINOPHEN 1000 MG: 500 TABLET ORAL at 21:11

## 2022-01-01 RX ADMIN — TAZOBACTAM SODIUM AND PIPERACILLIN SODIUM 3.38 G: 375; 3 INJECTION, SOLUTION INTRAVENOUS at 02:15

## 2022-01-01 RX ADMIN — GUAIFENESIN 600 MG: 600 TABLET, EXTENDED RELEASE ORAL at 08:06

## 2022-01-01 RX ADMIN — TAZOBACTAM SODIUM AND PIPERACILLIN SODIUM 3.38 G: 375; 3 INJECTION, SOLUTION INTRAVENOUS at 04:47

## 2022-01-01 RX ADMIN — IPRATROPIUM BROMIDE AND ALBUTEROL SULFATE 3 ML: .5; 3 SOLUTION RESPIRATORY (INHALATION) at 07:26

## 2022-01-01 RX ADMIN — ALLOPURINOL 300 MG: 300 TABLET ORAL at 10:06

## 2022-01-01 RX ADMIN — IPRATROPIUM BROMIDE AND ALBUTEROL SULFATE 3 ML: .5; 3 SOLUTION RESPIRATORY (INHALATION) at 07:47

## 2022-01-01 RX ADMIN — FUROSEMIDE 40 MG: 40 TABLET ORAL at 20:59

## 2022-01-01 RX ADMIN — LEVOTHYROXINE SODIUM 50 MCG: 0.05 TABLET ORAL at 07:04

## 2022-01-01 RX ADMIN — BUDESONIDE AND FORMOTEROL FUMARATE DIHYDRATE 2 PUFF: 160; 4.5 AEROSOL RESPIRATORY (INHALATION) at 07:28

## 2022-01-01 RX ADMIN — PREDNISONE 40 MG: 20 TABLET ORAL at 08:40

## 2022-01-01 RX ADMIN — IPRATROPIUM BROMIDE AND ALBUTEROL SULFATE 3 ML: .5; 3 SOLUTION RESPIRATORY (INHALATION) at 21:24

## 2022-01-01 RX ADMIN — GUAIFENESIN 600 MG: 600 TABLET, EXTENDED RELEASE ORAL at 08:24

## 2022-01-01 RX ADMIN — SODIUM CHLORIDE, PRESERVATIVE FREE 10 ML: 5 INJECTION INTRAVENOUS at 20:05

## 2022-01-01 RX ADMIN — IPRATROPIUM BROMIDE AND ALBUTEROL SULFATE 3 ML: .5; 3 SOLUTION RESPIRATORY (INHALATION) at 07:32

## 2022-01-01 RX ADMIN — ACETYLCYSTEINE 4 ML: 200 SOLUTION ORAL; RESPIRATORY (INHALATION) at 15:06

## 2022-01-01 RX ADMIN — APIXABAN 2.5 MG: 2.5 TABLET, FILM COATED ORAL at 20:46

## 2022-01-01 RX ADMIN — POTASSIUM CHLORIDE 20 MEQ: 750 TABLET, EXTENDED RELEASE ORAL at 11:35

## 2022-01-01 RX ADMIN — FUROSEMIDE 40 MG: 10 INJECTION, SOLUTION INTRAMUSCULAR; INTRAVENOUS at 06:05

## 2022-01-01 RX ADMIN — SODIUM CHLORIDE, PRESERVATIVE FREE 10 ML: 5 INJECTION INTRAVENOUS at 09:21

## 2022-01-01 RX ADMIN — DOXYCYCLINE 100 MG: 100 CAPSULE ORAL at 10:39

## 2022-01-01 RX ADMIN — IPRATROPIUM BROMIDE AND ALBUTEROL SULFATE 3 ML: 2.5; .5 SOLUTION RESPIRATORY (INHALATION) at 08:16

## 2022-01-01 RX ADMIN — APIXABAN 5 MG: 5 TABLET, FILM COATED ORAL at 09:15

## 2022-01-01 RX ADMIN — APIXABAN 5 MG: 5 TABLET, FILM COATED ORAL at 08:41

## 2022-01-01 RX ADMIN — INSULIN LISPRO 4 UNITS: 100 INJECTION, SOLUTION INTRAVENOUS; SUBCUTANEOUS at 17:27

## 2022-01-01 RX ADMIN — MAGNESIUM SULFATE HEPTAHYDRATE 2 G: 2 INJECTION, SOLUTION INTRAVENOUS at 15:31

## 2022-01-01 RX ADMIN — POTASSIUM CHLORIDE 20 MEQ: 10 TABLET, EXTENDED RELEASE ORAL at 11:04

## 2022-01-01 RX ADMIN — PANTOPRAZOLE SODIUM 40 MG: 40 INJECTION, POWDER, LYOPHILIZED, FOR SOLUTION INTRAVENOUS at 20:40

## 2022-01-01 RX ADMIN — Medication 3 MG: at 21:33

## 2022-01-01 RX ADMIN — DILTIAZEM HYDROCHLORIDE 15 MG: 30 TABLET, FILM COATED ORAL at 08:36

## 2022-01-01 RX ADMIN — POTASSIUM CHLORIDE 40 MEQ: 10 TABLET, EXTENDED RELEASE ORAL at 13:34

## 2022-01-01 RX ADMIN — LIDOCAINE HYDROCHLORIDE 60 MG: 20 INJECTION, SOLUTION INFILTRATION; PERINEURAL at 15:23

## 2022-01-01 RX ADMIN — INSULIN HUMAN 10 UNITS: 100 INJECTION, SOLUTION PARENTERAL at 04:21

## 2022-01-01 RX ADMIN — POTASSIUM CHLORIDE 40 MEQ: 1.5 POWDER, FOR SOLUTION ORAL at 11:44

## 2022-01-01 RX ADMIN — IPRATROPIUM BROMIDE AND ALBUTEROL SULFATE 3 ML: .5; 3 SOLUTION RESPIRATORY (INHALATION) at 17:03

## 2022-01-01 RX ADMIN — Medication 1000 MCG: at 08:48

## 2022-01-01 RX ADMIN — APIXABAN 5 MG: 5 TABLET, FILM COATED ORAL at 21:28

## 2022-01-01 RX ADMIN — APIXABAN 5 MG: 5 TABLET, FILM COATED ORAL at 22:54

## 2022-01-01 RX ADMIN — APIXABAN 5 MG: 5 TABLET, FILM COATED ORAL at 08:31

## 2022-01-01 RX ADMIN — METOPROLOL TARTRATE 25 MG: 25 TABLET, FILM COATED ORAL at 22:55

## 2022-01-01 RX ADMIN — BUDESONIDE AND FORMOTEROL FUMARATE DIHYDRATE 2 PUFF: 160; 4.5 AEROSOL RESPIRATORY (INHALATION) at 20:20

## 2022-01-01 RX ADMIN — METHYLPREDNISOLONE SODIUM SUCCINATE 40 MG: 40 INJECTION, POWDER, FOR SOLUTION INTRAMUSCULAR; INTRAVENOUS at 01:53

## 2022-01-01 RX ADMIN — SODIUM CHLORIDE, POTASSIUM CHLORIDE, SODIUM LACTATE AND CALCIUM CHLORIDE: 600; 310; 30; 20 INJECTION, SOLUTION INTRAVENOUS at 15:21

## 2022-01-01 RX ADMIN — METOPROLOL TARTRATE 25 MG: 25 TABLET, FILM COATED ORAL at 20:49

## 2022-01-01 RX ADMIN — ACETYLCYSTEINE 4 ML: 200 SOLUTION ORAL; RESPIRATORY (INHALATION) at 06:56

## 2022-01-01 RX ADMIN — FUROSEMIDE 40 MG: 40 TABLET ORAL at 09:57

## 2022-01-01 RX ADMIN — METOPROLOL TARTRATE 12.5 MG: 25 TABLET, FILM COATED ORAL at 21:30

## 2022-01-01 RX ADMIN — CALCIUM GLUCONATE 1 G: 20 INJECTION, SOLUTION INTRAVENOUS at 04:06

## 2022-01-01 RX ADMIN — FOLIC ACID 1 MG: 1 TABLET ORAL at 08:55

## 2022-01-01 RX ADMIN — INSULIN LISPRO 4 UNITS: 100 INJECTION, SOLUTION INTRAVENOUS; SUBCUTANEOUS at 09:23

## 2022-01-01 RX ADMIN — IPRATROPIUM BROMIDE AND ALBUTEROL SULFATE 3 ML: .5; 3 SOLUTION RESPIRATORY (INHALATION) at 15:26

## 2022-01-01 RX ADMIN — APIXABAN 5 MG: 5 TABLET, FILM COATED ORAL at 08:32

## 2022-01-01 RX ADMIN — POTASSIUM CHLORIDE 40 MEQ: 10 TABLET, EXTENDED RELEASE ORAL at 08:57

## 2022-01-01 RX ADMIN — GUAIFENESIN 600 MG: 600 TABLET, EXTENDED RELEASE ORAL at 09:06

## 2022-01-01 RX ADMIN — LEVOTHYROXINE SODIUM 50 MCG: 0.05 TABLET ORAL at 09:21

## 2022-01-01 RX ADMIN — PREDNISONE 40 MG: 20 TABLET ORAL at 09:18

## 2022-01-01 RX ADMIN — ATORVASTATIN CALCIUM 80 MG: 80 TABLET, FILM COATED ORAL at 20:46

## 2022-01-01 RX ADMIN — IPRATROPIUM BROMIDE AND ALBUTEROL SULFATE 3 ML: .5; 3 SOLUTION RESPIRATORY (INHALATION) at 15:51

## 2022-01-01 RX ADMIN — Medication 1000 MCG: at 10:06

## 2022-01-01 RX ADMIN — POTASSIUM CHLORIDE 20 MEQ: 10 TABLET, EXTENDED RELEASE ORAL at 10:06

## 2022-01-01 RX ADMIN — GUAIFENESIN 600 MG: 600 TABLET, EXTENDED RELEASE ORAL at 20:04

## 2022-01-01 RX ADMIN — APIXABAN 5 MG: 5 TABLET, FILM COATED ORAL at 08:06

## 2022-01-01 RX ADMIN — ACETAMINOPHEN 650 MG: 325 TABLET ORAL at 03:02

## 2022-01-01 RX ADMIN — ATORVASTATIN CALCIUM 80 MG: 80 TABLET, FILM COATED ORAL at 22:50

## 2022-01-01 RX ADMIN — AMLODIPINE BESYLATE 2.5 MG: 5 TABLET ORAL at 10:18

## 2022-01-01 RX ADMIN — PAROXETINE HYDROCHLORIDE 10 MG: 10 TABLET, FILM COATED ORAL at 09:20

## 2022-01-01 RX ADMIN — FUROSEMIDE 40 MG: 10 INJECTION, SOLUTION INTRAMUSCULAR; INTRAVENOUS at 17:27

## 2022-01-01 RX ADMIN — METOPROLOL TARTRATE 25 MG: 25 TABLET, FILM COATED ORAL at 09:22

## 2022-01-01 RX ADMIN — IPRATROPIUM BROMIDE AND ALBUTEROL SULFATE 3 ML: .5; 3 SOLUTION RESPIRATORY (INHALATION) at 13:50

## 2022-01-01 RX ADMIN — DILTIAZEM HYDROCHLORIDE 15 MG: 30 TABLET, FILM COATED ORAL at 12:00

## 2022-01-01 RX ADMIN — METOPROLOL TARTRATE 12.5 MG: 25 TABLET, FILM COATED ORAL at 08:54

## 2022-01-01 RX ADMIN — Medication 1000 MCG: at 09:15

## 2022-01-01 RX ADMIN — METHYLPREDNISOLONE SODIUM SUCCINATE 40 MG: 40 INJECTION, POWDER, FOR SOLUTION INTRAMUSCULAR; INTRAVENOUS at 14:40

## 2022-01-01 RX ADMIN — POTASSIUM CHLORIDE 20 MEQ: 10 TABLET, EXTENDED RELEASE ORAL at 10:16

## 2022-01-01 RX ADMIN — METHYLPREDNISOLONE SODIUM SUCCINATE 40 MG: 40 INJECTION, POWDER, FOR SOLUTION INTRAMUSCULAR; INTRAVENOUS at 20:35

## 2022-01-01 RX ADMIN — INSULIN LISPRO 5 UNITS: 100 INJECTION, SOLUTION INTRAVENOUS; SUBCUTANEOUS at 21:31

## 2022-01-01 RX ADMIN — Medication 3 MG: at 22:12

## 2022-01-01 RX ADMIN — ATORVASTATIN CALCIUM 80 MG: 80 TABLET, FILM COATED ORAL at 21:31

## 2022-01-01 RX ADMIN — BUDESONIDE AND FORMOTEROL FUMARATE DIHYDRATE 2 PUFF: 160; 4.5 AEROSOL RESPIRATORY (INHALATION) at 18:59

## 2022-01-01 RX ADMIN — DOXYCYCLINE 100 MG: 100 CAPSULE ORAL at 08:58

## 2022-01-01 RX ADMIN — INSULIN LISPRO 4 UNITS: 100 INJECTION, SOLUTION INTRAVENOUS; SUBCUTANEOUS at 06:02

## 2022-01-01 RX ADMIN — LEVOTHYROXINE SODIUM 50 MCG: 0.05 TABLET ORAL at 08:32

## 2022-01-01 RX ADMIN — LEVOTHYROXINE SODIUM 50 MCG: 0.05 TABLET ORAL at 08:41

## 2022-01-01 RX ADMIN — PANTOPRAZOLE SODIUM 80 MG: 40 INJECTION, POWDER, FOR SOLUTION INTRAVENOUS at 01:48

## 2022-01-01 RX ADMIN — METOPROLOL TARTRATE 25 MG: 25 TABLET, FILM COATED ORAL at 10:17

## 2022-01-01 RX ADMIN — ATORVASTATIN CALCIUM 80 MG: 80 TABLET, FILM COATED ORAL at 21:33

## 2022-01-01 RX ADMIN — Medication 1000 MCG: at 08:24

## 2022-01-01 RX ADMIN — SODIUM ZIRCONIUM CYCLOSILICATE 10 G: 10 POWDER, FOR SUSPENSION ORAL at 04:15

## 2022-01-01 RX ADMIN — INSULIN LISPRO 2 UNITS: 100 INJECTION, SOLUTION INTRAVENOUS; SUBCUTANEOUS at 21:55

## 2022-01-01 RX ADMIN — POTASSIUM CHLORIDE 20 MEQ: 750 TABLET, EXTENDED RELEASE ORAL at 09:21

## 2022-01-01 RX ADMIN — IPRATROPIUM BROMIDE AND ALBUTEROL SULFATE 3 ML: .5; 3 SOLUTION RESPIRATORY (INHALATION) at 23:36

## 2022-01-01 RX ADMIN — INSULIN LISPRO 7 UNITS: 100 INJECTION, SOLUTION INTRAVENOUS; SUBCUTANEOUS at 21:16

## 2022-01-01 RX ADMIN — TAZOBACTAM SODIUM AND PIPERACILLIN SODIUM 3.38 G: 375; 3 INJECTION, SOLUTION INTRAVENOUS at 04:29

## 2022-01-01 RX ADMIN — AMLODIPINE BESYLATE 5 MG: 5 TABLET ORAL at 08:24

## 2022-01-01 RX ADMIN — FOLIC ACID 1 MG: 1 TABLET ORAL at 08:06

## 2022-01-01 RX ADMIN — ATORVASTATIN CALCIUM 80 MG: 80 TABLET, FILM COATED ORAL at 22:54

## 2022-01-01 RX ADMIN — SODIUM CHLORIDE 1000 ML: 9 INJECTION, SOLUTION INTRAVENOUS at 14:28

## 2022-01-01 RX ADMIN — IPRATROPIUM BROMIDE AND ALBUTEROL SULFATE 3 ML: .5; 3 SOLUTION RESPIRATORY (INHALATION) at 20:14

## 2022-01-01 RX ADMIN — INSULIN LISPRO 2 UNITS: 100 INJECTION, SOLUTION INTRAVENOUS; SUBCUTANEOUS at 08:07

## 2022-01-01 RX ADMIN — DILTIAZEM HYDROCHLORIDE 15 MG: 30 TABLET, FILM COATED ORAL at 11:23

## 2022-01-01 RX ADMIN — IPRATROPIUM BROMIDE AND ALBUTEROL SULFATE 3 ML: .5; 3 SOLUTION RESPIRATORY (INHALATION) at 23:44

## 2022-01-01 RX ADMIN — POTASSIUM CHLORIDE 20 MEQ: 10 TABLET, EXTENDED RELEASE ORAL at 08:49

## 2022-01-01 RX ADMIN — IPRATROPIUM BROMIDE AND ALBUTEROL SULFATE 3 ML: .5; 3 SOLUTION RESPIRATORY (INHALATION) at 06:56

## 2022-01-01 RX ADMIN — IPRATROPIUM BROMIDE AND ALBUTEROL SULFATE 3 ML: 2.5; .5 SOLUTION RESPIRATORY (INHALATION) at 07:27

## 2022-01-01 RX ADMIN — ATORVASTATIN CALCIUM 80 MG: 80 TABLET, FILM COATED ORAL at 20:05

## 2022-01-01 RX ADMIN — INSULIN LISPRO 3 UNITS: 100 INJECTION, SOLUTION INTRAVENOUS; SUBCUTANEOUS at 08:48

## 2022-01-01 RX ADMIN — FOLIC ACID 1 MG: 1 TABLET ORAL at 09:20

## 2022-01-01 RX ADMIN — PAROXETINE HYDROCHLORIDE 10 MG: 10 TABLET, FILM COATED ORAL at 08:25

## 2022-01-01 RX ADMIN — MAGNESIUM SULFATE HEPTAHYDRATE 2 G: 2 INJECTION, SOLUTION INTRAVENOUS at 12:13

## 2022-01-01 RX ADMIN — METOPROLOL TARTRATE 25 MG: 25 TABLET, FILM COATED ORAL at 08:38

## 2022-01-01 RX ADMIN — IPRATROPIUM BROMIDE AND ALBUTEROL SULFATE 3 ML: .5; 3 SOLUTION RESPIRATORY (INHALATION) at 20:18

## 2022-01-01 RX ADMIN — METOPROLOL TARTRATE 2.5 MG: 1 INJECTION, SOLUTION INTRAVENOUS at 21:20

## 2022-01-01 RX ADMIN — TAZOBACTAM SODIUM AND PIPERACILLIN SODIUM 3.38 G: 375; 3 INJECTION, SOLUTION INTRAVENOUS at 12:13

## 2022-01-01 RX ADMIN — ALLOPURINOL 300 MG: 300 TABLET ORAL at 08:49

## 2022-01-01 RX ADMIN — ALLOPURINOL 300 MG: 300 TABLET ORAL at 08:37

## 2022-01-01 RX ADMIN — Medication 3 MG: at 20:46

## 2022-01-01 RX ADMIN — TAZOBACTAM SODIUM AND PIPERACILLIN SODIUM 3.38 G: 375; 3 INJECTION, SOLUTION INTRAVENOUS at 21:05

## 2022-01-01 RX ADMIN — DOXYCYCLINE 100 MG: 100 INJECTION, POWDER, LYOPHILIZED, FOR SOLUTION INTRAVENOUS at 06:31

## 2022-01-01 RX ADMIN — METOPROLOL TARTRATE 25 MG: 25 TABLET, FILM COATED ORAL at 08:40

## 2022-01-01 RX ADMIN — IPRATROPIUM BROMIDE AND ALBUTEROL SULFATE 3 ML: .5; 3 SOLUTION RESPIRATORY (INHALATION) at 15:07

## 2022-01-01 RX ADMIN — INSULIN LISPRO 4 UNITS: 100 INJECTION, SOLUTION INTRAVENOUS; SUBCUTANEOUS at 11:52

## 2022-01-01 RX ADMIN — POTASSIUM CHLORIDE 40 MEQ: 1.5 POWDER, FOR SOLUTION ORAL at 16:26

## 2022-01-01 RX ADMIN — IPRATROPIUM BROMIDE AND ALBUTEROL SULFATE 3 ML: .5; 3 SOLUTION RESPIRATORY (INHALATION) at 11:40

## 2022-01-01 RX ADMIN — POTASSIUM CHLORIDE 20 MEQ: 10 TABLET, EXTENDED RELEASE ORAL at 09:17

## 2022-01-01 RX ADMIN — INSULIN LISPRO 4 UNITS: 100 INJECTION, SOLUTION INTRAVENOUS; SUBCUTANEOUS at 21:09

## 2022-01-01 RX ADMIN — FUROSEMIDE 40 MG: 40 TABLET ORAL at 08:06

## 2022-01-01 RX ADMIN — ALLOPURINOL 300 MG: 300 TABLET ORAL at 09:57

## 2022-01-01 RX ADMIN — APIXABAN 5 MG: 5 TABLET, FILM COATED ORAL at 10:17

## 2022-01-01 RX ADMIN — FOLIC ACID 1 MG: 1 TABLET ORAL at 09:22

## 2022-01-01 RX ADMIN — POTASSIUM CHLORIDE 20 MEQ: 10 TABLET, EXTENDED RELEASE ORAL at 16:52

## 2022-01-01 RX ADMIN — POTASSIUM CHLORIDE 40 MEQ: 10 TABLET, EXTENDED RELEASE ORAL at 17:56

## 2022-01-01 RX ADMIN — LEVOTHYROXINE SODIUM 50 MCG: 0.05 TABLET ORAL at 09:23

## 2022-01-01 RX ADMIN — Medication 1000 MCG: at 09:27

## 2022-01-01 RX ADMIN — POTASSIUM CHLORIDE 20 MEQ: 10 TABLET, EXTENDED RELEASE ORAL at 09:33

## 2022-01-01 RX ADMIN — ATORVASTATIN CALCIUM 80 MG: 80 TABLET, FILM COATED ORAL at 20:04

## 2022-01-01 RX ADMIN — LEVOTHYROXINE SODIUM 50 MCG: 0.05 TABLET ORAL at 06:16

## 2022-01-01 RX ADMIN — APIXABAN 5 MG: 5 TABLET, FILM COATED ORAL at 20:16

## 2022-01-01 RX ADMIN — FUROSEMIDE 80 MG: 10 INJECTION, SOLUTION INTRAMUSCULAR; INTRAVENOUS at 09:29

## 2022-01-01 RX ADMIN — GUAIFENESIN 600 MG: 600 TABLET, EXTENDED RELEASE ORAL at 08:38

## 2022-01-01 RX ADMIN — INSULIN LISPRO 4 UNITS: 100 INJECTION, SOLUTION INTRAVENOUS; SUBCUTANEOUS at 23:04

## 2022-01-01 RX ADMIN — IPRATROPIUM BROMIDE AND ALBUTEROL SULFATE 3 ML: .5; 3 SOLUTION RESPIRATORY (INHALATION) at 20:11

## 2022-01-01 RX ADMIN — GUAIFENESIN 600 MG: 600 TABLET, EXTENDED RELEASE ORAL at 20:16

## 2022-01-01 RX ADMIN — LEVOTHYROXINE SODIUM 50 MCG: 0.05 TABLET ORAL at 08:40

## 2022-01-01 RX ADMIN — FOLIC ACID 1 MG: 1 TABLET ORAL at 08:49

## 2022-01-01 RX ADMIN — BUDESONIDE AND FORMOTEROL FUMARATE DIHYDRATE 2 PUFF: 160; 4.5 AEROSOL RESPIRATORY (INHALATION) at 07:22

## 2022-01-01 RX ADMIN — FUROSEMIDE 40 MG: 40 TABLET ORAL at 20:46

## 2022-01-01 RX ADMIN — INSULIN LISPRO 4 UNITS: 100 INJECTION, SOLUTION INTRAVENOUS; SUBCUTANEOUS at 18:58

## 2022-01-01 RX ADMIN — METHYLPREDNISOLONE SODIUM SUCCINATE 40 MG: 40 INJECTION, POWDER, FOR SOLUTION INTRAMUSCULAR; INTRAVENOUS at 05:04

## 2022-01-01 RX ADMIN — METHYLPREDNISOLONE SODIUM SUCCINATE 40 MG: 40 INJECTION, POWDER, FOR SOLUTION INTRAMUSCULAR; INTRAVENOUS at 18:28

## 2022-01-01 RX ADMIN — INSULIN LISPRO 2 UNITS: 100 INJECTION, SOLUTION INTRAVENOUS; SUBCUTANEOUS at 20:14

## 2022-01-01 RX ADMIN — DOXYCYCLINE 100 MG: 100 CAPSULE ORAL at 20:16

## 2022-01-01 RX ADMIN — DOXYCYCLINE 100 MG: 100 CAPSULE ORAL at 20:34

## 2022-01-01 RX ADMIN — IPRATROPIUM BROMIDE AND ALBUTEROL SULFATE 3 ML: .5; 3 SOLUTION RESPIRATORY (INHALATION) at 20:39

## 2022-01-01 RX ADMIN — IPRATROPIUM BROMIDE AND ALBUTEROL SULFATE 3 ML: .5; 3 SOLUTION RESPIRATORY (INHALATION) at 08:50

## 2022-01-01 RX ADMIN — DILTIAZEM HYDROCHLORIDE 15 MG: 30 TABLET, FILM COATED ORAL at 18:39

## 2022-01-01 RX ADMIN — INSULIN LISPRO 4 UNITS: 100 INJECTION, SOLUTION INTRAVENOUS; SUBCUTANEOUS at 17:43

## 2022-01-01 RX ADMIN — FUROSEMIDE 40 MG: 40 TABLET ORAL at 08:37

## 2022-01-01 RX ADMIN — ONDANSETRON 4 MG: 2 INJECTION INTRAMUSCULAR; INTRAVENOUS at 21:28

## 2022-01-01 RX ADMIN — PREDNISONE 40 MG: 20 TABLET ORAL at 09:57

## 2022-01-01 RX ADMIN — ACETAMINOPHEN 650 MG: 325 TABLET ORAL at 02:02

## 2022-01-01 RX ADMIN — IPRATROPIUM BROMIDE AND ALBUTEROL SULFATE 3 ML: .5; 3 SOLUTION RESPIRATORY (INHALATION) at 19:34

## 2022-01-01 RX ADMIN — POTASSIUM CHLORIDE 20 MEQ: 10 TABLET, EXTENDED RELEASE ORAL at 09:20

## 2022-01-01 RX ADMIN — FUROSEMIDE 40 MG: 40 TABLET ORAL at 11:04

## 2022-01-01 RX ADMIN — PAROXETINE HYDROCHLORIDE 10 MG: 10 TABLET, FILM COATED ORAL at 08:54

## 2022-01-01 RX ADMIN — Medication 1000 MCG: at 09:57

## 2022-01-01 RX ADMIN — IPRATROPIUM BROMIDE AND ALBUTEROL SULFATE 3 ML: 2.5; .5 SOLUTION RESPIRATORY (INHALATION) at 20:59

## 2022-01-01 RX ADMIN — APIXABAN 5 MG: 5 TABLET, FILM COATED ORAL at 20:39

## 2022-01-01 RX ADMIN — LEVOTHYROXINE SODIUM 50 MCG: 0.05 TABLET ORAL at 09:14

## 2022-01-01 RX ADMIN — APIXABAN 5 MG: 5 TABLET, FILM COATED ORAL at 09:14

## 2022-01-01 RX ADMIN — APIXABAN 5 MG: 5 TABLET, FILM COATED ORAL at 20:04

## 2022-01-01 RX ADMIN — TAZOBACTAM SODIUM AND PIPERACILLIN SODIUM 3.38 G: 375; 3 INJECTION, SOLUTION INTRAVENOUS at 03:35

## 2022-01-01 RX ADMIN — LEVOFLOXACIN 500 MG: 500 TABLET, FILM COATED ORAL at 16:28

## 2022-01-01 RX ADMIN — DILTIAZEM HYDROCHLORIDE 15 MG: 30 TABLET, FILM COATED ORAL at 18:01

## 2022-01-01 RX ADMIN — METHYLPREDNISOLONE SODIUM SUCCINATE 40 MG: 40 INJECTION, POWDER, FOR SOLUTION INTRAMUSCULAR; INTRAVENOUS at 14:16

## 2022-01-01 RX ADMIN — IPRATROPIUM BROMIDE AND ALBUTEROL SULFATE 3 ML: .5; 3 SOLUTION RESPIRATORY (INHALATION) at 07:35

## 2022-01-01 RX ADMIN — AMLODIPINE BESYLATE 5 MG: 5 TABLET ORAL at 09:21

## 2022-01-01 RX ADMIN — DOXYCYCLINE 100 MG: 100 CAPSULE ORAL at 21:55

## 2022-01-01 RX ADMIN — PAROXETINE HYDROCHLORIDE 10 MG: 10 TABLET, FILM COATED ORAL at 09:18

## 2022-01-01 RX ADMIN — DILTIAZEM HYDROCHLORIDE 30 MG: 30 TABLET, FILM COATED ORAL at 12:17

## 2022-01-01 RX ADMIN — TAZOBACTAM SODIUM AND PIPERACILLIN SODIUM 3.38 G: 375; 3 INJECTION, SOLUTION INTRAVENOUS at 04:06

## 2022-01-01 RX ADMIN — PAROXETINE HYDROCHLORIDE 10 MG: 10 TABLET, FILM COATED ORAL at 08:57

## 2022-01-01 RX ADMIN — DILTIAZEM HYDROCHLORIDE 30 MG: 30 TABLET, FILM COATED ORAL at 09:57

## 2022-01-01 RX ADMIN — PAROXETINE HYDROCHLORIDE 10 MG: 10 TABLET, FILM COATED ORAL at 08:49

## 2022-01-01 RX ADMIN — POTASSIUM CHLORIDE 20 MEQ: 10 TABLET, EXTENDED RELEASE ORAL at 18:10

## 2022-01-01 RX ADMIN — FUROSEMIDE 40 MG: 40 TABLET ORAL at 09:26

## 2022-01-01 RX ADMIN — SODIUM CHLORIDE 500 ML: 9 INJECTION, SOLUTION INTRAVENOUS at 15:22

## 2022-01-01 RX ADMIN — AMLODIPINE BESYLATE 5 MG: 5 TABLET ORAL at 08:06

## 2022-01-01 RX ADMIN — IPRATROPIUM BROMIDE AND ALBUTEROL SULFATE 3 ML: .5; 3 SOLUTION RESPIRATORY (INHALATION) at 11:55

## 2022-01-01 RX ADMIN — INSULIN GLARGINE-YFGN 5 UNITS: 100 INJECTION, SOLUTION SUBCUTANEOUS at 21:53

## 2022-01-01 RX ADMIN — APIXABAN 5 MG: 5 TABLET, FILM COATED ORAL at 08:57

## 2022-01-01 RX ADMIN — SODIUM CHLORIDE, PRESERVATIVE FREE 10 ML: 5 INJECTION INTRAVENOUS at 08:24

## 2022-01-01 RX ADMIN — POTASSIUM CHLORIDE 20 MEQ: 10 TABLET, EXTENDED RELEASE ORAL at 17:47

## 2022-01-01 RX ADMIN — FOLIC ACID 1 MG: 1 TABLET ORAL at 18:21

## 2022-01-01 RX ADMIN — DILTIAZEM HYDROCHLORIDE 120 MG: 120 CAPSULE, COATED, EXTENDED RELEASE ORAL at 12:14

## 2022-01-01 RX ADMIN — METOPROLOL TARTRATE 25 MG: 25 TABLET, FILM COATED ORAL at 20:05

## 2022-01-01 RX ADMIN — DILTIAZEM HYDROCHLORIDE 15 MG: 30 TABLET, FILM COATED ORAL at 15:13

## 2022-01-01 RX ADMIN — VANCOMYCIN HYDROCHLORIDE 1500 MG: 10 INJECTION, POWDER, LYOPHILIZED, FOR SOLUTION INTRAVENOUS at 12:37

## 2022-01-01 RX ADMIN — TAZOBACTAM SODIUM AND PIPERACILLIN SODIUM 3.38 G: 375; 3 INJECTION, SOLUTION INTRAVENOUS at 05:00

## 2022-01-01 RX ADMIN — Medication 1000 MCG: at 08:37

## 2022-01-01 RX ADMIN — AMLODIPINE BESYLATE 5 MG: 5 TABLET ORAL at 09:23

## 2022-01-01 RX ADMIN — PROPOFOL 80 MG: 10 INJECTION, EMULSION INTRAVENOUS at 15:23

## 2022-01-01 RX ADMIN — INSULIN LISPRO 7 UNITS: 100 INJECTION, SOLUTION INTRAVENOUS; SUBCUTANEOUS at 12:13

## 2022-01-01 RX ADMIN — Medication 1000 MCG: at 09:14

## 2022-01-01 RX ADMIN — IPRATROPIUM BROMIDE AND ALBUTEROL SULFATE 3 ML: .5; 3 SOLUTION RESPIRATORY (INHALATION) at 20:10

## 2022-01-01 RX ADMIN — INSULIN LISPRO 4 UNITS: 100 INJECTION, SOLUTION INTRAVENOUS; SUBCUTANEOUS at 12:09

## 2022-01-01 RX ADMIN — FUROSEMIDE 40 MG: 40 TABLET ORAL at 08:38

## 2022-01-01 RX ADMIN — PREDNISONE 40 MG: 20 TABLET ORAL at 10:05

## 2022-01-01 RX ADMIN — Medication 1000 MCG: at 08:41

## 2022-01-01 RX ADMIN — FUROSEMIDE 40 MG: 40 TABLET ORAL at 09:15

## 2022-01-01 RX ADMIN — METOPROLOL TARTRATE 25 MG: 25 TABLET, FILM COATED ORAL at 20:16

## 2022-01-01 RX ADMIN — METOPROLOL TARTRATE 25 MG: 25 TABLET, FILM COATED ORAL at 08:57

## 2022-01-01 RX ADMIN — APIXABAN 5 MG: 5 TABLET, FILM COATED ORAL at 21:14

## 2022-01-01 RX ADMIN — APIXABAN 5 MG: 5 TABLET, FILM COATED ORAL at 11:04

## 2022-01-01 RX ADMIN — FOLIC ACID 1 MG: 1 TABLET ORAL at 09:18

## 2022-01-01 RX ADMIN — APIXABAN 5 MG: 5 TABLET, FILM COATED ORAL at 21:13

## 2022-01-01 RX ADMIN — MAGNESIUM SULFATE HEPTAHYDRATE 2 G: 2 INJECTION, SOLUTION INTRAVENOUS at 20:01

## 2022-01-01 RX ADMIN — IPRATROPIUM BROMIDE AND ALBUTEROL SULFATE 3 ML: .5; 3 SOLUTION RESPIRATORY (INHALATION) at 19:14

## 2022-01-01 RX ADMIN — PREDNISONE 40 MG: 20 TABLET ORAL at 08:49

## 2022-01-01 RX ADMIN — DOXYCYCLINE 100 MG: 100 CAPSULE ORAL at 21:07

## 2022-01-01 RX ADMIN — GUAIFENESIN 600 MG: 600 TABLET, EXTENDED RELEASE ORAL at 13:30

## 2022-01-01 RX ADMIN — PREDNISONE 40 MG: 20 TABLET ORAL at 08:36

## 2022-01-01 RX ADMIN — PREDNISONE 40 MG: 20 TABLET ORAL at 09:20

## 2022-01-01 RX ADMIN — PAROXETINE HYDROCHLORIDE HEMIHYDRATE 10 MG: 10 TABLET, FILM COATED ORAL at 08:32

## 2022-01-01 RX ADMIN — ATORVASTATIN CALCIUM 80 MG: 80 TABLET, FILM COATED ORAL at 21:28

## 2022-01-01 RX ADMIN — AMLODIPINE BESYLATE 5 MG: 5 TABLET ORAL at 09:14

## 2022-01-01 RX ADMIN — Medication 1000 MCG: at 08:32

## 2022-01-01 RX ADMIN — ALLOPURINOL 300 MG: 300 TABLET ORAL at 09:06

## 2022-01-01 RX ADMIN — AMLODIPINE BESYLATE 5 MG: 5 TABLET ORAL at 09:06

## 2022-01-01 RX ADMIN — IPRATROPIUM BROMIDE AND ALBUTEROL SULFATE 3 ML: 2.5; .5 SOLUTION RESPIRATORY (INHALATION) at 11:15

## 2022-01-01 RX ADMIN — MAGNESIUM SULFATE HEPTAHYDRATE 2 G: 2 INJECTION, SOLUTION INTRAVENOUS at 22:07

## 2022-01-01 RX ADMIN — LEVOTHYROXINE SODIUM 50 MCG: 0.05 TABLET ORAL at 08:38

## 2022-01-01 RX ADMIN — INSULIN LISPRO 4 UNITS: 100 INJECTION, SOLUTION INTRAVENOUS; SUBCUTANEOUS at 17:51

## 2022-01-01 RX ADMIN — POTASSIUM CHLORIDE 40 MEQ: 10 TABLET, EXTENDED RELEASE ORAL at 19:06

## 2022-01-01 RX ADMIN — Medication 10 ML: at 21:31

## 2022-01-01 RX ADMIN — IPRATROPIUM BROMIDE AND ALBUTEROL SULFATE 3 ML: .5; 3 SOLUTION RESPIRATORY (INHALATION) at 15:18

## 2022-01-01 RX ADMIN — IPRATROPIUM BROMIDE AND ALBUTEROL SULFATE 3 ML: .5; 3 SOLUTION RESPIRATORY (INHALATION) at 05:09

## 2022-01-01 RX ADMIN — AMLODIPINE BESYLATE 5 MG: 5 TABLET ORAL at 11:04

## 2022-01-01 RX ADMIN — IPRATROPIUM BROMIDE AND ALBUTEROL SULFATE 3 ML: .5; 3 SOLUTION RESPIRATORY (INHALATION) at 11:25

## 2022-01-01 RX ADMIN — Medication 1000 MCG: at 08:31

## 2022-01-01 RX ADMIN — ACETAMINOPHEN 650 MG: 325 TABLET ORAL at 22:16

## 2022-01-01 RX ADMIN — LEVOTHYROXINE SODIUM 50 MCG: 0.05 TABLET ORAL at 09:17

## 2022-01-01 RX ADMIN — SODIUM CHLORIDE 500 ML: 9 INJECTION, SOLUTION INTRAVENOUS at 01:45

## 2022-01-01 RX ADMIN — ALLOPURINOL 300 MG: 300 TABLET ORAL at 08:31

## 2022-01-01 RX ADMIN — PAROXETINE HYDROCHLORIDE 10 MG: 10 TABLET, FILM COATED ORAL at 08:38

## 2022-01-01 RX ADMIN — IPRATROPIUM BROMIDE AND ALBUTEROL SULFATE 3 ML: 2.5; .5 SOLUTION RESPIRATORY (INHALATION) at 11:29

## 2022-01-01 RX ADMIN — BUDESONIDE AND FORMOTEROL FUMARATE DIHYDRATE 2 PUFF: 160; 4.5 AEROSOL RESPIRATORY (INHALATION) at 07:32

## 2022-01-01 RX ADMIN — POTASSIUM CHLORIDE 20 MEQ: 10 TABLET, EXTENDED RELEASE ORAL at 17:00

## 2022-01-01 RX ADMIN — ALLOPURINOL 300 MG: 300 TABLET ORAL at 08:40

## 2022-01-01 RX ADMIN — POTASSIUM CHLORIDE 20 MEQ: 10 TABLET, EXTENDED RELEASE ORAL at 08:37

## 2022-01-01 RX ADMIN — POTASSIUM CHLORIDE 20 MEQ: 750 TABLET, EXTENDED RELEASE ORAL at 09:13

## 2022-01-01 RX ADMIN — METOPROLOL TARTRATE 25 MG: 25 TABLET, FILM COATED ORAL at 08:29

## 2022-01-01 RX ADMIN — ONDANSETRON 4 MG: 2 INJECTION INTRAMUSCULAR; INTRAVENOUS at 01:49

## 2022-01-01 RX ADMIN — ALLOPURINOL 300 MG: 300 TABLET ORAL at 09:27

## 2022-01-01 RX ADMIN — FUROSEMIDE 40 MG: 40 TABLET ORAL at 10:06

## 2022-01-01 RX ADMIN — SODIUM CHLORIDE, PRESERVATIVE FREE 10 ML: 5 INJECTION INTRAVENOUS at 08:42

## 2022-01-01 RX ADMIN — METOPROLOL TARTRATE 25 MG: 25 TABLET, FILM COATED ORAL at 09:06

## 2022-01-01 RX ADMIN — METOPROLOL TARTRATE 25 MG: 25 TABLET, FILM COATED ORAL at 08:06

## 2022-01-01 RX ADMIN — TAZOBACTAM SODIUM AND PIPERACILLIN SODIUM 3.38 G: 375; 3 INJECTION, SOLUTION INTRAVENOUS at 21:04

## 2022-01-01 RX ADMIN — LEVOTHYROXINE SODIUM 50 MCG: 0.05 TABLET ORAL at 08:06

## 2022-01-01 RX ADMIN — IPRATROPIUM BROMIDE AND ALBUTEROL SULFATE 3 ML: 2.5; .5 SOLUTION RESPIRATORY (INHALATION) at 11:19

## 2022-01-01 RX ADMIN — GUAIFENESIN 600 MG: 600 TABLET, EXTENDED RELEASE ORAL at 09:22

## 2022-01-01 RX ADMIN — GUAIFENESIN 600 MG: 600 TABLET, EXTENDED RELEASE ORAL at 20:50

## 2022-01-01 RX ADMIN — INSULIN LISPRO 2 UNITS: 100 INJECTION, SOLUTION INTRAVENOUS; SUBCUTANEOUS at 17:11

## 2022-01-01 RX ADMIN — DOXYCYCLINE 100 MG: 100 CAPSULE ORAL at 09:27

## 2022-01-01 RX ADMIN — Medication 1000 MCG: at 08:38

## 2022-01-01 RX ADMIN — DIGOXIN 250 MCG: 0.25 INJECTION INTRAMUSCULAR; INTRAVENOUS at 14:24

## 2022-01-01 RX ADMIN — IPRATROPIUM BROMIDE AND ALBUTEROL SULFATE 3 ML: 2.5; .5 SOLUTION RESPIRATORY (INHALATION) at 15:31

## 2022-01-01 RX ADMIN — IPRATROPIUM BROMIDE AND ALBUTEROL SULFATE 3 ML: .5; 3 SOLUTION RESPIRATORY (INHALATION) at 15:27

## 2022-01-01 RX ADMIN — DOXYCYCLINE 100 MG: 100 CAPSULE ORAL at 09:23

## 2022-01-01 RX ADMIN — ALLOPURINOL 300 MG: 300 TABLET ORAL at 09:14

## 2022-01-01 RX ADMIN — FOLIC ACID 1 MG: 1 TABLET ORAL at 08:32

## 2022-01-01 RX ADMIN — TAZOBACTAM SODIUM AND PIPERACILLIN SODIUM 3.38 G: 375; 3 INJECTION, SOLUTION INTRAVENOUS at 20:50

## 2022-01-01 RX ADMIN — IPRATROPIUM BROMIDE AND ALBUTEROL SULFATE 3 ML: 2.5; .5 SOLUTION RESPIRATORY (INHALATION) at 07:21

## 2022-01-01 RX ADMIN — ALBUTEROL SULFATE 2.5 MG: 2.5 SOLUTION RESPIRATORY (INHALATION) at 09:24

## 2022-01-01 RX ADMIN — TAZOBACTAM SODIUM AND PIPERACILLIN SODIUM 3.38 G: 375; 3 INJECTION, SOLUTION INTRAVENOUS at 14:06

## 2022-01-01 RX ADMIN — FUROSEMIDE 40 MG: 10 INJECTION, SOLUTION INTRAMUSCULAR; INTRAVENOUS at 18:21

## 2022-01-01 RX ADMIN — METHYLPREDNISOLONE SODIUM SUCCINATE 40 MG: 40 INJECTION, POWDER, FOR SOLUTION INTRAMUSCULAR; INTRAVENOUS at 21:04

## 2022-01-01 RX ADMIN — FUROSEMIDE 40 MG: 10 INJECTION, SOLUTION INTRAMUSCULAR; INTRAVENOUS at 14:06

## 2022-01-01 RX ADMIN — ALLOPURINOL 300 MG: 300 TABLET ORAL at 08:25

## 2022-01-01 RX ADMIN — METHYLPREDNISOLONE SODIUM SUCCINATE 40 MG: 40 INJECTION, POWDER, FOR SOLUTION INTRAMUSCULAR; INTRAVENOUS at 20:58

## 2022-01-01 RX ADMIN — ALLOPURINOL 300 MG: 300 TABLET ORAL at 09:23

## 2022-01-01 RX ADMIN — TAZOBACTAM SODIUM AND PIPERACILLIN SODIUM 3.38 G: 375; 3 INJECTION, SOLUTION INTRAVENOUS at 15:10

## 2022-01-01 RX ADMIN — PAROXETINE HYDROCHLORIDE 10 MG: 10 TABLET, FILM COATED ORAL at 10:17

## 2022-01-01 RX ADMIN — METOPROLOL TARTRATE 25 MG: 25 TABLET, FILM COATED ORAL at 20:04

## 2022-01-01 RX ADMIN — FOLIC ACID 1 MG: 1 TABLET ORAL at 08:57

## 2022-01-01 RX ADMIN — ACETAMINOPHEN 650 MG: 325 TABLET ORAL at 11:04

## 2022-01-01 RX ADMIN — PAROXETINE HYDROCHLORIDE HEMIHYDRATE 10 MG: 10 TABLET, FILM COATED ORAL at 09:21

## 2022-01-01 RX ADMIN — INSULIN GLARGINE-YFGN 5 UNITS: 100 INJECTION, SOLUTION SUBCUTANEOUS at 20:55

## 2022-01-01 RX ADMIN — ACETYLCYSTEINE 4 ML: 200 SOLUTION ORAL; RESPIRATORY (INHALATION) at 23:36

## 2022-01-01 RX ADMIN — PAROXETINE HYDROCHLORIDE 10 MG: 10 TABLET, FILM COATED ORAL at 13:30

## 2022-01-01 RX ADMIN — ONDANSETRON 4 MG: 2 INJECTION INTRAMUSCULAR; INTRAVENOUS at 12:45

## 2022-01-01 RX ADMIN — DILTIAZEM HYDROCHLORIDE 30 MG: 30 TABLET, FILM COATED ORAL at 17:00

## 2022-01-01 RX ADMIN — TAZOBACTAM SODIUM AND PIPERACILLIN SODIUM 3.38 G: 375; 3 INJECTION, SOLUTION INTRAVENOUS at 20:04

## 2022-01-01 RX ADMIN — INSULIN LISPRO 6 UNITS: 100 INJECTION, SOLUTION INTRAVENOUS; SUBCUTANEOUS at 09:07

## 2022-01-01 RX ADMIN — APIXABAN 5 MG: 5 TABLET, FILM COATED ORAL at 21:27

## 2022-01-01 RX ADMIN — APIXABAN 5 MG: 5 TABLET, FILM COATED ORAL at 20:59

## 2022-01-01 RX ADMIN — IPRATROPIUM BROMIDE AND ALBUTEROL SULFATE 3 ML: .5; 3 SOLUTION RESPIRATORY (INHALATION) at 08:47

## 2022-01-01 RX ADMIN — ALLOPURINOL 300 MG: 300 TABLET ORAL at 09:21

## 2022-01-01 RX ADMIN — BUDESONIDE AND FORMOTEROL FUMARATE DIHYDRATE 2 PUFF: 160; 4.5 AEROSOL RESPIRATORY (INHALATION) at 19:52

## 2022-01-01 RX ADMIN — IPRATROPIUM BROMIDE AND ALBUTEROL SULFATE 3 ML: .5; 3 SOLUTION RESPIRATORY (INHALATION) at 19:18

## 2022-01-01 RX ADMIN — IPRATROPIUM BROMIDE AND ALBUTEROL SULFATE 3 ML: 2.5; .5 SOLUTION RESPIRATORY (INHALATION) at 16:09

## 2022-01-01 RX ADMIN — FUROSEMIDE 40 MG: 40 TABLET ORAL at 21:05

## 2022-01-01 RX ADMIN — DILTIAZEM HYDROCHLORIDE 30 MG: 30 TABLET, FILM COATED ORAL at 12:31

## 2022-01-01 RX ADMIN — ATORVASTATIN CALCIUM 80 MG: 80 TABLET, FILM COATED ORAL at 21:27

## 2022-01-01 RX ADMIN — Medication 10 ML: at 08:55

## 2022-01-01 RX ADMIN — ALLOPURINOL 300 MG: 300 TABLET ORAL at 09:20

## 2022-01-01 RX ADMIN — ALLOPURINOL 300 MG: 300 TABLET ORAL at 08:07

## 2022-01-01 RX ADMIN — FUROSEMIDE 40 MG: 40 TABLET ORAL at 09:30

## 2022-01-01 RX ADMIN — DILTIAZEM HYDROCHLORIDE 30 MG: 30 TABLET, FILM COATED ORAL at 09:20

## 2022-01-01 RX ADMIN — METOPROLOL TARTRATE 12.5 MG: 25 TABLET, FILM COATED ORAL at 08:49

## 2022-01-01 RX ADMIN — AMLODIPINE BESYLATE 5 MG: 5 TABLET ORAL at 08:38

## 2022-01-01 RX ADMIN — APIXABAN 5 MG: 5 TABLET, FILM COATED ORAL at 09:57

## 2022-01-01 RX ADMIN — METHYLPREDNISOLONE SODIUM SUCCINATE 40 MG: 40 INJECTION, POWDER, FOR SOLUTION INTRAMUSCULAR; INTRAVENOUS at 04:54

## 2022-01-01 RX ADMIN — FOLIC ACID 1 MG: 1 TABLET ORAL at 08:24

## 2022-01-01 RX ADMIN — IOPAMIDOL 85 ML: 612 INJECTION, SOLUTION INTRAVENOUS at 22:48

## 2022-01-01 RX ADMIN — AMLODIPINE BESYLATE 5 MG: 5 TABLET ORAL at 08:41

## 2022-01-01 RX ADMIN — IPRATROPIUM BROMIDE AND ALBUTEROL SULFATE 3 ML: .5; 3 SOLUTION RESPIRATORY (INHALATION) at 15:52

## 2022-01-01 RX ADMIN — POTASSIUM CHLORIDE 20 MEQ: 10 TABLET, EXTENDED RELEASE ORAL at 17:27

## 2022-01-01 RX ADMIN — FUROSEMIDE 40 MG: 10 INJECTION, SOLUTION INTRAMUSCULAR; INTRAVENOUS at 20:49

## 2022-01-01 RX ADMIN — OXYBUTYNIN CHLORIDE 10 MG: 10 TABLET, EXTENDED RELEASE ORAL at 15:30

## 2022-01-01 RX ADMIN — FUROSEMIDE 40 MG: 40 TABLET ORAL at 09:20

## 2022-02-14 PROBLEM — I50.33 DIASTOLIC CHF, ACUTE ON CHRONIC: Status: ACTIVE | Noted: 2021-04-22

## 2022-02-14 PROBLEM — J18.9 PNEUMONIA DUE TO INFECTIOUS ORGANISM: Status: ACTIVE | Noted: 2022-01-01

## 2022-02-14 PROBLEM — J96.01 ACUTE RESPIRATORY FAILURE WITH HYPOXIA (HCC): Status: ACTIVE | Noted: 2022-01-01

## 2022-02-14 PROBLEM — J81.0 ACUTE PULMONARY EDEMA: Status: ACTIVE | Noted: 2022-01-01

## 2022-02-14 NOTE — PLAN OF CARE
Goal Outcome Evaluation:  Plan of Care Reviewed With: patient     Outcome Summary: Pt admitted to floor from ED with acute on chronic respiratory failure, pt had to be started on a BiPAP for abnormal ABGs, repeat ABGs better,  pt now on a high-flow NC, currently at 9 L, will wean as able, pt on IV diuretics, will continue to monitor

## 2022-02-14 NOTE — ED NOTES
"Nursing report ED to floor  Yazmin Javier  86 y.o.  female    HPI :   Chief Complaint   Patient presents with   • Shortness of Breath       Admitting doctor:   Raciel Chapa MD    Admitting diagnosis:   The primary encounter diagnosis was Pneumonia due to infectious organism, unspecified laterality, unspecified part of lung. A diagnosis of Acute respiratory failure with hypoxia (HCC) was also pertinent to this visit.    Code status:   Current Code Status     Date Active Code Status Order ID Comments User Context       2/14/2022 0540 CPR (Attempt to Resuscitate) [id number]  Donna Gomes, APRN ED     Advance Care Planning Activity      Questions for Current Code Status     Question Answer    Code Status (Patient has no pulse and is not breathing) CPR (Attempt to Resuscitate)    Medical Interventions (Patient has pulse or is breathing) Full Support          Allergies:   Cefazolin    Intake and Output  No intake or output data in the 24 hours ending 02/14/22 0549    Weight:       02/14/22 0425   Weight: 85.3 kg (188 lb)       Most recent vitals:   Vitals:    02/14/22 0418 02/14/22 0425   BP: 131/79 132/67   BP Location: Right arm Right arm   Patient Position: Lying Lying   Pulse: 62 60   Resp: 17 (!) 33   Temp: 97.9 °F (36.6 °C)    TempSrc: Temporal    SpO2: 100% 100%   Weight:  85.3 kg (188 lb)   Height:  157.5 cm (62\")       Active LDAs/IV Access:   Lines, Drains & Airways     Active LDAs     Name Placement date Placement time Site Days    Peripheral IV 02/14/22 0425 Right Antecubital 02/14/22 0425  Antecubital  less than 1                Labs (abnormal labs have a star):   Labs Reviewed   COMPREHENSIVE METABOLIC PANEL - Abnormal; Notable for the following components:       Result Value    Glucose 127 (*)     Creatinine 1.41 (*)     Total Bilirubin 2.2 (*)     eGFR Non  Amer 36 (*)     All other components within normal limits    Narrative:     GFR Normal >60  Chronic Kidney Disease <60  Kidney " Failure <15     BNP (IN-HOUSE) - Abnormal; Notable for the following components:    proBNP 2,248.0 (*)     All other components within normal limits    Narrative:     Among patients with dyspnea, NT-proBNP is highly sensitive for the detection of acute congestive heart failure. In addition NT-proBNP of <300 pg/ml effectively rules out acute congestive heart failure with 99% negative predictive value.    Results may be falsely decreased if patient taking Biotin.     CBC WITH AUTO DIFFERENTIAL - Abnormal; Notable for the following components:    RBC 2.98 (*)     Hemoglobin 10.3 (*)     Hematocrit 31.1 (*)     .4 (*)     MCH 34.6 (*)     RDW-SD 54.1 (*)     Neutrophil % 76.2 (*)     Lymphocyte % 12.9 (*)     All other components within normal limits   BLOOD GAS, ARTERIAL - Abnormal; Notable for the following components:    pH, Arterial 7.348 (*)     pCO2, Arterial 46.9 (*)     Base Excess, Arterial -0.2 (*)     All other components within normal limits   TROPONIN (IN-HOUSE) - Normal    Narrative:     Troponin T Reference Range:  <= 0.03 ng/mL-   Negative for AMI  >0.03 ng/mL-     Abnormal for myocardial necrosis.  Clinicians would have to utilize clinical acumen, EKG, Troponin and serial changes to determine if it is an Acute Myocardial Infarction or myocardial injury due to an underlying chronic condition.       Results may be falsely decreased if patient taking Biotin.     RESPIRATORY PANEL PCR W/ COVID-19 (SARS-COV-2) [facility]/DEJON/[facility] IN-HOUSE, NP SWAB IN [facility], 3-4 HR TAT   BLOOD CULTURE   BLOOD CULTURE   BLOOD GAS, ARTERIAL   PROCALCITONIN   LACTIC ACID, PLASMA   BASIC METABOLIC PANEL   CBC WITH AUTO DIFFERENTIAL   PROTIME-INR   CBC AND DIFFERENTIAL    Narrative:     The following orders were created for panel order CBC & Differential.  Procedure                               Abnormality         Status                     ---------                               -----------         ------                      CBC Auto Differential[[id number]]        Abnormal            Final result                 Please view results for these tests on the individual orders.       EKG:   ECG 12 Lead   Preliminary Result   HEART RATE= 61  bpm   RR Interval= 996  ms   VT Interval= 47  ms   P Horizontal Axis= 179  deg   P Front Axis= -89  deg   QRSD Interval= 94  ms   QT Interval= 477  ms   QRS Axis= 77  deg   T Wave Axis= 78  deg   - BORDERLINE ECG -   Sinus or ectopic atrial rhythm   Atrial premature complex   Short VT interval   Electronically Signed By:    Date and Time of Study: 2022-02-14 04:37:33          Meds given in ED:   Medications   sodium chloride 0.9 % flush 10 mL (has no administration in time range)   doxycycline (VIBRAMYCIN) 100 mg in sodium chloride 0.9 % 100 mL IVPB-VTB (has no administration in time range)   sodium chloride 0.9 % flush 10 mL (has no administration in time range)   sodium chloride 0.9 % flush 10 mL (has no administration in time range)   nitroglycerin (NITROSTAT) SL tablet 0.4 mg (has no administration in time range)   acetaminophen (TYLENOL) tablet 650 mg (has no administration in time range)     Or   acetaminophen (TYLENOL) 160 MG/5ML solution 650 mg (has no administration in time range)     Or   acetaminophen (TYLENOL) suppository 650 mg (has no administration in time range)   ondansetron (ZOFRAN) injection 4 mg (has no administration in time range)       Imaging results:  No radiology results for the last day    Ambulatory status:   - TWO ASSIST/WEAKNESS    Social issues:   Social History     Socioeconomic History   • Marital status: Single   Tobacco Use   • Smoking status: Never Smoker   • Smokeless tobacco: Never Used   Substance and Sexual Activity   • Alcohol use: Never   • Sexual activity: Defer       NIH Stroke Scale:        Nursing report ED to floor:     Kim Langley RN  02/14/22 0153

## 2022-02-14 NOTE — ED PROVIDER NOTES
EMERGENCY DEPARTMENT ENCOUNTER    Room Number:  11/11  Date of encounter:  2/14/2022  PCP: Morenita Silverio MD  Historian: Patient and EMS    HPI:  Chief Complaint: Shortness of breath  A complete HPI/ROS/PMH/PSH/SH/FH are unobtainable due to: Patient is a poor historian    Context: Yazmin Javier is a 86 y.o. female who presents to the ED c/o shortness of breath began this morning at approximately 3 AM.  Per EMS the patient is on 2 L nasal cannula chronically.  When they got the call and arrived at the patient's residence she was satting 88 on 2 L nasal cannula.  They put on nonrebreather high flow and she went to 100%.  He attempted to titrate the patient down to 6 L nasal cannula and her SPO2 again fell into the 80s.  Nonrebreather was then placed on the patient she was brought to the emergency department for further evaluation.    Reviewing her chart she has a past medical history of atrial fibrillation and CHF.  She was last admitted to this hospital in October 2021 for syncope, anemia, chronic renal insufficiency.  Patient is anticoagulated with Eliquis.      PAST MEDICAL HISTORY  Active Ambulatory Problems     Diagnosis Date Noted   • Acute on chronic congestive heart failure (HCC) 04/04/2021   • Hypoxia 04/04/2021   • A-fib (HCC) 04/04/2021   • Chronic anticoagulation 04/04/2021   • Hypothyroidism (acquired) 04/04/2021   • Stage 3b chronic kidney disease ([facility]) 04/04/2021   • Right arm weakness 04/22/2021   • Chronic diastolic CHF (congestive heart failure) ([facility]) 04/22/2021   • Syncope 10/31/2021     Resolved Ambulatory Problems     Diagnosis Date Noted   • Acute embolic stroke ([facility]) 04/24/2021     No Additional Past Medical History         PAST SURGICAL HISTORY  History reviewed. No pertinent surgical history.      FAMILY HISTORY  History reviewed. No pertinent family history.      SOCIAL HISTORY  Social History     Socioeconomic History   • Marital status: Single   Tobacco Use   • Smoking status: Never  Smoker   • Smokeless tobacco: Never Used   Substance and Sexual Activity   • Alcohol use: Never   • Sexual activity: Defer         ALLERGIES  Cefazolin        REVIEW OF SYSTEMS  Review of Systems   Unable to perform ROS: Other   Poor historian    All systems reviewed and negative except for those discussed in HPI.       PHYSICAL EXAM    I have reviewed the triage vital signs and nursing notes.    ED Triage Vitals [02/14/22 0418]   Temp Heart Rate Resp BP SpO2   97.9 °F (36.6 °C) 62 17 131/79 100 %      Temp src Heart Rate Source Patient Position BP Location FiO2 (%)   Temporal Monitor Lying Right arm --       Physical Exam  GENERAL: Chronically ill-appearing, moderate distress  HENT: nares patent  EYES: no scleral icterus  CV: regular rhythm, regular rate  RESPIRATORY: Faint rales and rhonchi at the bases, slightly decreased air movement, increased labored breathing.  ABDOMEN: soft, nontender  MUSCULOSKELETAL: no deformity  NEURO: alert to person and place disoriented to date time, moves all extremities, follows commands  SKIN: warm, dry        LAB RESULTS  Recent Results (from the past 24 hour(s))   ECG 12 Lead    Collection Time: 02/14/22  4:37 AM   Result Value Ref Range    QT Interval 477 ms   Blood Gas, Arterial -    Collection Time: 02/14/22  4:52 AM    Specimen: Arterial Blood   Result Value Ref Range    Site Arterial: right brachial     Delgado's Test N/A     pH, Arterial 7.348 (L) 7.350 - 7.450 pH units    pCO2, Arterial 46.9 (H) 35.0 - 45.0 mm Hg    pO2, Arterial 91.3 80.0 - 100.0 mm Hg    HCO3, Arterial 25.8 22.0 - 28.0 mmol/L    Base Excess, Arterial -0.2 (L) 0.0 - 2.0 mmol/L    O2 Saturation Calculated 96.5 92.0 - 99.0 %    Barometric Pressure for Blood Gas 756.7 mmHg    Modality NRB     Flow Rate 15 lpm    Rate 22 Breaths/minute   Comprehensive Metabolic Panel    Collection Time: 02/14/22  4:58 AM    Specimen: Blood   Result Value Ref Range    Glucose 127 (H) 65 - 99 mg/dL    BUN 22 8 - 23 mg/dL     Creatinine 1.41 (H) 0.57 - 1.00 mg/dL    Sodium 143 136 - 145 mmol/L    Potassium 4.2 3.5 - 5.2 mmol/L    Chloride 106 98 - 107 mmol/L    CO2 25.1 22.0 - 29.0 mmol/L    Calcium 9.6 8.6 - 10.5 mg/dL    Total Protein 7.3 6.0 - 8.5 g/dL    Albumin 3.80 3.50 - 5.20 g/dL    ALT (SGPT) 20 1 - 33 U/L    AST (SGOT) 24 1 - 32 U/L    Alkaline Phosphatase 113 39 - 117 U/L    Total Bilirubin 2.2 (H) 0.0 - 1.2 mg/dL    eGFR Non African Amer 36 (L) >60 mL/min/1.73    Globulin 3.5 gm/dL    A/G Ratio 1.1 g/dL    BUN/Creatinine Ratio 15.6 7.0 - 25.0    Anion Gap 11.9 5.0 - 15.0 mmol/L   BNP    Collection Time: 02/14/22  4:58 AM    Specimen: Blood   Result Value Ref Range    proBNP 2,248.0 (H) 0.0-1,800.0 pg/mL   Troponin    Collection Time: 02/14/22  4:58 AM    Specimen: Blood   Result Value Ref Range    Troponin T <0.010 0.000 - 0.030 ng/mL   CBC Auto Differential    Collection Time: 02/14/22  4:58 AM    Specimen: Blood   Result Value Ref Range    WBC 9.10 3.40 - 10.80 10*3/mm3    RBC 2.98 (L) 3.77 - 5.28 10*6/mm3    Hemoglobin 10.3 (L) 12.0 - 15.9 g/dL    Hematocrit 31.1 (L) 34.0 - 46.6 %    .4 (H) 79.0 - 97.0 fL    MCH 34.6 (H) 26.6 - 33.0 pg    MCHC 33.1 31.5 - 35.7 g/dL    RDW 14.4 12.3 - 15.4 %    RDW-SD 54.1 (H) 37.0 - 54.0 fl    MPV 9.4 6.0 - 12.0 fL    Platelets 167 140 - 450 10*3/mm3    Neutrophil % 76.2 (H) 42.7 - 76.0 %    Lymphocyte % 12.9 (L) 19.6 - 45.3 %    Monocyte % 7.4 5.0 - 12.0 %    Eosinophil % 2.6 0.3 - 6.2 %    Basophil % 0.5 0.0 - 1.5 %    Immature Grans % 0.4 0.0 - 0.5 %    Neutrophils, Absolute 6.93 1.70 - 7.00 10*3/mm3    Lymphocytes, Absolute 1.17 0.70 - 3.10 10*3/mm3    Monocytes, Absolute 0.67 0.10 - 0.90 10*3/mm3    Eosinophils, Absolute 0.24 0.00 - 0.40 10*3/mm3    Basophils, Absolute 0.05 0.00 - 0.20 10*3/mm3    Immature Grans, Absolute 0.04 0.00 - 0.05 10*3/mm3    nRBC 0.1 0.0 - 0.2 /100 WBC       Ordered the above labs and independently reviewed the results.        RADIOLOGY  No Radiology  Exams Resulted Within Past 24 Hours    I ordered the above noted radiological studies. Reviewed by me and discussed with radiologist.  See dictation for official radiology interpretation.      PROCEDURES    Procedures      MEDICATIONS GIVEN IN ER    Medications   sodium chloride 0.9 % flush 10 mL (has no administration in time range)   doxycycline (VIBRAMYCIN) 100 mg in sodium chloride 0.9 % 100 mL IVPB-VTB (has no administration in time range)   sodium chloride 0.9 % flush 10 mL (has no administration in time range)   sodium chloride 0.9 % flush 10 mL (has no administration in time range)   nitroglycerin (NITROSTAT) SL tablet 0.4 mg (has no administration in time range)   acetaminophen (TYLENOL) tablet 650 mg (has no administration in time range)     Or   acetaminophen (TYLENOL) 160 MG/5ML solution 650 mg (has no administration in time range)     Or   acetaminophen (TYLENOL) suppository 650 mg (has no administration in time range)   ondansetron (ZOFRAN) injection 4 mg (has no administration in time range)         PROGRESS, DATA ANALYSIS, CONSULTS, AND MEDICAL DECISION MAKING    All labs have been independently reviewed by me.  All radiology studies have been reviewed by me and discussed with radiologist dictating the report.   EKG's independently viewed and interpreted by me.  Discussion below represents my analysis of pertinent findings related to patient's condition, differential diagnosis, treatment plan and final disposition.    DDx includes but is not limited to: ACS, CHF, COPD, COVID-19, PE, pneumonia, other viral respiratory infection.  Doubt PE as the patient is on Eliquis.  We will begin work-up by obtaining CBC, CMP, portable chest x-ray, EKG, troponin, BNP, respiratory viral panel to further evaluate the patient.    ED Course as of 02/14/22 0559   Mon Feb 14, 2022   0504 pH, Arterial(!): 7.348 [RC]   0504 pCO2, Arterial(!): 46.9 [RC]   0504 pO2, Arterial: 91.3 [RC]   0504 HCO3, Arterial: 25.8 [RC]   0507  EKG          EKG time: 0437  Rhythm/Rate: 61/sinus  P waves and CA: Short interval, pac   QRS, axis: Nl Axis   ST and T waves: No acute st or t wave cgs     Interpreted Contemporaneously by me, independently viewed  -no significant cg from 31 Oct 2021 [RC]   0526 Viewed patient's chest x-ray and there clearly bilateral lower lobe infiltrates.  X-ray has not officially been read by the radiologist.  I will add blood cultures, procalcitonin, lactic acid and given patient's allergies and discussion with my supervising physician Dr. Sutton we will go ahead and treat with doxycycline.  Will place call to the hospitalist to discuss admission.  Should the the respiratory viral panel and procalcitonin and lactic acid be negative the antibiotics can be stopped. [RC]   8714 Discussed patient's case with JADON Chase with [facility].  To place the patient in a telemetry bed under Dr. Jose keller. [RC]      ED Course User Index  [RC] Josh Berry III, PA           PPE: The patient wore a surgical mask throughout the entire patient encounter. I wore an N95.    AS OF 05:59 EST VITALS:    BP - 132/67  HR - 60  TEMP - 97.9 °F (36.6 °C) (Temporal)  O2 SATS - 100%        DIAGNOSIS  Final diagnoses:   Pneumonia due to infectious organism, unspecified laterality, unspecified part of lung   Acute respiratory failure with hypoxia (HCC)         DISPOSITION  ADMISSION    Discussed treatment plan and reason for admission with pt/family and admitting physician.  Pt/family voiced understanding of the plan for admission for further testing/treatment as needed.              Josh Berry III, PA  02/14/22 0501

## 2022-02-14 NOTE — H&P
HISTORY AND PHYSICAL   [facility]        Patient Identification:  Name: Yazmin Javier  Age: 86 y.o.  Sex: female  :  1935  MRN: [id number]                     Primary Care Physician: Morenita Silverio MD    Chief Complaint: Short of breath.    History of Present Illness:   Pleasant 86-year-old female with history of diastolic CHF presents complaining of shortness of breath.  My first introduction to the patient was been messaged this morning by the ER RN because the patient was on a high percent nonrebreather and breathing 40 times a minute.  Per available records the patient presented with respiratory rate of 17 but was requiring nonrebreather at that point.  Initial assessment by the ER staff was pneumonia and she has been given IV antibiotics.  On discussing with the patient she states she started feeling short of breath after the game last night.  Is been getting progressively worse.  No fever sweats or chills associated with this.  No coughing.  No sputum production.  No chest pain.  No palpitations.  No lightheaded spells.  She does have a history of diastolic CHF and 10 months ago and had a very similar presentation and the patient also notes a similarities her self.  She does not note any increased shortness of breath with lying versus sitting etc.  Interestingly the patient herself does not perceive that she is any more short of breath now than she was when she came in.    Past Medical History:  History reviewed. No pertinent past medical history.  Past Surgical History:  History reviewed. No pertinent surgical history.   Home Meds:  (Not in a hospital admission)      Allergies:  Allergies   Allergen Reactions   • Cefazolin Other (See Comments)     Immunizations:    There is no immunization history on file for this patient.  Social History:   Social History     Social History Narrative   • Not on file     Social History     Tobacco Use   • Smoking status: Never Smoker   • Smokeless  tobacco: Never Used   Substance Use Topics   • Alcohol use: Never     Family History:  History reviewed. No pertinent family history.     Review of Systems  Review of Systems   Constitutional: Negative.    HENT: Negative.    Eyes: Negative.    Respiratory:        As per history of present illness.   Cardiovascular:        As per history of present illness.   Gastrointestinal: Negative.    Endocrine: Negative.    Genitourinary: Negative.    Skin: Negative.    Allergic/Immunologic: Negative.    Neurological: Negative.    Hematological: Negative.    Psychiatric/Behavioral: Negative.        Objective:  T Max 24 hrs: Temp (24hrs), Av.9 °F (36.6 °C), Min:97.9 °F (36.6 °C), Max:97.9 °F (36.6 °C)    Vitals Ranges:   Temp:  [97.9 °F (36.6 °C)] 97.9 °F (36.6 °C)  Heart Rate:  [60-66] 66  Resp:  [17-40] 40  BP: (131-173)/(67-85) 173/81      Exam:  Physical Exam  Constitutional:       Appearance: She is obese. She is not ill-appearing, toxic-appearing or diaphoretic.   HENT:      Head: Normocephalic and atraumatic.      Right Ear: External ear normal.      Left Ear: External ear normal.      Nose: Nose normal.      Mouth/Throat:      Mouth: Mucous membranes are moist.   Eyes:      Extraocular Movements: Extraocular movements intact.      Conjunctiva/sclera: Conjunctivae normal.   Cardiovascular:      Rate and Rhythm: Normal rate. Rhythm irregular.      Comments: Heart sounds are distant.  Patient is hypertensive with a diastolic of nearly 120.  Pedal pulses 1+ bilaterally.  Positive JVD.  Pulmonary:      Breath sounds: No stridor. No rhonchi.      Comments: Patient does appear short of air but not in distress.  Respiratory rates are in the upper 20s at present.  There is some musculoskeletal discoordination associated with her respirations.  Breath sounds are also somewhat diminished.  There are some fine rales in the bases and end expiratory wheezes in the apices bilaterally.  Inspiratory/expiratory ratio is normal  however.  No inspiratory retractions.  Chest:      Chest wall: No tenderness.   Abdominal:      General: Abdomen is flat. Bowel sounds are normal. There is no distension.      Palpations: Abdomen is soft. There is no mass.      Tenderness: There is no abdominal tenderness. There is no guarding or rebound.   Musculoskeletal:      Cervical back: Normal range of motion and neck supple.      Right lower leg: Edema present.      Left lower leg: Edema present.      Comments: Trace pretibial edema.   Skin:     General: Skin is warm and dry.   Neurological:      General: No focal deficit present.      Mental Status: She is alert and oriented to person, place, and time.   Psychiatric:         Mood and Affect: Mood normal.         Behavior: Behavior normal.         Thought Content: Thought content normal.         Judgment: Judgment normal.         Data Review:  All labs and radiology reviewed.    Assessment:  Acute hypoxic respiratory failure: Secondary to pulmonary edema/CHF.  With the given clinical deterioration I will go ahead and recheck the ABGs.  Stat pulmonary consult requested.  Discussed with pulmonologist.  Acute on chronic CHF with pulmonary edema: Stat dose of Lasix 80 mg IV ordered.  Discussed with RN.  Previous echogram showed diastolic dysfunction and small left ventricular cavity.  I have personally viewed the chest x-ray.  The changes are consistent with pulmonary edema.  Doubt pneumonia.  Procalcitonin level well within normal limits.  No fever sweats or chills.  No sputum production.  No leukocytosis.  Recommend discontinue antibiotics.  Continue with aggressive diuresis.  Recheck echocardiogram.  A bit concerned without distant heart sounds are on exam.  Atrial fibrillation: Continue with rate control and anticoagulation.  COPD: Continue bronchodilators.  Presently I doubt COPD exacerbation.  I suspect the wheezes on hearing are actually cardiac in origin.  Monitor closely.  Hypothyroidism: Recheck TSH  and continue home meds.  Macrocytosis: Again needs a TSH rechecked.  We will also check B12 and folate levels.  Chronic kidney disease stage III: Monitor closely with diuresis.  Morbid obesity: Significant comorbidity.  Encourage weight loss.      Plan:  Please see above.  Discussed with RN.  Discussed with pulmonology.      Raciel Chapa MD  2/14/2022  09:21 EST    Addendum:  Repeat ABGs indicate that the patient is deteriorating with increasing CO2's and respiratory acidosis.  Discussed with pulmonology.  We will initiate BiPAP and hopefully we can get a good diuresis going here.  If she responds well we will continue with telemetry admission.  If not she will be transferred to ICU.  Electronically signed by Raciel Chapa MD, 02/14/22, 9:58 AM EST.     Addendum:  Patient looking much better this afternoon.  Off BiPAP and down to 9 L nasal cannula.  States she is breathing easier.  Lungs still with a few scattered faint end expiratory wheezes.  Rare faint basilar rale.  Much improved.  Continue diuresis.  Cardiology input appreciated.  Patient is requesting DNR status.  Discussed with RN.      EMR Dragon/Transcription disclaimer:   Much of this encounter note is an electronic transcription/translation of spoken language to printed text. The electronic translation of spoken language may permit erroneous, or at times, nonsensical words or phrases to be inadvertently transcribed; Although I have reviewed the note for such errors, some may still exist.

## 2022-02-14 NOTE — ED PROVIDER NOTES
The LETY and I have discussed this patients history, physical exam, and treatment plan. I have reviewed the documentation and personally had a face to face interaction with the patient. I affirm the documentation and agree with the treatment and plan.  The following note describes my personal findings    I provided a substantive portion of the care of this patient.  I personally performed the physical exam in its entirety for this encounter.      This patient is an 86-year-old female who presents to the emergency department this morning secondary to difficulty breathing.  Per reports, the patient was on 2 L of oxygen by nasal cannula and had oxygen saturations of 88%.  She does have a history of congestive heart failure.      Physical Exam  Vitals and nursing note reviewed.   Constitutional:       General: She is not in acute distress.     Appearance: She is well-developed.   HENT:      Head: Normocephalic.   Eyes:      Pupils: Pupils are equal, round, and reactive to light.   Cardiovascular:      Rate and Rhythm: Normal rate and regular rhythm.   Pulmonary:      Effort: Respiratory distress present.      Breath sounds: Examination of the right-lower field reveals rhonchi. Examination of the left-lower field reveals rhonchi. Rhonchi present.   Abdominal:      General: Bowel sounds are normal.      Palpations: Abdomen is soft.      Tenderness: There is no abdominal tenderness. There is no guarding or rebound.   Musculoskeletal:         General: Normal range of motion.      Cervical back: Normal range of motion and neck supple.   Skin:     General: Skin is warm and dry.      Findings: No rash.   Neurological:      Mental Status: She is alert and oriented to person, place, and time.       Plan: We will obtain labs as well as a chest x-ray and EKG in the emergency department.  We will continue the patient on at least 6 to 8 L of oxygen by nasal cannula to keep her oxygen saturations stable.  We will monitor and reassess  and treat accordingly.      The patient was wearing a facemask upon entrance into the room and remained in such throughout their visit.  I was wearing PPE including a facemask, eye protection, as well as gloves at any point entering the room and throughout the visit       Esteban Sutton MD  02/15/22 0112

## 2022-02-14 NOTE — CONSULTS
3  Referring Provider: Dr. Chapa  Reason for Consultation: Respiratory failure    Patient Care Team:  Morenita Silverio MD as PCP - General (Internal Medicine)    Chief complaint:   Shortness of breath    History of present illness:    Subjective   This is an 86-year-old female patient, lifelong non-smoker with reported history of asthma, on albuterol.  No prior PFT available.    She has chronic respiratory failure and uses oxygen 2.5 L/min.  She also has CHF and follows with Dr. Sparrow.    She presented to the hospital today for shortness of breath which started last night per her reports.  Dyspnea was worse with minimal activities.  She has mild associated cough with clear phlegm.  No chest pain or palpitation.  She is on Lasix 40 mg daily at home and reported taking it regularly.  She stated that she does not add salt to her food.    On presentation to the ED, she was placed on 15 L oxygen but her work of breathing increased since presentation and hence we were called to evaluate her.  She was treated for presumable pneumonia and received doxycycline and albuterol.    Data: Echo 11/1/2021: EF 50-55%; RVSP 35-45; normal LA size; mild MR with central jet.    Labs:  ABG: Initially at 4:50 AM 7.34/47/91 on 15 L NRB.  Repeat ABG at 9:30 AM 7.25/60/88 on NRB 15 L.  Hemoglobin 10; WBC normal.  Lactic acid and procalcitonin normal.      Review of Systems  Constitutional: No fever or chills.   ENMT: No sinus congestion or postnasal drip  Cardiovascular: No chest pain, palpitation but legs swelling.    Respiratory: See above  Gastrointestinal: No constipation, diarrhea or abdominal pain.  No nausea or vomiting  Neurology: No headache, weakness, numbness or dizziness.   Musculoskeletal: No joints pain, stiffness or swelling.   Psychiatry: No depression.  Genitourinary: No dysuria or frequent urination  Endo: No weight changes. No cold or warm intolerance.  Lymphatic: No swollen glands.  Integumentary: No  rash.    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  She does not drink alcohol.  No illicit drug use.    Allergies:  Cefazolin      , (Not in a hospital admission)  , Scheduled Meds:  sodium chloride, 10 mL, Intravenous, Q12H     and Allergies:  Cefazolin    Objective     Vital Signs   Temp:  [97.9 °F (36.6 °C)] 97.9 °F (36.6 °C)  Heart Rate:  [60-87] 87  Resp:  [17-44] 44  BP: (131-173)/(67-85) 173/81    PPE used per hospital policy    Physical Exam:  Constitutional: Not in acute distress.  Eyes: Injected conjunctivae, EOMI. pupils equal reactive to light.  ENMT: Fraga and Mallampati 3. No oral thrush.  External ears normal.  Neck: Large. Trachea midline. No thyromegaly  Heart: Regular rhythm but normal rate.  No audible murmur.  Lungs: Good and equal air entry bilaterally.  Non labored breathing.   Abdomen: Obese. Soft. No tenderness or dullness. No HSM.  Extremities: No cyanosis, clubbing but grade 3 pitting edema in legs.  Warm extremities and well-perfused.  Neuro: Conscious, alert, oriented x3.  Strength 5/5 in arms and legs.  No sensory deficit.  Psych: Appropriate mood and affect.    Integumentary: No rash.  Normal skin turgor  Lymphatic: No palpable cervical or supraclavicular lymph nodes.      Diagnostic imaging:  I personally and independently reviewed the following images:   CXR 2/14/22: Pulmonary edema        Assessment   1. Acute pulmonary edema  2. Acute hypercapnic respiratory failure, secondary #1  3. Acute on chronic hypoxic respiratory failure, secondary #1  4. Acute diastolic CHF  5. Elevated proBNP  6. Anemia, chronic  7. History of asthma prior records, unknown severity.  Doubt current exacerbation  8. High risk for sleep apnea    Pertinent medical problems:  · History of CVA with right  hemiparesis  · Atrial fibrillation  · Mild mitral regurgitation  · Hypothyroidism      Recommendations:    Initiate NIPPV with AVAPS, , EPAP 8, FiO2 100% and titrate to keep SPO2 89-92%.  Repeat ABG in 1-2 hours.  If no improvement clinically and white blood gas then she will need to be transferred to the intensive care unit.  I discussed with the ED nurse and Dr. Chapa.    Lasix 80 mg IV x1 and reassess again later.    Echocardiogram    Resume Eliquis    Consult Dr. Andrews Horner MD  02/14/22  09:44 EST    CC 35 minutes

## 2022-02-14 NOTE — ED NOTES
Nursing report ED to floor  Yazmin Javier  86 y.o.  female    HPI :   Chief Complaint   Patient presents with   • Shortness of Breath       Admitting doctor:   Raciel Chapa MD    Admitting diagnosis:   The primary encounter diagnosis was Pneumonia due to infectious organism, unspecified laterality, unspecified part of lung. A diagnosis of Acute respiratory failure with hypoxia (HCC) was also pertinent to this visit.    Code status:   Current Code Status     Date Active Code Status Order ID Comments User Context       2/14/2022 0955 CPR (Attempt to Resuscitate) [id number]  Raciel Chapa MD ED     Advance Care Planning Activity      Questions for Current Code Status     Question Answer    Code Status (Patient has no pulse and is not breathing) CPR (Attempt to Resuscitate)    Medical Interventions (Patient has pulse or is breathing) Full Support          Allergies:   Cefazolin    Intake and Output    Intake/Output Summary (Last 24 hours) at 2/14/2022 1058  Last data filed at 2/14/2022 0751  Gross per 24 hour   Intake 100 ml   Output --   Net 100 ml       Weight:       02/14/22 0425   Weight: 85.3 kg (188 lb)       Most recent vitals:   Vitals:    02/14/22 0924 02/14/22 0946 02/14/22 1001 02/14/22 1026   BP:   (!) 141/104    BP Location:       Patient Position:       Pulse: 87 70 68 68   Resp: (!) 44 (!) 46  26   Temp:       TempSrc:       SpO2: 100% 100% 100% 100%   Weight:       Height:           Active LDAs/IV Access:   Lines, Drains & Airways     Active LDAs     Name Placement date Placement time Site Days    Peripheral IV 02/14/22 0425 Right Antecubital 02/14/22 0425  Antecubital  less than 1                Labs (abnormal labs have a star):   Labs Reviewed   COMPREHENSIVE METABOLIC PANEL - Abnormal; Notable for the following components:       Result Value    Glucose 127 (*)     Creatinine 1.41 (*)     Total Bilirubin 2.2 (*)     eGFR Non  Amer 36 (*)     All other components within normal  limits    Narrative:     GFR Normal >60  Chronic Kidney Disease <60  Kidney Failure <15     BNP (IN-HOUSE) - Abnormal; Notable for the following components:    proBNP 2,248.0 (*)     All other components within normal limits    Narrative:     Among patients with dyspnea, NT-proBNP is highly sensitive for the detection of acute congestive heart failure. In addition NT-proBNP of <300 pg/ml effectively rules out acute congestive heart failure with 99% negative predictive value.    Results may be falsely decreased if patient taking Biotin.     CBC WITH AUTO DIFFERENTIAL - Abnormal; Notable for the following components:    RBC 2.98 (*)     Hemoglobin 10.3 (*)     Hematocrit 31.1 (*)     .4 (*)     MCH 34.6 (*)     RDW-SD 54.1 (*)     Neutrophil % 76.2 (*)     Lymphocyte % 12.9 (*)     All other components within normal limits   BLOOD GAS, ARTERIAL - Abnormal; Notable for the following components:    pH, Arterial 7.348 (*)     pCO2, Arterial 46.9 (*)     Base Excess, Arterial -0.2 (*)     All other components within normal limits   BASIC METABOLIC PANEL - Abnormal; Notable for the following components:    Glucose 130 (*)     BUN 24 (*)     Creatinine 1.32 (*)     eGFR Non  Amer 38 (*)     All other components within normal limits    Narrative:     GFR Normal >60  Chronic Kidney Disease <60  Kidney Failure <15     CBC WITH AUTO DIFFERENTIAL - Abnormal; Notable for the following components:    RBC 3.01 (*)     Hemoglobin 10.6 (*)     Hematocrit 32.4 (*)     .6 (*)     MCH 35.2 (*)     RDW-SD 59.1 (*)     All other components within normal limits   PROTIME-INR - Abnormal; Notable for the following components:    Protime 18.9 (*)     INR 1.59 (*)     All other components within normal limits   MANUAL DIFFERENTIAL - Abnormal; Notable for the following components:    Neutrophil % 85.0 (*)     Lymphocyte % 11.0 (*)     Monocyte % 3.0 (*)     Neutrophils Absolute 9.13 (*)     All other components within  normal limits   BNP (IN-HOUSE) - Abnormal; Notable for the following components:    proBNP 2,105.0 (*)     All other components within normal limits    Narrative:     Among patients with dyspnea, NT-proBNP is highly sensitive for the detection of acute congestive heart failure. In addition NT-proBNP of <300 pg/ml effectively rules out acute congestive heart failure with 99% negative predictive value.    Results may be falsely decreased if patient taking Biotin.     BLOOD GAS, ARTERIAL - Abnormal; Notable for the following components:    pH, Arterial 7.250 (*)     pCO2, Arterial 60.1 (*)     Base Excess, Arterial -1.6 (*)     All other components within normal limits   RESPIRATORY PANEL PCR W/ COVID-19 (SARS-COV-2) VIRGIL/DANIEL/DEJON/PAD/COR/[facility] IN-HOUSE, NP SWAB IN UT/VT, 3-4 HR TAT - Normal    Narrative:     In the setting of a positive respiratory panel with a viral infection PLUS a negative procalcitonin without other underlying concern for bacterial infection, consider observing off antibiotics or discontinuation of antibiotics and continue supportive care. If the respiratory panel is positive for atypical bacterial infection (Bordetella pertussis, Chlamydophila pneumoniae, or Mycoplasma pneumoniae), consider antibiotic de-escalation to target atypical bacterial infection.   TROPONIN (IN-HOUSE) - Normal    Narrative:     Troponin T Reference Range:  <= 0.03 ng/mL-   Negative for AMI  >0.03 ng/mL-     Abnormal for myocardial necrosis.  Clinicians would have to utilize clinical acumen, EKG, Troponin and serial changes to determine if it is an Acute Myocardial Infarction or myocardial injury due to an underlying chronic condition.       Results may be falsely decreased if patient taking Biotin.     PROCALCITONIN - Normal    Narrative:     As a Marker for Sepsis (Non-Neonates):     1. <0.5 ng/mL represents a low risk of severe sepsis and/or septic shock.  2. >2 ng/mL represents a high risk of severe sepsis and/or  "septic shock.    As a Marker for Lower Respiratory Tract Infections that require antibiotic therapy:  PCT on Admission     Antibiotic Therapy             6-12 Hrs later  >0.5                          Strongly Recommended            >0.25 - <0.5             Recommended  0.1 - 0.25                  Discouraged                       Remeasure/reassess PCT  <0.1                         Strongly Discouraged         Remeasure/reassess PCT      As 28 day mortality risk marker: \"Change in Procalcitonin Result\" (>80% or <=80%) if Day 0 (or Day 1) and Day 4 values are available. Refer to [email or web address]/    Change in PCT <=80 %   A decrease of PCT levels below or equal to 80% defines a positive change in PCT test result representing a higher risk for 28-day all-cause mortality of patients diagnosed with severe sepsis or septic shock.    Change in PCT >80 %   A decrease of PCT levels of more than 80% defines a negative change in PCT result representing a lower risk for 28-day all-cause mortality of patients diagnosed with severe sepsis or septic shock.               LACTIC ACID, PLASMA - Normal   BLOOD CULTURE   BLOOD CULTURE   BLOOD GAS, ARTERIAL   BLOOD GAS, ARTERIAL   RETICULOCYTES   CBC AND DIFFERENTIAL    Narrative:     The following orders were created for panel order CBC & Differential.  Procedure                               Abnormality         Status                     ---------                               -----------         ------                     CBC Auto Differential[[id number]]        Abnormal            Final result                 Please view results for these tests on the individual orders.       EKG:   ECG 12 Lead   Final Result   HEART RATE= 61  bpm   RR Interval= 996  ms   ID Interval= 47  ms   P Horizontal Axis= 179  deg   P Front Axis= -89  deg   QRSD Interval= 94  ms   QT Interval= 477  ms   QRS Axis= 77  deg   T Wave Axis= 78  deg   - BORDERLINE ECG -   Sinus or ectopic atrial " rhythm   Atrial premature complex - new   Short CO interval   Electronically Signed By: Abi Myers ([facility]) 14-Feb-2022 10:42:15   Date and Time of Study: 2022-02-14 04:37:33          Meds given in ED:   Medications   sodium chloride 0.9 % flush 10 mL (has no administration in time range)   sodium chloride 0.9 % flush 10 mL (10 mL Intravenous Given 2/14/22 0824)   sodium chloride 0.9 % flush 10 mL (has no administration in time range)   nitroglycerin (NITROSTAT) SL tablet 0.4 mg (has no administration in time range)   acetaminophen (TYLENOL) tablet 650 mg (has no administration in time range)     Or   acetaminophen (TYLENOL) 160 MG/5ML solution 650 mg (has no administration in time range)     Or   acetaminophen (TYLENOL) suppository 650 mg (has no administration in time range)   ipratropium-albuterol (DUO-NEB) nebulizer solution 3 mL (has no administration in time range)   albuterol (PROVENTIL) nebulizer solution 0.083% 2.5 mg/3mL (has no administration in time range)   allopurinol (ZYLOPRIM) tablet 300 mg (has no administration in time range)   apixaban (ELIQUIS) tablet 5 mg (has no administration in time range)   atorvastatin (LIPITOR) tablet 80 mg (has no administration in time range)   levothyroxine (SYNTHROID, LEVOTHROID) tablet 50 mcg (has no administration in time range)   metoprolol tartrate (LOPRESSOR) tablet 25 mg (has no administration in time range)   PARoxetine (PAXIL) tablet 10 mg (has no administration in time range)   potassium chloride (K-DUR,KLOR-CON) ER tablet 20 mEq (has no administration in time range)   vitamin B-12 (CYANOCOBALAMIN) tablet 1,000 mcg (has no administration in time range)   sodium chloride 0.9 % flush 10 mL (has no administration in time range)   sodium chloride 0.9 % flush 10 mL (has no administration in time range)   ondansetron (ZOFRAN) tablet 4 mg (has no administration in time range)     Or   ondansetron (ZOFRAN) injection 4 mg (has no administration in time range)    doxycycline (VIBRAMYCIN) 100 mg in sodium chloride 0.9 % 100 mL IVPB-VTB (0 mg Intravenous Stopped 2/14/22 0751)   furosemide (LASIX) injection 80 mg (80 mg Intravenous Given 2/14/22 0929)   albuterol (PROVENTIL) nebulizer solution 0.083% 2.5 mg/3mL (2.5 mg Nebulization Given 2/14/22 0924)       Imaging results:  XR Chest 1 View    Result Date: 2/14/2022  Electronically signed by Grzegorz Lema M.D. on 02-14-22 at 0725      Ambulatory status:   -       Social issues:   Social History     Socioeconomic History   • Marital status: Single   Tobacco Use   • Smoking status: Never Smoker   • Smokeless tobacco: Never Used   Substance and Sexual Activity   • Alcohol use: Never   • Sexual activity: Ginger Brennan RN  02/14/22 8912

## 2022-02-14 NOTE — ED NOTES
Patient increased work of breathing, RR 40 on 15L NRB. [facility] paged and aware of patient respiratory status. pulmoary consult called and aware of patient status.      Ginger Parra RN  02/14/22 0874

## 2022-02-14 NOTE — CONSULTS
Yazmin Javier   86 y.o.  female    LOS: 0 days   Patient Care Team:  Morenita Silverio MD as PCP - General (Internal Medicine)      Subjective     Patient Complaints:soa, HFpEF  History of Present Illness:     The patient is an 86 year old female who follows with Dr. Sparrow.  She was last seen in the office on 1/24/2022. She has a cardiac history of chronic diastolic CHF ( EF 51-55% in Nov 2021), persistent atrial fib on Eliquis (no h/o bleeding issues), HTN, LILIANA (untreated), LICA, CVA with left sided hemiparesis, COPD/ asthma, anemia, hypothyroidism and gout. She wears 2.5L NC at home.     She presents to the ER with complaints of SOA x 1-2 days.  She becomes SOA with minimal activity and feels like she is smothering when she lies flat.  The patient denies chest pain or palpitations, but does endorse a cough with mild sputum production.  She received 80mg IV lasix today and feels a little better.         Review of Systems:   + soa, cough, dypsnea on exertion, fatigue    Medication Review:   Prior to Admission medications    Medication Sig Start Date End Date Taking? Authorizing Provider   allopurinol (ZYLOPRIM) 300 MG tablet Take 300 mg by mouth Daily.   Yes ProviderCase MD   amLODIPine (NORVASC) 5 MG tablet Take 5 mg by mouth Daily. 12/29/21  Yes Case Buchanan MD   apixaban (ELIQUIS) 5 MG tablet tablet Take 5 mg by mouth 2 (Two) Times a Day.   Yes ProviderCase MD   atorvastatin (LIPITOR) 80 MG tablet Take 80 mg by mouth Every Night.   Yes ProviderCase MD   furosemide (LASIX) 40 MG tablet Take 1 tablet by mouth Daily for 30 days. 4/7/21 2/14/22 Yes Jeremiah Cartagena MD   levothyroxine (SYNTHROID, LEVOTHROID) 50 MCG tablet Take 50 mcg by mouth Daily.   Yes ProviderCase MD   metoprolol tartrate (LOPRESSOR) 25 MG tablet Take 1 tablet by mouth 2 (Two) Times a Day. 11/1/21  Yes Becky Andersen PA   PARoxetine (PAXIL) 10 MG tablet Take 10 mg by mouth Daily.   Yes  Provider, MD Case   potassium chloride 10 MEQ CR tablet Take 20 mEq by mouth Daily.   Yes Case Buchanan MD   vitamin B-12 (CYANOCOBALAMIN) 1000 MCG tablet Take 1,000 mcg by mouth Daily.   Yes Case Buchanan MD   albuterol sulfate  (90 Base) MCG/ACT inhaler Inhale 2 puffs Every 4 (Four) Hours As Needed for Wheezing.    Case Buchanan MD         History reviewed. No pertinent family history.  Social History     Socioeconomic History   • Marital status: Single   Tobacco Use   • Smoking status: Never Smoker   • Smokeless tobacco: Never Used   Substance and Sexual Activity   • Alcohol use: Never   • Sexual activity: Defer     History  PMH:  · Asthma  · Atrial fibrillation  · Left ischemic stroke with residual right hemiparesis  · CKD with baseline creatinine 1.5  · Left ICA stenosis  · CHF  · hypothyroidism     PSH:  Ankle fracture and knee surgery    Vital Sign Min/Max for last 24 hours  Temp  Min: 97.5 °F (36.4 °C)  Max: 97.9 °F (36.6 °C)   BP  Min: 131/79  Max: 173/81    Pulse  Min: 60  Max: 87         02/14/22  0425   Weight: 85.3 kg (188 lb)        Physical Exam:      General Appearance:    No acute distress                                Head:    Normocephalic, without obvious abnormality, atraumatic   Neck:   No adenopathy, supple, trachea midline, no carotid bruit, no + JVD   Lungs:     Diminished with crackles in bases, even and unlabored    Heart:    iregular rhythm and normal rate, normal S1 and S2,            No murmur, no gallop, no rub, no click   Abdomen:     Normal bowel sounds, no masses, no organomegaly, soft   non-tender, non-distended,    Extremities:   No edema. Moves all extremities well, no cyanosis, no erythema   Pulses:   Pulses palpable and equal bilaterally   Skin:   No bleeding, bruising or rash   Neurologic:   Alert and orients x3,  no obvious focal deficits          Results Review:     I reviewed the patient's new clinical results.    Sodium   Date Value  Ref Range Status   02/14/2022 142 136 - 145 mmol/L Final   02/14/2022 143 136 - 145 mmol/L Final     Potassium   Date Value Ref Range Status   02/14/2022 4.5 3.5 - 5.2 mmol/L Final     Comment:     Slight hemolysis detected by analyzer. Results may be affected.   02/14/2022 4.2 3.5 - 5.2 mmol/L Final     Chloride   Date Value Ref Range Status   02/14/2022 106 98 - 107 mmol/L Final   02/14/2022 106 98 - 107 mmol/L Final     No results found for: PLASMABICARB  BUN   Date Value Ref Range Status   02/14/2022 24 (H) 8 - 23 mg/dL Final   02/14/2022 22 8 - 23 mg/dL Final     Creatinine   Date Value Ref Range Status   02/14/2022 1.32 (H) 0.57 - 1.00 mg/dL Final   02/14/2022 1.41 (H) 0.57 - 1.00 mg/dL Final     Calcium   Date Value Ref Range Status   02/14/2022 9.4 8.6 - 10.5 mg/dL Final   02/14/2022 9.6 8.6 - 10.5 mg/dL Final     No results found for: MG    Results from last 7 days   Lab Units 02/14/22 0618   WBC 10*3/mm3 10.74   HEMOGLOBIN g/dL 10.6*   HEMATOCRIT % 32.4*   PLATELETS 10*3/mm3 173     Lab Results   Component Value Date    CHOL 156 04/22/2021     Lab Results   Component Value Date    HDL 37 (L) 04/22/2021     Lab Results   Component Value Date    LDL 97 04/22/2021     Lab Results   Component Value Date    TRIG 119 04/22/2021     Results from last 7 days   Lab Units 02/14/22 0618   INR  1.59*     Results from last 7 days   Lab Units 02/14/22 0458   TROPONIN T ng/mL <0.010     Results from last 7 days   Lab Units 02/14/22 0618 02/14/22  0458   PROBNP pg/mL 2,105.0* 2,248.0*                 Assessment/Plan     1. Acute on chronic hypoxic respiratory failure requiring increase in supplemental 02 (homr 2.5L NC)  2. Acute hypercarbic respiratory failure  3. Acute on chronic diastolic CHF      A. Echo 11/1/2022: Saline test results are negative.The left ventricular cavity is small in size.Estimated left ventricular EF was in disagreement with the calculated left ventricular EF. Left ventricular ejection fraction  appears to be 51 - 55%. There is mild, posterior mitral leaflet thickening present. Estimated right ventricular systolic pressure from tricuspid regurgitation is mildly elevated (35-45 mmHg). Low normal LV and RV function, EF 50-55%.  1+ MR, 1+ TR, 1+ PI. Low normal biventricular function may be seen with untreated LILIANA. Read by Dr. Sparrow.      B. Echo 4/21/2021: EF 60 to 65%, RV mild dilation, trivial MR, 1+ TR (32).     C. Echo 3/1/2018: EF 55 to 60%, mild to moderate LAE, 1-2+ MR, 1-2+ TR (RVSP 40).      4. COPD  5. Persistent atrial fibrillation on Eliquis     A. Holter 6/10/2021: 100% atrial fibrillation, 2.6-second pauses, average 87 bpm. No PPM.   6. Left sided CVA     A. US carotid 4/21/2021: Left ICA 16 to 49%.  7. CKD3  8. Hypothyroidism  9. Anemia  10. H/o syncope 2021  11. Untreated LILIANA (supposed to have an appt tomorrow)        Will continue IV lasix for HFpEF and pulmonary edema. Recheck labs in the am.  A repeat Echo has been ordered by the admitting team. Add strict I/Os, daily weights.  She received lasix 80 mg IV this morning.  Will order 40mg IV bid. Still on 13L high flow NC. She wears 2.5 L NC at home.  Assess BMP and mag in the am.            During the entire encounter, I was wearing recommended PPE including face mask and eye protection.  Hand sanitization was performed prior to entering room and upon exit.    Joslyn Adkins, JADON  02/14/22  14:38 EST   Note reviewed.  See my progress note from 2/15/2022  Garth Horton MD

## 2022-02-14 NOTE — ED NOTES
Pt arrives to Ed via EMS from assisted living facility with c/o SOB that started at 3am this morning. She wears 2L NC at baseline, she was 89% on EMS arrival. EMS put her on 15L non-rebreather, sats 100% , tried to titrate down to 6L NC and sats dropped to 90% so non-rebreather was re-applied. She was given a duoneb en route with little relief. EMS reports that patient was just taken out of quarantine at facility for exposure to covid.      Behringer, Catherine, RN  02/14/22 8396

## 2022-02-14 NOTE — ED PROVIDER NOTES
EMERGENCY DEPARTMENT ENCOUNTER    CHIEF COMPLAINT  Chief Complaint: ***  History given by: ***  History limited by: ***  Room Number: 11/11  PMD: Morenita Silverio MD      HPI:  Pt is a 86 y.o. female who presents complaining of ***    Duration:  ***  Onset: ***  Timing: ***  Location: ***  Radiation: ***  Quality: ***  Intensity/Severity: ***  Progression: ***  Associated Symptoms: ***  Aggravating Factors: ***  Alleviating Factors: ***  Previous Episodes: ***  Treatment before arrival: ***    PAST MEDICAL HISTORY  Active Ambulatory Problems     Diagnosis Date Noted   • Acute on chronic congestive heart failure ([facility]) 04/04/2021   • Hypoxia 04/04/2021   • A-fib ([facility]) 04/04/2021   • Chronic anticoagulation 04/04/2021   • Hypothyroidism (acquired) 04/04/2021   • Stage 3b chronic kidney disease ([facility]) 04/04/2021   • Right arm weakness 04/22/2021   • Chronic diastolic CHF (congestive heart failure) ([facility]) 04/22/2021   • Syncope 10/31/2021     Resolved Ambulatory Problems     Diagnosis Date Noted   • Acute embolic stroke ([facility]) 04/24/2021     No Additional Past Medical History       PAST SURGICAL HISTORY  History reviewed. No pertinent surgical history.    FAMILY HISTORY  History reviewed. No pertinent family history.    SOCIAL HISTORY  Social History     Socioeconomic History   • Marital status: Single   Tobacco Use   • Smoking status: Never Smoker   • Smokeless tobacco: Never Used   Substance and Sexual Activity   • Alcohol use: Never   • Sexual activity: Defer       ALLERGIES  Cefazolin    REVIEW OF SYSTEMS  Review of Systems    PHYSICAL EXAM  ED Triage Vitals [02/14/22 0418]   Temp Heart Rate Resp BP SpO2   97.9 °F (36.6 °C) 62 17 131/79 100 %      Temp src Heart Rate Source Patient Position BP Location FiO2 (%)   Temporal Monitor Lying Right arm --       Physical Exam    LAB RESULTS  Lab Results (last 24 hours)     ** No results found for the last 24 hours. **          I ordered the above labs and reviewed the  results    RADIOLOGY  No orders to display        I ordered the above noted radiological studies. Interpreted by radiologist. Discussed with radiologist (***). Reviewed by me in PACS.       PROCEDURES  Procedures      PROGRESS AND CONSULTS     The patient was wearing a facemask upon entrance into the room and remained in such throughout their visit.  I was wearing PPE including a facemask, eye protection, as well as gloves at any point entering the room and throughout the visit      MEDICAL DECISION MAKING  Results were reviewed/discussed with the patient and they were also made aware of online access. Pt also made aware that some labs, such as cultures, will not be resulted during ER visit and follow up with PMD is necessary.     MDM  Number of Diagnoses or Management Options     Amount and/or Complexity of Data Reviewed  Clinical lab tests: ordered and reviewed  Tests in the radiology section of CPT®: ordered and reviewed  Review and summarize past medical records: yes (Upon medical records review, the patient was last seen and evaluated on 10/31/2021 secondary to syncope and anemia)           DIAGNOSIS  Final diagnoses:   None       DISPOSITION  ***    Latest Documented Vital Signs:  As of 04:30 EST  BP- 131/79 HR- 62 Temp- 97.9 °F (36.6 °C) (Temporal) O2 sat- 100%

## 2022-02-15 NOTE — PROGRESS NOTES
"                                              LOS: 1 day   Patient Care Team:  Morenita Silverio MD as PCP - General (Internal Medicine)    Chief Complaint:  F/up respiratory failure, pulmonary edema    Subjective   Interval History  On oxygen 8 L/min. Reported mild cough earlier today. Use the PAP at night.    REVIEW OF SYSTEMS:   CARDIOVASCULAR: No chest pain, chest pressure or chest discomfort. No palpitations or edema.     CONSTITUTIONAL: No fever or chills.     Ventilator/Non-Invasive Ventilation Settings (From admission, onward)             Start     Ordered    02/14/22 0947  NIPPV (CPAP or BIPAP)  Until Discontinued        Question Answer Comment   Indication: Acute Respiratory Failure    Type: BIPAP    IPAP 14    EPAP 6        02/14/22 0947                      Physical Exam:     Vital Signs  Temp:  [97.9 °F (36.6 °C)-98.6 °F (37 °C)] 97.9 °F (36.6 °C)  Heart Rate:  [63-83] 73  Resp:  [18-24] 20  BP: (127-144)/(61-71) 144/71    Intake/Output Summary (Last 24 hours) at 2/15/2022 1833  Last data filed at 2/15/2022 1047  Gross per 24 hour   Intake 0 ml   Output 1450 ml   Net -1450 ml     Flowsheet Rows      First Filed Value   Admission Height 157.5 cm (62\") Documented at 02/14/2022 0425   Admission Weight 85.3 kg (188 lb) Documented at 02/14/2022 0425          PPE used per hospital policy    General Appearance:   Alert, cooperative, in no acute distress   ENMT:  Mallampati score 3, moist mucous membrane   Eyes:  Pupils equal and reactive to light. EOMI   Neck:   Large. Trachea midline. No thyromegaly.   Lungs:    Equal but significantly diminished air entry. Mild expiratory wheezing bilaterally. Mild crackles at the bases.    Heart:   Regular rhythm and normal rate, normal S1 and S2, no         murmur   Skin:   No rash or ecchymosis   Abdomen:    Obese. Soft. No tenderness. No HSM.   Neuro/psych:  Conscious, alert, oriented x3. Strength 5/5 in upper and lower  ext.  Appropriate mood and affect   Extremities:  " No cyanosis, clubbing but pitting edema.  Warm extremities and well-perfused          Results Review:        Results from last 7 days   Lab Units 02/15/22  0525 02/14/22  0618 02/14/22 0618 02/14/22 0458 02/14/22 0458   SODIUM mmol/L 142  --  142  --  143   POTASSIUM mmol/L 3.6  --  4.5  --  4.2   CHLORIDE mmol/L 103  --  106  --  106   CO2 mmol/L 28.0  --  27.0  --  25.1   BUN mg/dL 26*  --  24*  --  22   CREATININE mg/dL 1.43*  --  1.32*  --  1.41*   GLUCOSE mg/dL 111*   < > 130*   < > 127*   CALCIUM mg/dL 8.8  --  9.4  --  9.6    < > = values in this interval not displayed.     Results from last 7 days   Lab Units 02/15/22  0525 02/14/22  0458   TROPONIN T ng/mL <0.010 <0.010     Results from last 7 days   Lab Units 02/15/22  0525 02/14/22 0618 02/14/22 0458   WBC 10*3/mm3 5.88 10.74 9.10   HEMOGLOBIN g/dL 9.0* 10.6* 10.3*   HEMATOCRIT % 27.6* 32.4* 31.1*   PLATELETS 10*3/mm3 139* 173 167     Results from last 7 days   Lab Units 02/14/22 0618   INR  1.59*     Results from last 7 days   Lab Units 02/14/22 0618   PROBNP pg/mL 2,105.0*                   Results from last 7 days   Lab Units 02/14/22  1132 02/14/22  0937 02/14/22 0452   PH, ARTERIAL pH units 7.327* 7.250* 7.348*   PCO2, ARTERIAL mm Hg 51.7* 60.1* 46.9*   PO2 ART mm Hg 138.6* 88.1 91.3   FLOW RATE lpm  --  15 15   MODALITY  BiPap NRB NRB   O2 SATURATION CALC % 98.9 94.8 96.5         I reviewed the patient's new clinical results.        Medication Review:   allopurinol, 300 mg, Oral, Daily  amLODIPine, 5 mg, Oral, Daily  apixaban, 5 mg, Oral, BID  atorvastatin, 80 mg, Oral, Nightly  folic acid, 1 mg, Oral, Daily  furosemide, 40 mg, Intravenous, Q12H  ipratropium-albuterol, 3 mL, Nebulization, 4x Daily - RT  levothyroxine, 50 mcg, Oral, Daily  metoprolol tartrate, 25 mg, Oral, BID  PARoxetine, 10 mg, Oral, Daily  potassium chloride, 20 mEq, Oral, Daily  sodium chloride, 10 mL, Intravenous, Q12H  sodium chloride, 10 mL, Intravenous, Q12H  vitamin  B-12, 1,000 mcg, Oral, Daily             CXR 2/14/22: Pulmonary edema            Assessment     1. Acute pulmonary edema  2. Acute hypercapnic respiratory failure, secondary #1  3. Acute on chronic hypoxic respiratory failure, secondary #1  4. Acute diastolic CHF  5. Elevated proBNP  6. Anemia, chronic  7. History of asthma prior records, unknown severity.  Doubt current exacerbation  8. High risk for sleep apnea  9. Severe pulmonary hypertension, RVSP 69 on echo 2/15. Surprisingly LA is normal in size. Likely group 2     Pertinent medical problems:  · History of CVA with right hemiparesis  · Atrial fibrillation  · Mild mitral regurgitation  · Hypothyroidism        Plan     · Oxygen by nasal cannula and titrate to keep SPO2 89-92%. Avoid hyperoxia in the setting of hypercarbia  · Lasix 40 mg IV twice daily  · NIPPV at night  · Incentive spirometry  · Outpatient evaluation for sleep apnea      Ayo Horner MD  02/15/22  18:33 EST            This note was dictated utilizing [employer] dictation

## 2022-02-15 NOTE — PROGRESS NOTES
"DAILY PROGRESS NOTE  [facility]    Patient Identification:  Name: Yazmin Javier  Age: 86 y.o.  Sex: female  :  1935  MRN: [id number]         Primary Care Physician: Morenita Silverio MD      Subjective  No new complaints.    Objective:  General Appearance:  Comfortable, well-appearing, in no acute distress and not in pain (Obese.).    Vital signs: (most recent): Blood pressure 144/71, pulse 73, temperature 97.9 °F (36.6 °C), temperature source Oral, resp. rate 20, height 157.5 cm (62\"), weight 88.9 kg (196 lb), SpO2 93 %.    Lungs:  Normal effort and normal respiratory rate.  She is not in respiratory distress.  No stridor.  There are wheezes.  No rales, decreased breath sounds or rhonchi.  (Occasional end expiratory wheeze anteriorly.)  Heart: Normal rate.  Regular rhythm.    Extremities: There is no dependent edema.    Neurological: Patient is alert and oriented to person, place and time.    Skin:  Warm and dry.                Vital signs in last 24 hours:  Temp:  [97.9 °F (36.6 °C)-98.6 °F (37 °C)] 97.9 °F (36.6 °C)  Heart Rate:  [63-83] 73  Resp:  [18-24] 20  BP: (127-144)/(61-71) 144/71    Intake/Output:    Intake/Output Summary (Last 24 hours) at 2/15/2022 1709  Last data filed at 2/15/2022 1047  Gross per 24 hour   Intake 0 ml   Output 1450 ml   Net -1450 ml         Results from last 7 days   Lab Units 02/15/22  0525 22  0618 22  0458   WBC 10*3/mm3 5.88 10.74 9.10   HEMOGLOBIN g/dL 9.0* 10.6* 10.3*   PLATELETS 10*3/mm3 139* 173 167     Results from last 7 days   Lab Units 02/15/22  0525 22  0618 22  0458   SODIUM mmol/L 142 142 143   POTASSIUM mmol/L 3.6 4.5 4.2   CHLORIDE mmol/L 103 106 106   CO2 mmol/L 28.0 27.0 25.1   BUN mg/dL 26* 24* 22   CREATININE mg/dL 1.43* 1.32* 1.41*   GLUCOSE mg/dL 111* 130* 127*   Estimated Creatinine Clearance: 30.3 mL/min (A) (by C-G formula based on SCr of 1.43 mg/dL (H)).  Results from last 7 days   Lab Units 02/15/22  0525 " 02/14/22  0618 02/14/22  0458   CALCIUM mg/dL 8.8 9.4 9.6   ALBUMIN g/dL  --   --  3.80   MAGNESIUM mg/dL 1.8  --   --    PHOSPHORUS mg/dL 4.4  --   --      Results from last 7 days   Lab Units 02/14/22  0458   ALBUMIN g/dL 3.80   BILIRUBIN mg/dL 2.2*   ALK PHOS U/L 113   AST (SGOT) U/L 24   ALT (SGPT) U/L 20       Assessment:  Acute hypoxic respiratory failure secondary to CHF: Much improved.  Acute on chronic CHF with pulmonary edema:  Cardiology input appreciated.  Continue diuresis.  Atrial fibrillation: Continue with rate control and anticoagulation.  COPD: Continue bronchodilators.  Presently I doubt COPD exacerbation.  I suspect the wheezes heard are actually cardiac in origin.  Monitor closely.  Hypothyroidism: Recheck TSH and continue home meds.  Macrocytic anemia:  Low folate and evaluation.  Initiate supplementation.  Monitor.  Chronic kidney disease stage III:  Creatinine up a bit today but still within her preadmission baseline.  Monitor closely with diuresis.  Morbid obesity: Significant comorbidity.  Encourage weight loss.  Positive blood culture: Appears to be contaminant.  Monitor closely.      Plan:  Please see above.  Discussed with RN.    Raciel Chapa MD  2/15/2022  17:09 EST

## 2022-02-15 NOTE — PROGRESS NOTES
Yazmin Javier   86 y.o.  female    LOS: 1 day   Patient Care Team:  Morenita Silverio MD as PCP - General (Internal Medicine)      Subjective     Interval History:     Patient Complaints: She says her breathing has improved.  No chest pain.  No palpitations or lightheadedness.    Review of Systems:   Subjective fever chills  No obvious bleeding issues    Medication Review:   Current Facility-Administered Medications:   •  acetaminophen (TYLENOL) tablet 650 mg, 650 mg, Oral, Q4H PRN **OR** acetaminophen (TYLENOL) 160 MG/5ML solution 650 mg, 650 mg, Oral, Q4H PRN **OR** acetaminophen (TYLENOL) suppository 650 mg, 650 mg, Rectal, Q4H PRN, Raciel Chapa MD  •  albuterol (PROVENTIL) nebulizer solution 0.083% 2.5 mg/3mL, 2.5 mg, Nebulization, Q6H PRN, Raciel Chapa MD  •  allopurinol (ZYLOPRIM) tablet 300 mg, 300 mg, Oral, Daily, Raciel Chapa MD, 300 mg at 02/15/22 0921  •  amLODIPine (NORVASC) tablet 5 mg, 5 mg, Oral, Daily, Raciel Chapa MD, 5 mg at 02/15/22 0921  •  apixaban (ELIQUIS) tablet 5 mg, 5 mg, Oral, BID, Raciel Chapa MD, 5 mg at 02/15/22 0921  •  atorvastatin (LIPITOR) tablet 80 mg, 80 mg, Oral, Nightly, Raciel Chapa MD, 80 mg at 02/14/22 2039  •  furosemide (LASIX) injection 40 mg, 40 mg, Intravenous, Q12H, Joslyn Adkins APRN, 40 mg at 02/15/22 0605  •  ipratropium-albuterol (DUO-NEB) nebulizer solution 3 mL, 3 mL, Nebulization, 4x Daily - RT, Raciel Chapa MD, 3 mL at 02/15/22 0727  •  levothyroxine (SYNTHROID, LEVOTHROID) tablet 50 mcg, 50 mcg, Oral, Daily, Raciel Chapa MD, 50 mcg at 02/15/22 0921  •  metoprolol tartrate (LOPRESSOR) tablet 25 mg, 25 mg, Oral, BID, Raciel Chapa MD, 25 mg at 02/15/22 0920  •  nitroglycerin (NITROSTAT) SL tablet 0.4 mg, 0.4 mg, Sublingual, Q5 Min PRN, Raciel Chapa MD  •  ondansetron (ZOFRAN) tablet 4 mg, 4 mg, Oral, Q6H PRN **OR** ondansetron (ZOFRAN) injection 4 mg, 4 mg, Intravenous, Q6H PRN,  Raciel Chapa MD  •  PARoxetine (PAXIL) tablet 10 mg, 10 mg, Oral, Daily, Raciel Chapa MD, 10 mg at 02/15/22 0921  •  potassium chloride (K-DUR,KLOR-CON) ER tablet 20 mEq, 20 mEq, Oral, Daily, Raciel Chapa MD, 20 mEq at 02/15/22 0921  •  [COMPLETED] Insert peripheral IV, , , Once **AND** sodium chloride 0.9 % flush 10 mL, 10 mL, Intravenous, PRN, Raciel Chapa MD  •  sodium chloride 0.9 % flush 10 mL, 10 mL, Intravenous, Q12H, Raciel Chapa MD, 10 mL at 02/15/22 0920  •  sodium chloride 0.9 % flush 10 mL, 10 mL, Intravenous, PRN, Raciel Chapa MD  •  sodium chloride 0.9 % flush 10 mL, 10 mL, Intravenous, Q12H, Raciel Chapa MD, 10 mL at 02/15/22 0921  •  sodium chloride 0.9 % flush 10 mL, 10 mL, Intravenous, PRN, Raciel Chapa MD  •  vitamin B-12 (CYANOCOBALAMIN) tablet 1,000 mcg, 1,000 mcg, Oral, Daily, Raciel Chapa MD, 1,000 mcg at 02/15/22 0921      Objective     Vital Sign Min/Max for last 24 hours  Temp  Min: 97.5 °F (36.4 °C)  Max: 98.6 °F (37 °C)   BP  Min: 127/61  Max: 153/66    Pulse  Min: 63  Max: 83     Wt Readings from Last 3 Encounters:   02/15/22 88.9 kg (196 lb)   11/01/21 89 kg (196 lb 3.4 oz)   04/22/21 86.6 kg (191 lb)        Intake/Output Summary (Last 24 hours) at 2/15/2022 0946  Last data filed at 2/15/2022 0607  Gross per 24 hour   Intake 0 ml   Output 1750 ml   Net -1750 ml     Physical Exam:      General Appearance:    Well developed and well nourished in no acute distress   Head:    Normocephalic, atraumatic   Eyes:            Conjunctivae normal, anicteric, no xanthelasma   Neck:   no carotid bruit, + JVD   Lungs:    Some decreased breath sounds bases    Heart:    Regular rate and rhythm.  Normal S1 and S2. No murmur, no gallop, rub or lift.    Chest Wall:    No abnormalities observed   Abdomen:     Normal bowel sounds, no masses, no organomegaly, soft        nontender, nondistended, no guarding, no rebound                 tenderness   Rectal:     Deferred   Extremities:   No clubbing, cyanosis or edema.         Skin:   No bleeding, bruising or rash   Neurologic:   awake alert and oriented x3, speech clear and approp, no facial drooping      Monitor:  nsr  Results Review:     I reviewed the patient's new clinical results.    Sodium Sodium   Date Value Ref Range Status   02/15/2022 142 136 - 145 mmol/L Final   02/14/2022 142 136 - 145 mmol/L Final   02/14/2022 143 136 - 145 mmol/L Final      Potassium Potassium   Date Value Ref Range Status   02/15/2022 3.6 3.5 - 5.2 mmol/L Final   02/14/2022 4.5 3.5 - 5.2 mmol/L Final     Comment:     Slight hemolysis detected by analyzer. Results may be affected.   02/14/2022 4.2 3.5 - 5.2 mmol/L Final      Chloride Chloride   Date Value Ref Range Status   02/15/2022 103 98 - 107 mmol/L Final   02/14/2022 106 98 - 107 mmol/L Final   02/14/2022 106 98 - 107 mmol/L Final      Bicarbonate No results found for: PLASMABICARB   BUN BUN   Date Value Ref Range Status   02/15/2022 26 (H) 8 - 23 mg/dL Final   02/14/2022 24 (H) 8 - 23 mg/dL Final   02/14/2022 22 8 - 23 mg/dL Final      Creatinine Creatinine   Date Value Ref Range Status   02/15/2022 1.43 (H) 0.57 - 1.00 mg/dL Final   02/14/2022 1.32 (H) 0.57 - 1.00 mg/dL Final   02/14/2022 1.41 (H) 0.57 - 1.00 mg/dL Final      Calcium Calcium   Date Value Ref Range Status   02/15/2022 8.8 8.6 - 10.5 mg/dL Final   02/14/2022 9.4 8.6 - 10.5 mg/dL Final   02/14/2022 9.6 8.6 - 10.5 mg/dL Final      Magnesium Magnesium   Date Value Ref Range Status   02/15/2022 1.8 1.6 - 2.4 mg/dL Final        Results from last 7 days   Lab Units 02/15/22  0525   WBC 10*3/mm3 5.88   HEMOGLOBIN g/dL 9.0*   HEMATOCRIT % 27.6*   PLATELETS 10*3/mm3 139*     Lab Results   Lab Value Date/Time    TROPONINT <0.010 02/15/2022 0525    TROPONINT <0.010 02/14/2022 0458    TROPONINT <0.010 10/31/2021 2334    TROPONINT <0.010 10/31/2021 1907    TROPONINT <0.010 04/21/2021 2331    TROPONINT <0.010  04/04/2021 0910     Lab Results   Component Value Date    CHOL 156 04/22/2021     Lab Results   Component Value Date    HDL 37 (L) 04/22/2021     Lab Results   Component Value Date    LDL 97 04/22/2021     Lab Results   Component Value Date    TRIG 119 04/22/2021     No components found for: CHOLHDL    Results from last 7 days   Lab Units 02/14/22  0618   INR  1.59*         Echo EF Estimated  No results found for: ECHOEFEST       Assessment/ Plan  1. Acute on chronic hypoxic respiratory failure requiring increase in supplemental 02 (homr 2.5L NC)  2. Acute hypercarbic respiratory failure  3. Acute on chronic diastolic CHF      A. Echo 11/1/2022: Saline test results are negative.The left ventricular cavity is small in size.Estimated left ventricular EF was in disagreement with the calculated left ventricular EF. Left ventricular ejection fraction appears to be 51 - 55%. There is mild, posterior mitral leaflet thickening present. Estimated right ventricular systolic pressure from tricuspid regurgitation is mildly elevated (35-45 mmHg). Low normal LV and RV function, EF 50-55%.  1+ MR, 1+ TR, 1+ PI. Low normal biventricular function may be seen with untreated LILIANA. Read by Dr. Sparrow.      B. Echo 4/21/2021: EF 60 to 65%, RV mild dilation, trivial MR, 1+ TR (32).     C. Echo 3/1/2018: EF 55 to 60%, mild to moderate LAE, 1-2+ MR, 1-2+ TR (RVSP 40).      4. COPD  5.  Paroxysmal atrial fibrillation on Eliquis, patient currently in sinus rhythm     A. Holter 6/10/2021: 100% atrial fibrillation, 2.6-second pauses, average 87 bpm. No PPM.   6. Left sided CVA     A. US carotid 4/21/2021: Left ICA 16 to 49%.  7. CKD3  8. Hypothyroidism  9. Anemia  10. H/o syncope 2021  11. Untreated LILIANA (supposed to have an appt tomorrow)  Plan #1 patient seems to be improving symptomatically.  Her weights are not helpful since 1 is a stated weight 1 is a bad weight.  I talked to her nurse about standing weights only.  ESTRELLA reasonable.  There  appears to be at least 1 unmeasured urine and sure if there is more.   Creatinine 1.43 this morning.  Since admission its been 1.41 and 1.32.  Bicarb okay at 28.  For now we will continue with the current 40 mg of IV Lasix twice a day  #2 blood pressure acceptable.  She is in sinus rhythm.  To new current metoprolol and amlodipine.  Potassium only 3.6 we will see how the BMP looks in the morning.    Garth Horton MD  02/15/22  09:46 EST      Time: 19 minutes

## 2022-02-15 NOTE — PLAN OF CARE
Goal Outcome Evaluation:    Progress: improving  Outcome Summary: Vitals stable. Pt wore BiPAP, tolerated well. To go for Echo. Blood cultures positive for anaerobic bottle gram positive cocci in groups, [facility] on-call notified. Pt stable and needs met at this time.

## 2022-02-15 NOTE — PLAN OF CARE
Goal Outcome Evaluation:  Plan of Care Reviewed With: patient        Progress: improving  Outcome Summary: No major issues over shift, VS stable, pt now on 6 L of oxygen, pt remains alert and oriented, no complaints of pain, pt had echocardiogram this afternoon, pt continues on IV diuretics, will continue to monitor

## 2022-02-16 NOTE — PLAN OF CARE
Goal Outcome Evaluation:    Progress: improving  Outcome Summary: Vitals stable. Pt wore BiPAP for around five hours while sleeping, tolerating fairly well. Pt on 5L O2 high-flow - continuing to wean. C/O bilateral knee aches, PRN tylenol given. Patient stable and needs met at this time.

## 2022-02-16 NOTE — PLAN OF CARE
Goal Outcome Evaluation:  Plan of Care Reviewed With: patient        Progress: improving  Outcome Summary: VSS. PT weaned to 3L O2. Chest xray done today. Replaced K+. Pt sa in chair. Will continue to monitor.

## 2022-02-16 NOTE — PROGRESS NOTES
"                                              LOS: 2 days   Patient Care Team:  Morenita Silverio MD as PCP - General (Internal Medicine)    Chief Complaint:  F/up respiratory failure, pulmonary edema    Subjective   Interval History  On oxygen 4 L/min.  Down from 8 L yesterday.  She denies cough.  She used the Pap last night.  She reported improved breathing.    REVIEW OF SYSTEMS:   CARDIOVASCULAR: No chest pain, chest pressure or chest discomfort. No palpitations or edema.     CONSTITUTIONAL: No fever or chills.  Gastrointestinal: No nausea or vomiting.    Ventilator/Non-Invasive Ventilation Settings (From admission, onward)             Start     Ordered    02/14/22 0947  NIPPV (CPAP or BIPAP)  Until Discontinued        Question Answer Comment   Indication: Acute Respiratory Failure    Type: BIPAP    IPAP 14    EPAP 6        02/14/22 0947                      Physical Exam:     Vital Signs  Temp:  [97.5 °F (36.4 °C)-98.6 °F (37 °C)] 97.9 °F (36.6 °C)  Heart Rate:  [60-86] 68  Resp:  [16-20] 18  BP: (122-144)/(59-73) 132/73    Intake/Output Summary (Last 24 hours) at 2/16/2022 1525  Last data filed at 2/16/2022 0526  Gross per 24 hour   Intake 480 ml   Output 900 ml   Net -420 ml     Flowsheet Rows      First Filed Value   Admission Height 157.5 cm (62\") Documented at 02/14/2022 0425   Admission Weight 85.3 kg (188 lb) Documented at 02/14/2022 0425          PPE used per hospital policy    General Appearance:   Alert, cooperative, in no acute distress   ENMT:  Mallampati score 3, moist mucous membrane   Eyes:  Pupils equal and reactive to light. EOMI   Neck:   Large. Trachea midline. No thyromegaly.   Lungs:    Diminished breath sounds overall.  Mild crackles at the bases.  No wheezing    Heart:   Regular rhythm and normal rate, normal S1 and S2, no         murmur   Skin:   No rash or ecchymosis   Abdomen:    Obese. Soft. No tenderness. No HSM.   Neuro/psych:  Conscious, alert, oriented x3. Strength 5/5 in upper and " lower  ext.  Appropriate mood and affect   Extremities:  No cyanosis, clubbing but pitting edema.  Warm extremities and well-perfused          Results Review:        Results from last 7 days   Lab Units 02/16/22  0625 02/15/22  0525 02/15/22  0525 02/14/22 0618 02/14/22 0618   SODIUM mmol/L 139  --  142  --  142   POTASSIUM mmol/L 3.4*  --  3.6  --  4.5   CHLORIDE mmol/L 100  --  103  --  106   CO2 mmol/L 29.2*  --  28.0  --  27.0   BUN mg/dL 29*  --  26*  --  24*   CREATININE mg/dL 1.40*  --  1.43*  --  1.32*   GLUCOSE mg/dL 112*   < > 111*   < > 130*   CALCIUM mg/dL 9.1  --  8.8  --  9.4    < > = values in this interval not displayed.     Results from last 7 days   Lab Units 02/15/22  0525 02/14/22 0458   TROPONIN T ng/mL <0.010 <0.010     Results from last 7 days   Lab Units 02/16/22  0625 02/15/22  0525 02/14/22 0618   WBC 10*3/mm3 5.99 5.88 10.74   HEMOGLOBIN g/dL 9.6* 9.0* 10.6*   HEMATOCRIT % 28.8* 27.6* 32.4*   PLATELETS 10*3/mm3 139* 139* 173     Results from last 7 days   Lab Units 02/14/22 0618   INR  1.59*     Results from last 7 days   Lab Units 02/14/22 0618   PROBNP pg/mL 2,105.0*                   Results from last 7 days   Lab Units 02/14/22  1132 02/14/22  0937 02/14/22  0452   PH, ARTERIAL pH units 7.327* 7.250* 7.348*   PCO2, ARTERIAL mm Hg 51.7* 60.1* 46.9*   PO2 ART mm Hg 138.6* 88.1 91.3   FLOW RATE lpm  --  15 15   MODALITY  BiPap NRB NRB   O2 SATURATION CALC % 98.9 94.8 96.5         I reviewed the patient's new clinical results.        Medication Review:   allopurinol, 300 mg, Oral, Daily  amLODIPine, 5 mg, Oral, Daily  apixaban, 5 mg, Oral, BID  atorvastatin, 80 mg, Oral, Nightly  folic acid, 1 mg, Oral, Daily  furosemide, 40 mg, Intravenous, Q12H  ipratropium-albuterol, 3 mL, Nebulization, 4x Daily - RT  levothyroxine, 50 mcg, Oral, Daily  metoprolol tartrate, 25 mg, Oral, BID  PARoxetine, 10 mg, Oral, Daily  potassium chloride, 20 mEq, Oral, Daily  potassium chloride, 40 mEq, Oral,  Once  sodium chloride, 10 mL, Intravenous, Q12H  sodium chloride, 10 mL, Intravenous, Q12H  vitamin B-12, 1,000 mcg, Oral, Daily          Diagnostic imaging:  I personally and Independently reviewed and interpreted the following images:  CXR 2/16/2022: Improved pulmonary edema.  Bilateral pleural effusion    Assessment     1. Acute pulmonary edema, improved  2. Acute hypercapnic respiratory failure, secondary #1  3. Acute on chronic hypoxic respiratory failure, secondary #1  4. Acute diastolic CHF  5. Bilateral pleural effusion  6. Elevated proBNP  7. Anemia, chronic  8. History of asthma prior records, unknown severity.  Doubt current exacerbation  9. High risk for sleep apnea  10. Severe pulmonary hypertension, RVSP 69 on echo 2/15. Surprisingly LA is normal in size. Likely group 2     Pertinent medical problems:  · History of CVA with right hemiparesis  · Atrial fibrillation  · Mild mitral regurgitation  · Hypothyroidism        Plan     · Oxygen by nasal cannula and titrate to keep SPO2 89-92%. Avoid hyperoxia in the setting of hypercarbia  · Lasix 40 mg IV twice daily.  Pleural effusion is not large enough to drain and she seems to be improving with diuresis  · NIPPV at night  · Incentive spirometry  · Outpatient evaluation for sleep apnea  · DVT prophylaxis/A. fib: Milan Horner MD  02/16/22  15:25 EST            This note was dictated utilizing Dragon dictation

## 2022-02-16 NOTE — CASE MANAGEMENT/SOCIAL WORK
Discharge Planning Assessment  [facility]     Patient Name: Yazmin Javier  MRN: [id number]  Today's Date: 2/16/2022    Admit Date: 2/14/2022     Discharge Needs Assessment     Row Name 02/16/22 1337       Living Environment    Lives With facility resident    Name(s) of Who Lives With Patient [facility] assisted living    Current Living Arrangements independent/assisted living facility    Duration at Residence [facility] assisted living    Primary Care Provided by self; other (see comments)  [facility] assisted living    Provides Primary Care For no one, unable/limited ability to care for self    Family Caregiver if Needed other (see comments)  [facility] assisted living    Quality of Family Relationships unable to assess    Able to Return to Prior Arrangements yes       Resource/Environmental Concerns    Resource/Environmental Concerns none       Transition Planning    Patient/Family Anticipates Transition to other (see comments)  [facility] assisted living    Patient/Family Anticipated Services at Transition home health care    Transportation Anticipated family or friend will provide       Discharge Needs Assessment    Readmission Within the Last 30 Days no previous admission in last 30 days    Equipment Currently Used at Home cane, straight; wheelchair; commode; shower chair    Concerns to be Addressed care coordination/care conferences; decision-making; discharge planning    Anticipated Changes Related to Illness none    Equipment Needed After Discharge none    Outpatient/Agency/Support Group Needs assisted living facility    Discharge Facility/Level of Care Needs home with home health    Provided Post Acute Provider List? Refused    Refused Provider List Comment Wants [facility] as she has used them in the past.    Provided Post Acute Provider Quality & Resource List? Refused    Current Discharge Risk chronically ill               Discharge Plan     Row Name 02/16/22 2650       Plan    Plan From [facility]  assisted living with plans to return with [facility]. Waiting return call from [facility] to see what requirements are needed for pt to return. PT eval pending.    Patient/Family in Agreement with Plan yes    Plan Comments Met with pt. at bedside. Explained roll of . Face sheet and pharmacy verified. Pt lives [facility]. States she receives meals, bathing assistance, and pre-packaged medication planner via [facility]. DME equipment includes a cane, wheelchair, commode, Home O2 through [employer] at 2.5LNC, and shower chair.  Pt is requires assist with ADLs. Pt has been to [facility] and has used [facility]. Request [facility] see her at D/C. Declined Medicare Ratings stating she has used them before. Pt’s PCP is Dr. QUIRINO Silverio. Uses [facility] on [facility]. At discharge, family will transport. Pt states she plans to return to [facility] at D/C. Called [facility] to see what requirements are for pt. to return. Awaiting return call. Explained that CCP would follow to assess for discharge needs.  Naresh Sampson RN-BC              Continued Care and Services - Admitted Since 2/14/2022    Coordination has not been started for this encounter.       Expected Discharge Date and Time     Expected Discharge Date Expected Discharge Time    Feb 18, 2022          Demographic Summary     Row Name 02/16/22 5935       General Information    Admission Type inpatient    Arrived From emergency department    Required Notices Provided Important Message from Medicare    Reason for Consult discharge planning; decision-making; care coordination/care conference    Preferred Language English     Used During This Interaction no               Functional Status     Row Name 02/16/22 0610       Functional Status    Usual Activity Tolerance good    Current Activity Tolerance moderate       Functional Status, IADL    Medications assistive equipment    Meal Preparation assistive equipment     Housekeeping assistive equipment    Laundry assistive equipment    Shopping assistive equipment       Mental Status    General Appearance WDL WDL       Mental Status Summary    Recent Changes in Mental Status/Cognitive Functioning no changes                 Naresh Sampson RN

## 2022-02-16 NOTE — CASE MANAGEMENT/SOCIAL WORK
Continued Stay Note  [facility]     Patient Name: Yazmin Javier  MRN: [id number]  Today's Date: 2/16/2022    Admit Date: 2/14/2022     Discharge Plan     Row Name 02/16/22 1353       Plan    Plan From [facility] with plans to return with [facility]. Waiting return call from [facility] to see what requirements are needed for pt to return. PT eval pending.    Patient/Family in Agreement with Plan yes    Plan Comments Met with pt. at bedside. Explained roll of . Face sheet and pharmacy verified. Pt lives [facility]. States she receives meals, bathing assistance, and pre-packaged medication planner via [facility]. DME equipment includes a cane, wheelchair, commode, Home O2 through [employer] at 2.5LNC, and shower chair.  Pt is requires assist with ADLs. Pt has been to [facility] and has used [facility]. Request [facility] see her at D/C. Declined Medicare Ratings stating she has used them before. Pt’s PCP is Dr. QUIRINO Silverio. Uses [facility] on [facility]. At discharge, family will transport. Pt states she plans to return to [facility] at D/C. Called [facility] to see what requirements are for pt. to return. Awaiting return call. Explained that CCP would follow to assess for discharge needs.  Naresh Sampson RN-BC               Discharge Codes    No documentation.               Expected Discharge Date and Time     Expected Discharge Date Expected Discharge Time    Feb 18, 2022             Naresh Sampson RN

## 2022-02-16 NOTE — PROGRESS NOTES
Yazmin Javier   86 y.o.  female    LOS: 2 days   Patient Care Team:  Morenita Silverio MD as PCP - General (Internal Medicine)      Subjective     Interval History:     Patient Complaints: Shortness of air improved. No chest pain. Palpitations or lightheadedness.    Review of Systems:   No subjective fever chills  No obvious bleeding issues    Medication Review:   Current Facility-Administered Medications:   •  acetaminophen (TYLENOL) tablet 650 mg, 650 mg, Oral, Q4H PRN, 650 mg at 02/16/22 0007 **OR** acetaminophen (TYLENOL) 160 MG/5ML solution 650 mg, 650 mg, Oral, Q4H PRN **OR** acetaminophen (TYLENOL) suppository 650 mg, 650 mg, Rectal, Q4H PRN, Raciel Chapa MD  •  albuterol (PROVENTIL) nebulizer solution 0.083% 2.5 mg/3mL, 2.5 mg, Nebulization, Q6H PRN, Raciel Chapa MD  •  allopurinol (ZYLOPRIM) tablet 300 mg, 300 mg, Oral, Daily, Raciel Chapa MD, 300 mg at 02/15/22 0921  •  amLODIPine (NORVASC) tablet 5 mg, 5 mg, Oral, Daily, Raciel Chapa MD, 5 mg at 02/15/22 0921  •  apixaban (ELIQUIS) tablet 5 mg, 5 mg, Oral, BID, Raciel Chapa MD, 5 mg at 02/15/22 2038  •  atorvastatin (LIPITOR) tablet 80 mg, 80 mg, Oral, Nightly, Raciel Chapa MD, 80 mg at 02/15/22 2038  •  folic acid (FOLVITE) tablet 1 mg, 1 mg, Oral, Daily, Raciel Chapa MD, 1 mg at 02/15/22 1821  •  furosemide (LASIX) injection 40 mg, 40 mg, Intravenous, Q12H, Joslyn Adkins APRN, 40 mg at 02/16/22 0510  •  ipratropium-albuterol (DUO-NEB) nebulizer solution 3 mL, 3 mL, Nebulization, 4x Daily - RT, Raciel Chapa MD, 3 mL at 02/16/22 0745  •  levothyroxine (SYNTHROID, LEVOTHROID) tablet 50 mcg, 50 mcg, Oral, Daily, Raciel Chapa MD, 50 mcg at 02/15/22 0921  •  metoprolol tartrate (LOPRESSOR) tablet 25 mg, 25 mg, Oral, BID, Raciel Chapa MD, 25 mg at 02/15/22 2038  •  nitroglycerin (NITROSTAT) SL tablet 0.4 mg, 0.4 mg, Sublingual, Q5 Min PRN, Raciel Chapa MD  •  ondansetron  (ZOFRAN) tablet 4 mg, 4 mg, Oral, Q6H PRN **OR** ondansetron (ZOFRAN) injection 4 mg, 4 mg, Intravenous, Q6H PRN, Raciel Chapa MD  •  PARoxetine (PAXIL) tablet 10 mg, 10 mg, Oral, Daily, Raciel Chapa MD, 10 mg at 02/15/22 0921  •  potassium chloride (K-DUR,KLOR-CON) ER tablet 20 mEq, 20 mEq, Oral, Daily, Raciel Chapa MD, 20 mEq at 02/15/22 0921  •  [COMPLETED] Insert peripheral IV, , , Once **AND** sodium chloride 0.9 % flush 10 mL, 10 mL, Intravenous, PRN, Raciel Chapa MD  •  sodium chloride 0.9 % flush 10 mL, 10 mL, Intravenous, Q12H, Raciel Chapa MD, 10 mL at 02/15/22 2039  •  sodium chloride 0.9 % flush 10 mL, 10 mL, Intravenous, PRN, Raciel Chapa MD  •  sodium chloride 0.9 % flush 10 mL, 10 mL, Intravenous, Q12H, Raciel Chapa MD, 10 mL at 02/15/22 2039  •  sodium chloride 0.9 % flush 10 mL, 10 mL, Intravenous, PRN, Raciel Chapa MD  •  vitamin B-12 (CYANOCOBALAMIN) tablet 1,000 mcg, 1,000 mcg, Oral, Daily, Raciel Chapa MD, 1,000 mcg at 02/15/22 0921      Objective     Vital Sign Min/Max for last 24 hours  Temp  Min: 97.5 °F (36.4 °C)  Max: 98.6 °F (37 °C)   BP  Min: 122/61  Max: 144/60    Pulse  Min: 62  Max: 86     Wt Readings from Last 3 Encounters:   02/16/22 85.7 kg (189 lb)   11/01/21 89 kg (196 lb 3.4 oz)   04/22/21 86.6 kg (191 lb)        Intake/Output Summary (Last 24 hours) at 2/16/2022 0837  Last data filed at 2/16/2022 0526  Gross per 24 hour   Intake 480 ml   Output 1400 ml   Net -920 ml     Physical Exam:      General Appearance:   Overweight female in no acute distress   Head:    Normocephalic, atraumatic   Eyes:            Conjunctivae normal, anicteric, no xanthelasma   Neck:   no carotid bruit,  JVD difficult to evaluate   Lungs:    Some crackles at the bases    Heart:    Regular rate and rhythm.  Normal S1 and S2. No murmur, no gallop, rub or lift.    Chest Wall:    No abnormalities observed   Abdomen:     Normal bowel  sounds, no masses, no organomegaly, soft        nontender, nondistended, no guarding, no rebound                tenderness   Rectal:     Deferred   Extremities:   No clubbing, cyanosis or edema.         Skin:   No bleeding, bruising or rash   Neurologic:   awake alert and oriented x3, speech clear and approp, no facial drooping      Monitor:  nsr  Results Review:     I reviewed the patient's new clinical results.    Sodium Sodium   Date Value Ref Range Status   02/15/2022 142 136 - 145 mmol/L Final   02/14/2022 142 136 - 145 mmol/L Final   02/14/2022 143 136 - 145 mmol/L Final      Potassium Potassium   Date Value Ref Range Status   02/15/2022 3.6 3.5 - 5.2 mmol/L Final   02/14/2022 4.5 3.5 - 5.2 mmol/L Final     Comment:     Slight hemolysis detected by analyzer. Results may be affected.   02/14/2022 4.2 3.5 - 5.2 mmol/L Final      Chloride Chloride   Date Value Ref Range Status   02/15/2022 103 98 - 107 mmol/L Final   02/14/2022 106 98 - 107 mmol/L Final   02/14/2022 106 98 - 107 mmol/L Final      Bicarbonate No results found for: PLASMABICARB   BUN BUN   Date Value Ref Range Status   02/15/2022 26 (H) 8 - 23 mg/dL Final   02/14/2022 24 (H) 8 - 23 mg/dL Final   02/14/2022 22 8 - 23 mg/dL Final      Creatinine Creatinine   Date Value Ref Range Status   02/15/2022 1.43 (H) 0.57 - 1.00 mg/dL Final   02/14/2022 1.32 (H) 0.57 - 1.00 mg/dL Final   02/14/2022 1.41 (H) 0.57 - 1.00 mg/dL Final      Calcium Calcium   Date Value Ref Range Status   02/15/2022 8.8 8.6 - 10.5 mg/dL Final   02/14/2022 9.4 8.6 - 10.5 mg/dL Final   02/14/2022 9.6 8.6 - 10.5 mg/dL Final      Magnesium Magnesium   Date Value Ref Range Status   02/15/2022 1.8 1.6 - 2.4 mg/dL Final        Results from last 7 days   Lab Units 02/16/22  0625   WBC 10*3/mm3 5.99   HEMOGLOBIN g/dL 9.6*   HEMATOCRIT % 28.8*   PLATELETS 10*3/mm3 139*     Lab Results   Lab Value Date/Time    TROPONINT <0.010 02/15/2022 0525    TROPONINT <0.010 02/14/2022 0458    TROPONINT  <0.010 10/31/2021 2334    TROPONINT <0.010 10/31/2021 1907    TROPONINT <0.010 04/21/2021 2331    TROPONINT <0.010 04/04/2021 0910     Lab Results   Component Value Date    CHOL 156 04/22/2021     Lab Results   Component Value Date    HDL 37 (L) 04/22/2021     Lab Results   Component Value Date    LDL 97 04/22/2021     Lab Results   Component Value Date    TRIG 119 04/22/2021     No components found for: CHOLHDL    Results from last 7 days   Lab Units 02/14/22  0618   INR  1.59*         Echo EF Estimated  No results found for: ECHOEFEST       Assessment/ Plan  1. Acute on chronic hypoxic respiratory failure requiring increase in supplemental 02 (homr 2.5L NC)  2. Acute hypercarbic respiratory failure  3. Acute on chronic diastolic CHF      A. Echo 11/1/2021: Saline test results are negative.The left ventricular cavity is small in size.Estimated left ventricular EF was in disagreement with the calculated left ventricular EF. Left ventricular ejection fraction appears to be 51 - 55%. There is mild, posterior mitral leaflet thickening present. Estimated right ventricular systolic pressure from tricuspid regurgitation is mildly elevated (35-45 mmHg). Low normal LV and RV function, EF 50-55%.  1+ MR, 1+ TR, 1+ PI. Low normal biventricular function may be seen with untreated LILIANA. Read by Dr. Sparrow.      B. Echo 4/21/2021: EF 60 to 65%, RV mild dilation, trivial MR, 1+ TR (32).     C. Echo 3/1/2018: EF 55 to 60%, mild to moderate LAE, 1-2+ MR, 1-2+ TR (RVSP 40).    4. COPD  5.  Paroxysmal atrial fibrillation on Eliquis, patient currently in sinus rhythm     A. Holter 6/10/2021: 100% atrial fibrillation, 2.6-second pauses, average 87 bpm. No PPM.   6. Left sided CVA     A. US carotid 4/21/2021: Left ICA 16 to 49%.  7. CKD3  8. Hypothyroidism  9. Anemia  10. H/o syncope 2021  11. Untreated LILIANA (supposed to have an appt tomorrow)  12. Echocardiogram this admission demonstrates EF 56 to 60% diastolic dysfunction grade 2.  Right ventricular systolic pressure 69 whereas on 11/1/2021 was only 38.  Plan #1 symptomatically she is improved. Urine output not too great. Her weight is better at 188 but this is the only standing weight we have had since admission. She is still on almost 5 L of O2 and O2 currently 91-92. I am going to recheck her chest x-ray today and decide about any need for adjusting diuretics.  2. After we clear the heart failure will schedule a limited echo to recheck the right ventricular systolic pressure to see if it has come back to where it was in November 2021.  3. today's BMP is still pending. Hemoglobin is stable at 9.6 and she continues on Eliquis. She is on 5 mg every 12 hours now but with the creatinine levels she is becoming borderline for the 2.5 dose  #4  will get her up in the chair as tolerated    Garth Horton MD  02/16/22  08:37 EST      Time: 20 minutes

## 2022-02-16 NOTE — PROGRESS NOTES
"DAILY PROGRESS NOTE  [facility]    Patient Identification:  Name: Yazmin Javier  Age: 86 y.o.  Sex: female  :  1935  MRN: [id number]         Primary Care Physician: Morenita Silverio MD      Subjective  Patient with no acute complaints.  Continues to feel better.    Objective:  General Appearance:  Comfortable, well-appearing, in no acute distress and not in pain.    Vital signs: (most recent): Blood pressure 132/73, pulse 68, temperature 97.9 °F (36.6 °C), temperature source Oral, resp. rate 18, height 157.5 cm (62\"), weight 85.7 kg (189 lb), SpO2 94 %.    Lungs:  Normal effort and normal respiratory rate.  She is not in respiratory distress.  No stridor.  There are rales.  No decreased breath sounds, wheezes or rhonchi.  (Few fine basilar rales.  Presently on 4 L nasal cannula.)  Heart: Normal rate.  Irregular rhythm.    Extremities: There is no dependent edema.    Neurological: Patient is alert and oriented to person, place and time.    Skin:  Warm and dry.                Vital signs in last 24 hours:  Temp:  [97.5 °F (36.4 °C)-98.6 °F (37 °C)] 97.9 °F (36.6 °C)  Heart Rate:  [60-86] 68  Resp:  [16-20] 18  BP: (122-144)/(59-73) 132/73    Intake/Output:    Intake/Output Summary (Last 24 hours) at 2022 1440  Last data filed at 2022 0526  Gross per 24 hour   Intake 480 ml   Output 900 ml   Net -420 ml         Results from last 7 days   Lab Units 22  0625 02/15/22  0525 22  0618 22  0458   WBC 10*3/mm3 5.99 5.88 10.74 9.10   HEMOGLOBIN g/dL 9.6* 9.0* 10.6* 10.3*   PLATELETS 10*3/mm3 139* 139* 173 167     Results from last 7 days   Lab Units 22  0625 02/15/22  0525 22  0618 22  0458   SODIUM mmol/L 139 142 142 143   POTASSIUM mmol/L 3.4* 3.6 4.5 4.2   CHLORIDE mmol/L 100 103 106 106   CO2 mmol/L 29.2* 28.0 27.0 25.1   BUN mg/dL 29* 26* 24* 22   CREATININE mg/dL 1.40* 1.43* 1.32* 1.41*   GLUCOSE mg/dL 112* 111* 130* 127*   Estimated Creatinine " Clearance: 30.4 mL/min (A) (by C-G formula based on SCr of 1.4 mg/dL (H)).  Results from last 7 days   Lab Units 02/16/22  0625 02/15/22  0525 02/14/22  0618 02/14/22  0458   CALCIUM mg/dL 9.1 8.8 9.4 9.6   ALBUMIN g/dL  --   --   --  3.80   MAGNESIUM mg/dL  --  1.8  --   --    PHOSPHORUS mg/dL  --  4.4  --   --      Results from last 7 days   Lab Units 02/14/22  0458   ALBUMIN g/dL 3.80   BILIRUBIN mg/dL 2.2*   ALK PHOS U/L 113   AST (SGOT) U/L 24   ALT (SGPT) U/L 20       Assessment:  Acute hypoxic respiratory failure secondary to CHF: Much improved.  Acute on chronic CHF with pulmonary edema:  Cardiology input appreciated.  Continue diuresis.  Atrial fibrillation: Continue with rate control and anticoagulation.  COPD: Continue bronchodilators.  Presently I doubt COPD exacerbation.  I suspect the wheezes heard were actually cardiac in origin.  Monitor closely.  Hypothyroidism: TSH normal continue home meds.  Macrocytic anemia:  Low folate on evaluation.  Initiate supplementation.  Monitor.  Chronic kidney disease stage III:   Creatinine stable today.  Monitor closely with diuresis.  Morbid obesity: Significant comorbidity.  Encourage weight loss.  Positive blood culture: Appears to be contaminant.  Monitor closely.      Plan:  Please see above.  Increase activity.    Raicel Chapa MD  2/16/2022  14:40 EST

## 2022-02-17 NOTE — PROGRESS NOTES
"                                              LOS: 3 days   Patient Care Team:  Morenita Silverio MD as PCP - General (Internal Medicine)    Chief Complaint:  F/up respiratory failure, pulmonary edema    Subjective   Interval History  On 3 L oxygen.  Walked with PT today.  Reported no cough.  She has no shortness of breath at rest but mild dyspnea on activities.  Use the Pap overnight.    REVIEW OF SYSTEMS:   CARDIOVASCULAR: No chest pain, chest pressure or chest discomfort. No palpitations or edema.     CONSTITUTIONAL: No fever or chills.      Ventilator/Non-Invasive Ventilation Settings (From admission, onward)             Start     Ordered    02/14/22 0947  NIPPV (CPAP or BIPAP)  Until Discontinued        Question Answer Comment   Indication: Acute Respiratory Failure    Type: BIPAP    IPAP 14    EPAP 6        02/14/22 0947                      Physical Exam:     Vital Signs  Temp:  [97.6 °F (36.4 °C)-98.1 °F (36.7 °C)] 98.1 °F (36.7 °C)  Heart Rate:  [72-85] 73  Resp:  [18] 18  BP: (125-145)/(50-75) 125/50    Intake/Output Summary (Last 24 hours) at 2/17/2022 1439  Last data filed at 2/17/2022 0745  Gross per 24 hour   Intake 240 ml   Output 1450 ml   Net -1210 ml     Flowsheet Rows      First Filed Value   Admission Height 157.5 cm (62\") Documented at 02/14/2022 0425   Admission Weight 85.3 kg (188 lb) Documented at 02/14/2022 0425          PPE used per hospital policy    General Appearance:   Alert, cooperative, in no acute distress   ENMT:  Mallampati score 3, moist mucous membrane   Eyes:  Pupils equal and reactive to light. EOMI   Neck:   Large. Trachea midline. No thyromegaly.   Lungs:    Bibasilar crackles.  No wheezing.  No use of accessory muscles    Heart:   Regular rhythm and normal rate, normal S1 and S2, no         murmur   Skin:   No rash or ecchymosis   Abdomen:    Obese. Soft. No tenderness. No HSM.   Neuro/psych:  Conscious, alert, oriented x3. Strength 5/5 in upper and lower  ext.  Appropriate " mood and affect   Extremities:  No cyanosis, clubbing but pitting edema.  Warm extremities and well-perfused          Results Review:        Results from last 7 days   Lab Units 02/17/22  0947 02/16/22  0625 02/16/22  0625 02/15/22  0525 02/15/22  0525   SODIUM mmol/L 135*  --  139  --  142   POTASSIUM mmol/L 3.8  --  3.4*  --  3.6   CHLORIDE mmol/L 95*  --  100  --  103   CO2 mmol/L 31.3*  --  29.2*  --  28.0   BUN mg/dL 29*  --  29*  --  26*   CREATININE mg/dL 1.30*  --  1.40*  --  1.43*   GLUCOSE mg/dL 115*   < > 112*   < > 111*   CALCIUM mg/dL 9.2  --  9.1  --  8.8    < > = values in this interval not displayed.     Results from last 7 days   Lab Units 02/15/22  0525 02/14/22 0458   TROPONIN T ng/mL <0.010 <0.010     Results from last 7 days   Lab Units 02/16/22  0625 02/15/22  0525 02/14/22  0618   WBC 10*3/mm3 5.99 5.88 10.74   HEMOGLOBIN g/dL 9.6* 9.0* 10.6*   HEMATOCRIT % 28.8* 27.6* 32.4*   PLATELETS 10*3/mm3 139* 139* 173     Results from last 7 days   Lab Units 02/14/22  0618   INR  1.59*     Results from last 7 days   Lab Units 02/14/22 0618   PROBNP pg/mL 2,105.0*                   Results from last 7 days   Lab Units 02/14/22  1132 02/14/22  0937 02/14/22  0452   PH, ARTERIAL pH units 7.327* 7.250* 7.348*   PCO2, ARTERIAL mm Hg 51.7* 60.1* 46.9*   PO2 ART mm Hg 138.6* 88.1 91.3   FLOW RATE lpm  --  15 15   MODALITY  BiPap NRB NRB   O2 SATURATION CALC % 98.9 94.8 96.5         I reviewed the patient's new clinical results.        Medication Review:   allopurinol, 300 mg, Oral, Daily  amLODIPine, 5 mg, Oral, Daily  apixaban, 5 mg, Oral, BID  atorvastatin, 80 mg, Oral, Nightly  folic acid, 1 mg, Oral, Daily  furosemide, 40 mg, Oral, BID  ipratropium-albuterol, 3 mL, Nebulization, 4x Daily - RT  levothyroxine, 50 mcg, Oral, Daily  metoprolol tartrate, 25 mg, Oral, BID  PARoxetine, 10 mg, Oral, Daily  potassium chloride, 20 mEq, Oral, Daily  sodium chloride, 10 mL, Intravenous, Q12H  sodium chloride, 10  mL, Intravenous, Q12H  vitamin B-12, 1,000 mcg, Oral, Daily          Diagnostic imaging:  I personally and Independently reviewed and interpreted the following images:  CXR 2/16/2022: Improved pulmonary edema.  Bilateral pleural effusion    Assessment     1. Acute pulmonary edema, improved  2. Acute hypercapnic respiratory failure, secondary #1  3. Acute on chronic hypoxic respiratory failure, secondary #1  4. Acute diastolic CHF  5. Bilateral pleural effusion  6. Elevated proBNP  7. Anemia, chronic  8. History of asthma prior records, unknown severity.  Doubt current exacerbation  9. High risk for sleep apnea  10. Severe pulmonary hypertension, RVSP 69 on echo 2/15. Surprisingly LA is normal in size. Likely group 2     Pertinent medical problems:  · History of CVA with right hemiparesis  · Atrial fibrillation  · Mild mitral regurgitation  · Hypothyroidism        Plan     · Oxygen by nasal cannula and titrate to keep SPO2 89-92%. Avoid hyperoxia in the setting of hypercarbia  · Lasix 40 mg p.o. twice daily.    · NIPPV at night  · Incentive spirometry  · Outpatient evaluation for sleep apnea  · DVT prophylaxis/A. fib: Eliquis    She can probably be discharged soon.  She would not require NIPPV on discharge but will likely require oxygen.      Ayo Horner MD  02/17/22  14:39 EST            This note was dictated utilizing Dragon dictation

## 2022-02-17 NOTE — PROGRESS NOTES
Yazmin Javier   86 y.o.  female    LOS: 3 days   Patient Care Team:  Morenita Silverio MD as PCP - General (Internal Medicine)      Subjective     Interval History:     Patient Complaints: No shortness of air today.  No chest pain.  No palpitations or lightheadedness.  She said she did sit in the chair without problems.  She said she also able to go to the bathroom.    Review of Systems:   No subjective fever chills  No obvious bleeding issues    Medication Review:   Current Facility-Administered Medications:   •  acetaminophen (TYLENOL) tablet 650 mg, 650 mg, Oral, Q4H PRN, 650 mg at 02/16/22 0007 **OR** acetaminophen (TYLENOL) 160 MG/5ML solution 650 mg, 650 mg, Oral, Q4H PRN **OR** acetaminophen (TYLENOL) suppository 650 mg, 650 mg, Rectal, Q4H PRN, Raciel Chapa MD  •  albuterol (PROVENTIL) nebulizer solution 0.083% 2.5 mg/3mL, 2.5 mg, Nebulization, Q6H PRN, Raciel Chapa MD  •  allopurinol (ZYLOPRIM) tablet 300 mg, 300 mg, Oral, Daily, Raciel Chapa MD, 300 mg at 02/16/22 0914  •  amLODIPine (NORVASC) tablet 5 mg, 5 mg, Oral, Daily, Raciel Chapa MD, 5 mg at 02/16/22 0914  •  apixaban (ELIQUIS) tablet 5 mg, 5 mg, Oral, BID, Raciel Chapa MD, 5 mg at 02/16/22 2005  •  atorvastatin (LIPITOR) tablet 80 mg, 80 mg, Oral, Nightly, Raciel Chapa MD, 80 mg at 02/16/22 2005  •  folic acid (FOLVITE) tablet 1 mg, 1 mg, Oral, Daily, Raciel Chapa MD, 1 mg at 02/16/22 0914  •  furosemide (LASIX) tablet 40 mg, 40 mg, Oral, BID, TrimGarth yoder MD  •  ipratropium-albuterol (DUO-NEB) nebulizer solution 3 mL, 3 mL, Nebulization, 4x Daily - RT, Raciel Chapa MD, 3 mL at 02/17/22 0721  •  levothyroxine (SYNTHROID, LEVOTHROID) tablet 50 mcg, 50 mcg, Oral, Daily, Raciel Chapa MD, 50 mcg at 02/16/22 0914  •  metoprolol tartrate (LOPRESSOR) tablet 25 mg, 25 mg, Oral, BID, Raciel Chapa MD, 25 mg at 02/16/22 2005  •  nitroglycerin (NITROSTAT) SL tablet 0.4  mg, 0.4 mg, Sublingual, Q5 Min PRN, Raciel Chapa MD  •  ondansetron (ZOFRAN) tablet 4 mg, 4 mg, Oral, Q6H PRN **OR** ondansetron (ZOFRAN) injection 4 mg, 4 mg, Intravenous, Q6H PRN, Raciel Chapa MD  •  PARoxetine (PAXIL) tablet 10 mg, 10 mg, Oral, Daily, Raciel Chapa MD, 10 mg at 02/16/22 0914  •  potassium chloride (K-DUR,KLOR-CON) ER tablet 20 mEq, 20 mEq, Oral, Daily, Raciel Chapa MD, 20 mEq at 02/16/22 0913  •  [COMPLETED] Insert peripheral IV, , , Once **AND** sodium chloride 0.9 % flush 10 mL, 10 mL, Intravenous, PRN, Raciel Chapa MD  •  sodium chloride 0.9 % flush 10 mL, 10 mL, Intravenous, Q12H, Raciel Chapa MD, 10 mL at 02/16/22 2005  •  sodium chloride 0.9 % flush 10 mL, 10 mL, Intravenous, PRN, Raciel Chapa MD  •  sodium chloride 0.9 % flush 10 mL, 10 mL, Intravenous, Q12H, Raciel Chapa MD, 10 mL at 02/16/22 2005  •  sodium chloride 0.9 % flush 10 mL, 10 mL, Intravenous, PRN, Raciel Chapa MD  •  vitamin B-12 (CYANOCOBALAMIN) tablet 1,000 mcg, 1,000 mcg, Oral, Daily, Raciel Chapa MD, 1,000 mcg at 02/16/22 0914      Objective     Vital Sign Min/Max for last 24 hours  Temp  Min: 97.6 °F (36.4 °C)  Max: 98.1 °F (36.7 °C)   BP  Min: 127/60  Max: 145/75    Pulse  Min: 60  Max: 85     Wt Readings from Last 3 Encounters:   02/16/22 85.7 kg (189 lb)   11/01/21 89 kg (196 lb 3.4 oz)   04/22/21 86.6 kg (191 lb)        Intake/Output Summary (Last 24 hours) at 2/17/2022 0811  Last data filed at 2/17/2022 0745  Gross per 24 hour   Intake 240 ml   Output 1450 ml   Net -1210 ml     Physical Exam:      General Appearance:   Overweight female in no acute distress   Head:    Normocephalic, atraumatic   Eyes:            Conjunctivae normal, anicteric, no xanthelasma   Neck:   no carotid bruit, no JVD   Lungs:     Clear to auscultation bilaterally. No wheezes, rhonchi or rales. No accessory muscle use.     Heart:    Regular rate and rhythm.   Normal S1 and S2. No murmur, no gallop, rub or lift.    Chest Wall:    No abnormalities observed   Abdomen:     Normal bowel sounds, no masses, no organomegaly, soft        nontender, nondistended, no guarding, no rebound                tenderness   Rectal:     Deferred   Extremities:   No clubbing, cyanosis or edema.         Skin:   No bleeding, bruising or rash   Neurologic:   awake alert and oriented x3, speech clear and approp, no facial drooping      Monitor:  nsr  Results Review:     I reviewed the patient's new clinical results.    Sodium Sodium   Date Value Ref Range Status   02/16/2022 139 136 - 145 mmol/L Final   02/15/2022 142 136 - 145 mmol/L Final      Potassium Potassium   Date Value Ref Range Status   02/16/2022 3.4 (L) 3.5 - 5.2 mmol/L Final   02/15/2022 3.6 3.5 - 5.2 mmol/L Final      Chloride Chloride   Date Value Ref Range Status   02/16/2022 100 98 - 107 mmol/L Final   02/15/2022 103 98 - 107 mmol/L Final      Bicarbonate No results found for: PLASMABICARB   BUN BUN   Date Value Ref Range Status   02/16/2022 29 (H) 8 - 23 mg/dL Final   02/15/2022 26 (H) 8 - 23 mg/dL Final      Creatinine Creatinine   Date Value Ref Range Status   02/16/2022 1.40 (H) 0.57 - 1.00 mg/dL Final   02/15/2022 1.43 (H) 0.57 - 1.00 mg/dL Final      Calcium Calcium   Date Value Ref Range Status   02/16/2022 9.1 8.6 - 10.5 mg/dL Final   02/15/2022 8.8 8.6 - 10.5 mg/dL Final      Magnesium Magnesium   Date Value Ref Range Status   02/15/2022 1.8 1.6 - 2.4 mg/dL Final        Results from last 7 days   Lab Units 02/16/22  0625   WBC 10*3/mm3 5.99   HEMOGLOBIN g/dL 9.6*   HEMATOCRIT % 28.8*   PLATELETS 10*3/mm3 139*     Lab Results   Lab Value Date/Time    TROPONINT <0.010 02/15/2022 0525    TROPONINT <0.010 02/14/2022 0458    TROPONINT <0.010 10/31/2021 2334    TROPONINT <0.010 10/31/2021 1907    TROPONINT <0.010 04/21/2021 2331    TROPONINT <0.010 04/04/2021 0910     Lab Results   Component Value Date    CHOL 156 04/22/2021      Lab Results   Component Value Date    HDL 37 (L) 04/22/2021     Lab Results   Component Value Date    LDL 97 04/22/2021     Lab Results   Component Value Date    TRIG 119 04/22/2021     No components found for: CHOLHDL    Results from last 7 days   Lab Units 02/14/22  0618   INR  1.59*         Echo EF Estimated  No results found for: ECHOEFEST       Assessment/ Plan  1. Acute on chronic hypoxic respiratory failure requiring increase in supplemental 02 (homr 2.5L NC)  2. Acute hypercarbic respiratory failure  3. Acute on chronic diastolic CHF      A. Echo 11/1/2021: Saline test results are negative.The left ventricular cavity is small in size.Estimated left ventricular EF was in disagreement with the calculated left ventricular EF. Left ventricular ejection fraction appears to be 51 - 55%. There is mild, posterior mitral leaflet thickening present. Estimated right ventricular systolic pressure from tricuspid regurgitation is mildly elevated (35-45 mmHg). Low normal LV and RV function, EF 50-55%.  1+ MR, 1+ TR, 1+ PI. Low normal biventricular function may be seen with untreated LILIANA. Read by Dr. Sparrow.      B. Echo 4/21/2021: EF 60 to 65%, RV mild dilation, trivial MR, 1+ TR (32).     C. Echo 3/1/2018: EF 55 to 60%, mild to moderate LAE, 1-2+ MR, 1-2+ TR (RVSP 40).    4. COPD  5.  Paroxysmal atrial fibrillation on Eliquis, patient currently in sinus rhythm     A. Holter 6/10/2021: 100% atrial fibrillation, 2.6-second pauses, average 87 bpm. No PPM.   6. Left sided CVA     A. US carotid 4/21/2021: Left ICA 16 to 49%.  7. CKD3  8. Hypothyroidism  9. Anemia  10. H/o syncope 2021  11. Untreated LILIANA (supposed to have an appt tomorrow)  12. Echocardiogram this admission demonstrates EF 56 to 60% diastolic dysfunction grade 2. Right ventricular systolic pressure 69 whereas on 11/1/2021 was only 38.  Plan #1 no complaints of any significant shortness of air.  She  is down to 3 L of O2 and her O2 sats 96%.  She is on about  2.5 L O2 as an outpatient.  Chest sounds fairly clear this morning.  Chest x-ray yesterday much improved.  I switched her back to 40 mg of furosemide orally twice a day.  She had received an IV dose about 0600 hrs. this morning so we will start the first oral dose at 1800 hrs. today.  BMP today is still to be collected.  #2 she has done well sitting in the chair.  I talked to her nurse about having her walk around the nurses station with her walker or a cane.  She may need to get PT to do this.  At the assisted living she says she does do some walking with the walker or cane.  #3 going to try to get a limited echo today just to see what the right ventricular systolic pressure is now that the heart failure seems fairly well compensated.  4.  From my standpoint she would probably be ready for discharge tomorrow if other physicians agree.  5.  Discussed in detail with the patient's nurse.    Garth Horton MD  02/17/22  08:11 EST      Time: 20 minutes

## 2022-02-17 NOTE — THERAPY EVALUATION
Patient Name: Yazmin Javier  : 1935    MRN: [id number]                              Today's Date: 2022       Admit Date: 2022    Visit Dx:     ICD-10-CM ICD-9-CM   1. Pneumonia due to infectious organism, unspecified laterality, unspecified part of lung  J18.9 486   2. Acute respiratory failure with hypoxia (HCC)  J96.01 518.81     Patient Active Problem List   Diagnosis   • Acute on chronic congestive heart failure (HCC)   • Hypoxia   • A-fib (HCC)   • Chronic anticoagulation   • Hypothyroidism (acquired)   • Stage 3b chronic kidney disease (HCC)   • Right arm weakness   • Diastolic CHF, acute on chronic (HCC)   • Syncope   • Acute respiratory failure with hypoxia (HCC)   • Acute pulmonary edema (HCC)     History reviewed. No pertinent past medical history.  History reviewed. No pertinent surgical history.   General Information     Row Name 22 1515          Physical Therapy Time and Intention    Document Type evaluation  -MS (r) AY (t) MS (c)     Mode of Treatment physical therapy  -MS (r) AY (t) MS (c)     Row Name 22 1515          General Information    Patient Profile Reviewed yes  -MS (r) AY (t) MS (c)     Prior Level of Function independent:; gait; transfer; all household mobility; bed mobility; ADL's  -MS (r) AY (t) MS (c)     Existing Precautions/Restrictions fall  -MS (r) AY (t) MS (c)     Barriers to Rehab none identified  -MS (r) AY (t) MS (c)     Row Name 22 1515          Living Environment    Lives With facility resident  -MS (r) AY (t) MS (c)     Row Name 22 1515          Home Main Entrance    Number of Stairs, Main Entrance none  -MS (r) AY (t) MS (c)     Row Name 22 1515          Cognition    Orientation Status (Cognition) oriented x 4  -MS (r) AY (t) MS (c)     Row Name 22 1515          Safety Issues, Functional Mobility    Impairments Affecting Function (Mobility) balance; coordination; strength; postural/trunk control; endurance/activity  tolerance  -MS (r) AY (t) MS (c)           User Key  (r) = Recorded By, (t) = Taken By, (c) = Cosigned By    Initials Name Provider Type    MS MooreLilia, PT Physical Therapist    Ki Fernandes, PT Student PT Student               Mobility     Row Name 02/17/22 1515          Bed Mobility    Bed Mobility bed mobility (all) activities  -MS (r) AY (t) MS (c)     All Activities, [place] (Bed Mobility) standby assist; verbal cues  -MS (r) AY (t) MS (c)     Assistive Device (Bed Mobility) bed rails  -MS (r) AY (t) MS (c)     Row Name 02/17/22 1515          Sit-Stand Transfer    Sit-Stand [place] (Transfers) contact guard; verbal cues  -MS (r) AY (t) MS (c)     Assistive Device (Sit-Stand Transfers) walker, front-wheeled  -MS (r) AY (t) MS (c)     Row Name 02/17/22 1515          Gait/Stairs (Locomotion)    [place] Level (Gait) contact guard; verbal cues  -MS (r) AY (t) MS (c)     Assistive Device (Gait) walker, front-wheeled  -MS (r) AY (t) MS (c)     Distance in Feet (Gait) 100'  -MS (r) AY (t) MS (c)     Comment (Gait/Stairs) no LOB noted. Pt needed rest break midway through gait trial d/t feeling fatigued  -MS (r) AY (t) MS (c)           User Key  (r) = Recorded By, (t) = Taken By, (c) = Cosigned By    Initials Name Provider Type    MS MooreLilia, PT Physical Therapist    Ki Fernandes, PT Student PT Student               Obj/Interventions     Row Name 02/17/22 1517          Range of Motion Comprehensive    General Range of Motion bilateral lower extremity ROM WFL  -MS (r) AY (t) MS (c)     Row Name 02/17/22 1517          Strength Comprehensive (MMT)    General Manual Muscle Testing (MMT) Assessment lower extremity strength deficits identified  -MS (r) AY (t) MS (c)     Comment, General Manual Muscle Testing (MMT) Assessment 4/5 B LE grossly  -MS (r) AY (t) MS (c)     Row Name 02/17/22 1517          Balance    Balance Assessment sitting static balance; sitting dynamic balance; standing  dynamic balance; standing static balance  -MS (r) AY (t) MS (c)     Static Sitting Balance WFL; unsupported; sitting, edge of bed  -MS (r) AY (t) MS (c)     Dynamic Sitting Balance WFL; unsupported; sitting, edge of bed  -MS (r) AY (t) MS (c)     Static Standing Balance WFL; supported; standing  -MS (r) AY (t) MS (c)     Dynamic Standing Balance mild impairment; supported; standing  -MS (r) AY (t) MS (c)     Balance Interventions sitting; standing; sit to stand; supported; static; dynamic  -MS (r) AY (t) MS (c)     Row Name 02/17/22 1517          Sensory Assessment (Somatosensory)    Sensory Assessment (Somatosensory) sensation intact  -MS (r) AY (t) MS (c)           User Key  (r) = Recorded By, (t) = Taken By, (c) = Cosigned By    Initials Name Provider Type    Lilia Vitale, PT Physical Therapist    Ki Fernandes, PT Student PT Student               Goals/Plan    No documentation.                Clinical Impression     Row Name 02/17/22 1518          Pain    Additional Documentation Pain Scale: Numbers Pre/Post-Treatment (Group)  -MS (r) AY (t) MS (c)     Row Name 02/17/22 1518          Pain Scale: Numbers Pre/Post-Treatment    Pretreatment Pain Rating 0/10 - no pain  -MS (r) AY (t) MS (c)     Posttreatment Pain Rating 0/10 - no pain  -MS (r) AY (t) MS (c)     Row Name 02/17/22 151          Plan of Care Review    Plan of Care Reviewed With patient  -MS (r) AY (t) MS (c)     Progress improving  -MS (r) AY (t) MS (c)     Outcome Summary Pt is a 86 y.o. female who presents to the ED c/o shortness of breath. Pt was lying supine in bed and was AOx4. Pt states she uses 3 L/min of O2 at home chronically. Pt states she uses a WC and SPC at mobility at living facility. Pt stated she needed assist with ADLs but was IND for mobility. Pt was SBA for bed mobility and transfers, and CGA for gait w/ FWW. Pt needed rest break midway through gait trial d/t fatigue. Pt stated that this is normal because she only walks  household distances. Pt's sitting and standing balance was WFL. Pt is at baseline level of function and may return to [facility] assisted living facility after being medically cleared.  -MS (r) AY (t) MS (c)     Row Name 02/17/22 1518          Vital Signs    O2 Delivery Pre Treatment supplemental O2  3 L/min  -MS (r) AY (t) MS (c)     O2 Delivery Intra Treatment supplemental O2  3 L/min  -MS (r) AY (t) MS (c)     O2 Delivery Post Treatment supplemental O2  3 L/min  -MS (r) AY (t) MS (c)     Row Name 02/17/22 1518          Positioning and Restraints    Pre-Treatment Position in bed  -MS (r) AY (t) MS (c)     Post Treatment Position chair  -MS (r) AY (t) MS (c)     In Chair reclined; call light within reach; encouraged to call for assist; exit alarm on  -MS (r) AY (t) MS (c)           User Key  (r) = Recorded By, (t) = Taken By, (c) = Cosigned By    Initials Name Provider Type    Lilia Vitale, PT Physical Therapist    Ki Fernandes, PT Student PT Student               Outcome Measures     Row Name 02/17/22 1519          How much help from another person do you currently need...    Turning from your back to your side while in flat bed without using bedrails? 4  -MS (r) AY (t) MS (c)     Moving from lying on back to sitting on the side of a flat bed without bedrails? 3  -MS (r) AY (t) MS (c)     Moving to and from a bed to a chair (including a wheelchair)? 3  -MS (r) AY (t) MS (c)     Standing up from a chair using your arms (e.g., wheelchair, bedside chair)? 3  -MS (r) AY (t) MS (c)     Climbing 3-5 steps with a railing? 1  -MS (r) AY (t) MS (c)     To walk in hospital room? 3  -MS (r) AY (t) MS (c)     AM-PAC 6 Clicks Score (PT) 17  -MS (r) AY (t)     Row Name 02/17/22 1519          Functional Assessment    Outcome Measure Options AM-PAC 6 Clicks Basic Mobility (PT)  -MS (r) AY (t) MS (c)           User Key  (r) = Recorded By, (t) = Taken By, (c) = Cosigned By    Initials Name Provider Type    MS Moore,  Lilia VARGAS, PT Physical Therapist    Ki Fernandes, PT Student PT Student                               PT Recommendation and Plan     Plan of Care Reviewed With: patient  Progress: improving  Outcome Summary: Pt is a 86 y.o. female who presents to the ED c/o shortness of breath. Pt was lying supine in bed and was AOx4. Pt states she uses 3 L/min of O2 at home chronically. Pt states she uses a WC and SPC at mobility at living facility. Pt stated she needed assist with ADLs but was IND for mobility. Pt was SBA for bed mobility and transfers, and CGA for gait w/ FWW. Pt needed rest break midway through gait trial d/t fatigue. Pt stated that this is normal because she only walks household distances. Pt's sitting and standing balance was WFL. Pt is at baseline level of function and may return to [facility] assisted living [facility] after being medically cleared.     Time Calculation:    PT Charges     Row Name 02/17/22 1520             Time Calculation    Start Time 1420  -MS (r) AY (t) MS (c)      Stop Time 1435  -MS (r) AY (t) MS (c)      Time Calculation (min) 15 min  -MS (r) AY (t)      PT Received On 02/17/22  -MS (r) AY (t) MS (c)              Time Calculation- PT    Total Timed Code Minutes- PT 12 minute(s)  -MS (r) AY (t) MS (c)              Timed Charges    71641 - PT Therapeutic Activity Minutes 12  -MS (r) AY (t) MS (c)              Total Minutes    Timed Charges Total Minutes 12  -MS (r) AY (t)       Total Minutes 12  -MS (r) AY (t)            User Key  (r) = Recorded By, (t) = Taken By, (c) = Cosigned By    Initials Name Provider Type    MS Moore Lilia ALICIA, PT Physical Therapist    Ki Fernandes, PT Student PT Student              Therapy Charges for Today     Code Description Service Date Service Provider Modifiers Qty    60171720603 HC PT THERAPEUTIC ACT EA 15 MIN 2/17/2022 Ki Simms, PT Student GP 1    14422356942 HC PT THER SUPP EA 15 MIN 2/17/2022 Ki Simms, PT Student GP 1          PT  G-Codes  Outcome Measure Options: AM-PAC 6 Clicks Basic Mobility (PT)  AM-PAC 6 Clicks Score (PT): 17    Ki Simms, PT Student  2/17/2022

## 2022-02-17 NOTE — PLAN OF CARE
Goal Outcome Evaluation:  Plan of Care Reviewed With: patient        Progress: improving  Outcome Summary: VSS. Pt ambulated with nursing students and PT. Echo done. Pt weaned to 3L o2. Will start PO lasix. Will continue to monitor.

## 2022-02-17 NOTE — PLAN OF CARE
Goal Outcome Evaluation:  Plan of Care Reviewed With: patient        Progress: improving  Outcome Summary: Pt is a 86 y.o. female who presents to the ED c/o shortness of breath. Pt was lying supine in bed and was AOx4. Pt states she uses 3 L/min of O2 at home chronically. Pt states she uses a WC and SPC at mobility at living facility. Pt stated she needed assist with ADLs but was IND for mobility. Pt was SBA for bed mobility and transfers, and CGA for gait w/ FWW. Pt needed rest break midway through gait trial d/t fatigue. Pt stated that this is normal because she only walks household distances. Pt's sitting and standing balance was WFL. Pt is at baseline level of function and may return to [facility] assisted living facility after being medically cleared.    Patient was wearing a face mask during this therapy encounter. Therapist used appropriate personal protective equipment including mask and gloves.  Mask used was standard procedure mask. Appropriate PPE was worn during the entire therapy session. Hand hygiene was completed before and after therapy session. Patient is not in enhanced droplet precautions.

## 2022-02-17 NOTE — PLAN OF CARE
Goal Outcome Evaluation:    Progress: improving  Outcome Summary: Vitals stable. Pt on 3L O2, BiPAP worn at night - patient tolerated BiPAP for around 4 hours. No c/o pain or SOA. Turned Q2hrs, skin care provided, purewick in place. IV Lasix continued. Pt stable and needs met at this time.

## 2022-02-17 NOTE — PROGRESS NOTES
"DAILY PROGRESS NOTE  [facility]    Patient Identification:  Name: Yazmin Javier  Age: 86 y.o.  Sex: female  :  1935  MRN: [id number]         Primary Care Physician: Morenita Silverio MD      Subjective  Patient with no complaints at present.  Overall feeling well.    Objective:  General Appearance:  Comfortable, well-appearing, in no acute distress and not in pain (Obese).    Vital signs: (most recent): Blood pressure 125/50, pulse 73, temperature 98.1 °F (36.7 °C), temperature source Oral, resp. rate 18, height 157.5 cm (62\"), weight 85.7 kg (189 lb), SpO2 96 %.    Lungs:  Normal effort and normal respiratory rate.  Breath sounds clear to auscultation.    Heart: Normal rate.  Irregular rhythm.    Extremities: There is no dependent edema.    Neurological: Patient is alert and oriented to person, place and time.    Skin:  Warm and dry.                Vital signs in last 24 hours:  Temp:  [97.6 °F (36.4 °C)-98.1 °F (36.7 °C)] 98.1 °F (36.7 °C)  Heart Rate:  [68-85] 73  Resp:  [18] 18  BP: (125-145)/(50-75) 125/50    Intake/Output:    Intake/Output Summary (Last 24 hours) at 2022 1226  Last data filed at 2022 0745  Gross per 24 hour   Intake 240 ml   Output 1450 ml   Net -1210 ml         Results from last 7 days   Lab Units 22  0625 02/15/22  0522  0618 22  0458   WBC 10*3/mm3 5.99 5.88 10.74 9.10   HEMOGLOBIN g/dL 9.6* 9.0* 10.6* 10.3*   PLATELETS 10*3/mm3 139* 139* 173 167     Results from last 7 days   Lab Units 22  0947 22  0625 02/15/22  0525 22  0618 22  0458   SODIUM mmol/L 135* 139 142 142 143   POTASSIUM mmol/L 3.8 3.4* 3.6 4.5 4.2   CHLORIDE mmol/L 95* 100 103 106 106   CO2 mmol/L 31.3* 29.2* 28.0 27.0 25.1   BUN mg/dL 29* 29* 26* 24* 22   CREATININE mg/dL 1.30* 1.40* 1.43* 1.32* 1.41*   GLUCOSE mg/dL 115* 112* 111* 130* 127*   Estimated Creatinine Clearance: 32.7 mL/min (A) (by C-G formula based on SCr of 1.3 mg/dL " (H)).  Results from last 7 days   Lab Units 02/17/22  0947 02/16/22  0625 02/15/22  0525 02/14/22  0618 02/14/22  0458   CALCIUM mg/dL 9.2 9.1 8.8 9.4 9.6   ALBUMIN g/dL  --   --   --   --  3.80   MAGNESIUM mg/dL  --   --  1.8  --   --    PHOSPHORUS mg/dL  --   --  4.4  --   --      Results from last 7 days   Lab Units 02/14/22  0458   ALBUMIN g/dL 3.80   BILIRUBIN mg/dL 2.2*   ALK PHOS U/L 113   AST (SGOT) U/L 24   ALT (SGPT) U/L 20       Assessment:  Acute hypoxic respiratory failure secondary to CHF: Much improved.  Acute on chronic CHF with pulmonary edema:  Cardiology input appreciated.  Switching to oral diuretics today.  Atrial fibrillation: Continue with rate control and anticoagulation.  COPD: Continue bronchodilators.  Presently I doubt COPD exacerbation.  I suspect the wheezes heard were actually cardiac in origin.  Monitor closely.  Hypothyroidism: TSH normal continue home meds.  Macrocytic anemia:  Low folate on evaluation.  Initiate supplementation.  Monitor.  Chronic kidney disease stage III:   Creatinine stable today.  Monitor closely with diuresis.  Morbid obesity: Significant comorbidity.  Encourage weight loss.  Positive blood culture: Appears to be contaminant.  Monitor closely.      Plan:  Please see above.  Awaiting PT evaluation.  Hopefully discharge tomorrow.  Discussed with TYLER.    Raciel Chapa MD  2/17/2022  12:26 EST

## 2022-02-18 PROBLEM — J96.02 ACUTE RESPIRATORY FAILURE WITH HYPOXIA AND HYPERCAPNIA (HCC): Status: ACTIVE | Noted: 2022-01-01

## 2022-02-18 PROBLEM — I48.0 PAROXYSMAL ATRIAL FIBRILLATION (HCC): Status: ACTIVE | Noted: 2021-04-04

## 2022-02-18 NOTE — PLAN OF CARE
Goal Outcome Evaluation:  Plan of Care Reviewed With: patient        Progress: improving  Outcome Summary: VSS, wore bipap for approx 5 hours over night, 3L O2 when off bipap, no acute changes, will continue to monitor

## 2022-02-18 NOTE — PROGRESS NOTES
Exercise Oximetry    Patient Name:Yazmin Javier   MRN: [id number]   Date: 02/18/22             ROOM AIR BASELINE   SpO2%            86   HR                    72   Blood Pressure  116/65     EXERCISE ON ROOM AIR SpO2% EXERCISE ON O2 @  LPM SpO2%   1 MINUTE  1 MINUTE                3   93   2 MINUTES  2 MINUTES              3    91   3 MINUTES  3 MINUTES              3    88   4 MINUTES  4 MINUTES             4    90   5 MINUTES  5 MINUTES           --   --    6 MINUTES  6 MINUTES           --   --              Distance Walked   Distance  Walked            60 feert   Dyspnea (Noe Scale)   Dyspnea (Noe Scale)   Fatigue (Noe Scale)   Fatigue (Noe Scale)   SpO2% Post Exercise   SpO2% Post  Exercise            90   HR Post Exercise   HR Post Exercise                     95   Time to Recovery   Time to Recovery                  2 minutes     Comments: pt placed on O2 prior to walk. Titrated to 3 lpm for resting saturation of 93%. Pt unable to walk full 6 minutes due to shortness of breath. O2 saturation at 90%. No complainants of pain during walk. Saturation increased to 97% on 4 lpm at rest. Placed on post exorcize 3 lpm. Saturation remains at 96%

## 2022-02-18 NOTE — DISCHARGE PLACEMENT REQUEST
"Yazmin Javier (86 y.o. Female)             Date of Birth Social Security Number Address Home Phone MRN    1935  [id number]    [place] [address] [phone] [id number]    Confucianism Marital Status             Buddhism Single       Admission Date Admission Type Admitting Provider Attending Provider Department, Room/Bed    2/14/22 Emergency Raciel Chapa MD Lykins, Matthew D, MD [address], [address]1    Discharge Date Discharge Disposition Discharge Destination           Home or Self Care              Attending Provider: Jf Menchaca MD    Allergies: Cefazolin    Isolation: None   Infection: None   Code Status: No CPR   Advance Care Planning Activity    Ht: 157.5 cm (62\")   Wt: 85.2 kg (187 lb 12.8 oz)    Admission Cmt: None   Principal Problem: Acute respiratory failure with hypoxia and hypercapnia (HCC) [J96.01,J96.02]                 Active Insurance as of 2/14/2022     Primary Coverage     Payor Plan Insurance Group Employer/Plan Group    MEDICARE MEDICARE A & B      Payor Plan Address Payor Plan Phone Number Payor Plan Fax Number Effective Dates    [address] [phone]  6/1/2002 - None Entered    [facility] [id number]       Subscriber Name Subscriber Birth Date Member ID       YAZMIN JAVIER 1935 [id number]           Secondary Coverage     Payor Plan Insurance Group Employer/Plan Group    [facility] SUPP [id number]     Payor Plan Address Payor Plan Phone Number Payor Plan Fax Number Effective Dates    [address]   12/1/2016 - None Entered    [facility] [id number]       Subscriber Name Subscriber Birth Date Member ID       YAZMIN JAVIER 1935 [id number]                 Emergency Contacts      (Rel.) Home Phone Work Phone Mobile Phone    WALTER SERVIN (Surrogate) [phone] -- [phone]              "

## 2022-02-18 NOTE — DISCHARGE SUMMARY
"Date of Admission: 2/14/2022  Date of Discharge:  2/18/2022  Primary Care Physician: Morenita Silverio MD     Discharge Diagnosis:  Active Hospital Problems    Diagnosis  POA   • **Acute respiratory failure with hypoxia and hypercapnia (HCC) [J96.01, J96.02]  Yes   • Acute pulmonary edema (HCC) [J81.0]  Yes   • Diastolic CHF, acute on chronic (HCC) [I50.33]  Yes   • Stage 3b chronic kidney disease (HCC) [N18.32]  Yes   • Hypothyroidism (acquired) [E03.9]  Yes   • Chronic anticoagulation [Z79.01]  Not Applicable   • Paroxysmal atrial fibrillation (HCC) [I48.0]  Yes      Resolved Hospital Problems   No resolved problems to display.       Presenting Problem/History of Present Illness:  Acute respiratory failure with hypoxia (HCC) [J96.01]  Pneumonia due to infectious organism, unspecified laterality, unspecified part of lung [J18.9]     Hospital Course:  The patient is a 86 y.o. female with a history of PAF on eliquis, hypothyroidism, chronic diastolic CHF, COPD, and stage 3b CKD, who presented with shortness of breath. Please see admission H&P for further details. She was found to have acute on chronic diastolic CHF with pulmonary edema and acute hypoxic and hypercapnic respiratory failure. She was started on diuresis and pulmonology evaluated and started on NIPPV. Her symptoms steadily improved with this and she was transitioned to oral lasix which she tolerated well. Per pulmonology she will not need to continue NIPPV but would need supplemental oxygen as an outpatient and this was arranged based on walking oximetry during which she required 3L. This will have to be followed as an outpatient and she should avoid over-oxygenation.     Exam Today:  Blood pressure 116/65, pulse 66, temperature 97.9 °F (36.6 °C), temperature source Oral, resp. rate 18, height 157.5 cm (62\"), weight 85.2 kg (187 lb 12.8 oz), SpO2 100 %.  General Appearance:  Comfortable, in no acute distress. Appears chronically ill  HEENT: NCAT, " "oropharynx clear and moist  Neck: supple, no JVD  Lungs:  Normal effort and normal respiratory rate.  Breath sounds clear to auscultation.    Heart: Normal rate.  Irregular rhythm.    Abdomen: soft, non-tender, normoactive bowel sounds  Extremities: There is no dependent edema.    Neurological: Patient is alert and oriented to person, place and time.    Skin:  Warm and dry.    Psychiatric: appropriate mood and affect    Procedures Performed:  Yazmin Javier  Echo Complete w/Doppler and Color Flow  Order# [id number]  Reading physician: Jr Coppola Jr., MD Ordering physician: Raciel Chapa MD Study date: 2/15/22       Patient Information    Patient Name   Yazmin Javier MRN   [id number] Legal Sex   Female  (Age)   1935 (86 y.o.)     Admission Information    Admission Date/Time Discharge Date/Time Room/Bed   22  04:22 AM  E466/1     PACS Images     Show images for Adult Transthoracic Echo Complete W/ Cont if Necessary Per Protocol   Sedation Narrator Report    Sedation Narrator Report        Interpretation Summary    · Left ventricular ejection fraction appears to be 56 - 60%. Left ventricular systolic function is normal. Normal left ventricular cavity size and wall thickness noted. All left ventricular wall segments contract normally. Left ventricular diastolic function is consistent with (grade II w/high LAP) pseudonormalization.  · The right ventricular cavity is mildly dilated. Borderline low right ventricular systolic function noted.  · Calculated right ventricular systolic pressure from tricuspid regurgitation is 69.3 mmHg.         Patient Hx Of Height, Weight, and Vitals    Height Weight BSA (Calculated - sq m) BMI (kg/m2) Pulse BP   157.5 cm (62\") 88.9 kg (196 lb) 1.9 sq meters 35.92 69 144/71     Clinical Indication    Heart Failure, Cardiomyopathy or Systemic or Pulmonary Hypertension; Hypoxemia     Cardiac History    No past medical history on file.     Study Description    A complete " transthoracic echocardiogram with complete Doppler and color flow was performed. The study is technically good for diagnosis. The quality of the study is limited due to patient positioning and diffculty of breathing . Bubble study performed on previous with negative results.     Echocardiogram Findings    Left Ventricle Calculated left ventricular EF = 57.8% Estimated left ventricular EF was in agreement with the calculated left ventricular EF. Left ventricular ejection fraction appears to be 56 - 60%. Left ventricular systolic function is normal.   Normal left ventricular cavity size and wall thickness noted. All left ventricular wall segments contract normally. Left ventricular diastolic function is consistent with (grade II w/high LAP) pseudonormalization.   Right Ventricle The right ventricular cavity is mildly dilated. Borderline low right ventricular systolic function noted.   Left Atrium Left atrial volume is normal.   Right Atrium Right atrium not well visualized.   Aortic Valve The aortic valve is structurally normal with no regurgitation or stenosis present.   Mitral Valve Mitral annular calcification is present. Mild mitral valve regurgitation is present. No significant mitral valve stenosis is present.   Tricuspid Valve Mild tricuspid valve regurgitation is present. Calculated right ventricular systolic pressure from tricuspid regurgitation is 69.3 mmHg.   Pulmonic Valve The pulmonic valve is not well visualized.   Greater Vessels No dilation of the aortic root is present. The inferior vena cava is normally sized. Partial IVC inspiratory collapse of less than 50% noted.   Pericardium There is no evidence of pericardial effusion.     Consults:   Consults     Date and Time Order Name Status Description    2/14/2022 10:29 AM Inpatient Cardiology Consult Completed     2/14/2022 10:18 AM Inpatient Cardiology Consult      2/14/2022  8:51 AM Inpatient Pulmonology Consult Completed     2/14/2022  5:28 AM [facility]  (on-call MD unless specified) Details      2/14/2022  5:28 AM [facility] (on-call MD unless specified) Details      2/14/2022  5:27 AM [facility] (on-call MD unless specified) Details             Discharge Disposition:  Home or Self Care    Discharge Medications:     Discharge Medications      New Medications      Instructions Start Date   folic acid 1 MG tablet  Commonly known as: FOLVITE   1 mg, Oral, Daily   Start Date: February 19, 2022        Changes to Medications      Instructions Start Date   furosemide 40 MG tablet  Commonly known as: LASIX  What changed: when to take this   40 mg, Oral, 2 Times Daily         Continue These Medications      Instructions Start Date   albuterol sulfate  (90 Base) MCG/ACT inhaler  Commonly known as: PROVENTIL HFA;VENTOLIN HFA;PROAIR HFA   2 puffs, Inhalation, Every 4 Hours PRN      allopurinol 300 MG tablet  Commonly known as: ZYLOPRIM   300 mg, Oral, Daily      amLODIPine 5 MG tablet  Commonly known as: NORVASC   5 mg, Oral, Daily      apixaban 5 MG tablet tablet  Commonly known as: ELIQUIS   5 mg, Oral, 2 Times Daily      atorvastatin 80 MG tablet  Commonly known as: LIPITOR   80 mg, Oral, Nightly      levothyroxine 50 MCG tablet  Commonly known as: SYNTHROID, LEVOTHROID   50 mcg, Oral, Daily      metoprolol tartrate 25 MG tablet  Commonly known as: LOPRESSOR   25 mg, Oral, 2 Times Daily      PARoxetine 10 MG tablet  Commonly known as: PAXIL   10 mg, Oral, Daily      potassium chloride 10 MEQ CR tablet   20 mEq, Oral, Daily      vitamin B-12 1000 MCG tablet  Commonly known as: CYANOCOBALAMIN   1,000 mcg, Oral, Daily             Discharge Diet:   Diet Instructions     Diet: Regular, Cardiac; Thin      Discharge Diet:  Regular  Cardiac       Fluid Consistency: Thin          Activity at Discharge:   Activity Instructions     Activity as Tolerated            Follow-up Appointments:  No future appointments.  Additional Instructions for the Follow-ups that You Need to Schedule      Discharge Follow-up with PCP   As directed       Currently Documented PCP:    Morenita Silverio MD    PCP Phone Number:    [phone]     Follow Up Details: 1 week         Discharge Follow-up with Specialty: cardiology; 2 Weeks   As directed      Specialty: cardiology    Follow Up: 2 Weeks              Jf Menchaca MD  02/18/22  12:20 EST    Time Spent on Discharge Activities: Greater than 30 minutes.

## 2022-02-19 NOTE — OUTREACH NOTE
Prep Survey      Responses   [facility] facility patient discharged from? [facility]   Is LACE score < 7 ? No   Emergency Room discharge w/ pulse ox? No   Eligibility Readm Mgmt   Discharge diagnosis Resp failure   Does the patient have one of the following disease processes/diagnoses(primary or secondary)? Other   Does the patient have Home health ordered? Yes   What is the Home health agency?  [facility]    Is there a DME ordered? Yes   What DME was ordered? [facility]    Prep survey completed? Yes          Marjorie Natarajan RN

## 2022-02-21 NOTE — CASE MANAGEMENT/SOCIAL WORK
Case Management Discharge Note      Final Note: Return to [facility] assisted living with [facility]. [facility] notified of increase in home O2. Family transport    Provided Post Acute Provider List?: Refused  Refused Provider List Comment: Wants [facility] as she has used them in the past.  Provided Post Acute Provider Quality & Resource List?: Refused    Selected Continued Care - Discharged on 2/18/2022 Admission date: 2/14/2022 - Discharge disposition: Home or Self Care    Destination    No services have been selected for the patient.              Durable Medical Equipment    No services have been selected for the patient.              Dialysis/Infusion    No services have been selected for the patient.              Home Medical Care    No services have been selected for the patient.              Therapy    No services have been selected for the patient.              Community Resources    No services have been selected for the patient.              Community & DME    No services have been selected for the patient.                  Transportation Services  Private: Car    Final Discharge Disposition Code: 06 - home with home health care

## 2022-02-22 NOTE — OUTREACH NOTE
Medical Week 1 Survey      Responses   [facility] patient discharged from? [place]   Does the patient have one of the following disease processes/diagnoses(primary or secondary)? Other   Week 1 attempt successful? Yes   Call start time 1830   Call end time 1831   Discharge diagnosis Resp failure   Is patient permission given to speak with other caregiver? Yes   List who call center can speak with surrogate   Person spoke with today (if not patient) and relationship surrogate   Meds reviewed with patient/caregiver? Yes   Is the patient having any side effects they believe may be caused by any medication additions or changes? No   Does the patient have all medications ordered at discharge? Yes   Is the patient taking all medications as directed (includes completed medication regime)? Yes   Does the patient have a primary care provider?  Yes   What is the Home health agency?  [facility]   Has home health visited the patient within 72 hours of discharge? Yes   What DME was ordered? [facility]    Has all DME been delivered? Yes   Psychosocial issues? No   Did the patient receive a copy of their discharge instructions? Yes   Nursing interventions Reviewed instructions with patient  [with surrogate]   What is the patient's perception of their health status since discharge? Improving   Is the patient/caregiver able to teach back signs and symptoms related to disease process for when to call PCP? Yes   Is the patient/caregiver able to teach back signs and symptoms related to disease process for when to call 911? Yes   Is the patient/caregiver able to teach back the hierarchy of who to call/visit for symptoms/problems? PCP, Specialist, Home health nurse, Urgent Care, ED, 911 Yes   Week 1 call completed? Yes   Graduated Yes   Is the patient interested in additional calls from an ambulatory ?  NOTE:  applies to high risk patients requiring additional follow-up. No   Did the patient feel the follow up calls  were helpful during their recovery period? Yes   Was the number of calls appropriate? Yes   Graduated/Revoked comments Per surrogate, patient is doing well, no further calls needed.          Sona Limon RN

## 2022-04-01 PROBLEM — Z87.09 HISTORY OF ASTHMA: Status: ACTIVE | Noted: 2022-01-01

## 2022-04-01 PROBLEM — R73.9 HYPERGLYCEMIA: Status: ACTIVE | Noted: 2022-01-01

## 2022-04-01 PROBLEM — J96.21 ACUTE ON CHRONIC RESPIRATORY FAILURE WITH HYPOXIA: Status: ACTIVE | Noted: 2022-01-01

## 2022-04-01 PROBLEM — R50.9 FEVER IN ADULT: Status: ACTIVE | Noted: 2022-01-01

## 2022-04-01 PROBLEM — E87.6 HYPOKALEMIA: Status: ACTIVE | Noted: 2022-01-01
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
## 2022-04-01 NOTE — CASE MANAGEMENT/SOCIAL WORK
Discharge Planning Assessment  [facility]     Patient Name: Yazmin Javier  MRN: [id number]  Today's Date: 4/1/2022    Admit Date: 3/31/2022     Discharge Needs Assessment     Row Name 04/01/22 1343       Living Environment    People in Home alone    Current Living Arrangements independent living facility    Primary Care Provided by self    Provides Primary Care For no one    Family Caregiver if Needed other relative(s)    Family Caregiver Names Evy Ny is HCS, other neices and nephew help as well.    Able to Return to Prior Arrangements yes    Living Arrangement Comments [facility]       Resource/Environmental Concerns    Resource/Environmental Concerns none       Transition Planning    Patient/Family Anticipates Transition to home    Patient/Family Anticipated Services at Transition none    Transportation Anticipated family or friend will provide       Discharge Needs Assessment    Equipment Currently Used at Home wheelchair;rollator;bath bench;grab bar;pulse ox;oxygen    Concerns to be Addressed adjustment to diagnosis/illness    Equipment Needed After Discharge none               Discharge Plan     Row Name 04/01/22 3270       Plan    Plan Plan is to return to Independent living apartment but JAI if needed would be [place]    Patient/Family in Agreement with Plan yes    Plan Comments Spoke with patient at bedside and confirmed information on face sheet. Pt lives at [facility]. She takes her meals in the dining room and uses a rollator and portable 02. They provide housekeeping, laundry, and they prompt her to remember to take her medications. Meds come prepackaged per day from [facility]. Oxygen at home provided by [employer] @ 3L. Pt states she has nieces and nephew who help her with all her finances, medical appointments and errands/ groceries. Spoke with tomnorma Anant by telephone who confirmed info given by pt. She also stated should she need JAI she would like her to return to [facility]  "pt has been to both facilities. She has also ahd  in the past but said it was set up through [facility] so she wasn't sure the provider.              Continued Care and Services - Admitted Since 3/31/2022    Coordination has not been started for this encounter.       Expected Discharge Date and Time     Expected Discharge Date Expected Discharge Time    Apr 3, 2022          Demographic Summary    No documentation.                Functional Status     Row Name 04/01/22 1342       Functional Status    Usual Activity Tolerance excellent    Current Activity Tolerance good       Functional Status, IADL    Medications assistive person    Meal Preparation assistive person    Housekeeping assistive person    Laundry assistive person    Shopping assistive person    IADL Comments Lives in \"independent living apartment\" takes meals in the dining room staff prompts her to take her medications TID.       Mental Status    General Appearance WDL WDL       Mental Status Summary    Recent Changes in Mental Status/Cognitive Functioning no changes               Psychosocial    No documentation.                Abuse/Neglect    No documentation.                Legal    No documentation.                Substance Abuse    No documentation.                Patient Forms    No documentation.                   Abeba Gonzalez RN    "

## 2022-04-01 NOTE — H&P
"    Patient Name:  Yazmin Javier  YOB: 1935  MRN:  [id number]  Admit Date:  3/31/2022  Patient Care Team:  Morenita Silverio MD as PCP - General (Internal Medicine)      Subjective   History Present Illness     Chief Complaint   Patient presents with   • Shortness of Breath       Ms. Javier is a 86 y.o. never smoker with a history of asthma, obesity, CVA, hypothyroidism, chronic respiratory failure requiring continuous 3 L oxygen via nasal cannula who presents to [facility] ER chief complaint of shortness of breath admitted for acute on chronic respiratory failure with hypoxia.    From NH with complaints of progressive SOB began approximately 2 days ago with productive cough & fever on arrival.  States \"cough\" circulating among residents at nursing home; therefore, subjective fever for further evaluation.    CT abdomen pelvis obtained for abdominal pain (resolved) with reported findings of right lower lobe pneumonia.  Patient received empiric antibiotic IV Zosyn and vancomycin.    Recommendation pending hospital course.  Details on assessment/plan.    History of Present Illness  Review of Systems   Constitutional: Positive for chills, fatigue (acute on chronic) and fever.   HENT: Positive for congestion and voice change.    Respiratory: Positive for cough and shortness of breath.    Cardiovascular: Positive for leg swelling. Negative for chest pain.   Gastrointestinal: Negative for abdominal pain, constipation, diarrhea, nausea and vomiting.   Endocrine: Negative for polydipsia, polyphagia and polyuria.   Genitourinary: Negative for difficulty urinating and dysuria.   Musculoskeletal: Positive for gait problem (chronic & uses a rollator & wheelchair typically). Negative for myalgias.   Skin: Negative for rash and wound.   Neurological: Positive for weakness (generalized). Negative for dizziness, syncope and light-headedness.   Psychiatric/Behavioral: Negative for confusion and hallucinations.    "     Personal History     Past Medical History:   Diagnosis Date   • CHF (congestive heart failure) (HCC)    • Sleep apnea    • Stroke (HCC)      History reviewed. No pertinent surgical history.  History reviewed. No pertinent family history.  Social History     Tobacco Use   • Smoking status: Never Smoker   • Smokeless tobacco: Never Used   Substance Use Topics   • Alcohol use: Never   • Drug use: Never     No current facility-administered medications on file prior to encounter.     Current Outpatient Medications on File Prior to Encounter   Medication Sig Dispense Refill   • albuterol sulfate  (90 Base) MCG/ACT inhaler Inhale 2 puffs Every 4 (Four) Hours As Needed for Wheezing.     • allopurinol (ZYLOPRIM) 300 MG tablet Take 300 mg by mouth Daily.     • amLODIPine (NORVASC) 5 MG tablet Take 5 mg by mouth Daily.     • apixaban (ELIQUIS) 5 MG tablet tablet Take 5 mg by mouth 2 (Two) Times a Day.     • atorvastatin (LIPITOR) 80 MG tablet Take 80 mg by mouth Every Night.     • folic acid (FOLVITE) 1 MG tablet Take 1 tablet by mouth Daily. 30 tablet 0   • furosemide (LASIX) 40 MG tablet Take 1 tablet by mouth 2 (Two) Times a Day. 60 tablet 0   • levothyroxine (SYNTHROID, LEVOTHROID) 50 MCG tablet Take 50 mcg by mouth Daily.     • metoprolol tartrate (LOPRESSOR) 25 MG tablet Take 1 tablet by mouth 2 (Two) Times a Day. 60 tablet 0   • PARoxetine (PAXIL) 10 MG tablet Take 10 mg by mouth Daily.     • potassium chloride 10 MEQ CR tablet Take 20 mEq by mouth Daily.     • vitamin B-12 (CYANOCOBALAMIN) 1000 MCG tablet Take 1,000 mcg by mouth Daily.       Allergies   Allergen Reactions   • Cefazolin Other (See Comments)       Objective    Objective     Vital Signs  Temp:  [98 °F (36.7 °C)-101.3 °F (38.5 °C)] 98 °F (36.7 °C)  Heart Rate:  [66-92] 69  Resp:  [18-20] 18  BP: (104-149)/(48-78) 122/48  SpO2:  [92 %-100 %] 100 %  on  Flow (L/min):  [3-4] 3;   Device (Oxygen Therapy): nasal cannula  Body mass index is 35.01  kg/m².    Physical Exam  Constitutional:       General: She is not in acute distress.     Appearance: She is obese. She is ill-appearing. She is not toxic-appearing.   HENT:      Head: Normocephalic and atraumatic.   Eyes:      Extraocular Movements: Extraocular movements intact.      Conjunctiva/sclera: Conjunctivae normal.   Cardiovascular:      Rate and Rhythm: Normal rate.      Heart sounds: Normal heart sounds.   Pulmonary:      Breath sounds: Wheezing present.      Comments: Mild-moderate increased respiratory work effort  Abdominal:      General: Bowel sounds are normal.      Palpations: Abdomen is soft.   Musculoskeletal:         General: No tenderness.      Cervical back: Normal range of motion and neck supple.      Right lower leg: Edema (dependent, non-pitting edema, BLE) present.      Left lower leg: Edema present.   Skin:     General: Skin is warm and dry.   Neurological:      Mental Status: She is alert and oriented to person, place, and time.      Motor: Weakness (generalized) present.   Psychiatric:         Behavior: Behavior normal.         Thought Content: Thought content normal.         Results Review:  I reviewed the patient's new clinical results.  I reviewed the patient's new imaging results and agree with the interpretation.  I reviewed the patient's other test results and agree with the interpretation  I personally viewed and interpreted the patient's EKG/Telemetry data  Discussed with ED provider.    Lab Results (last 24 hours)     Procedure Component Value Units Date/Time    Respiratory Panel PCR w/COVID-19(SARS-CoV-2) VIRGIL/DANIEL/DEJON/PAD/COR/MAD/ANGEL LUIS In-House, NP Swab in UTM/VTM, 3-4 HR TAT - Swab, Nasopharynx [[id number]]  (Normal) Collected: 03/31/22 2047    Specimen: Swab from Nasopharynx Updated: 03/31/22 2157     ADENOVIRUS, PCR Not Detected     Coronavirus 229E Not Detected     Coronavirus HKU1 Not Detected     Coronavirus NL63 Not Detected     Coronavirus OC43 Not Detected     COVID19 Not  Detected     Human Metapneumovirus Not Detected     Human Rhinovirus/Enterovirus Not Detected     Influenza A PCR Not Detected     Influenza B PCR Not Detected     Parainfluenza Virus 1 Not Detected     Parainfluenza Virus 2 Not Detected     Parainfluenza Virus 3 Not Detected     Parainfluenza Virus 4 Not Detected     RSV, PCR Not Detected     Bordetella pertussis pcr Not Detected     Bordetella parapertussis PCR Not Detected     Chlamydophila pneumoniae PCR Not Detected     Mycoplasma pneumo by PCR Not Detected    Narrative:      In the setting of a positive respiratory panel with a viral infection PLUS a negative procalcitonin without other underlying concern for bacterial infection, consider observing off antibiotics or discontinuation of antibiotics and continue supportive care. If the respiratory panel is positive for atypical bacterial infection (Bordetella pertussis, Chlamydophila pneumoniae, or Mycoplasma pneumoniae), consider antibiotic de-escalation to target atypical bacterial infection.    CBC & Differential [[id number]]  (Abnormal) Collected: 03/31/22 2054    Specimen: Blood Updated: 03/31/22 2108    Narrative:      The following orders were created for panel order CBC & Differential.  Procedure                               Abnormality         Status                     ---------                               -----------         ------                     CBC Auto Differential[[id number]]        Abnormal            Final result                 Please view results for these tests on the individual orders.    Comprehensive Metabolic Panel [[id number]]  (Abnormal) Collected: 03/31/22 2054    Specimen: Blood Updated: 03/31/22 2158     Glucose 175 mg/dL      BUN 21 mg/dL      Creatinine 1.42 mg/dL      Sodium 138 mmol/L      Potassium 3.7 mmol/L      Comment: Slight hemolysis detected by analyzer. Results may be affected.        Chloride 102 mmol/L      CO2 22.5 mmol/L      Calcium 9.0 mg/dL      Total Protein  7.4 g/dL      Albumin 3.60 g/dL      ALT (SGPT) 27 U/L      AST (SGOT) 37 U/L      Comment: Slight hemolysis detected by analyzer. Results may be affected.        Alkaline Phosphatase 117 U/L      Total Bilirubin 1.5 mg/dL      Globulin 3.8 gm/dL      A/G Ratio 0.9 g/dL      BUN/Creatinine Ratio 14.8     Anion Gap 13.5 mmol/L      eGFR 36.5 mL/min/1.73      Comment: National Kidney Foundation and American Society of Nephrology (ASN) Task Force recommended calculation based on the Chronic Kidney Disease Epidemiology Collaboration (CKD-EPI) equation refit without adjustment for race.       Narrative:      GFR Normal >60  Chronic Kidney Disease <60  Kidney Failure <15      aPTT [[id number]]  (Abnormal) Collected: 03/31/22 2054    Specimen: Blood Updated: 03/31/22 2119     PTT 41.0 seconds     Protime-INR [[id number]]  (Abnormal) Collected: 03/31/22 2054    Specimen: Blood Updated: 03/31/22 2119     Protime 18.2 Seconds      INR 1.51    BNP [[id number]]  (Normal) Collected: 03/31/22 2054    Specimen: Blood Updated: 03/31/22 2133     proBNP 1,091.0 pg/mL     Narrative:      Among patients with dyspnea, NT-proBNP is highly sensitive for the detection of acute congestive heart failure. In addition NT-proBNP of <300 pg/ml effectively rules out acute congestive heart failure with 99% negative predictive value.    Results may be falsely decreased if patient taking Biotin.      Troponin [[id number]]  (Normal) Collected: 03/31/22 2054    Specimen: Blood Updated: 03/31/22 2134     Troponin T <0.010 ng/mL     Narrative:      Troponin T Reference Range:  <= 0.03 ng/mL-   Negative for AMI  >0.03 ng/mL-     Abnormal for myocardial necrosis.  Clinicians would have to utilize clinical acumen, EKG, Troponin and serial changes to determine if it is an Acute Myocardial Infarction or myocardial injury due to an underlying chronic condition.       Results may be falsely decreased if patient taking Biotin.      Blood Culture - Blood, Arm, Right  "[[id number]] Collected: 03/31/22 2054    Specimen: Blood from Arm, Right Updated: 03/31/22 2104    Blood Culture - Blood, Arm, Left [[id number]] Collected: 03/31/22 2054    Specimen: Blood from Arm, Left Updated: 03/31/22 2104    Lactic Acid, Plasma [[id number]]  (Normal) Collected: 03/31/22 2054    Specimen: Blood Updated: 03/31/22 2126     Lactate 2.0 mmol/L     Procalcitonin [[id number]]  (Normal) Collected: 03/31/22 2054    Specimen: Blood Updated: 03/31/22 2204     Procalcitonin 0.08 ng/mL     Narrative:      As a Marker for Sepsis (Non-Neonates):    1. <0.5 ng/mL represents a low risk of severe sepsis and/or septic shock.  2. >2 ng/mL represents a high risk of severe sepsis and/or septic shock.    As a Marker for Lower Respiratory Tract Infections that require antibiotic therapy:    PCT on Admission    Antibiotic Therapy       6-12 Hrs later    >0.5                Strongly Recommended  >0.25 - <0.5        Recommended   0.1 - 0.25          Discouraged              Remeasure/reassess PCT  <0.1                Strongly Discouraged     Remeasure/reassess PCT    As 28 day mortality risk marker: \"Change in Procalcitonin Result\" (>80% or <=80%) if Day 0 (or Day 1) and Day 4 values are available. Refer to http://[email or web address]-pct-calculator.[email or web address]    Change in PCT <=80%  A decrease of PCT levels below or equal to 80% defines a positive change in PCT test result representing a higher risk for 28-day all-cause mortality of patients diagnosed with severe sepsis for septic shock.    Change in PCT >80%  A decrease of PCT levels of more than 80% defines a negative change in PCT result representing a lower risk for 28-day all-cause mortality of patients diagnosed with severe sepsis or septic shock.       CBC Auto Differential [[id number]]  (Abnormal) Collected: 03/31/22 2054    Specimen: Blood Updated: 03/31/22 2108     WBC 12.66 10*3/mm3      RBC 3.32 10*6/mm3      Hemoglobin 11.2 g/dL      Hematocrit 32.9 %      MCV 99.1 fL      MCH " 33.7 pg      MCHC 34.0 g/dL      RDW 15.4 %      RDW-SD 54.9 fl      MPV 9.5 fL      Platelets 167 10*3/mm3      Neutrophil % 76.9 %      Lymphocyte % 12.5 %      Monocyte % 8.7 %      Eosinophil % 1.2 %      Basophil % 0.2 %      Immature Grans % 0.5 %      Neutrophils, Absolute 9.74 10*3/mm3      Lymphocytes, Absolute 1.58 10*3/mm3      Monocytes, Absolute 1.10 10*3/mm3      Eosinophils, Absolute 0.15 10*3/mm3      Basophils, Absolute 0.03 10*3/mm3      Immature Grans, Absolute 0.06 10*3/mm3      nRBC 0.1 /100 WBC     Urinalysis With Microscopic If Indicated (No Culture) - Urine, Clean Catch [[id number]]  (Abnormal) Collected: 03/31/22 2132    Specimen: Urine, Clean Catch Updated: 03/31/22 2142     Color, UA Yellow     Appearance, UA Clear     pH, UA <=5.0     Specific Gravity, UA 1.012     Glucose, UA Negative     Ketones, UA Negative     Bilirubin, UA Negative     Blood, UA Trace     Protein,  mg/dL (2+)     Leuk Esterase, UA Negative     Nitrite, UA Negative     Urobilinogen, UA 0.2 E.U./dL    Urinalysis, Microscopic Only - Urine, Clean Catch [[id number]]  (Abnormal) Collected: 03/31/22 2132    Specimen: Urine, Clean Catch Updated: 03/31/22 2143     RBC, UA 3-5 /HPF      WBC, UA 0-2 /HPF      Bacteria, UA None Seen /HPF      Squamous Epithelial Cells, UA 3-6 /HPF      Hyaline Casts, UA 0-2 /LPF      Methodology Automated Microscopy    Basic Metabolic Panel [[id number]]  (Abnormal) Collected: 04/01/22 0524    Specimen: Blood Updated: 04/01/22 0627     Glucose 114 mg/dL      BUN 19 mg/dL      Creatinine 1.24 mg/dL      Sodium 140 mmol/L      Potassium 3.4 mmol/L      Chloride 106 mmol/L      CO2 23.4 mmol/L      Calcium 8.5 mg/dL      BUN/Creatinine Ratio 15.3     Anion Gap 10.6 mmol/L      eGFR 43.0 mL/min/1.73      Comment: National Kidney Foundation and American Society of Nephrology (ASN) Task Force recommended calculation based on the Chronic Kidney Disease Epidemiology Collaboration (CKD-EPI) equation  refit without adjustment for race.       Narrative:      GFR Normal >60  Chronic Kidney Disease <60  Kidney Failure <15      CBC (No Diff) [[id number]]  (Abnormal) Collected: 04/01/22 0524    Specimen: Blood Updated: 04/01/22 0559     WBC 9.95 10*3/mm3      RBC 2.90 10*6/mm3      Hemoglobin 9.6 g/dL      Hematocrit 29.9 %      .1 fL      MCH 33.1 pg      MCHC 32.1 g/dL      RDW 15.1 %      RDW-SD 55.6 fl      MPV 9.7 fL      Platelets 145 10*3/mm3     POC Glucose Once [[id number]]  (Normal) Collected: 04/01/22 1115    Specimen: Blood Updated: 04/01/22 1116     Glucose 119 mg/dL      Comment: Meter: [id number] : [id number] Michael CASH             Imaging Results (Last 24 Hours)     Procedure Component Value Units Date/Time    CT Abdomen Pelvis With Contrast [[id number]] Collected: 03/31/22 2327     Updated: 03/31/22 2339    Narrative:      CT ABDOMEN PELVIS W CONTRAST-     CLINICAL HISTORY: Acute onset nonlocalized abdominal pain.     TECHNIQUE: Spiral CT images were acquired through the abdomen and pelvis  with IV contrast and were reconstructed in 3 mm thick slices.     Radiation dose reduction techniques were utilized, including automated  exposure control and exposure modulation based on body size.     COMPARISON: None     FINDINGS: The liver, spleen, pancreas, and adrenal glands appear within  normal limits. Multiple small gallstones are layering posteriorly within  the gallbladder lumen. The gallbladder is otherwise unremarkable. There  is no bile duct dilatation. There is a tiny cortical cyst in the  anterior aspect of the right kidney. The kidneys are otherwise  unremarkable. The stomach and small bowel appear within normal limits.  There is no bowel distention. There are a few diverticuli scattered  throughout the left colon and proximal sigmoid colon. There is no  definite CT evidence of diverticulitis. There is prominent focal spasm  of the ascending colon that may or may not be of any  clinical  significance. Localized colitis could produce these findings. Images  through the lung bases demonstrate patchy groundglass opacities in the  right lower lobe suspicious for pneumonia.       Impression:      Cholelithiasis. Mild colonic diverticulosis without definite  evidence of diverticulitis. Lumbar spasm of the ascending colon  suspicious for colitis. The remainder of the colon does not appear  inflamed and contains formed stool. Patchy groundglass opacities at the  right lung base suspicious for pneumonia. Otherwise unremarkable CT scan  of the abdomen and pelvis.     This report was finalized on 3/31/2022 11:36 PM by Dr. Tevin Lopez M.D.       XR Chest 1 View [[id number]] Collected: 03/31/22 2140     Updated: 03/31/22 2145    Narrative:      PORTABLE CHEST 03/31/2022 AT 9:22 PM     CLINICAL HISTORY: Dyspnea     Compared to a previous chest x-ray dated 02/16/2022.     The current chest x-ray is lordotic in projection which accentuates the  heart size. Allowing for this, the heart appears within normal limits in  size and unchanged. The lungs are well-expanded and clear. There are no  pleural effusions. The pulmonary vasculature is within normal limits.  Mild vascular congestion shown on the previous chest x-ray has resolved.     This report was finalized on 3/31/2022 9:41 PM by Dr. Tevin Lopez M.D.             Results for orders placed during the hospital encounter of 02/14/22    Adult Transthoracic Echo Limited W/ Cont if Necessary Per Protocol    Interpretation Summary  · A limited transthoracic echocardiogram with limited Doppler and color flow was performed. Limited echo to assess RVSP RVSP still severely elevated at 76.4 mmHg  · Calculated right ventricular systolic pressure from tricuspid regurgitation is 76.4 mmHg.      ECG 12 Lead   Preliminary Result   HEART RATE= 76  bpm   RR Interval= 796  ms   SD Interval= 188  ms   P Horizontal Axis= 9  deg   P Front Axis= 82  deg   QRSD Interval=  83  ms   QT Interval= 389  ms   QRS Axis= 62  deg   T Wave Axis= 96  deg   - ABNORMAL ECG -   Sinus rhythm   Atrial premature complex   Nonspecific repol abnormality, lateral leads   Electronically Signed By:    Date and Time of Study: 2022-03-31 20:44:11           Assessment/Plan     Active Hospital Problems    Diagnosis  POA   • **Acute on chronic respiratory failure with hypoxia (HCC) [J96.21]  Yes   • Fever in adult [R50.9]  Yes   • History of asthma [Z87.09]  Not Applicable   • Hyperglycemia [R73.9]  Yes   • Hypokalemia [E87.6]  Clinically Undetermined   • Chronic diastolic CHF (congestive heart failure) (HCC) [I50.32]  Yes   • Chronic anticoagulation [Z79.01]  Not Applicable   • Paroxysmal atrial fibrillation (HCC) [I48.0]  Yes   • Stage 3b chronic kidney disease (HCC) [N18.32]  Yes      Resolved Hospital Problems   No resolved problems to display.       Ms. Javier is a 86 y.o. never smoker with a history of asthma, obesity, CVA, hypothyroidism, chronic respiratory failure requiring continuous 3 L oxygen via nasal cannula who presents to [facility] ER chief complaint of shortness of breath admitted for acute on chronic respiratory failure with hypoxia.      Acute on chronic respiratory failure with hypoxia (HCC) /   History of asthma  Denies complications from swallowing/history of dysphagia  Ordering DuoNeb scheduled and as needed, Symbicort twice daily, IV Solu-Medrol 80 mg 3 times daily  Oxygen saturation 97% 3 L nasal cannula (baseline oxygen demand)      Paroxysmal atrial fibrillation (HCC) /  Chronic anticoagulation  Rate controlled with metoprolol tartrate 25 mg p.o. twice daily continued admission (hemodynamically stable)  Apixaban twice daily      Stage 3b chronic kidney disease (HCC) /   Hypokalemia  Serum creatinine 1.2 appears baseline  Avoid nephrotoxins  Monitor labs  Ordering KCL 20 mEq ER daily      Chronic diastolic CHF (congestive heart failure) (HCC)  Euvolemic on exam  proBNP 1090  Monitor  signs of fluid volume overload  Continue furosemide 40 mg p.o. twice daily      Fever in adult  Respiratory viral panel negative to include COVID-19  Unremarkable procalcitonin  Likely symptom of #1 suspected viral illness despite negative panel  Continue empiric antibiotic therapy Zosyn  MRSA screen pending (status post IV vancomycin once)      Hyperglycemia  A1c in a.m.   Anticipate elevated glucose secondary to steroids  Moderate dose lispro sliding scale for now      I discussed the patient's findings and my recommendations with Dr. Gibson.    VTE Prophylaxis - SCD  Code Status - No CPR       JADON Painting  [facility]  04/01/22  12:42 EDT

## 2022-04-01 NOTE — PLAN OF CARE
Problem: Adult Inpatient Plan of Care  Goal: Plan of Care Review  Flowsheets (Taken 4/1/2022 1402)  Progress: no change  Plan of Care Reviewed With: patient  Outcome Evaluation: vitals stable, pt remains dyspneic on 3l oxygen, breathing tx started, iv steroids started, nares swabbed for mrsa, abx per order, continue to monitor  Goal: Absence of Hospital-Acquired Illness or Injury  Intervention: Identify and Manage Fall Risk  Recent Flowsheet Documentation  Taken 4/1/2022 1250 by Neela Saxena RN  Safety Promotion/Fall Prevention:   safety round/check completed   room organization consistent   nonskid shoes/slippers when out of bed   lighting adjusted   fall prevention program maintained   clutter free environment maintained   assistive device/personal items within reach  Taken 4/1/2022 1050 by Neela Saxena RN  Safety Promotion/Fall Prevention:   safety round/check completed   room organization consistent   nonskid shoes/slippers when out of bed   lighting adjusted   fall prevention program maintained   clutter free environment maintained   assistive device/personal items within reach  Taken 4/1/2022 0836 by Neela Saxena RN  Safety Promotion/Fall Prevention:   safety round/check completed   room organization consistent   nonskid shoes/slippers when out of bed   lighting adjusted   fall prevention program maintained   clutter free environment maintained   assistive device/personal items within reach  Intervention: Prevent and Manage VTE (Venous Thromboembolism) Risk  Recent Flowsheet Documentation  Taken 4/1/2022 1050 by Neela Saxena RN  Activity Management: up to bedside commode  Taken 4/1/2022 0836 by Neela Saxena RN  Activity Management: sitting, edge of bed  VTE Prevention/Management:   sequential compression devices off   patient refused intervention  Goal: Optimal Comfort and Wellbeing  Intervention: Provide Person-Centered Care  Recent Flowsheet Documentation  Taken 4/1/2022 0836  by Neela Saxena RN  Trust Relationship/Rapport:   care explained   thoughts/feelings acknowledged   Goal Outcome Evaluation:  Plan of Care Reviewed With: patient        Progress: no change  Outcome Evaluation: vitals stable, pt remains dyspneic on 3l oxygen, breathing tx started, iv steroids started, nares swabbed for mrsa, abx per order, continue to monitor

## 2022-04-01 NOTE — ED NOTES
Patient presents to ED from home with report of shortness of breath since last night, she took an albuterol inhaler which did not help. Upon fire arrival patients o2 sat 72%. Patient placed on nonbreather and improved to 97%. EMS administered duoneb and placed on 4L NC patient maintaining 94-96%.     Patient in mask at this time, triage staff wearing appropriate PPE.

## 2022-04-01 NOTE — ED NOTES
.  Nursing report ED to floor  Yazmin Javier  86 y.o.  female    HPI :   Chief Complaint   Patient presents with   • Shortness of Breath       Admitting doctor:   Trey Freeman MD    Admitting diagnosis:   The primary encounter diagnosis was Pneumonia of right lower lobe due to infectious organism. A diagnosis of Fever and chills was also pertinent to this visit.    Code status:   Current Code Status     Date Active Code Status Order ID Comments User Context       4/1/2022 0015 CPR (Attempt to Resuscitate) [id number]  Ching Fields APRN ED     Advance Care Planning Activity      Questions for Current Code Status     Question Answer    Code Status (Patient has no pulse and is not breathing) CPR (Attempt to Resuscitate)    Medical Interventions (Patient has pulse or is breathing) Full Support          Allergies:   Cefazolin    Intake and Output    Intake/Output Summary (Last 24 hours) at 4/1/2022 0052  Last data filed at 4/1/2022 0034  Gross per 24 hour   Intake 1050 ml   Output --   Net 1050 ml       Weight:       03/31/22  2100   Weight: 82.6 kg (182 lb)       Most recent vitals:   Vitals:    03/31/22 2133 03/31/22 2256 04/01/22 0001 04/01/22 0031   BP:   104/59 113/55   BP Location:       Pulse:   69 66   Resp:       Temp: 99.3 °F (37.4 °C) 98.7 °F (37.1 °C)     TempSrc: Oral Oral     SpO2:   97% 97%   Weight:       Height:           Active LDAs/IV Access:   Lines, Drains & Airways     Active LDAs     Name Placement date Placement time Site Days    Peripheral IV 03/31/22 Left Antecubital 03/31/22  --  Antecubital  1    Peripheral IV 03/31/22 2053 Right Antecubital 03/31/22 2053  Antecubital  less than 1                Labs (abnormal labs have a star):   Labs Reviewed   COMPREHENSIVE METABOLIC PANEL - Abnormal; Notable for the following components:       Result Value    Glucose 175 (*)     Creatinine 1.42 (*)     AST (SGOT) 37 (*)     Total Bilirubin 1.5 (*)     eGFR 36.5 (*)     All other components  within normal limits    Narrative:     GFR Normal >60  Chronic Kidney Disease <60  Kidney Failure <15     APTT - Abnormal; Notable for the following components:    PTT 41.0 (*)     All other components within normal limits   PROTIME-INR - Abnormal; Notable for the following components:    Protime 18.2 (*)     INR 1.51 (*)     All other components within normal limits   CBC WITH AUTO DIFFERENTIAL - Abnormal; Notable for the following components:    WBC 12.66 (*)     RBC 3.32 (*)     Hemoglobin 11.2 (*)     Hematocrit 32.9 (*)     MCV 99.1 (*)     MCH 33.7 (*)     RDW-SD 54.9 (*)     Neutrophil % 76.9 (*)     Lymphocyte % 12.5 (*)     Neutrophils, Absolute 9.74 (*)     Monocytes, Absolute 1.10 (*)     Immature Grans, Absolute 0.06 (*)     All other components within normal limits   URINALYSIS W/ MICROSCOPIC IF INDICATED (NO CULTURE) - Abnormal; Notable for the following components:    Blood, UA Trace (*)     Protein,  mg/dL (2+) (*)     All other components within normal limits   URINALYSIS, MICROSCOPIC ONLY - Abnormal; Notable for the following components:    RBC, UA 3-5 (*)     Squamous Epithelial Cells, UA 3-6 (*)     All other components within normal limits   RESPIRATORY PANEL PCR W/ COVID-19 (SARS-COV-2) VIRGIL/DANIEL/DEJON/PAD/COR/MAD/[facility]EL [facility] IN-HOUSE, NP SWAB IN [facility], 3-4 HR TAT - Normal    Narrative:     In the setting of a positive respiratory panel with a viral infection PLUS a negative procalcitonin without other underlying concern for bacterial infection, consider observing off antibiotics or discontinuation of antibiotics and continue supportive care. If the respiratory panel is positive for atypical bacterial infection (Bordetella pertussis, Chlamydophila pneumoniae, or Mycoplasma pneumoniae), consider antibiotic de-escalation to target atypical bacterial infection.   BNP (IN-HOUSE) - Normal    Narrative:     Among patients with dyspnea, NT-proBNP is highly sensitive for the detection of acute congestive  "heart failure. In addition NT-proBNP of <300 pg/ml effectively rules out acute congestive heart failure with 99% negative predictive value.    Results may be falsely decreased if patient taking Biotin.     TROPONIN (IN-HOUSE) - Normal    Narrative:     Troponin T Reference Range:  <= 0.03 ng/mL-   Negative for AMI  >0.03 ng/mL-     Abnormal for myocardial necrosis.  Clinicians would have to utilize clinical acumen, EKG, Troponin and serial changes to determine if it is an Acute Myocardial Infarction or myocardial injury due to an underlying chronic condition.       Results may be falsely decreased if patient taking Biotin.     LACTIC ACID, PLASMA - Normal   PROCALCITONIN - Normal    Narrative:     As a Marker for Sepsis (Non-Neonates):    1. <0.5 ng/mL represents a low risk of severe sepsis and/or septic shock.  2. >2 ng/mL represents a high risk of severe sepsis and/or septic shock.    As a Marker for Lower Respiratory Tract Infections that require antibiotic therapy:    PCT on Admission    Antibiotic Therapy       6-12 Hrs later    >0.5                Strongly Recommended  >0.25 - <0.5        Recommended   0.1 - 0.25          Discouraged              Remeasure/reassess PCT  <0.1                Strongly Discouraged     Remeasure/reassess PCT    As 28 day mortality risk marker: \"Change in Procalcitonin Result\" (>80% or <=80%) if Day 0 (or Day 1) and Day 4 values are available. Refer to [email or web address]-pct-calculator.[email or web address]    Change in PCT <=80%  A decrease of PCT levels below or equal to 80% defines a positive change in PCT test result representing a higher risk for 28-day all-cause mortality of patients diagnosed with severe sepsis for septic shock.    Change in PCT >80%  A decrease of PCT levels of more than 80% defines a negative change in PCT result representing a lower risk for 28-day all-cause mortality of patients diagnosed with severe sepsis or septic shock.      BLOOD CULTURE   BLOOD CULTURE   BASIC " METABOLIC PANEL   CBC (NO DIFF)   CBC AND DIFFERENTIAL    Narrative:     The following orders were created for panel order CBC & Differential.  Procedure                               Abnormality         Status                     ---------                               -----------         ------                     CBC Auto Differential[[id number]]        Abnormal            Final result                 Please view results for these tests on the individual orders.       EKG:   ECG 12 Lead   Preliminary Result   HEART RATE= 76  bpm   RR Interval= 796  ms   MS Interval= 188  ms   P Horizontal Axis= 9  deg   P Front Axis= 82  deg   QRSD Interval= 83  ms   QT Interval= 389  ms   QRS Axis= 62  deg   T Wave Axis= 96  deg   - ABNORMAL ECG -   Sinus rhythm   Atrial premature complex   Nonspecific repol abnormality, lateral leads   Electronically Signed By:    Date and Time of Study: 2022-03-31 20:44:11          Meds given in ED:   Medications   sodium chloride 0.9 % flush 10 mL (has no administration in time range)   sodium chloride 0.9 % flush 10 mL (has no administration in time range)   nitroglycerin (NITROSTAT) SL tablet 0.4 mg (has no administration in time range)   acetaminophen (TYLENOL) tablet 650 mg (has no administration in time range)     Or   acetaminophen (TYLENOL) 160 MG/5ML solution 650 mg (has no administration in time range)     Or   acetaminophen (TYLENOL) suppository 650 mg (has no administration in time range)   ondansetron (ZOFRAN) tablet 4 mg (has no administration in time range)     Or   ondansetron (ZOFRAN) injection 4 mg (has no administration in time range)   aluminum-magnesium hydroxide-simethicone (MAALOX MAX) 400-400-40 MG/5ML suspension 15 mL (has no administration in time range)   sodium chloride 0.9 % bolus 1,000 mL (0 mL Intravenous Stopped 4/1/22 0003)   acetaminophen (TYLENOL) tablet 1,000 mg (1,000 mg Oral Given 3/31/22 2111)   iopamidol (ISOVUE-300) 61 % injection 100 mL (85 mL  Intravenous Given by Other 3/31/22 6429)   vancomycin 1750 mg/500 mL 0.9% NS IVPB ([facility]) (1,750 mg Intravenous New Bag 4/1/22 0035)   piperacillin-tazobactam (ZOSYN) 3.375 g in iso-osmotic dextrose 50 ml (premix) (0 g Intravenous Stopped 4/1/22 0034)       Imaging results:  CT Abdomen Pelvis With Contrast    Result Date: 3/31/2022  Cholelithiasis. Mild colonic diverticulosis without definite evidence of diverticulitis. Lumbar spasm of the ascending colon suspicious for colitis. The remainder of the colon does not appear inflamed and contains formed stool. Patchy groundglass opacities at the right lung base suspicious for pneumonia. Otherwise unremarkable CT scan of the abdomen and pelvis.  This report was finalized on 3/31/2022 11:36 PM by Dr. Tevin Lopez M.D.        Ambulatory status:   - asstx2    Social issues:   Social History     Socioeconomic History   • Marital status: Single   Tobacco Use   • Smoking status: Never Smoker   • Smokeless tobacco: Never Used   Substance and Sexual Activity   • Alcohol use: Never   • Sexual activity: Defer

## 2022-04-01 NOTE — ED PROVIDER NOTES
EMERGENCY DEPARTMENT ENCOUNTER    CHIEF COMPLAINT  Chief Complaint: Shortness of breath/cough  History given by: Patient  History limited by: None  Room Number: 22/22  PMD: Morenita Silverio MD      HPI:  Pt is a 86 y.o. female who presents complaining of shortness of breath that has been present since last night they got a bit worse today prompting her emergency room visit.  The patient reports a productive cough.  She also states that at the time, she was having generalized abdominal discomfort.  However, the symptoms of abdominal discomfort have since fully resolved.  She denies nausea/vomiting/diarrhea, chest pain, back pain, or dysuria/hematuria.  She also reports that she did not notice having a fever however she did have a fever upon arrival here to the ED.    Duration: Ongoing for the past 1 day  Onset: Gradual  Location: Generalized  Radiation: None  Quality: Dull abdominal discomfort, shortness of breath  Intensity/Severity: Moderate  Progression: Improved somewhat  Associated Symptoms: Cough  Aggravating Factors: None  Alleviating Factors: None noted  Previous Episodes: None  Treatment before arrival: None    PAST MEDICAL HISTORY  Active Ambulatory Problems     Diagnosis Date Noted   • Acute on chronic congestive heart failure (HCC) 04/04/2021   • Hypoxia 04/04/2021   • Paroxysmal atrial fibrillation (HCC) 04/04/2021   • Chronic anticoagulation 04/04/2021   • Hypothyroidism (acquired) 04/04/2021   • Stage 3b chronic kidney disease (HCC) 04/04/2021   • Right arm weakness 04/22/2021   • Diastolic CHF, acute on chronic (HCC) 04/22/2021   • Syncope 10/31/2021   • Acute respiratory failure with hypoxia and hypercapnia (HCC) 02/14/2022   • Acute pulmonary edema (HCC) 02/14/2022     Resolved Ambulatory Problems     Diagnosis Date Noted   • Acute embolic stroke (HCC) 04/24/2021     No Additional Past Medical History       PAST SURGICAL HISTORY  History reviewed. No pertinent surgical history.    FAMILY  HISTORY  History reviewed. No pertinent family history.    SOCIAL HISTORY  Social History     Socioeconomic History   • Marital status: Single   Tobacco Use   • Smoking status: Never Smoker   • Smokeless tobacco: Never Used   Substance and Sexual Activity   • Alcohol use: Never   • Sexual activity: Defer       ALLERGIES  Cefazolin    REVIEW OF SYSTEMS  Review of Systems   Constitutional: Negative for fever.   HENT: Negative for sore throat.    Eyes: Negative.    Respiratory: Positive for cough and shortness of breath.    Cardiovascular: Negative for chest pain.   Gastrointestinal: Positive for abdominal pain. Negative for diarrhea and vomiting.   Genitourinary: Negative for dysuria.   Musculoskeletal: Negative for neck pain.   Skin: Negative for rash.   Allergic/Immunologic: Negative.    Neurological: Negative for weakness, numbness and headaches.   Hematological: Negative.    Psychiatric/Behavioral: Negative.    All other systems reviewed and are negative.      PHYSICAL EXAM  ED Triage Vitals   Temp Heart Rate Resp BP SpO2   03/31/22 2027 03/31/22 2026 03/31/22 2026 03/31/22 2026 03/31/22 2026   (!) 101.3 °F (38.5 °C) 74 20 146/71 94 %      Temp src Heart Rate Source Patient Position BP Location FiO2 (%)   03/31/22 2027 -- -- -- --   Tympanic           Physical Exam  Vitals and nursing note reviewed.   Constitutional:       General: She is not in acute distress.  HENT:      Head: Normocephalic and atraumatic.   Eyes:      Pupils: Pupils are equal, round, and reactive to light.   Cardiovascular:      Rate and Rhythm: Normal rate and regular rhythm.      Heart sounds: Normal heart sounds.   Pulmonary:      Effort: Pulmonary effort is normal. No respiratory distress.      Breath sounds: Normal breath sounds.   Abdominal:      Palpations: Abdomen is soft.      Tenderness: There is no abdominal tenderness. There is no guarding or rebound.   Musculoskeletal:         General: Normal range of motion.      Cervical back:  Normal range of motion and neck supple.   Skin:     General: Skin is warm and dry.      Findings: No rash.   Neurological:      Mental Status: She is alert and oriented to person, place, and time.      Sensory: Sensation is intact.   Psychiatric:         Mood and Affect: Mood and affect normal.         LAB RESULTS  Lab Results (last 24 hours)     Procedure Component Value Units Date/Time    Respiratory Panel PCR w/COVID-19(SARS-CoV-2) VIRGIL/DANIEL/DEJON/PAD/COR/MAD/ANGEL LUIS In-House, NP Swab in UTM/VTM, 3-4 HR TAT - Swab, Nasopharynx [[id number]]  (Normal) Collected: 03/31/22 2047    Specimen: Swab from Nasopharynx Updated: 03/31/22 2157     ADENOVIRUS, PCR Not Detected     Coronavirus 229E Not Detected     Coronavirus HKU1 Not Detected     Coronavirus NL63 Not Detected     Coronavirus OC43 Not Detected     COVID19 Not Detected     Human Metapneumovirus Not Detected     Human Rhinovirus/Enterovirus Not Detected     Influenza A PCR Not Detected     Influenza B PCR Not Detected     Parainfluenza Virus 1 Not Detected     Parainfluenza Virus 2 Not Detected     Parainfluenza Virus 3 Not Detected     Parainfluenza Virus 4 Not Detected     RSV, PCR Not Detected     Bordetella pertussis pcr Not Detected     Bordetella parapertussis PCR Not Detected     Chlamydophila pneumoniae PCR Not Detected     Mycoplasma pneumo by PCR Not Detected    Narrative:      In the setting of a positive respiratory panel with a viral infection PLUS a negative procalcitonin without other underlying concern for bacterial infection, consider observing off antibiotics or discontinuation of antibiotics and continue supportive care. If the respiratory panel is positive for atypical bacterial infection (Bordetella pertussis, Chlamydophila pneumoniae, or Mycoplasma pneumoniae), consider antibiotic de-escalation to target atypical bacterial infection.    CBC & Differential [[id number]]  (Abnormal) Collected: 03/31/22 2054    Specimen: Blood Updated: 03/31/22 2108     Narrative:      The following orders were created for panel order CBC & Differential.  Procedure                               Abnormality         Status                     ---------                               -----------         ------                     CBC Auto Differential[[id number]]        Abnormal            Final result                 Please view results for these tests on the individual orders.    Comprehensive Metabolic Panel [[id number]]  (Abnormal) Collected: 03/31/22 2054    Specimen: Blood Updated: 03/31/22 2158     Glucose 175 mg/dL      BUN 21 mg/dL      Creatinine 1.42 mg/dL      Sodium 138 mmol/L      Potassium 3.7 mmol/L      Comment: Slight hemolysis detected by analyzer. Results may be affected.        Chloride 102 mmol/L      CO2 22.5 mmol/L      Calcium 9.0 mg/dL      Total Protein 7.4 g/dL      Albumin 3.60 g/dL      ALT (SGPT) 27 U/L      AST (SGOT) 37 U/L      Comment: Slight hemolysis detected by analyzer. Results may be affected.        Alkaline Phosphatase 117 U/L      Total Bilirubin 1.5 mg/dL      Globulin 3.8 gm/dL      A/G Ratio 0.9 g/dL      BUN/Creatinine Ratio 14.8     Anion Gap 13.5 mmol/L      eGFR 36.5 mL/min/1.73      Comment: National Kidney Foundation and American Society of Nephrology (ASN) Task Force recommended calculation based on the Chronic Kidney Disease Epidemiology Collaboration (CKD-EPI) equation refit without adjustment for race.       Narrative:      GFR Normal >60  Chronic Kidney Disease <60  Kidney Failure <15      aPTT [[id number]]  (Abnormal) Collected: 03/31/22 2054    Specimen: Blood Updated: 03/31/22 2119     PTT 41.0 seconds     Protime-INR [[id number]]  (Abnormal) Collected: 03/31/22 2054    Specimen: Blood Updated: 03/31/22 2119     Protime 18.2 Seconds      INR 1.51    BNP [[id number]]  (Normal) Collected: 03/31/22 2054    Specimen: Blood Updated: 03/31/22 2133     proBNP 1,091.0 pg/mL     Narrative:      Among patients with dyspnea, NT-proBNP is  "highly sensitive for the detection of acute congestive heart failure. In addition NT-proBNP of <300 pg/ml effectively rules out acute congestive heart failure with 99% negative predictive value.    Results may be falsely decreased if patient taking Biotin.      Troponin [[id number]]  (Normal) Collected: 03/31/22 2054    Specimen: Blood Updated: 03/31/22 2134     Troponin T <0.010 ng/mL     Narrative:      Troponin T Reference Range:  <= 0.03 ng/mL-   Negative for AMI  >0.03 ng/mL-     Abnormal for myocardial necrosis.  Clinicians would have to utilize clinical acumen, EKG, Troponin and serial changes to determine if it is an Acute Myocardial Infarction or myocardial injury due to an underlying chronic condition.       Results may be falsely decreased if patient taking Biotin.      Blood Culture - Blood, Arm, Right [[id number]] Collected: 03/31/22 2054    Specimen: Blood from Arm, Right Updated: 03/31/22 2104    Blood Culture - Blood, Arm, Left [[id number]] Collected: 03/31/22 2054    Specimen: Blood from Arm, Left Updated: 03/31/22 2104    Lactic Acid, Plasma [[id number]]  (Normal) Collected: 03/31/22 2054    Specimen: Blood Updated: 03/31/22 2126     Lactate 2.0 mmol/L     Procalcitonin [[id number]]  (Normal) Collected: 03/31/22 2054    Specimen: Blood Updated: 03/31/22 2204     Procalcitonin 0.08 ng/mL     Narrative:      As a Marker for Sepsis (Non-Neonates):    1. <0.5 ng/mL represents a low risk of severe sepsis and/or septic shock.  2. >2 ng/mL represents a high risk of severe sepsis and/or septic shock.    As a Marker for Lower Respiratory Tract Infections that require antibiotic therapy:    PCT on Admission    Antibiotic Therapy       6-12 Hrs later    >0.5                Strongly Recommended  >0.25 - <0.5        Recommended   0.1 - 0.25          Discouraged              Remeasure/reassess PCT  <0.1                Strongly Discouraged     Remeasure/reassess PCT    As 28 day mortality risk marker: \"Change in " "Procalcitonin Result\" (>80% or <=80%) if Day 0 (or Day 1) and Day 4 values are available. Refer to http://www.[facility]-pct-calculator.com    Change in PCT <=80%  A decrease of PCT levels below or equal to 80% defines a positive change in PCT test result representing a higher risk for 28-day all-cause mortality of patients diagnosed with severe sepsis for septic shock.    Change in PCT >80%  A decrease of PCT levels of more than 80% defines a negative change in PCT result representing a lower risk for 28-day all-cause mortality of patients diagnosed with severe sepsis or septic shock.       CBC Auto Differential [[id number]]  (Abnormal) Collected: 03/31/22 2054    Specimen: Blood Updated: 03/31/22 2108     WBC 12.66 10*3/mm3      RBC 3.32 10*6/mm3      Hemoglobin 11.2 g/dL      Hematocrit 32.9 %      MCV 99.1 fL      MCH 33.7 pg      MCHC 34.0 g/dL      RDW 15.4 %      RDW-SD 54.9 fl      MPV 9.5 fL      Platelets 167 10*3/mm3      Neutrophil % 76.9 %      Lymphocyte % 12.5 %      Monocyte % 8.7 %      Eosinophil % 1.2 %      Basophil % 0.2 %      Immature Grans % 0.5 %      Neutrophils, Absolute 9.74 10*3/mm3      Lymphocytes, Absolute 1.58 10*3/mm3      Monocytes, Absolute 1.10 10*3/mm3      Eosinophils, Absolute 0.15 10*3/mm3      Basophils, Absolute 0.03 10*3/mm3      Immature Grans, Absolute 0.06 10*3/mm3      nRBC 0.1 /100 WBC     Urinalysis With Microscopic If Indicated (No Culture) - Urine, Clean Catch [[id number]]  (Abnormal) Collected: 03/31/22 2132    Specimen: Urine, Clean Catch Updated: 03/31/22 2142     Color, UA Yellow     Appearance, UA Clear     pH, UA <=5.0     Specific Gravity, UA 1.012     Glucose, UA Negative     Ketones, UA Negative     Bilirubin, UA Negative     Blood, UA Trace     Protein,  mg/dL (2+)     Leuk Esterase, UA Negative     Nitrite, UA Negative     Urobilinogen, UA 0.2 E.U./dL    Urinalysis, Microscopic Only - Urine, Clean Catch [[id number]]  (Abnormal) Collected: 03/31/22 2132    " Specimen: Urine, Clean Catch Updated: 03/31/22 2143     RBC, UA 3-5 /HPF      WBC, UA 0-2 /HPF      Bacteria, UA None Seen /HPF      Squamous Epithelial Cells, UA 3-6 /HPF      Hyaline Casts, UA 0-2 /LPF      Methodology Automated Microscopy          I ordered the above labs and reviewed the results    RADIOLOGY  CT Abdomen Pelvis With Contrast   Final Result   Cholelithiasis. Mild colonic diverticulosis without definite   evidence of diverticulitis. Lumbar spasm of the ascending colon   suspicious for colitis. The remainder of the colon does not appear   inflamed and contains formed stool. Patchy groundglass opacities at the   right lung base suspicious for pneumonia. Otherwise unremarkable CT scan   of the abdomen and pelvis.       This report was finalized on 3/31/2022 11:36 PM by Dr. Tevin Lopez M.D.          XR Chest 1 View   Final Result           I ordered the above noted radiological studies. Interpreted by radiologist. Discussed with radiologist (Dr. Lopez). Reviewed by me in PACS.       PROCEDURES  Procedures  EKG          EKG time: 2044  Rhythm/Rate: NSR, 76  P waves and UT: nml  QRS, axis: nml, nml   ST and T waves: nml     Interpreted Contemporaneously by me, independently viewed  unchanged compared to prior 2/14/22      PROGRESS AND CONSULTS     The patient was wearing a facemask upon entrance into the room and remained in such throughout their visit.  I was wearing PPE including a facemask, eye protection, as well as gloves at any point entering the room and throughout the visit    2045  Given the fact that the patient is febrile, we do worry about the potential for sepsis.  We will work her up as well as treat her fever and begin some IV fluids.  We will monitor closely.    2350  On reevaluation, the patient is resting comfortably and without acute complaint.  Oxygen saturations are currently 95%.  I informed the patient that her CT scan did show probable right lower lobe pneumonia.  We will  begin IV antibiotics and admit her to the hospital for further management and treatment.  The patient is in agreement with the plan and all questions have been answered.    0010  Case discussed with JADON Tellez for [facility], who agrees to admit the patient to the hospital for Dr. Freeman.      MEDICAL DECISION MAKING  Results were reviewed/discussed with the patient and they were also made aware of online access. Pt also made aware that some labs, such as cultures, will not be resulted during ER visit and follow up with PMD is necessary.     MDM  Number of Diagnoses or Management Options     Amount and/or Complexity of Data Reviewed  Clinical lab tests: ordered and reviewed  Tests in the radiology section of CPT®: reviewed and ordered  Tests in the medicine section of CPT®: ordered and reviewed  Review and summarize past medical records: yes (Upon medical records review, the patient was last seen and evaluated on 2/14/2022 secondary to pneumonia)  Discuss the patient with other providers: yes (JADON Tellez for [facility], who will admit the patient for Dr. Freeman)  Independent visualization of images, tracings, or specimens: yes (CT scan of the abdomen and pelvis showing gallstones without cholecystitis as well as probable right lower lobe pneumonia)           DIAGNOSIS  Final diagnoses:   Pneumonia of right lower lobe due to infectious organism   Fever and chills       DISPOSITION  ADMISSION    Discussed treatment plan and reason for admission with pt/family and admitting physician.  Pt/family voiced understanding of the plan for admission for further testing/treatment as needed.         Latest Documented Vital Signs:  As of 00:12 EDT  BP- 104/59 HR- 69 Temp- 98.7 °F (37.1 °C) (Oral) O2 sat- 97%         Esteban Sutton MD  04/01/22 0012

## 2022-04-02 PROBLEM — N17.9 AKI (ACUTE KIDNEY INJURY): Status: ACTIVE | Noted: 2022-01-01

## 2022-04-02 NOTE — PROGRESS NOTES
Name: Yazmin Javier ADMIT: 3/31/2022   : 1935  PCP: Morenita Silverio MD    MRN: [id number] LOS: 1 days   AGE/SEX: 86 y.o. female  ROOM: [facility]     Subjective   Subjective   Appears chronically ill, elderly, frail, generally weak, relatively comfortable, no apparent distress.  Reports breathing better today.  RN at bedside.  Voices no new concerns.  Still with audible wheezing on exam.    Review of Systems   Constitutional: Positive for chills, fatigue (acute on chronic) and fever (resolved).   HENT: Positive for congestion and voice change (resolving).    Respiratory: Positive for cough and shortness of breath (improving).    Cardiovascular: Positive for leg swelling. Negative for chest pain.   Gastrointestinal: Negative for abdominal pain, constipation, diarrhea, nausea and vomiting.   Genitourinary: Negative for difficulty urinating and dysuria.   Musculoskeletal: Positive for gait problem (chronic & uses a rollator & wheelchair typically). Negative for myalgias.   Skin: Negative for rash and wound.   Neurological: Positive for weakness (generalized). Negative for dizziness, syncope and light-headedness.      Objective   Objective   Vital Signs  Temp:  [97.2 °F (36.2 °C)-98.1 °F (36.7 °C)] 97.6 °F (36.4 °C)  Heart Rate:  [63-88] 72  Resp:  [16-24] 16  BP: (130-138)/(60-75) 130/62  SpO2:  [82 %-100 %] 100 %  on  Flow (L/min):  [2-3] 3;   Device (Oxygen Therapy): nasal cannula  Body mass index is 35.19 kg/m².     Physical Exam   Constitutional:       General: She is not in acute distress.     Appearance: She is obese. She is ill-appearing. She is not toxic-appearing.   Cardiovascular:      Rate and Rhythm: Normal rate.      Heart sounds: Normal heart sounds.   Pulmonary:      Breath sounds: Wheezing present.      Comments: Mild-moderate increased respiratory work effort  Abdominal:      General: Bowel sounds are normal.      Palpations: Abdomen is soft.   Musculoskeletal:         General: No  tenderness.      Cervical back: Normal range of motion and neck supple.      Right lower leg: No edema present.      Left lower leg: No edema present.   Skin:     General: Skin is warm and dry.   Neurological:      Mental Status: She is alert and oriented to person, place, and time.      Motor: Weakness (generalized) present.   Psychiatric:         Behavior: Behavior normal.         Thought Content: Thought content normal.     Results Review     I reviewed the patient's new clinical results.  Results from last 7 days   Lab Units 04/02/22 0533 04/01/22 0524 03/31/22 2054   WBC 10*3/mm3 9.27 9.95 12.66*   HEMOGLOBIN g/dL 9.2* 9.6* 11.2*   PLATELETS 10*3/mm3 145 145 167     Results from last 7 days   Lab Units 04/02/22 0533 04/01/22 0524 03/31/22 2054   SODIUM mmol/L 139 140 138   POTASSIUM mmol/L 4.5 3.4* 3.7   CHLORIDE mmol/L 105 106 102   CO2 mmol/L 22.0 23.4 22.5   BUN mg/dL 26* 19 21   CREATININE mg/dL 1.50* 1.24* 1.42*   GLUCOSE mg/dL 146* 114* 175*   EGFR mL/min/1.73 34.2* 43.0* 36.5*     Results from last 7 days   Lab Units 04/02/22 0533 03/31/22 2054   ALBUMIN g/dL 3.60 3.60   BILIRUBIN mg/dL 1.8* 1.5*   ALK PHOS U/L 97 117   AST (SGOT) U/L 32 37*   ALT (SGPT) U/L 31 27     Results from last 7 days   Lab Units 04/02/22 0533 04/01/22 0524 03/31/22 2054   CALCIUM mg/dL 8.9 8.5* 9.0   ALBUMIN g/dL 3.60  --  3.60   MAGNESIUM mg/dL 1.9  --   --    PHOSPHORUS mg/dL 3.0  --   --      Results from last 7 days   Lab Units 03/31/22 2054   PROCALCITONIN ng/mL 0.08   LACTATE mmol/L 2.0     Hemoglobin A1C   Date/Time Value Ref Range Status   04/02/2022 0533 5.40 4.80 - 5.60 % Final     Glucose   Date/Time Value Ref Range Status   04/02/2022 1052 176 (H) 70 - 130 mg/dL Final     Comment:     Meter: [id number] : [id number] Tatyana Mac RN   04/02/2022 0548 165 (H) 70 - 130 mg/dL Final     Comment:     Meter: [id number] : [id number] Wally CASH   04/01/2022 2034 303 (H) 70 - 130 mg/dL Final      Comment:     Meter: [id number] : [id number] Wally Rinaldi NA   04/01/2022 1729 184 (H) 70 - 130 mg/dL Final     Comment:     Meter: [id number] : [id number] Michael Jo NA   04/01/2022 1115 119 70 - 130 mg/dL Final     Comment:     Meter: [id number] : [id number] Michael CASH       CT Abdomen Pelvis With Contrast  Narrative: CT ABDOMEN PELVIS W CONTRAST-     CLINICAL HISTORY: Acute onset nonlocalized abdominal pain.     TECHNIQUE: Spiral CT images were acquired through the abdomen and pelvis  with IV contrast and were reconstructed in 3 mm thick slices.     Radiation dose reduction techniques were utilized, including automated  exposure control and exposure modulation based on body size.     COMPARISON: None     FINDINGS: The liver, spleen, pancreas, and adrenal glands appear within  normal limits. Multiple small gallstones are layering posteriorly within  the gallbladder lumen. The gallbladder is otherwise unremarkable. There  is no bile duct dilatation. There is a tiny cortical cyst in the  anterior aspect of the right kidney. The kidneys are otherwise  unremarkable. The stomach and small bowel appear within normal limits.  There is no bowel distention. There are a few diverticuli scattered  throughout the left colon and proximal sigmoid colon. There is no  definite CT evidence of diverticulitis. There is prominent focal spasm  of the ascending colon that may or may not be of any clinical  significance. Localized colitis could produce these findings. Images  through the lung bases demonstrate patchy groundglass opacities in the  right lower lobe suspicious for pneumonia.     Impression: Cholelithiasis. Mild colonic diverticulosis without definite  evidence of diverticulitis. Lumbar spasm of the ascending colon  suspicious for colitis. The remainder of the colon does not appear  inflamed and contains formed stool. Patchy groundglass opacities at the  right lung base suspicious for pneumonia.  Otherwise unremarkable CT scan  of the abdomen and pelvis.     This report was finalized on 3/31/2022 11:36 PM by Dr. Tevin Lopez M.D.     XR Chest 1 View  PORTABLE CHEST 03/31/2022 AT 9:22 PM     CLINICAL HISTORY: Dyspnea     Compared to a previous chest x-ray dated 02/16/2022.     The current chest x-ray is lordotic in projection which accentuates the  heart size. Allowing for this, the heart appears within normal limits in  size and unchanged. The lungs are well-expanded and clear. There are no  pleural effusions. The pulmonary vasculature is within normal limits.  Mild vascular congestion shown on the previous chest x-ray has resolved.     This report was finalized on 3/31/2022 9:41 PM by Dr. Tevin Lopez M.D.       Scheduled Medications  allopurinol, 300 mg, Oral, Daily  amLODIPine, 5 mg, Oral, Daily  apixaban, 5 mg, Oral, BID  atorvastatin, 80 mg, Oral, Nightly  budesonide-formoterol, 2 puff, Inhalation, BID - RT  folic acid, 1 mg, Oral, Daily  guaiFENesin, 600 mg, Oral, Q12H  insulin lispro, 0-9 Units, Subcutaneous, 4x Daily With Meals & Nightly  ipratropium-albuterol, 3 mL, Nebulization, 4x Daily - RT  levothyroxine, 50 mcg, Oral, Daily  magnesium sulfate, 2 g, Intravenous, Once  metoprolol tartrate, 25 mg, Oral, BID  PARoxetine, 10 mg, Oral, Daily  piperacillin-tazobactam, 3.375 g, Intravenous, Q8H  predniSONE, 40 mg, Oral, Daily With Breakfast  vitamin B-12, 1,000 mcg, Oral, Daily    Infusions   Diet  Diet Regular; Cardiac       Assessment/Plan     Active Hospital Problems    Diagnosis  POA   • **Acute on chronic respiratory failure with hypoxia ([facility]) [J96.21]  Yes   • BRENDA (acute kidney injury) ([facility]) [N17.9]  Unknown   • Fever in adult [R50.9]  Yes   • History of asthma [Z87.09]  Not Applicable   • Hyperglycemia [R73.9]  Yes   • Hypokalemia [E87.6]  Clinically Undetermined   • Chronic diastolic CHF (congestive heart failure) ([facility]) [I50.32]  Yes   • Chronic anticoagulation [Z79.01]  Not Applicable    • Paroxysmal atrial fibrillation (HCC) [I48.0]  Yes   • Stage 3b chronic kidney disease (HCC) [N18.32]  Yes      Resolved Hospital Problems   No resolved problems to display.       86 y.o. female admitted with Acute on chronic respiratory failure with hypoxia (HCC).      Acute on chronic respiratory failure with hypoxia (HCC) /   History of asthma  Denies complications from swallowing/history of dysphagia  DuoNeb scheduled and as needed, Symbicort twice daily, Prednisone 40 mg daily  Ordering IV magnesium sulfate 2 gram once  Oxygen saturation 100 % 3 L nasal cannula (baseline oxygen demand)       Paroxysmal atrial fibrillation (HCC) /  Chronic anticoagulation  Rate controlled with metoprolol tartrate 25 mg p.o. twice daily continued admission (hemodynamically stable)  Apixaban twice daily       BRENDA on stage 3b chronic kidney disease (HCC) /   Hypokalemia  Serum creatinine trend: 1.2-->1.5 (4/2)  Avoid nephrotoxins  Monitor labs  K+ normalized following KCL 20 mEq ER        Chronic diastolic CHF (congestive heart failure) (HCC)  Euvolemic on exam  proBNP 1090  Monitor signs of fluid volume overload  Hold furosemide 40 mg p.o. twice daily for now       Fever in adult  Respiratory viral panel negative to include COVID-19  Unremarkable procalcitonin  Likely symptom of #1 suspected viral illness despite negative panel  Continue empiric antibiotic therapy Zosyn & likely transition to Augmentin at DC  MRSA screen negative discontinue vancomycin        Hyperglycemia  A1c 5.4  Anticipate elevated glucose secondary to steroids  Moderate dose lispro sliding scale for now     · Apixaban for DVT prophylaxis.  · No CPR  · Discussed with patient & RN.  · Anticipate discharge hopeful tomorrow return to NH      JADON Painting  [facility]  04/02/22  12:11 EDT

## 2022-04-02 NOTE — PLAN OF CARE
Goal Outcome Evaluation:         VSS. Up to BSC with assist x1. Urine sample collected. BG elevated, states she is concerned about needing insulin injections, pt educated on indications for use while in hospital. Loose/watery BM x1. IV abx. No other issues at this time.

## 2022-04-02 NOTE — PLAN OF CARE
Problem: Adult Inpatient Plan of Care  Goal: Plan of Care Review  Outcome: Ongoing, Progressing  Flowsheets (Taken 4/2/2022 1525)  Progress: improving  Plan of Care Reviewed With: patient  Outcome Evaluation: vitals stable, pt dyspneic this am with audible wheezes, prn breathing tx and magnesium given, sputum specimen sent, phlegm yellow/tan thick red tinged, furosemide stopped r/t kidney dz, continue to monitor  Goal: Patient-Specific Goal (Individualized)  Outcome: Ongoing, Progressing  Goal: Absence of Hospital-Acquired Illness or Injury  Outcome: Ongoing, Progressing  Intervention: Identify and Manage Fall Risk  Recent Flowsheet Documentation  Taken 4/2/2022 1442 by Neela Saxena RN  Safety Promotion/Fall Prevention:   safety round/check completed   room organization consistent   nonskid shoes/slippers when out of bed   lighting adjusted   fall prevention program maintained   clutter free environment maintained   assistive device/personal items within reach  Taken 4/2/2022 1213 by Neela Saxena RN  Safety Promotion/Fall Prevention:   safety round/check completed   room organization consistent   nonskid shoes/slippers when out of bed   lighting adjusted   fall prevention program maintained   clutter free environment maintained   assistive device/personal items within reach  Taken 4/2/2022 1045 by Neela Saxena RN  Safety Promotion/Fall Prevention:   safety round/check completed   room organization consistent   nonskid shoes/slippers when out of bed   lighting adjusted   fall prevention program maintained   clutter free environment maintained   assistive device/personal items within reach  Taken 4/2/2022 0835 by Neela Saxena RN  Safety Promotion/Fall Prevention:   safety round/check completed   room organization consistent   nonskid shoes/slippers when out of bed   lighting adjusted   fall prevention program maintained   clutter free environment maintained   assistive device/personal items  within reach  Intervention: Prevent Skin Injury  Recent Flowsheet Documentation  Taken 4/2/2022 0835 by Neela Saxena RN  Skin Protection: adhesive use limited  Intervention: Prevent and Manage VTE (Venous Thromboembolism) Risk  Recent Flowsheet Documentation  Taken 4/2/2022 1442 by Neela Saxena RN  Activity Management:   up in chair   ambulated outside room  Taken 4/2/2022 1213 by Neela Saxena RN  Activity Management: up in chair  Goal: Optimal Comfort and Wellbeing  Outcome: Ongoing, Progressing  Goal: Readiness for Transition of Care  Outcome: Ongoing, Progressing   Goal Outcome Evaluation:  Plan of Care Reviewed With: patient        Progress: improving  Outcome Evaluation: vitals stable, pt dyspneic this am with audible wheezes, prn breathing tx and magnesium given, sputum specimen sent, phlegm yellow/tan thick red tinged, furosemide stopped r/t kidney dz, continue to monitor

## 2022-04-02 NOTE — THERAPY EVALUATION
Patient Name: Yazmin Javier  : 1935    MRN: [id number]                              Today's Date: 2022       Admit Date: 3/31/2022    Visit Dx:     ICD-10-CM ICD-9-CM   1. Pneumonia of right lower lobe due to infectious organism  J18.9 486   2. Fever and chills  R50.9 780.60     Patient Active Problem List   Diagnosis   • Acute on chronic congestive heart failure (HCC)   • Hypoxia   • Paroxysmal atrial fibrillation (HCC)   • Chronic anticoagulation   • Hypothyroidism (acquired)   • Stage 3b chronic kidney disease (HCC)   • Right arm weakness   • Chronic diastolic CHF (congestive heart failure) (HCC)   • Syncope   • Acute respiratory failure with hypoxia and hypercapnia (HCC)   • Acute pulmonary edema (HCC)   • Acute on chronic respiratory failure with hypoxia (HCC)   • Fever in adult   • History of asthma   • Hyperglycemia   • Hypokalemia   • BRENDA (acute kidney injury) (HCC)     Past Medical History:   Diagnosis Date   • CHF (congestive heart failure) (HCC)    • Sleep apnea    • Stroke (HCC)      History reviewed. No pertinent surgical history.   General Information     Row Name 22 1610          Physical Therapy Time and Intention    Document Type evaluation  -     Mode of Treatment individual therapy;physical therapy  -     Row Name 22 1610          General Information    Patient Profile Reviewed yes  -     Prior Level of Function independent:;all household mobility;community mobility;ADL's  Assisted living provides meals  -     Existing Precautions/Restrictions fall  -     Barriers to Rehab previous functional deficit  -     Row Name 22 1610          Living Environment    People in Home other (see comments)  Independent living  -     Row Name 22 1610          Home Main Entrance    Number of Stairs, Main Entrance none  -     Row Name 22 1610          Stairs Within Home, Primary    Number of Stairs, Within Home, Primary none  -     Row Name 22 1610           Cognition    Orientation Status (Cognition) oriented x 3  -     Row Name 04/02/22 1610          Safety Issues, Functional Mobility    Impairments Affecting Function (Mobility) shortness of breath;strength  -           User Key  (r) = Recorded By, (t) = Taken By, (c) = Cosigned By    Initials Name Provider Type    Janett Yi PT Physical Therapist               Mobility     Row Name 04/02/22 1611          Bed Mobility    Bed Mobility other (see comments)  NT - [facility]  -     Row Name 04/02/22 1611          Bed-Chair Transfer    Bed-Chair [place] (Transfers) not tested  -     Row Name 04/02/22 1611          Sit-Stand Transfer    Sit-Stand [place] (Transfers) supervision  -     Assistive Device (Sit-Stand Transfers) walker, front-wheeled  -     Row Name 04/02/22 1611          Gait/Stairs (Locomotion)    [place] Level (Gait) standby assist  -     Assistive Device (Gait) walker, front-wheeled  -     Distance in Feet (Gait) 75ft  -     Deviations/Abnormal Patterns (Gait) base of support, narrow;stride length decreased;vinicius decreased  -     Bilateral Gait Deviations forward flexed posture  -     Comment, (Gait/Stairs) 75ft with RW and SBA, demonstrated decrease vinicius, mild c/o SOA with SpO2at 88% on 3L NC O2 post gait  -           User Key  (r) = Recorded By, (t) = Taken By, (c) = Cosigned By    Initials Name Provider Type    Janett Yi PT Physical Therapist               Obj/Interventions     Row Name 04/02/22 1611          Range of Motion Comprehensive    General Range of Motion no range of motion deficits identified  -     Row Name 04/02/22 1611          Strength Comprehensive (MMT)    General Manual Muscle Testing (MMT) Assessment no strength deficits identified  -[facility] Name 04/02/22 1611          Balance    Balance Assessment sitting static balance;standing static balance;standing dynamic balance  -     Static Sitting Balance independent  -      Position, Sitting Balance sitting in chair  -     Static Standing Balance modified independence  -     Dynamic Standing Balance standby assist  -     Position/Device Used, Standing Balance walker, front-wheeled  -     Row Name 04/02/22 1611          Sensory Assessment (Somatosensory)    Sensory Assessment (Somatosensory) sensation intact  -           User Key  (r) = Recorded By, (t) = Taken By, (c) = Cosigned By    Initials Name Provider Type    Janett Yi PT Physical Therapist               Goals/Plan     Row Name 04/02/22 1614          Transfer Goal 1 (PT)    Activity/Assistive Device (Transfer Goal 1, PT) transfers, all;walker, rolling  -KH     [place] Level/Cues Needed (Transfer Goal 1, PT) modified independence  -KH     Time Frame (Transfer Goal 1, PT) 1 week  -     Row Name 04/02/22 1614          Gait Training Goal 1 (PT)    Activity/Assistive Device (Gait Training Goal 1, PT) gait (walking locomotion);assistive device use;walker, rolling  -KH     [place] Level (Gait Training Goal 1, PT) modified independence  -KH     Distance (Gait Training Goal 1, PT) 150ft  -KH     Time Frame (Gait Training Goal 1, PT) 1 week  -           User Key  (r) = Recorded By, (t) = Taken By, (c) = Cosigned By    Initials Name Provider Type    Janett Yi PT Physical Therapist               Clinical Impression     Row Name 04/02/22 1613          Pain    Pretreatment Pain Rating 0/10 - no pain  -     Posttreatment Pain Rating 0/10 - no pain  -     Row Name 04/02/22 1613          Plan of Care Review    Plan of Care Reviewed With patient  -     Progress improving  -     Outcome Evaluation Pt is an 85 y/o female presenting to [facility] with Acute on chronic respiratory failure with hypoxia. Pt lives at an independent assisted living facility. Reports independence in mobility with rollator within apartment and w/c mobility for community distances. Uses 3L NC O2 at baseline. On eval, performed  transfers with SBA and ambulated 75ft with RW and SBA. SpO2 at 88% post gait on 3L NC O2. Pt appears to be at functional baseline. However, pt at high risk for functional decline. Pt will benefit from skilled PT to maintain and improve functional status prior to d/c back to assisted living facility.  -     Row Name 04/02/22 1613          Therapy Assessment/Plan (PT)    Rehab Potential (PT) good, to achieve stated therapy goals  -     Criteria for Skilled Interventions Met (PT) yes;skilled treatment is necessary  -     Predicted Duration of Therapy Intervention (PT) 1 week  -     Row Name 04/02/22 1613          Vital Signs    Pre SpO2 (%) 94  -KH     O2 Delivery Pre Treatment supplemental O2  3L NC O2  -KH     Intra SpO2 (%) 88  -KH     O2 Delivery Intra Treatment supplemental O2  -KH     Post SpO2 (%) 93  -KH     O2 Delivery Post Treatment supplemental O2  -KH     Row Name 04/02/22 1613          Positioning and Restraints    Pre-Treatment Position sitting in chair/recliner  -     Post Treatment Position chair  -KH     In Chair notified nsg;reclined;call light within reach;encouraged to call for assist;exit alarm on  -           User Key  (r) = Recorded By, (t) = Taken By, (c) = Cosigned By    Initials Name Provider Type    Janett Yi, PT Physical Therapist               Outcome Measures     Row Name 04/02/22 1615          How much help from another person do you currently need...    Turning from your back to your side while in flat bed without using bedrails? 4  -KH     Moving from lying on back to sitting on the side of a flat bed without bedrails? 4  -KH     Moving to and from a bed to a chair (including a wheelchair)? 4  -KH     Standing up from a chair using your arms (e.g., wheelchair, bedside chair)? 4  -KH     Climbing 3-5 steps with a railing? 3  -KH     To walk in hospital room? 4  -KH     AM-PAC 6 Clicks Score (PT) 23  -     Row Name 04/02/22 1615          Functional Assessment     Outcome Measure Options AM-PAC 6 Clicks Basic Mobility (PT)  -           User Key  (r) = Recorded By, (t) = Taken By, (c) = Cosigned By    Initials Name Provider Type    Janett Yi PT Physical Therapist                             Physical Therapy Education                 Title: PT OT SLP Therapies (Done)     Topic: Physical Therapy (Done)     Point: Mobility training (Done)     Learning Progress Summary           Patient Acceptance, E, VU by CANDI at 4/2/2022 1617                   Point: Home exercise program (Done)     Learning Progress Summary           Patient Acceptance, E, VU by CANDI at 4/2/2022 1617                   Point: Body mechanics (Done)     Learning Progress Summary           Patient Acceptance, E, VU by CANDI at 4/2/2022 1617                   Point: Precautions (Done)     Learning Progress Summary           Patient Acceptance, E, VU by CANDI at 4/2/2022 1617                               User Key     Initials Effective Dates Name Provider Type [facility] 11/03/21 -  Janett Rodriguez PT Physical Therapist PT              PT Recommendation and Plan  Planned Therapy Interventions (PT): balance training, bed mobility training, gait training, home exercise program, patient/family education, strengthening, transfer training  Plan of Care Reviewed With: patient  Progress: improving  Outcome Evaluation: Pt is an 85 y/o female presenting to [facility] with Acute on chronic respiratory failure with hypoxia. Pt lives at an independent assisted living facility. Reports independence in mobility with rollator within apartment and w/c mobility for community distances. Uses 3L NC O2 at baseline. On eval, performed transfers with SBA and ambulated 75ft with RW and SBA. SpO2 at 88% post gait on 3L NC O2. Pt appears to be at functional baseline. However, pt at high risk for functional decline. Pt will benefit from skilled PT to maintain and improve functional status prior to d/c back to assisted living  facility.     Time Calculation:    PT Charges     Row Name 04/02/22 1620             Time Calculation    Start Time 1417  -      Stop Time 1443  -      Time Calculation (min) 26 min  -      PT Received On 04/02/22  -      PT - Next Appointment 04/04/22  -      PT Goal Re-Cert Due Date 04/09/22  -              Time Calculation- PT    Total Timed Code Minutes- PT 15 minute(s)  -              Timed Charges    36036 - Gait Training Minutes  15  -KH              Total Minutes    Timed Charges Total Minutes 15  -KH       Total Minutes 15  -KH            User Key  (r) = Recorded By, (t) = Taken By, (c) = Cosigned By    Initials Name Provider Type    Janett Yi, PT Physical Therapist              Therapy Charges for Today     Code Description Service Date Service Provider Modifiers Qty    87120399724 HC PT EVAL MOD COMPLEXITY 3 4/2/2022 Janett Bucio, PT GP 1    97698314442 HC GAIT TRAINING EA 15 MIN 4/2/2022 Janett Bucio, PT GP 1          PT G-Codes  Outcome Measure Options: AM-PAC 6 Clicks Basic Mobility (PT)  AM-PAC 6 Clicks Score (PT): 23    JANETT BUCIO PT  4/2/2022

## 2022-04-02 NOTE — PLAN OF CARE
Goal Outcome Evaluation:  Plan of Care Reviewed With: patient        Progress: improving  Pt is an 87 y/o female presenting to [facility] with Acute on chronic respiratory failure with hypoxia. Pt lives at an independent assisted living facility. Reports independence in mobility with rollator within apartment and w/c mobility for community distances. Uses 3L NC O2 at baseline. On eval, performed transfers with SBA and ambulated 75ft with RW and SBA. SpO2 at 88% post gait on 3L NC O2. Pt appears to be at functional baseline. However, pt at high risk for functional decline. Pt will benefit from skilled PT to maintain and improve functional status prior to d/c back to assisted living facility.

## 2022-04-03 PROBLEM — R74.01 TRANSAMINITIS: Status: ACTIVE | Noted: 2022-01-01

## 2022-04-03 PROBLEM — E80.6 HYPERBILIRUBINEMIA: Status: ACTIVE | Noted: 2022-01-01

## 2022-04-03 PROBLEM — R04.2 COUGH WITH HEMOPTYSIS: Status: ACTIVE | Noted: 2022-01-01

## 2022-04-03 NOTE — PROGRESS NOTES
Name: Yazmin Javier ADMIT: 3/31/2022   : 1935  PCP: Morenita Silverio MD    MRN: [id number] LOS: 2 days   AGE/SEX: 86 y.o. female  ROOM: [facility]     Subjective   Subjective   Appears chronically ill, elderly, frail, generally weak, relatively comfortable, no apparent distress.  Breathing worse today.  RT at bedside.  Moderate hemoptysis on napkin.  Discussed with RN.  Audible wheezing on exam.    Review of Systems   Constitutional: Negative for chills, fatigue (acute on chronic) and fever (resolved).   HENT: Positive for congestion and voice change (resolving).    Respiratory: Positive for cough and shortness of breath (improving).    Cardiovascular: Positive for leg swelling. Negative for chest pain.   Gastrointestinal: Negative for abdominal pain, constipation, diarrhea, nausea and vomiting.   Genitourinary: Negative for difficulty urinating and dysuria.   Musculoskeletal: Positive for gait problem (chronic & uses a rollator & wheelchair typically). Negative for myalgias.   Skin: Negative for rash and wound.   Neurological: Positive for weakness (generalized). Negative for dizziness, syncope and light-headedness.      Objective   Objective   Vital Signs  Temp:  [97.2 °F (36.2 °C)-97.5 °F (36.4 °C)] 97.5 °F (36.4 °C)  Heart Rate:  [71-80] 78  Resp:  [16-24] 24  BP: (132-151)/(67-79) 151/72  SpO2:  [88 %-97 %] 88 %  on  Flow (L/min):  [3-5] 5;   Device (Oxygen Therapy): humidified;nasal cannula  Body mass index is 35.19 kg/m².     Physical Exam   Constitutional:       General: She is not in acute distress.     Appearance: She is obese. She is ill-appearing. She is not toxic-appearing.   Cardiovascular:      Rate and Rhythm: Tachycardia on monitor (120's)     Heart sounds: Normal heart sounds.   Pulmonary:      Breath sounds: Wheezing present.      Comments: Moderate respiratory work effort & hemoptysis.   Abdominal:      General: Bowel sounds are normal.      Palpations: Abdomen is soft.    Musculoskeletal:         General: No tenderness.      Cervical back: Normal range of motion and neck supple.      Right lower leg: No edema present.      Left lower leg: No edema present.   Skin:     General: Skin is warm and dry.   Neurological:      Mental Status: She is alert and oriented to person, place, and time.      Motor: Weakness (generalized) present.   Psychiatric:         Behavior: Behavior normal.         Thought Content: Thought content normal.     Results Review     I reviewed the patient's new clinical results.  Results from last 7 days   Lab Units 04/03/22 0736 04/02/22 0533 04/01/22 0524 03/31/22 2054   WBC 10*3/mm3 19.48* 9.27 9.95 12.66*   HEMOGLOBIN g/dL 9.6* 9.2* 9.6* 11.2*   PLATELETS 10*3/mm3 202 145 145 167     Results from last 7 days   Lab Units 04/03/22 0736 04/02/22 0533 04/01/22 0524 03/31/22 2054   SODIUM mmol/L 141 139 140 138   POTASSIUM mmol/L 4.0 4.5 3.4* 3.7   CHLORIDE mmol/L 103 105 106 102   CO2 mmol/L 20.7* 22.0 23.4 22.5   BUN mg/dL 38* 26* 19 21   CREATININE mg/dL 1.70* 1.50* 1.24* 1.42*   GLUCOSE mg/dL 122* 146* 114* 175*   EGFR mL/min/1.73 29.4* 34.2* 43.0* 36.5*     Results from last 7 days   Lab Units 04/03/22 0736 04/02/22 0533 03/31/22 2054   ALBUMIN g/dL 3.60 3.60 3.60   BILIRUBIN mg/dL 2.1* 1.8* 1.5*   ALK PHOS U/L 99 97 117   AST (SGOT) U/L 58* 32 37*   ALT (SGPT) U/L 58* 31 27     Results from last 7 days   Lab Units 04/03/22 0736 04/02/22 0533 04/01/22 0524 03/31/22 2054   CALCIUM mg/dL 9.2 8.9 8.5* 9.0   ALBUMIN g/dL 3.60 3.60  --  3.60   MAGNESIUM mg/dL  --  1.9  --   --    PHOSPHORUS mg/dL  --  3.0  --   --      Results from last 7 days   Lab Units 03/31/22 2054   PROCALCITONIN ng/mL 0.08   LACTATE mmol/L 2.0     Hemoglobin A1C   Date/Time Value Ref Range Status   04/02/2022 0533 5.40 4.80 - 5.60 % Final     Glucose   Date/Time Value Ref Range Status   04/03/2022 1057 128 70 - 130 mg/dL Final     Comment:     Meter: [id number] : [id number]  Tatyana Mac RN   04/03/2022 0629 148 (H) 70 - 130 mg/dL Final     Comment:     Meter: [id number] : [id number] Matthias Rhodes NA   04/02/2022 2010 192 (H) 70 - 130 mg/dL Final     Comment:     Meter: [id number] : [id number] Wally Pedrazarielle NA   04/02/2022 1616 204 (H) 70 - 130 mg/dL Final     Comment:     Meter: [id number] : [id number] Tatyana Mac RN   04/02/2022 1052 176 (H) 70 - 130 mg/dL Final     Comment:     Meter: [id number] : [id number] Tatyana Mac RN   04/02/2022 0548 165 (H) 70 - 130 mg/dL Final     Comment:     Meter: [id number] : [id number] Wally Rinaldi NA   04/01/2022 2034 303 (H) 70 - 130 mg/dL Final     Comment:     Meter: [id number] : [id number] Wally Rinaldi SHAILESH       CT Abdomen Pelvis With Contrast  Narrative: CT ABDOMEN PELVIS W CONTRAST-     CLINICAL HISTORY: Acute onset nonlocalized abdominal pain.     TECHNIQUE: Spiral CT images were acquired through the abdomen and pelvis  with IV contrast and were reconstructed in 3 mm thick slices.     Radiation dose reduction techniques were utilized, including automated  exposure control and exposure modulation based on body size.     COMPARISON: None     FINDINGS: The liver, spleen, pancreas, and adrenal glands appear within  normal limits. Multiple small gallstones are layering posteriorly within  the gallbladder lumen. The gallbladder is otherwise unremarkable. There  is no bile duct dilatation. There is a tiny cortical cyst in the  anterior aspect of the right kidney. The kidneys are otherwise  unremarkable. The stomach and small bowel appear within normal limits.  There is no bowel distention. There are a few diverticuli scattered  throughout the left colon and proximal sigmoid colon. There is no  definite CT evidence of diverticulitis. There is prominent focal spasm  of the ascending colon that may or may not be of any clinical  significance. Localized colitis could produce these findings. Images  through the  lung bases demonstrate patchy groundglass opacities in the  right lower lobe suspicious for pneumonia.     Impression: Cholelithiasis. Mild colonic diverticulosis without definite  evidence of diverticulitis. Lumbar spasm of the ascending colon  suspicious for colitis. The remainder of the colon does not appear  inflamed and contains formed stool. Patchy groundglass opacities at the  right lung base suspicious for pneumonia. Otherwise unremarkable CT scan  of the abdomen and pelvis.     This report was finalized on 3/31/2022 11:36 PM by Dr. Tevin Lopez M.D.     XR Chest 1 View  PORTABLE CHEST 03/31/2022 AT 9:22 PM     CLINICAL HISTORY: Dyspnea     Compared to a previous chest x-ray dated 02/16/2022.     The current chest x-ray is lordotic in projection which accentuates the  heart size. Allowing for this, the heart appears within normal limits in  size and unchanged. The lungs are well-expanded and clear. There are no  pleural effusions. The pulmonary vasculature is within normal limits.  Mild vascular congestion shown on the previous chest x-ray has resolved.     This report was finalized on 3/31/2022 9:41 PM by Dr. Tevin Lopez M.D.       Scheduled Medications  allopurinol, 300 mg, Oral, Daily  amLODIPine, 5 mg, Oral, Daily  atorvastatin, 80 mg, Oral, Nightly  budesonide-formoterol, 2 puff, Inhalation, BID - RT  folic acid, 1 mg, Oral, Daily  guaiFENesin, 600 mg, Oral, Q12H  insulin lispro, 0-9 Units, Subcutaneous, 4x Daily With Meals & Nightly  ipratropium-albuterol, 3 mL, Nebulization, 4x Daily - RT  levothyroxine, 50 mcg, Oral, Daily  methylPREDNISolone sodium succinate, 60 mg, Intravenous, Q6H  metoprolol tartrate, 25 mg, Oral, BID  PARoxetine, 10 mg, Oral, Daily  piperacillin-tazobactam, 3.375 g, Intravenous, Q8H  vitamin B-12, 1,000 mcg, Oral, Daily    Infusions   Diet  Diet Regular; Cardiac       Assessment/Plan     Active Hospital Problems    Diagnosis  POA   • **Acute on chronic respiratory failure  with hypoxia (HCC) [J96.21]  Yes   • Cough with hemoptysis [R04.2]  Unknown   • Hyperbilirubinemia [E80.6]  Unknown   • BRENDA (acute kidney injury) (HCC) [N17.9]  Unknown   • Fever in adult [R50.9]  Yes   • History of asthma [Z87.09]  Not Applicable   • Hyperglycemia [R73.9]  Yes   • Hypokalemia [E87.6]  Clinically Undetermined   • Chronic diastolic CHF (congestive heart failure) (HCC) [I50.32]  Yes   • Chronic anticoagulation [Z79.01]  Not Applicable   • Paroxysmal atrial fibrillation (HCC) [I48.0]  Yes   • Stage 3b chronic kidney disease (HCC) [N18.32]  Yes      Resolved Hospital Problems   No resolved problems to display.       86 y.o. female admitted with Acute on chronic respiratory failure with hypoxia (HCC).      Acute on chronic respiratory failure with hypoxia (HCC) /   History of asthma  Denies complications from swallowing/history of dysphagia  DuoNeb scheduled and as needed, Symbicort twice daily, Prednisone 40 mg daily switched to IV solumedrol 60 mg every 6 hours for now  Consult pulmonology  S/p IV magnesium sulfate 2 gram once  Oxygen saturation 90% 6 L nasal cannula (baseline 3L oxygen)  Ordering Rapid flu, CT chest STAT, ABG       Paroxysmal atrial fibrillation (HCC) /  Chronic anticoagulation  Rate elevated despite metoprolol tartrate 25 mg p.o. twice daily most likely due to #1 continued admission (hemodynamically stable)  Apixaban twice daily held for hemoptysis        BRENDA on stage 3b chronic kidney disease (HCC) /   Hypokalemia  Serum creatinine trend: 1.2-->1.5-->1.7 (4/3) complicated by #1   Avoid nephrotoxins  Monitor labs  K+ normalized following KCL 20 mEq ER        Acute on chronic diastolic CHF (congestive heart failure) (HCC)  Euvolemic on exam  proBNP 1090-->2700 (4/3)-->ordering ECHO  Monitor signs of fluid volume overload  Hold furosemide 40 mg p.o. twice daily for now      Transaminitis / hyperbilirubinemia  Most likely hepatic congestion for suspected acute on chronic CHF       Fever  in adult  Respiratory viral panel negative to include COVID-19  Unremarkable procalcitonin  Likely symptom of #1 suspected viral illness despite negative panel  WBC daily trend:  12-->9-->19 (likely part elevated due to steroid) Continue empiric antibiotic therapy Zosyn for now  MRSA screen negative discontinue vancomycin   BC x2 NGTD  Urine antigens negative       Hyperglycemia  A1c 5.4  Anticipate elevated glucose secondary to steroids  Moderate dose lispro sliding scale for now     · Apixaban for DVT prophylaxis.  · No CPR  · Discussed with patient, Dr. Gibson, Dr. Sarkis Hart, RN.  · Anticipate discharge pending clinical course      JADON Painting  [facility]  04/03/22  11:48 EDT

## 2022-04-03 NOTE — CONSULTS
[facility]    Reason for consult: Acute on chronic hypoxemic respiratory failure    HPI:  Mrs. Javier is a 87yo female admitted 3/31 with COPD and chronic hypoxemic respiratory failure on 3lpm baseline.  She came in with relatively sudden onset of increased shortness of breath for a day with abdominal discomfort (which resolved prior to ER).  She was febrile in ER and was having increased cough and sputum production with increased volume and color change.  She had a ct abd/pelvis on admission and had a right lower lobe infitlrate on that, which is apprent to me on the cXR as well.  She was started on zosyn, steroids and bronchodilators.  She has had continue coughing and wheezing and increasing oxygen requirements since admission.  Wheezing is audible at times.  Pulmonary consultation was requested.  She reports she had an episode of minor hemoptysis today.    She currently doesn't feel greatly distressed    Past Medical History:   Diagnosis Date   • CHF (congestive heart failure) (HCC)    • Sleep apnea    • Stroke (HCC)      Social History     Socioeconomic History   • Marital status: Single   Tobacco Use   • Smoking status: Never Smoker   • Smokeless tobacco: Never Used   Substance and Sexual Activity   • Alcohol use: Never   • Drug use: Never   • Sexual activity: Defer     History reviewed. No pertinent family history.  MEDS: reviewed as per chart  ALL: cefzolin  ROS: 12 point negative except as in HPI    Vital Sign Min/Max for last 24 hours  Temp  Min: 97.2 °F (36.2 °C)  Max: 97.5 °F (36.4 °C)   BP  Min: 132/77  Max: 151/72   Pulse  Min: 71  Max: 80   Resp  Min: 16  Max: 24   SpO2  Min: 88 %  Max: 97 %   Flow (L/min)  Min: 3  Max: 5   No data recorded     GEN: appears ill, obese, AxOx3  HEENT: PERRL, EOMI, normal sclera, mmm, no jvd, trachea midline, neck supple  CHEST: decreased, coarse ae bilat, + mainly exhalational wheezes, + crackles, no use of accessory muscles  CV: RRR, no m/g/r  ABD: soft,  nt, nd +bs, no hepatosplenomegaly  EXT: no c/c/ trace edema bilaterally  SKIN: no rashes, no xanthomas, nl turgor, warm, dry  LYMPH: no palpable cervical or supraclavicular lymphadenopathy  NEURO: CN 2-12 intact and symmetric bilaterally  PSYCH: nl affect, nl orientation, nl judgement, nl mood  MSK: +kyphosis, 5/5 strength ue and le bilaterally    Labs: 4/3: reviewed:  Glucose 122  Bun 38  Cr 1.70 (1.2 -->:1.5)  Na 141  Bicarb 20  Alt 58  ast 58  Wbc 19 (83% neutropils)  hgb 9.6  plts 202  3/31:  procal 0.08  probnp 1091    Micro: reviewed, no growth    CXR: 3/31: reviewed: right middle or lower lobe atelectasis/infiltrate. Hiatal hernia    CT abd/pelvis 3/31: cholelithisasis, right lower lobe infiltrate    A/P:  1. Acute on chronic hypoxemic respiratory failure -- continue to titrate oxygen as needed; repeat CXR, repeat procal, repeat bnp  2. Asthma (COPD? Lifelong nonsmoker, no pft's that I can see) -- currently on iv steroids, and bronchodilators, needs better pulm toilet  3. BRENDA/CKD -- avoid nephrotoxins -- adjust zosyn as needed  4. Pneumonia -- antibiotics, repeat cxr, repeat procal  5. Anemia  6. PAF -- on anticoagulation  7. Chronic diastolic chf -- repeat bnp, cxr  8. Probable Todd  9. Pulmonary hypertension --group 2?  10. Obesity  11. Minor hemoptysis -- patient on anticoagulation    Further recommendations basedon results of above testing.

## 2022-04-03 NOTE — PLAN OF CARE
Problem: Adult Inpatient Plan of Care  Goal: Plan of Care Review  Outcome: Ongoing, Progressing  Flowsheets  Taken 4/3/2022 1657 by Neela Saxena RN  Plan of Care Reviewed With: patient  Outcome Evaluation: pt with audible wheezes worse with excertion, solumedrol iv restarted, breathing tx per md, cxr, ct scan complete, iv lasix started, pulmonary consult, xarelto held r/t hemoptysis, rhythm change afib/flutter, continue to monitor  Taken 4/3/2022 0434 by Jacque Jewell RN  Progress: no change     Problem: Adult Inpatient Plan of Care  Goal: Plan of Care Review  Outcome: Ongoing, Progressing  Flowsheets  Taken 4/3/2022 1657 by Neela Saxena RN  Plan of Care Reviewed With: patient  Outcome Evaluation: pt with audible wheezes worse with excertion, solumedrol iv restarted, breathing tx per md, cxr, ct scan complete, iv lasix started, pulmonary consult, xarelto held r/t hemoptysis, rhythm change afib/flutter, continue to monitor  Taken 4/3/2022 0434 by Jacque Jewell RN  Progress: no change  Goal: Patient-Specific Goal (Individualized)  Outcome: Ongoing, Progressing  Goal: Absence of Hospital-Acquired Illness or Injury  Outcome: Ongoing, Progressing  Intervention: Identify and Manage Fall Risk  Recent Flowsheet Documentation  Taken 4/3/2022 1645 by Neela Saxena RN  Safety Promotion/Fall Prevention:   safety round/check completed   room organization consistent   nonskid shoes/slippers when out of bed   lighting adjusted   fall prevention program maintained   clutter free environment maintained   assistive device/personal items within reach  Taken 4/3/2022 1406 by Neela Saxena RN  Safety Promotion/Fall Prevention:   safety round/check completed   room organization consistent   nonskid shoes/slippers when out of bed   lighting adjusted   fall prevention program maintained   clutter free environment maintained   assistive device/personal items within reach  Taken 4/3/2022 1152 by Hemanth  Neela J, RN  Safety Promotion/Fall Prevention:   safety round/check completed   room organization consistent   nonskid shoes/slippers when out of bed   lighting adjusted   fall prevention program maintained   clutter free environment maintained   assistive device/personal items within reach  Taken 4/3/2022 1030 by Neela Saxena RN  Safety Promotion/Fall Prevention:   safety round/check completed   room organization consistent   nonskid shoes/slippers when out of bed   lighting adjusted   fall prevention program maintained   clutter free environment maintained   assistive device/personal items within reach  Taken 4/3/2022 0841 by Neela Saxena RN  Safety Promotion/Fall Prevention:   safety round/check completed   room organization consistent   nonskid shoes/slippers when out of bed   lighting adjusted   fall prevention program maintained   clutter free environment maintained   assistive device/personal items within reach  Intervention: Prevent Skin Injury  Recent Flowsheet Documentation  Taken 4/3/2022 0841 by Neela Saxena RN  Skin Protection: adhesive use limited  Goal: Optimal Comfort and Wellbeing  Outcome: Ongoing, Progressing  Goal: Readiness for Transition of Care  Outcome: Ongoing, Progressing   Goal Outcome Evaluation:  Plan of Care Reviewed With: patient        Progress: improving  Outcome Evaluation: pt with audible wheezes worse with excertion, solumedrol iv restarted, breathing tx per md, cxr, ct scan complete, iv lasix started, pulmonary consult, xarelto held r/t hemoptysis, rhythm change afib/flutter, continue to monitor

## 2022-04-04 PROBLEM — J18.9 PNEUMONIA INVOLVING RIGHT LUNG: Status: ACTIVE | Noted: 2022-01-01

## 2022-04-04 PROBLEM — I27.20 PULMONARY HYPERTENSION (HCC): Status: ACTIVE | Noted: 2022-01-01

## 2022-04-04 NOTE — PLAN OF CARE
Goal Outcome Evaluation:  Plan of Care Reviewed With: patient        Progress: improving  Outcome Evaluation: VSS, now on 8L HF O2 (down from 10), audible wheezes continue but improved. IV Solumedrol and Zosyn. Hemoptysis continues. Afib/ST on monitor. Tylenol for generalized pain. Will continue to monitor.

## 2022-04-04 NOTE — PROGRESS NOTES
"    Name: Yazmin Javier ADMIT: 3/31/2022   : 1935  PCP: Morenita Silverio MD    MRN: [id number] LOS: 3 days   AGE/SEX: 86 y.o. female  ROOM: [facility]     Subjective   Subjective   Resting comfortably in bed, easily arousable to voice.  Says she is eating \"too much\".  Having bowel movements.  Overall feeling well.  Still having minimal hemoptysis, is improving.    Review of Systems  Denies chest pain, fevers, abdominal pain, lower extremity swelling     Objective   Objective   Vital Signs  Temp:  [97.2 °F (36.2 °C)-98.4 °F (36.9 °C)] 97.5 °F (36.4 °C)  Heart Rate:  [] 94  Resp:  [16-18] 16  BP: (117-145)/(78-87) 119/79  SpO2:  [92 %-100 %] 100 %  on  Flow (L/min):  [7-10] 7;   Device (Oxygen Therapy): humidified;high-flow nasal cannula  Body mass index is 35.52 kg/m².  Physical Exam  Constitutional:       General: She is not in acute distress.     Appearance: She is not diaphoretic.   HENT:      Mouth/Throat:      Mouth: Mucous membranes are moist.   Eyes:      General: No scleral icterus.  Cardiovascular:      Rate and Rhythm: Normal rate and regular rhythm.   Pulmonary:      Effort: Pulmonary effort is normal. No respiratory distress.      Breath sounds: Wheezing present. No rhonchi.   Abdominal:      Palpations: Abdomen is soft.      Tenderness: There is no abdominal tenderness. There is no guarding.   Skin:     General: Skin is warm and dry.   Neurological:      Mental Status: She is alert and oriented to person, place, and time.   Psychiatric:         Mood and Affect: Mood normal.         Results Review     I reviewed the patient's new clinical results.  Results from last 7 days   Lab Units 22  1159 22  0733 22  0736 22  0533   WBC 10*3/mm3 9.49 7.93 19.48* 9.27   HEMOGLOBIN g/dL 9.4* 8.5* 9.6* 9.2*   PLATELETS 10*3/mm3 164 152 202 145     Results from last 7 days   Lab Units 22  1159 22  0733 22  0736 22  0533   SODIUM mmol/L 141 141 141 139 "   POTASSIUM mmol/L 3.6 3.8 4.0 4.5   CHLORIDE mmol/L 105 106 103 105   CO2 mmol/L 23.0 24.6 20.7* 22.0   BUN mg/dL 45* 43* 38* 26*   CREATININE mg/dL 1.73* 1.87* 1.70* 1.50*   GLUCOSE mg/dL 158* 168* 122* 146*   Estimated Creatinine Clearance: 25 mL/min (A) (by C-G formula based on SCr of 1.73 mg/dL (H)).  Results from last 7 days   Lab Units 04/04/22  0733 04/03/22  0736 04/02/22  0533 03/31/22 2054   ALBUMIN g/dL 3.50 3.60 3.60 3.60   BILIRUBIN mg/dL 1.8* 2.1* 1.8* 1.5*   ALK PHOS U/L 90 99 97 117   AST (SGOT) U/L 47* 58* 32 37*   ALT (SGPT) U/L 61* 58* 31 27     Results from last 7 days   Lab Units 04/04/22  1159 04/04/22  0733 04/03/22  0736 04/02/22  0533 04/01/22  0524 03/31/22 2054   CALCIUM mg/dL 8.7 8.6 9.2 8.9   < > 9.0   ALBUMIN g/dL  --  3.50 3.60 3.60  --  3.60   MAGNESIUM mg/dL  --   --   --  1.9  --   --    PHOSPHORUS mg/dL  --   --   --  3.0  --   --     < > = values in this interval not displayed.     Results from last 7 days   Lab Units 04/03/22  0736 03/31/22 2054   PROCALCITONIN ng/mL 0.07 0.08   LACTATE mmol/L  --  2.0     COVID19   Date Value Ref Range Status   03/31/2022 Not Detected Not Detected - Ref. Range Final   02/14/2022 Not Detected Not Detected - Ref. Range Final     Hemoglobin A1C   Date/Time Value Ref Range Status   04/02/2022 0533 5.40 4.80 - 5.60 % Final     Glucose   Date/Time Value Ref Range Status   04/04/2022 1043 196 (H) 70 - 130 mg/dL Final     Comment:     Meter: [id number] : [id number] Jarad Barajas RN   04/04/2022 0528 169 (H) 70 - 130 mg/dL Final     Comment:     Meter: [id number] : [id number] Wally CASH   04/03/2022 2101 265 (H) 70 - 130 mg/dL Final     Comment:     Meter: [id number] : [id number] Wally CASH   04/03/2022 1621 248 (H) 70 - 130 mg/dL Final     Comment:     Meter: [id number] : [id number] Tatyana Mac RN   04/03/2022 1057 128 70 - 130 mg/dL Final     Comment:     Meter: [id number] : [id number] Tatyana Mac RN    04/03/2022 0629 148 (H) 70 - 130 mg/dL Final     Comment:     Meter: [id number] : [id number] Matthias CASH   04/02/2022 2010 192 (H) 70 - 130 mg/dL Final     Comment:     Meter: [id number] : [id number] Wally CASH       Adult Transthoracic Echo Complete W/ Cont if Necessary Per Protocol  · Calculated left ventricular EF = 60.1% Estimated left ventricular EF =   60% Estimated left ventricular EF was in agreement with the calculated   left ventricular EF. Left ventricular systolic function is normal. The   left ventricular cavity is small in size. Left ventricular wall thickness   is consistent with moderate concentric hypertrophy. All left ventricular   wall segments contract normally. Left ventricular diastolic function was   indeterminate.  · The right ventricular cavity is moderately dilated. Normal right   ventricular systolic function noted.  · The left atrial cavity is moderately dilated  · The right atrial cavity is moderately dilated.  · No aortic valve regurgitation or stenosis is present. The aortic valve   is abnormal in structure. There is calcification of the aortic valve.  · Severe mitral annular calcification is present. Mild mitral valve   regurgitation is present. Mean mitral valve gradient is elevated at 6 mmHg   at a heart rate of 117 bpm. This is likely due to tachycardia though   cannot rule out early mitral valve stenosis  · Mild tricuspid valve regurgitation is present. Estimated right   ventricular systolic pressure from tricuspid regurgitation is markedly   elevated (>55 mmHg). Calculated right ventricular systolic pressure from   tricuspid regurgitation is 56 mmHg.       I reviewed the patient's daily medications.  Scheduled Medications  allopurinol, 300 mg, Oral, Daily  amLODIPine, 5 mg, Oral, Daily  apixaban, 5 mg, Oral, BID  atorvastatin, 80 mg, Oral, Nightly  budesonide-formoterol, 2 puff, Inhalation, BID - RT  doxycycline, 100 mg, Oral, Q12H  folic acid, 1 mg, Oral,  Daily  guaiFENesin, 600 mg, Oral, Q12H  insulin lispro, 0-9 Units, Subcutaneous, 4x Daily With Meals & Nightly  ipratropium-albuterol, 3 mL, Nebulization, 4x Daily - RT  levothyroxine, 50 mcg, Oral, Daily  methylPREDNISolone sodium succinate, 40 mg, Intravenous, Q8H  metoprolol tartrate, 25 mg, Oral, BID  PARoxetine, 10 mg, Oral, Daily  piperacillin-tazobactam, 3.375 g, Intravenous, Q8H  vitamin B-12, 1,000 mcg, Oral, Daily    Infusions   Diet  Diet Regular; Cardiac       Assessment/Plan     Active Hospital Problems    Diagnosis  POA   • **Acute on chronic respiratory failure with hypoxia (HCC) [J96.21]  Yes   • Pneumonia involving right lung [J18.9]  Yes   • Pulmonary hypertension (HCC) [I27.20]  Yes   • Cough with hemoptysis [R04.2]  No   • Hyperbilirubinemia [E80.6]  Yes   • Transaminitis [R74.01]  Yes   • BRENDA (acute kidney injury) (HCC) [N17.9]  Yes   • Fever in adult [R50.9]  Yes   • History of asthma [Z87.09]  Not Applicable   • Hyperglycemia [R73.9]  Yes   • Hypokalemia [E87.6]  Yes   • Chronic diastolic CHF (congestive heart failure) (HCC) [I50.32]  Yes   • Chronic anticoagulation [Z79.01]  Not Applicable   • Paroxysmal atrial fibrillation (HCC) [I48.0]  Yes   • Stage 3b chronic kidney disease (HCC) [N18.32]  Yes      Resolved Hospital Problems   No resolved problems to display.     86-year-old female with a history of COPD on 3 L home oxygen who presented with fever, shortness of breath and cough productive of yellow sputum.  She was febrile on admission and CT abdomen showed patchy groundglass opacities at the right lung base.  She is admitted for COPD exacerbation due to pneumonia.     Today: Resume Eliquis, monitor for worsening hemoptysis  She is still requiring significant oxygen, but O2 sat was 97% at the time my evaluation.  Wean as tolerated.  We will start oral lasix daily today.      COPD/asthma exacerbation, acute on chronic hypoxic respiratory failure:   -Continue IV steroids, duo nebs and  antibiotics for pneumonia as below.  Pulmonology following  -Encourage incentive spirometry, out of bed ambulation      Right lower lobe pneumonia.  Hemoptysis:   -On Zosyn and doxycycline as she has had repeated hospitalizations, at risk for Pseudomonas and atypical infections due to structural lung disease.   Minimal sputum collection.  Strep pneumo and Legionella urine antigens, MRSA PCR negative    A. Fib: Okay to resume Eliquis, monitor for worsening hemoptysis.  Continue metoprolol    Severe pulmonary hypertension: P.o. Lasix.  Pulmonology following    BRENDA on CKD stage III: Baseline creatinine around 1.4.  Creatinine peaked at 1.8.  Avoid nephrotoxic medications, monitor daily BMP    Hyperglycemia: A1c 5.4.  Likely elevated due to steroids.  Low-dose sliding scale     Hypertension: Blood pressure acceptable acutely.  Continue amlodipine and metoprolol    Elevated LFTs: Recheck CMP      · Eliquis (home med) for DVT prophylaxis.  · DNR/DNI  · Discussed with patient and nursing staff.  · Anticipate discharge to SNU facility in 2-3 days.      Farhana Gibson [facility]  04/04/22  14:04 EDT    Patient was placed in face mask on first look.  I wore protective equipment throughout this patient encounter including a face mask, gloves and protective eyewear.  Hand hygiene was performed before donning protective equipment and after removal when leaving the room.

## 2022-04-04 NOTE — PLAN OF CARE
Problem: Adult Inpatient Plan of Care  Goal: Plan of Care Review  Outcome: Ongoing, Progressing  Flowsheets (Taken 4/4/2022 1512)  Progress: no change  Plan of Care Reviewed With: patient  Outcome Evaluation: vss. pt remains on 8L HfNC. iv steroids and abx continued per order. no c/o pain this shift. hemoptysis present.   Goal Outcome Evaluation:  Plan of Care Reviewed With: patient        Progress: no change  Outcome Evaluation: vss. pt remains on 8L HfNC. iv steroids and abx continued per order. no c/o pain this shift. hemoptysis present.

## 2022-04-04 NOTE — PROGRESS NOTES
[facility]     Mar/chart reviewed  Follow up asthma with acute exacerbation, chronic diastolic chf, acute on chronic hypoxemic respiratory failure  Still with cough and shortness of breath    Vital Sign Min/Max for last 24 hours  Temp  Min: 97.2 °F (36.2 °C)  Max: 98.4 °F (36.9 °C)   BP  Min: 117/87  Max: 151/72   Pulse  Min: 71  Max: 122   Resp  Min: 16  Max: 24   SpO2  Min: 88 %  Max: 97 %   Flow (L/min)  Min: 3  Max: 10   Weight  Min: 88.1 kg (194 lb 3.2 oz)  Max: 88.1 kg (194 lb 3.2 oz)     Nad, axox3,   perrl, eomi, no icterus,  mmm, no jvd, trachea midline, neck supple,  chest coarse bilaterally, + crackles, + wheezes,   Irrg, rate ok  soft, nt, nd +bs,  no c/c/ 1+ edema  Skin warm, dry no rashes    Labs: 4/4: reviewed:  Bun 43  Cr 1.87  Bicarb 24  Wbc 7.9  hgb 8.5  plts 152    Ct chest: 4/3: reviewed right lower lobe infiltrate, some air trapping vs. Edema, small bilateral effusions    A/P:  1. Acute on chronic hypoxemic respiratory failure -- continue to titrate oxygen as needed;   2. Asthma (COPD? Lifelong nonsmoker, no pft's that I can see) -- currently on iv steroids, and bronchodilators, needs better pulm toilet  3. BRENDA/CKD -- avoid nephrotoxins -- adjust zosyn as needed  4. Pneumonia -- antibiotics, add atypical coverage  5. Anemia  6. PAF -- on anticoagulation  7. Chronic diastolic chf --gentle diuresis as able  8. Probable Todd  9. Pulmonary hypertension --group 2?  10. Obesity  11. Minor hemoptysis -- patient on anticoagulation

## 2022-04-05 NOTE — PLAN OF CARE
Goal Outcome Evaluation:  Plan of Care Reviewed With: patient        Progress: improving  Outcome Evaluation: Pt sleeping soundly in bed but agreed to participate in PT once fully awake. Pt is essentially independent with bed mobility. She req CGA to stand from EOB. Pt amb increased distance of 110' with r wx and CGA on 6L O2, pace is slow, posture is flexed, balance is fair with r wx, decreased endurance limits further amb distance. O2 97% supine, 93% sitting, 92% after amb. Pt performed seated ther ex at EOB. Pt progressing well and is primarily limited by SOB/poor endurance. PLOF pt was amb with r wx in room and using w/c to attend dining knight at [facility]. Pt was also participating in ex class T/TH. I feel pt is safe to return to [facility] with addition of home PT/OT. Cont PT to address functional deficits and progress as tolerated.  Patient was intermittently wearing a face mask during this therapy encounter. Therapist used appropriate personal protective equipment including mask and gloves.  Mask used was standard procedure mask. Appropriate PPE was worn during the entire therapy session. Hand hygiene was completed before and after therapy session. Patient is not in enhanced droplet precautions.

## 2022-04-05 NOTE — PLAN OF CARE
Goal Outcome Evaluation:     VSS, A&O, A-fib rate controlled, 6 LPM O2 via NC  Patient up to BSC, educated and encouraged to change positions frequently to prevent PI, patient with 1 occurrence of hemoptysis this shift. Patient NPO at midnight for abd u/s d/t increased LFT, patient desat with activity.

## 2022-04-05 NOTE — THERAPY TREATMENT NOTE
Patient Name: Yazmin Javier  : 1935    MRN: [id number]                              Today's Date: 2022       Admit Date: 3/31/2022    Visit Dx:     ICD-10-CM ICD-9-CM   1. Pneumonia of right lower lobe due to infectious organism  J18.9 486   2. Fever and chills  R50.9 780.60     Patient Active Problem List   Diagnosis   • Acute on chronic congestive heart failure (HCC)   • Hypoxia   • Paroxysmal atrial fibrillation (HCC)   • Chronic anticoagulation   • Hypothyroidism (acquired)   • Stage 3b chronic kidney disease (HCC)   • Right arm weakness   • Chronic diastolic CHF (congestive heart failure) (HCC)   • Syncope   • Acute respiratory failure with hypoxia and hypercapnia (HCC)   • Acute pulmonary edema (HCC)   • Acute on chronic respiratory failure with hypoxia (HCC)   • Fever in adult   • History of asthma   • Hyperglycemia   • Hypokalemia   • BRENDA (acute kidney injury) (HCC)   • Cough with hemoptysis   • Hyperbilirubinemia   • Transaminitis   • Pneumonia involving right lung   • Pulmonary hypertension (HCC)     Past Medical History:   Diagnosis Date   • CHF (congestive heart failure) (HCC)    • Sleep apnea    • Stroke (HCC)      History reviewed. No pertinent surgical history.   General Information     Row Name 22 1442          Physical Therapy Time and Intention    Document Type therapy note (daily note)  -DJ     Mode of Treatment individual therapy;physical therapy  -DJ     Row Name 22 144          General Information    Patient Profile Reviewed yes  -DJ     Existing Precautions/Restrictions fall;oxygen therapy device and L/min  6L  -DJ     Row Name 22 1442          Cognition    Orientation Status (Cognition) oriented x 3  pleasant and cooperative  -DJ     Row Name 22 1442          Safety Issues, Functional Mobility    Comment, Safety Issues/Impairments (Mobility) gt belt, nonskid socks, O2  -DJ           User Key  (r) = Recorded By, (t) = Taken By, (c) = Cosigned By     Initials Name Provider Type    Dorota Post, PT Physical Therapist               Mobility     Row Name 04/05/22 1443          Bed Mobility    Bed Mobility supine-sit;sit-supine  -DJ     Supine-Sit [place] (Bed Mobility) independent;verbal cues  -DJ     Sit-Supine [place] (Bed Mobility) independent;verbal cues  -DJ     Assistive Device (Bed Mobility) bed rails;head of bed elevated  -DJ     Comment, (Bed Mobility) moves well in bed  -DJ     Row Name 04/05/22 1443          Transfers    Comment, (Transfers) sit/stand from EOB  -DJ     Row Name 04/05/22 1443          Bed-Chair Transfer    Bed-Chair [place] (Transfers) not tested  -DJ     Row Name 04/05/22 1443          Sit-Stand Transfer    Sit-Stand [place] (Transfers) standby assist;verbal cues  -DJ     Assistive Device (Sit-Stand Transfers) walker, front-wheeled  -DJ     Row Name 04/05/22 1443          Gait/Stairs (Locomotion)    [place] Level (Gait) contact guard;verbal cues  -DJ     Assistive Device (Gait) walker, front-wheeled  -DJ     Distance in Feet (Gait) 110'  -DJ     Deviations/Abnormal Patterns (Gait) base of support, narrow;stride length decreased;vinicius decreased  -DJ     Bilateral Gait Deviations forward flexed posture  -DJ     [place] Level (Stairs) not tested  -DJ     Comment, (Gait/Stairs) Pt amb 110' with r wx adn CGA on 6L O2, pace is slow, posture is flexed, balance is fair with r wx, decreased endurance limits further amb distance. O2 97% supine, 93% sitting, 92% after amb.  -DJ           User Key  (r) = Recorded By, (t) = Taken By, (c) = Cosigned By    Initials Name Provider Type    Dorota Post, PT Physical Therapist               Obj/Interventions     Row Name 04/05/22 1445          Motor Skills    Therapeutic Exercise other (see comments)  AP, LAQ, seated hip flex  -DJ     Row Name 04/05/22 1445          Balance    Balance Assessment standing static balance;standing dynamic balance  -DJ     Static Standing  Balance standby assist;verbal cues  -DJ     Dynamic Standing Balance standby assist;verbal cues  -DJ     Position/Device Used, Standing Balance supported;walker, front-wheeled  -DJ     Balance Interventions sitting;standing;sit to stand;supported;weight shifting activity  -DJ     Comment, Balance fair with r wx  -DJ           User Key  (r) = Recorded By, (t) = Taken By, (c) = Cosigned By    Initials Name Provider Type    DJ Dorota Huizar, PT Physical Therapist               Goals/Plan    No documentation.                Clinical Impression     Row Name 04/05/22 1446          Pain    Pretreatment Pain Rating 0/10 - no pain  -DJ     Pre/Posttreatment Pain Comment generally without c/os;  -DJ     Row Name 04/05/22 1446          Plan of Care Review    Plan of Care Reviewed With patient  -DJ     Progress improving  -DJ     Outcome Evaluation Pt sleeping soundly in bed but agreed to participate in PT once fully awake. Pt is essentially independent with bed mobility. She req CGA to stand from EOB. Pt amb increased distance of 110' with r wx and CGA on 6L O2, pace is slow, posture is flexed, balance is fair with r wx, decreased endurance limits further amb distance. O2 97% supine, 93% sitting, 92% after amb. Pt performed seated ther ex at EOB. Pt progressing well and is primarily limited by SOB/poor endurance. PLOF pt was amb with r wx in room and using w/c to attend dining knight at [facility]. Pt was also participating in ex class T/TH. I feel pt is safe to return to [facility] with addition of home PT/OT. Cont PT to address functional deficits and progress as tolerated.  -     Row Name 04/05/22 1446          Therapy Assessment/Plan (PT)    Patient/Family Therapy Goals Statement (PT) return to [facility]  -     Criteria for Skilled Interventions Met (PT) skilled treatment is necessary  -     Row Name 04/05/22 1446          Vital Signs    Pre SpO2 (%) 97  -DJ     O2 Delivery Pre Treatment supplemental O2  6L  -DJ     O2 Delivery Intra  Treatment supplemental O2  6L  -DJ     Post SpO2 (%) 92  -DJ     O2 Delivery Post Treatment supplemental O2  6L  -DJ     Pre Patient Position Supine  -DJ     Intra Patient Position Standing  -DJ     Post Patient Position Supine  -DJ     Row Name 04/05/22 1446          Positioning and Restraints    Pre-Treatment Position in bed  -DJ     Post Treatment Position bed  -DJ     In Bed notified nsg;supine;call light within reach;encouraged to call for assist;exit alarm on  -DJ           User Key  (r) = Recorded By, (t) = Taken By, (c) = Cosigned By    Initials Name Provider Type    Dorota Post, PT Physical Therapist               Outcome Measures     Row Name 04/05/22 1450          How much help from another person do you currently need...    Turning from your back to your side while in flat bed without using bedrails? 4  -DJ     Moving from lying on back to sitting on the side of a flat bed without bedrails? 4  -DJ     Moving to and from a bed to a chair (including a wheelchair)? 3  -DJ     Standing up from a chair using your arms (e.g., wheelchair, bedside chair)? 4  -DJ     Climbing 3-5 steps with a railing? 2  -DJ     To walk in hospital room? 3  -DJ     AM-PAC 6 Clicks Score (PT) 20  -DJ     Row Name 04/05/22 1450          Functional Assessment    Outcome Measure Options AM-PAC 6 Clicks Basic Mobility (PT)  -DJ           User Key  (r) = Recorded By, (t) = Taken By, (c) = Cosigned By    Initials Name Provider Type    Dorota Post, PT Physical Therapist                             Physical Therapy Education                 Title: PT OT SLP Therapies (Done)     Topic: Physical Therapy (Done)     Point: Mobility training (Done)     Learning Progress Summary           Patient Acceptance, E, VU,NR by MARGARITA at 4/5/2022 1451    Acceptance, E, VU by CANDI at 4/2/2022 1617                   Point: Home exercise program (Done)     Learning Progress Summary           Patient Acceptance, E, VU,NR by MARGARITA at 4/5/2022 1451     Acceptance, E, VU by  at 4/2/2022 1617                   Point: Body mechanics (Done)     Learning Progress Summary           Patient Acceptance, E, VU,NR by  at 4/5/2022 1451    Acceptance, E, VU by  at 4/2/2022 1617                   Point: Precautions (Done)     Learning Progress Summary           Patient Acceptance, E, VU,NR by  at 4/5/2022 1451    Acceptance, E, VU by  at 4/2/2022 1617                               User Key     Initials Effective Dates Name Provider Type Discipline     10/25/19 -  Dorota Huizar, PT Physical Therapist PT     11/03/21 -  Janett Rodriguez, PT Physical Therapist PT              PT Recommendation and Plan     Plan of Care Reviewed With: patient  Progress: improving  Outcome Evaluation: Pt sleeping soundly in bed but agreed to participate in PT once fully awake. Pt is essentially independent with bed mobility. She req CGA to stand from EOB. Pt amb increased distance of 110' with r wx and CGA on 6L O2, pace is slow, posture is flexed, balance is fair with r wx, decreased endurance limits further amb distance. O2 97% supine, 93% sitting, 92% after amb. Pt performed seated ther ex at EOB. Pt progressing well and is primarily limited by SOB/poor endurance. PLOF pt was amb with r wx in room and using w/c to attend dining knight at [facility]. Pt was also participating in ex class T/TH. I feel pt is safe to return to [facility] with addition of home PT/OT. Cont PT to address functional deficits and progress as tolerated.     Time Calculation:    PT Charges     Row Name 04/05/22 1452             Time Calculation    Start Time 1417  -DJ      Stop Time 1441  -DJ      Time Calculation (min) 24 min  -DJ      PT Non-Billable Time (min) 10 min  -DJ      PT Received On 04/05/22  -DJ      PT - Next Appointment 04/06/22  -DJ            User Key  (r) = Recorded By, (t) = Taken By, (c) = Cosigned By    Initials Name Provider Type    Dorota Post, PT Physical Therapist              Therapy Charges for  Today     Code Description Service Date Service Provider Modifiers Qty    50848137641 HC PT THERAPEUTIC ACT EA 15 MIN 4/5/2022 Dorota Huizar, PT GP 2          PT G-Codes  Outcome Measure Options: AM-PAC 6 Clicks Basic Mobility (PT)  AM-PAC 6 Clicks Score (PT): 20    Dorota Huizar, PT  4/5/2022

## 2022-04-05 NOTE — PROGRESS NOTES
Name: Yazmin Javier ADMIT: 3/31/2022   : 1935  PCP: Morenita Silverio MD    MRN: [id number] LOS: 4 days   AGE/SEX: 86 y.o. female  ROOM: [facility]     Subjective   Subjective   Bright and pleasant affect.  We discussed spring planting, she enjoys all the lizama that they plant at her assisted living facility.  Feeling well    Review of Systems  Denies chest pain, fevers, abdominal pain, lower extremity swelling     Objective   Objective   Vital Signs  Temp:  [97.2 °F (36.2 °C)-97.6 °F (36.4 °C)] 97.2 °F (36.2 °C)  Heart Rate:  [] 82  Resp:  [16-20] 16  BP: (114-122)/(73-85) 118/83  SpO2:  [88 %-99 %] 93 %  on  Flow (L/min):  [6-9] 6;   Device (Oxygen Therapy): humidified;high-flow nasal cannula  Body mass index is 35.37 kg/m².  Physical Exam  Constitutional:       General: She is not in acute distress.     Appearance: She is not diaphoretic.   HENT:      Mouth/Throat:      Mouth: Mucous membranes are moist.   Eyes:      General: No scleral icterus.  Cardiovascular:      Rate and Rhythm: Normal rate and regular rhythm.   Pulmonary:      Effort: Pulmonary effort is normal. No respiratory distress.      Breath sounds: Wheezing present. No rhonchi.   Abdominal:      Palpations: Abdomen is soft.      Tenderness: There is no abdominal tenderness. There is no guarding.   Skin:     General: Skin is warm and dry.   Neurological:      Mental Status: She is alert and oriented to person, place, and time.   Psychiatric:         Mood and Affect: Mood normal.         Results Review     I reviewed the patient's new clinical results.  Results from last 7 days   Lab Units 22  0619 22  1159 22  0733 22  0736   WBC 10*3/mm3 10.00 9.49 7.93 19.48*   HEMOGLOBIN g/dL 9.2* 9.4* 8.5* 9.6*   PLATELETS 10*3/mm3 178 164 152 202     Results from last 7 days   Lab Units 22  0619 22  1159 22  0733 22  0736   SODIUM mmol/L 143 141 141 141   POTASSIUM mmol/L 3.3* 3.6 3.8 4.0    CHLORIDE mmol/L 104 105 106 103   CO2 mmol/L 23.8 23.0 24.6 20.7*   BUN mg/dL 48* 45* 43* 38*   CREATININE mg/dL 1.81* 1.73* 1.87* 1.70*   GLUCOSE mg/dL 181* 158* 168* 122*   Estimated Creatinine Clearance: 23.8 mL/min (A) (by C-G formula based on SCr of 1.81 mg/dL (H)).  Results from last 7 days   Lab Units 04/05/22  0619 04/04/22  0733 04/03/22  0736 04/02/22  0533   ALBUMIN g/dL 3.30* 3.50 3.60 3.60   BILIRUBIN mg/dL 1.6* 1.8* 2.1* 1.8*   ALK PHOS U/L 97 90 99 97   AST (SGOT) U/L 52* 47* 58* 32   ALT (SGPT) U/L 79* 61* 58* 31     Results from last 7 days   Lab Units 04/05/22  0619 04/04/22  1159 04/04/22  0733 04/03/22  0736 04/02/22  0533   CALCIUM mg/dL 8.9 8.7 8.6 9.2 8.9   ALBUMIN g/dL 3.30*  --  3.50 3.60 3.60   MAGNESIUM mg/dL  --   --   --   --  1.9   PHOSPHORUS mg/dL  --   --   --   --  3.0     Results from last 7 days   Lab Units 04/03/22  0736 03/31/22  2054   PROCALCITONIN ng/mL 0.07 0.08   LACTATE mmol/L  --  2.0     COVID19   Date Value Ref Range Status   03/31/2022 Not Detected Not Detected - Ref. Range Final   02/14/2022 Not Detected Not Detected - Ref. Range Final     Glucose   Date/Time Value Ref Range Status   04/05/2022 1050 311 (H) 70 - 130 mg/dL Final     Comment:     Meter: [id number] : [id number] Herman Segovia    04/05/2022 0506 199 (H) 70 - 130 mg/dL Final     Comment:     Meter: [id number] : [id number] Loerika Rinaldi SHAILESH   04/04/2022 2302 216 (H) 70 - 130 mg/dL Final     Comment:     Meter: [id number] : [id number] Wally Rinaldi    04/04/2022 1556 239 (H) 70 - 130 mg/dL Final     Comment:     Meter: [id number] : [id number] Herman Segovia    04/04/2022 1043 196 (H) 70 - 130 mg/dL Final     Comment:     Meter: [id number] : [id number] Jarad Barajas RN   04/04/2022 0528 169 (H) 70 - 130 mg/dL Final     Comment:     Meter: [id number] : [id number] aWlly Rinaldi    04/03/2022 2101 265 (H) 70 - 130 mg/dL Final     Comment:     Meter: [id number]  : [id number] Wally CASH       Adult Transthoracic Echo Complete W/ Cont if Necessary Per Protocol  · Calculated left ventricular EF = 60.1% Estimated left ventricular EF =   60% Estimated left ventricular EF was in agreement with the calculated   left ventricular EF. Left ventricular systolic function is normal. The   left ventricular cavity is small in size. Left ventricular wall thickness   is consistent with moderate concentric hypertrophy. All left ventricular   wall segments contract normally. Left ventricular diastolic function was   indeterminate.  · The right ventricular cavity is moderately dilated. Normal right   ventricular systolic function noted.  · The left atrial cavity is moderately dilated  · The right atrial cavity is moderately dilated.  · No aortic valve regurgitation or stenosis is present. The aortic valve   is abnormal in structure. There is calcification of the aortic valve.  · Severe mitral annular calcification is present. Mild mitral valve   regurgitation is present. Mean mitral valve gradient is elevated at 6 mmHg   at a heart rate of 117 bpm. This is likely due to tachycardia though   cannot rule out early mitral valve stenosis  · Mild tricuspid valve regurgitation is present. Estimated right   ventricular systolic pressure from tricuspid regurgitation is markedly   elevated (>55 mmHg). Calculated right ventricular systolic pressure from   tricuspid regurgitation is 56 mmHg.       I reviewed the patient's daily medications.  Scheduled Medications  allopurinol, 300 mg, Oral, Daily  amLODIPine, 5 mg, Oral, Daily  apixaban, 5 mg, Oral, BID  atorvastatin, 80 mg, Oral, Nightly  budesonide-formoterol, 2 puff, Inhalation, BID - RT  doxycycline, 100 mg, Oral, Q12H  folic acid, 1 mg, Oral, Daily  furosemide, 40 mg, Oral, Daily  guaiFENesin, 600 mg, Oral, Q12H  insulin lispro, 0-9 Units, Subcutaneous, 4x Daily With Meals & Nightly  ipratropium-albuterol, 3 mL, Nebulization, 4x Daily -  RT  levothyroxine, 50 mcg, Oral, Daily  methylPREDNISolone sodium succinate, 40 mg, Intravenous, Q8H  metoprolol tartrate, 25 mg, Oral, BID  PARoxetine, 10 mg, Oral, Daily  piperacillin-tazobactam, 3.375 g, Intravenous, Q8H  vitamin B-12, 1,000 mcg, Oral, Daily    Infusions   Diet  Diet Regular; Cardiac       Assessment/Plan     Active Hospital Problems    Diagnosis  POA   • **Acute on chronic respiratory failure with hypoxia (HCC) [J96.21]  Yes   • Pneumonia involving right lung [J18.9]  Yes   • Pulmonary hypertension (HCC) [I27.20]  Yes   • Cough with hemoptysis [R04.2]  No   • Hyperbilirubinemia [E80.6]  Yes   • Transaminitis [R74.01]  Yes   • BRENDA (acute kidney injury) (HCC) [N17.9]  Yes   • Fever in adult [R50.9]  Yes   • History of asthma [Z87.09]  Not Applicable   • Hyperglycemia [R73.9]  Yes   • Hypokalemia [E87.6]  Yes   • Chronic diastolic CHF (congestive heart failure) (HCC) [I50.32]  Yes   • Chronic anticoagulation [Z79.01]  Not Applicable   • Paroxysmal atrial fibrillation (HCC) [I48.0]  Yes   • Stage 3b chronic kidney disease (HCC) [N18.32]  Yes      Resolved Hospital Problems   No resolved problems to display.     86-year-old female with a history of COPD on 3 L home oxygen who presented with fever, shortness of breath and cough productive of yellow sputum.  She was febrile on admission and CT abdomen showed patchy groundglass opacities at the right lung base.  She is admitted for COPD exacerbation due to pneumonia.     Today: Wheezing still present but oxygen requirements decreasing.  Kidney function stable after Lasix.  Continues to have small hemoptysis, but she thinks improving.Check hepatitis panel and RUQ US for elevated LFTs      COPD/asthma exacerbation, acute on chronic hypoxic respiratory failure, on 3 L home oxygen:   -Continue IV steroids, duo nebs and antibiotics for pneumonia as below.  Pulmonology following  -Encourage incentive spirometry, chest PT, out of bed ambulation      Right  lower lobe pneumonia.  Hemoptysis:   -On Zosyn and doxycycline as she has had repeated hospitalizations, at risk for Pseudomonas and atypical infections due to structural lung disease.   Minimal sputum collection.  Strep pneumo and Legionella urine antigens, MRSA PCR negative    A. Fib: resume Eliquis, monitor for worsening hemoptysis.  Continue metoprolol    Severe pulmonary hypertension: P.o. Lasix.  Pulmonology following    BRENDA on CKD stage III: Baseline creatinine around 1.4.  Creatinine peaked and stabilized around 1.8.  Avoid nephrotoxic medications, monitor daily BMP    Hyperglycemia: A1c 5.4.  Likely elevated due to steroids.  Low-dose sliding scale     Hypertension: Blood pressure acceptable acutely.  Continue amlodipine and metoprolol    Elevated LFTs: Mild, ?related to congestion. Check hepatitis panel and RUQ US       · Eliquis (home med) for DVT prophylaxis.  · DNR/DNI  · Discussed with patient and nursing staff.  · Anticipate discharge to SNU facility in 2-3 days.      Farhana Gibson DO  [facility]  04/05/22  12:27 EDT    Patient was placed in face mask on first look.  I wore protective equipment throughout this patient encounter including a face mask, gloves and protective eyewear.  Hand hygiene was performed before donning protective equipment and after removal when leaving the room.

## 2022-04-05 NOTE — PROGRESS NOTES
[facility]      Mar/chart reviewed  Follow up asthma with acute exacerbation, chronic diastolic chf, acute on chronic hypoxemic respiratory failure  Still with cough and shortness of breath, but she feels improved  Some minor hemoptysis still    Vital Sign Min/Max for last 24 hours  Temp  Min: 97.2 °F (36.2 °C)  Max: 97.6 °F (36.4 °C)   BP  Min: 114/85  Max: 122/73   Pulse  Min: 75  Max: 105   Resp  Min: 16  Max: 20   SpO2  Min: 88 %  Max: 99 %   Flow (L/min)  Min: 6  Max: 9   Weight  Min: 87.7 kg (193 lb 6.4 oz)  Max: 87.7 kg (193 lb 6.4 oz)     Nad, axox3,   perrl, eomi, no icterus,  mmm, no jvd, trachea midline, neck supple,  chest coarse bilaterally, + crackles, + wheezes (mainly exhalational),   Irrg, rate ok  soft, nt, nd +bs,  no c/c/ 1+ edema  Skin warm, dry no rashes  Labs: 4/5: reviewed:  Glucose 181  Bun 48  Cr 1.8  Na 143  k 3.3    A/P:  1. Acute on chronic hypoxemic respiratory failure -- continue to titrate oxygen as needed;   2. Asthma (COPD? Lifelong nonsmoker, no pft's that I can see) -- currently on iv steroids, and bronchodilators, needs better pulm toilet  3. BRENDA/CKD -- avoid nephrotoxins -- adjust zosyn as needed  4. Pneumonia -- antibiotics, add atypical coverage  5. Anemia  6. PAF -- on anticoagulation  7. Chronic diastolic chf --gentle diuresis as able  8. Probable Todd  9. Pulmonary hypertension --group 2?  10. Obesity  11. Minor hemoptysis -- patient on anticoagulation    Continue current, consider bronchoscopy if no improvement

## 2022-04-05 NOTE — CASE MANAGEMENT/SOCIAL WORK
Continued Stay Note  [facility]     Patient Name: Yazmin Javier  MRN: [id number]  Today's Date: 4/5/2022    Admit Date: 3/31/2022     Discharge Plan     Row Name 04/05/22 1434       Plan    Patient/Family in Agreement with Plan yes    Plan Comments Plan is to return to asst living facility once 02 needs back to baseline of 3L and otherwise stable. Discussed with patient. She did well with PT today. CCP will continue to follow.               Discharge Codes    No documentation.               Expected Discharge Date and Time     Expected Discharge Date Expected Discharge Time    Apr 6, 2022             Abeba Gonzalez RN

## 2022-04-05 NOTE — PLAN OF CARE
Goal Outcome Evaluation:  Plan of Care Reviewed With: patient        Progress: no change  Outcome Evaluation: VSS. Alert and oriented x4. No complaints of pain. IV steroids continued. IV abx continued. Continues with hemoptysis. On 6L HFNC. Up to BSC x1 assist. Desats upon exertion but recovers quickly. Eliquis resumed. Pt remains in afib on monitor. Will continue to provide supportive care.

## 2022-04-06 NOTE — PLAN OF CARE
Goal Outcome Evaluation:      VSS, A&O, A-fib rate controlled, patient on 6 LPM HFNC    Patient with wheezing throughout requiring PRN neb tx, patient had abd u/s early this shift, patient DW and O2 drops with activity but patient recovers quicker than prior.

## 2022-04-06 NOTE — PLAN OF CARE
Goal Outcome Evaluation:  Plan of Care Reviewed With: patient        Progress: no change  Outcome Evaluation: VSS. Alert and oriented x4. IV abx continued. IV steroids continued. No complaints of pain. Liver US completed this AM at bedside. Up to BSC x1 assist. Audible wheezing continues. No hemoptysis this shift. Will continue to provide supportive care.

## 2022-04-06 NOTE — PROGRESS NOTES
[facility] INPATIENT PROGRESS NOTE         [address]    2022      PATIENT IDENTIFICATION:  Name: Yazmin Javier ADMIT: 3/31/2022   : 1935  PCP: Morentia Silverio MD    MRN: [id number] LOS: 5 days   AGE/SEX: 86 y.o. female  ROOM: [facility]                     LOS 5    Reason for visit: Respiratory failure with asthma exacerbation      SUBJECTIVE:      Says that she feels her breathing has improved.  On 5 L supplemental oxygen at time of visit.  No chest pain, nausea or vomiting.  Diffuse wheezing bilaterally on auscultation.  Says that she has not had any significant hemoptysis but there is still some pink coloration to sputum.  I am seeing the patient for the first time today.  All patient problems are new to me.      Objective   OBJECTIVE:    Vital Sign Min/Max for last 24 hours  Temp  Min: 97.2 °F (36.2 °C)  Max: 97.7 °F (36.5 °C)   BP  Min: 118/83  Max: 140/88   Pulse  Min: 74  Max: 102   Resp  Min: 16  Max: 20   SpO2  Min: 92 %  Max: 99 %   No data recorded   Weight  Min: 87.8 kg (193 lb 9.6 oz)  Max: 87.8 kg (193 lb 9.6 oz)                         Body mass index is 35.41 kg/m².    Intake/Output Summary (Last 24 hours) at 2022 1000  Last data filed at 2022 0900  Gross per 24 hour   Intake 720 ml   Output 700 ml   Net 20 ml         Exam:  GEN:  No distress, appears stated age  EYES:   PERRL, anicteric sclerae  ENT:    External ears/nose normal, OP clear  NECK:  No adenopathy, midline trachea  LUNGS: Normal chest on inspection, palpation and diffuse wheeze bilaterally on auscultation  CV:  Normal S1S2, without murmur  ABD:  Nontender, nondistended, no hepatosplenomegaly, +BS  EXT:  No edema.  No cyanosis or clubbing.  No mottling and normal cap refill.    Assessment     Scheduled meds:  allopurinol, 300 mg, Oral, Daily  amLODIPine, 5 mg, Oral, Daily  apixaban, 5 mg, Oral, BID  atorvastatin, 80 mg, Oral, Nightly  budesonide-formoterol, 2 puff, Inhalation, BID - RT  doxycycline,  100 mg, Oral, Q12H  folic acid, 1 mg, Oral, Daily  furosemide, 40 mg, Oral, Daily  guaiFENesin, 600 mg, Oral, Q12H  insulin lispro, 0-9 Units, Subcutaneous, 4x Daily With Meals & Nightly  ipratropium-albuterol, 3 mL, Nebulization, 4x Daily - RT  levothyroxine, 50 mcg, Oral, Daily  methylPREDNISolone sodium succinate, 40 mg, Intravenous, Q8H  metoprolol tartrate, 25 mg, Oral, BID  PARoxetine, 10 mg, Oral, Daily  piperacillin-tazobactam, 3.375 g, Intravenous, Q8H  potassium chloride, 40 mEq, Oral, BID With Meals  vitamin B-12, 1,000 mcg, Oral, Daily      IV meds:                         Data Review:  Results from last 7 days   Lab Units 04/06/22  0541 04/05/22  0619 04/04/22  1159 04/04/22  0733 04/03/22  0736   SODIUM mmol/L 145 143 141 141 141   POTASSIUM mmol/L 3.2* 3.3* 3.6 3.8 4.0   CHLORIDE mmol/L 106 104 105 106 103   CO2 mmol/L 25.2 23.8 23.0 24.6 20.7*   BUN mg/dL 44* 48* 45* 43* 38*   CREATININE mg/dL 1.44* 1.81* 1.73* 1.87* 1.70*   GLUCOSE mg/dL 161* 181* 158* 168* 122*   CALCIUM mg/dL 8.7 8.9 8.7 8.6 9.2         Estimated Creatinine Clearance: 29.9 mL/min (A) (by C-G formula based on SCr of 1.44 mg/dL (H)).  Results from last 7 days   Lab Units 04/06/22  0541 04/05/22  0619 04/04/22  1159 04/04/22  0733 04/03/22  0736   WBC 10*3/mm3 11.19* 10.00 9.49 7.93 19.48*   HEMOGLOBIN g/dL 9.2* 9.2* 9.4* 8.5* 9.6*   PLATELETS 10*3/mm3 193 178 164 152 202     Results from last 7 days   Lab Units 03/31/22  2054   INR  1.51*     Results from last 7 days   Lab Units 04/06/22  0541 04/05/22  0619 04/04/22  0733 04/03/22  0736 04/02/22  0533   ALT (SGPT) U/L 108* 79* 61* 58* 31   AST (SGOT) U/L 74* 52* 47* 58* 32     Results from last 7 days   Lab Units 04/03/22  1146   PH, ARTERIAL pH units 7.488*   PO2 ART mm Hg 49.2*   PCO2, ARTERIAL mm Hg 32.6*   HCO3 ART mmol/L 24.7     Results from last 7 days   Lab Units 04/03/22  0736 03/31/22  2054   PROCALCITONIN ng/mL 0.07 0.08   LACTATE mmol/L  --  2.0         Glucose    Date/Time Value Ref Range Status   04/06/2022 0541 164 (H) 70 - 130 mg/dL Final     Comment:     Meter: [id number] : [id number] Wally Pedrazarielle NA   04/05/2022 2052 237 (H) 70 - 130 mg/dL Final     Comment:     Meter: [id number] : [id number] Wally Pedrazarielle NA   04/05/2022 1559 176 (H) 70 - 130 mg/dL Final     Comment:     Meter: [id number] : [id number] Sutton Atticcus NA   04/05/2022 1050 311 (H) 70 - 130 mg/dL Final     Comment:     Meter: [id number] : [id number] Sutton Atticcus NA   04/05/2022 0506 199 (H) 70 - 130 mg/dL Final     Comment:     Meter: [id number] : [id number] Wally Pedrazarielle NA   04/04/2022 2302 216 (H) 70 - 130 mg/dL Final     Comment:     Meter: [id number] : [id number] Wally Pedrazarielle NA   04/04/2022 1556 239 (H) 70 - 130 mg/dL Final     Comment:     Meter: [id number] : [id number] Sutton Juventinoiccus NA       2D echo reviewed: EF 60%.  Moderately dilated left atrium and right ventricle with estimated pulmonary pressures 56 mmHg.    CT chest 4/3 reviewed shows patchy pulmonary infiltrates with multifocal airspace disease.  Right pleural effusion is seen.  Cardiomegaly and mild mediastinal reactive lymphadenopathy.      Microbiology reviewed            Active Hospital Problems    Diagnosis  POA   • **Acute on chronic respiratory failure with hypoxia (HCC) [J96.21]  Yes   • Pneumonia involving right lung [J18.9]  Yes   • Pulmonary hypertension (HCC) [I27.20]  Yes   • Cough with hemoptysis [R04.2]  No   • Hyperbilirubinemia [E80.6]  Yes   • Transaminitis [R74.01]  Yes   • BRENDA (acute kidney injury) (HCC) [N17.9]  Yes   • Fever in adult [R50.9]  Yes   • History of asthma [Z87.09]  Not Applicable   • Hyperglycemia [R73.9]  Yes   • Hypokalemia [E87.6]  Yes   • Chronic diastolic CHF (congestive heart failure) (HCC) [I50.32]  Yes   • Chronic anticoagulation [Z79.01]  Not Applicable   • Paroxysmal atrial fibrillation (HCC) [I48.0]  Yes   • Stage 3b chronic kidney disease (HCC)  [N18.32]  Yes      Resolved Hospital Problems   No resolved problems to display.         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  Encourage pulmonary toilet.  Continue bronchodilators scheduled and as needed and steroid with taper.  Continue antibiotics.    Isauro Ramirez MD  Pulmonary and Critical Care Medicine  [facility], [facility]  4/6/2022    10:00 EDT

## 2022-04-06 NOTE — PROGRESS NOTES
Name: Yazmin Javier ADMIT: 3/31/2022   : 1935  PCP: Morenita Silverio MD    MRN: [id number] LOS: 5 days   AGE/SEX: 86 y.o. female  ROOM: [facility]     Subjective   Subjective   Hypoxia and hemoptysis continue to improve.  She thinks the chest physical therapy is helping.  Feeling well overall    Review of Systems  Denies chest pain, fevers, abdominal pain, lower extremity swelling     Objective   Objective   Vital Signs  Temp:  [97.3 °F (36.3 °C)-97.7 °F (36.5 °C)] 97.3 °F (36.3 °C)  Heart Rate:  [] 100  Resp:  [18-20] 18  BP: (127-140)/(72-88) 132/72  SpO2:  [92 %-98 %] 92 %  on  Flow (L/min):  [6-7] 7;   Device (Oxygen Therapy): humidified;high-flow nasal cannula  Body mass index is 35.41 kg/m².  Physical Exam  Constitutional:       General: She is not in acute distress.     Appearance: She is not diaphoretic.   Cardiovascular:      Rate and Rhythm: Normal rate and regular rhythm.   Pulmonary:      Effort: Pulmonary effort is normal. No respiratory distress.      Breath sounds: Wheezing present. No rhonchi.   Abdominal:      Palpations: Abdomen is soft.      Tenderness: There is no abdominal tenderness. There is no guarding.   Skin:     General: Skin is warm and dry.   Neurological:      Mental Status: She is alert.   Psychiatric:         Mood and Affect: Mood normal.         Results Review     I reviewed the patient's new clinical results.  Results from last 7 days   Lab Units 22  0541 22  0622  11522  0733   WBC 10*3/mm3 11.19* 10.00 9.49 7.93   HEMOGLOBIN g/dL 9.2* 9.2* 9.4* 8.5*   PLATELETS 10*3/mm3 193 178 164 152     Results from last 7 days   Lab Units 22  0541 22  0619 22  1159 22  0733   SODIUM mmol/L 145 143 141 141   POTASSIUM mmol/L 3.2* 3.3* 3.6 3.8   CHLORIDE mmol/L 106 104 105 106   CO2 mmol/L 25.2 23.8 23.0 24.6   BUN mg/dL 44* 48* 45* 43*   CREATININE mg/dL 1.44* 1.81* 1.73* 1.87*   GLUCOSE mg/dL 161* 181* 158* 168*   Estimated  Creatinine Clearance: 29.9 mL/min (A) (by C-G formula based on SCr of 1.44 mg/dL (H)).  Results from last 7 days   Lab Units 04/06/22  0541 04/05/22  0619 04/04/22  0733 04/03/22  0736   ALBUMIN g/dL 3.30* 3.30* 3.50 3.60   BILIRUBIN mg/dL 1.5* 1.6* 1.8* 2.1*   ALK PHOS U/L 89 97 90 99   AST (SGOT) U/L 74* 52* 47* 58*   ALT (SGPT) U/L 108* 79* 61* 58*     Results from last 7 days   Lab Units 04/06/22  0541 04/05/22  0619 04/04/22  1159 04/04/22 0733 04/03/22  0736 04/02/22  0533   CALCIUM mg/dL 8.7 8.9 8.7 8.6 9.2 8.9   ALBUMIN g/dL 3.30* 3.30*  --  3.50 3.60 3.60   MAGNESIUM mg/dL  --   --   --   --   --  1.9   PHOSPHORUS mg/dL  --   --   --   --   --  3.0     Results from last 7 days   Lab Units 04/03/22 0736 03/31/22 2054   PROCALCITONIN ng/mL 0.07 0.08   LACTATE mmol/L  --  2.0     COVID19   Date Value Ref Range Status   03/31/2022 Not Detected Not Detected - Ref. Range Final   02/14/2022 Not Detected Not Detected - Ref. Range Final     Glucose   Date/Time Value Ref Range Status   04/06/2022 1115 207 (H) 70 - 130 mg/dL Final     Comment:     Meter: [id number] : [id number] Michael Jo NA   04/06/2022 0541 164 (H) 70 - 130 mg/dL Final     Comment:     Meter: [id number] : [id number] Wally Rinaldi NA   04/05/2022 2052 237 (H) 70 - 130 mg/dL Final     Comment:     Meter: [id number] : [id number] Wally Pedrazarielle NA   04/05/2022 1559 176 (H) 70 - 130 mg/dL Final     Comment:     Meter: [id number] : [id number] Sutton Juventinogregg SHAILESH   04/05/2022 1050 311 (H) 70 - 130 mg/dL Final     Comment:     Meter: [id number] : [id number] Herman Jaureguigregg SHAILESH   04/05/2022 0506 199 (H) 70 - 130 mg/dL Final     Comment:     Meter: [id number] : [id number] Wally CASH   04/04/2022 2302 216 (H) 70 - 130 mg/dL Final     Comment:     Meter: [id number] : [id number] Wally CASH       US Abdomen Limited  Narrative: RIGHT UPPER OUTER ULTRASOUND     HISTORY: Abnormal elevated liver function  tests     COMPARISON: 2022     TECHNIQUE: Grayscale and color Doppler sonographic images were obtained  through the right upper quadrant.     FINDINGS:  There is limited visualization of the pancreas. It does appear somewhat  atrophic. No focal hepatic lesions are seen. Hepatic echotexture appears  normal. There is no intra or extrahepatic biliary dilatation. Common  bile duct measures 4 mm. There is cholelithiasis, but there is no  evidence of acute cholecystitis. There is no gallbladder wall thickening  or pericholecystic fluid. Gallbladder wall measures 3 mm in thickness.  Right kidney measures 8.7 x 4.5 0.5 cm. There is no hydronephrosis.  There is a simple appearing right renal cyst, measuring 9 x 9 x 9 mm.     Impression:    1. There is no intra or extrahepatic biliary dilatation.  2. Cholelithiasis.     This report was finalized on 2022 5:53 AM by Dr. Joanna Gracia M.D.     XR Chest 1 Vw  RADIOLOGY REPORT    FACILITY:  [facility]  UNIT/AGE/GENDER: J.ED  ER      AGE:84 Y          SEX:F  PATIENT NAME/:  PRAVIN MAN A    1935  UNIT NUMBER:  [id number]  ACCOUNT NUMBER:  [id number]  ACCESSION NUMBER:  [id number]    EXAMINATION: CHEST AP PORTABLE ERECT.    DATE OF EXAM(S): 2020.    CLINICAL HISTORY: productive cough, fatigue.    FINDINGS: Lungs are well expanded and clear. Heart and mediastinal contours are normal. Pulmonary vascularity is normal. No pleural fluid is present.    IMPRESSION: No active disease.    Dictated by: Antonio Shin M.D.    Images and Report reviewed and interpreted by: Antonio Shin M.D.    <PS><Electronically signed by: Antonio Shin M.D.>  2020 1338    D: 2020 1337  T: 2020 1337  CT Head Wo Contrast  RADIOLOGY REPORT    FACILITY:  [facility]  UNIT/AGE/GENDER: J.ED  ER      AGE:84 Y          SEX:F  PATIENT NAME/:  PRAVIN MAN A    1935  UNIT NUMBER:  [id number]  ACCOUNT NUMBER:   [id number]  ACCESSION NUMBER:  [id number]    EXAMINATION: HEAD CT WITHOUT CONTRAST.    DATE: 2020.    HISTORY: fatigue and confusion. H/O A fib on eliquis.    COMPARISON: None available    MIPS: Dose reduction techniques were employed per ALARA protocol.    FINDINGS: Ventricles and sulci are normal for age. Mild low-attenuation in the periventricular white matter is nonspecific, but most commonly reflects chronic small vessel ischemic disease.    No mass, hemorrhage, or acute infarct is detected. Three punctate calcifications lie in a left parietal sulcus, possibly dystrophic (images 33-35). A punctate parenchymal calcification in the right basal ganglia and medial left temporal lobe are also   likely dystrophic (image 19).    The calvarium is intact. The included paranasal sinuses and mastoid air cells are well-aerated.    IMPRESSION:  1. Mild chronic ischemic change of the hemispheric white matter.  2. No acute intracranial abnormality.    Dictated by: Antonio Shin M.D.    Images and Report reviewed and interpreted by: Antonio Shin M.D.    <PS><Electronically signed by: Antonio Shin M.D.>  2020 1434    D: 2020 1429  T: 2020 1429  CT Head Wo Contrast  RADIOLOGY REPORT    FACILITY:  [facility]  UNIT/AGE/GENDER: ALEJANDRA3W  IN      AGE:84 Y          SEX:F  PATIENT NAME/:  PRAVIN MAN A    1935  UNIT NUMBER:  [id number]  ACCOUNT NUMBER:  [id number]  ACCESSION NUMBER:  [id number]    EXAMINATION: CT HEAD WO CONTRAST    DATE: 2020    COMPARISON: Head CT from 2020.    HISTORY: Head trauma, coagulopathy (Age 19-64y)  patient fell and hit head. Follow-up    TECHNIQUE:  Routine CT imaging through the brain was performed without intravenous contrast.  The radiation dose reduction device was turned on for this study per the ALARA protocol.    FINDINGS:  The ventricles and cerebral sulci are unchanged and are again within normal limits for the  patient's age.. No change in the small amount of periventricular and deep white matter low-density change scattered within both frontal, parietal and   occipital lobes compatible with chronic ischemic change from underlying microangiopathy. No suspected infarcts are seen. There was no evidence of intracerebral hemorrhage or mass lesion.  No extracerebral fluid collections were seen. There is partial   calcification of the distal aspects of both internal carotid arteries and the distal aspect of the right vertebral artery. No diagnostic bony abnormalities are seen.    IMPRESSION:.  1. Stable chronic intracranial findings.  2. No suspected acute intracranial findings were seen.    Dictated by: Ty Tejada M.D.    Images and Report reviewed and interpreted by: Ty Tejada M.D.    <PS><Electronically signed by: Ty Tejada M.D.>  2020    D: 2020  T: 2020  XR Chest 1 Vw  RADIOLOGY REPORT    FACILITY:  [facility]  UNIT/AGE/GENDER: J.ED  ER      AGE:85 Y          SEX:F  PATIENT NAME/:  MAN PRAVIN, A    1935  UNIT NUMBER:  [id number]  ACCOUNT NUMBER:  [id number]  ACCESSION NUMBER:  [id number]    XR CHEST 1 VW    DATE: 2020    COMPARISON: 2020    INDICATION: soa.    FINDINGS: Cardia mediastinal silhouette is stable. The left lung appears relatively clear. There is slight increased haziness of right lung interstitial markings best seen in the upper lung and right infrahilar region. This could be accentuated by   summation artifact from the chest wall. Subtle infiltrate or edema is not excluded. No focal consolidation. No pleural effusion or pneumothorax is seen.    IMPRESSION: Questionable subtle hazy edema or infiltrate in the right lung. Suggest follow-up with dedicated PA and lateral chest x-ray and/or chest CT if clinically indicated.    ADDENDUM:  This study was reviewed via teleradiology by [employer] and a preliminary  report was faxed.     ADDENDUM: Preliminary report described no evidence for acute cardiac or pulmonary process. Due to the possible discrepancy, report was called to the emergency room at the time of this interpretation. The report was given to nurse Navarro on 2020 at   0650.    Dictated by: Foster Summers M.D.    Images and Report reviewed and interpreted by: Foster Summers M.D.    <PS><Electronically signed by: Foster Summers M.D.>  202052    D: 2020  T: 202047  CT Chest Wo Contrast  RADIOLOGY REPORT    FACILITY:  [facility]  UNIT/AGE/GENDER: J.ED  ER      AGE:85 Y          SEX:F  PATIENT NAME/:  PRAVIN MAN A    1935  UNIT NUMBER:  [id number]  ACCOUNT NUMBER:  [id number]  ACCESSION NUMBER:  [id number]    DATE: 2020    EXAMINATION:  Computed tomography of the chest without intravenous contrast    HISTORY: Shortness of breath, possible COVID 19 pneumonia    TECHNIQUE:  Transaxial computed tomographic images of the chest were obtained without intravenous contrast according to the standard protocol. Dose reduction technique performed per ALARA protocol.    COMPARISON: None    FINDINGS:    Heart is mildly enlarged. There is no pericardial effusion. The thoracic aorta is normal in caliber with severe atherosclerosis. Dense coronary artery calcifications as well as dense calcification of the mitral valve are present. The pulmonary arteries   are not dilated. No axillary, supraclavicular, or mediastinal lymphadenopathy. Calcified hilar nodes are present.    There is mosaic attenuation of the lung with areas of interlobular septal thickening at the lung bases. No focal peripheral consolidative or groundglass opacities highly suspicious for COVID 19. No pleural effusion. Numerous calcified granulomas are   present.    No acute findings in the imaged upper abdomen. No acute osseous abnormality.    IMPRESSION:      1.Mosaic attenuation of the  lungs with areas of interlobular septal thickening. Findings are most compatible with interstitial pulmonary edema. Indeterminate for COVID 19 pneumonia.  2.Cardiomegaly and dense mitral valve calcification.  3.Severe atherosclerosis including coronary artery calcifications.    Dictated by: Attila Foreman D.O.    Images and Report reviewed and interpreted by: Attila Foreman D.O.    <PS><Electronically signed by: Attila Foreman D.O.>  202010    D: 2020  T: 2020 0806  US Renal Bilateral  REVIEWING YOUR TEST RESULTS IN [facility] IS NOT A SUBSTITUTE FOR DISCUSSING THOSE RESULTS WITH YOUR HEALTH CARE PROVIDER.  PLEASE CONTACT YOUR PROVIDER [id number] TO DISCUSS ANY QUESTIONS OR CONCERNS YOU MAY HAVE REGARDING THESE TEST RESULTS.    RADIOLOGY REPORT    [facility]ACILITY:  [facility]  UNIT/AGE/GENDER: M.US  OP      AGE:86 Y          SEX:F  PATIENT NAME/:  PRAVIN MAN A    1935  UNIT NUMBER:  [id number]  ACCOUNT NUMBER:  [id number]  ACCESSION NUMBER:  [id number]    Exam: Ultrasound kidney/retroperitoneum complete exam.    DATE: 2021    HISTORY: Stage IV chronic kidney disease    FINDINGS: The right kidney and left kidney measure 9.2 x 4.2 x 4.9 and 8.8 x 5.2 x 4.4 cm respectively.  There is no hydronephrosis. No solid masses or stones are seen. The kidneys show increased echogenicity and decreased thickness of the renal cortex   consistent with medical renal disease. The bladder is trabeculated with a possible small diverticulum posteriorly on the right.    Incidental note is made of multiple small gallstones within the gallbladder.    IMPRESSION: Increased echogenicity and decreased thickness of the renal cortex consistent with medical renal disease. Trabeculation of bladder with possible posterior diverticulum on the right. CT scanning may be helpful to more definitively evaluate.    Incidental note made of multiple small  gallstones within the gallbladder.    Dictated by: Hernandez Marie M.D.    Images and Report reviewed and interpreted by: Hernandez Marie M.D.    <PS><Electronically signed by: Hernandez Marie M.D.>  09/13/2021 1633    D: 09/13/2021 1623  T: 09/13/2021 1623  Holter monitor - 72 hour  Narrative: This result has an attachment that is not available.  Impression: Holter monitor    Date: 1935    Indication: Assess  Atrial fibrillation.    Findings: Patient was monitored for 3 days and 3 minutes.  A total of 375,305 QRS complexes were analyzed.  Average heart rate was 87 bpm.  Maximum heart rate 150 bpm, minimum heart rate 44 bpm.  Maximum occurred at 12 noon on day 4, minimum occurred on day 1 at 7 PM.  There were 12 episodes of bradycardia less than 50 bpm.  Longest pause was 2.6 seconds which occurred at 5:30 AM on day 4.    Patient was in atrial fibrillation 100% of time.  There was no ventricular ectopy.  There were no patient symptoms noted, and there were no patient activated events.    Impression: Average heart rate in atrial fibrillation 87 bpm, with pauses 2.6 seconds at 5:30 AM and 2.6 seconds at 1 AM, day 4 and day 3 respectively.    Overall heart rate control is acceptable.  Pauses noted, not needing pacemaker at this time.     I reviewed the patient's daily medications.  Scheduled Medications  allopurinol, 300 mg, Oral, Daily  amLODIPine, 5 mg, Oral, Daily  apixaban, 5 mg, Oral, BID  atorvastatin, 80 mg, Oral, Nightly  budesonide-formoterol, 2 puff, Inhalation, BID - RT  doxycycline, 100 mg, Oral, Q12H  folic acid, 1 mg, Oral, Daily  furosemide, 40 mg, Oral, Daily  guaiFENesin, 600 mg, Oral, Q12H  insulin lispro, 0-9 Units, Subcutaneous, 4x Daily With Meals & Nightly  ipratropium-albuterol, 3 mL, Nebulization, 4x Daily - RT  levothyroxine, 50 mcg, Oral, Daily  methylPREDNISolone sodium succinate, 40 mg, Intravenous, Q8H  metoprolol tartrate, 25 mg, Oral, BID  PARoxetine, 10 mg, Oral,  Daily  piperacillin-tazobactam, 3.375 g, Intravenous, Q8H  potassium chloride, 40 mEq, Oral, BID With Meals  vitamin B-12, 1,000 mcg, Oral, Daily    Infusions   Diet  Diet Regular; Cardiac       Assessment/Plan     Active Hospital Problems    Diagnosis  POA   • **Acute on chronic respiratory failure with hypoxia (HCC) [J96.21]  Yes   • Pneumonia involving right lung [J18.9]  Yes   • Pulmonary hypertension (HCC) [I27.20]  Yes   • Cough with hemoptysis [R04.2]  No   • Hyperbilirubinemia [E80.6]  Yes   • Transaminitis [R74.01]  Yes   • BRENDA (acute kidney injury) (HCC) [N17.9]  Yes   • Fever in adult [R50.9]  Yes   • History of asthma [Z87.09]  Not Applicable   • Hyperglycemia [R73.9]  Yes   • Hypokalemia [E87.6]  Yes   • Chronic diastolic CHF (congestive heart failure) (HCC) [I50.32]  Yes   • Chronic anticoagulation [Z79.01]  Not Applicable   • Paroxysmal atrial fibrillation (HCC) [I48.0]  Yes   • Stage 3b chronic kidney disease (HCC) [N18.32]  Yes      Resolved Hospital Problems   No resolved problems to display.     86-year-old female with a history of COPD on 3 L home oxygen who presented with fever, shortness of breath and cough productive of yellow sputum.  She was febrile on admission and CT abdomen showed patchy groundglass opacities at the right lung base.  She is admitted for COPD exacerbation due to pneumonia.     Today: Hypoxia and hemoptysis improving.  Pulmonology is weaning down steroids  Right upper quadrant ultrasound shows stones but no bile duct dilation      COPD/asthma exacerbation, acute on chronic hypoxic respiratory failure, on 3 L home oxygen:   -Continue IV steroids, duo nebs and treatment of pneumonia as below.  Pulmonology following  -Encourage incentive spirometry, chest PT, out of bed ambulation      Right lower lobe pneumonia.  Hemoptysis:   -On Zosyn and doxycycline as she has had repeated hospitalizations, at risk for Pseudomonas and atypical infections due to structural lung disease.    Minimal sputum collection.  Strep pneumo and Legionella urine antigens, MRSA PCR negative    A. Fib: resume Eliquis, monitor for worsening hemoptysis.  Continue metoprolol    Severe pulmonary hypertension: P.o. Lasix.  Pulmonology following    BRENDA on CKD stage III: Baseline creatinine around 1.4.  Creatinine peaked and stabilized around 1.8.  Avoid nephrotoxic medications, monitor daily BMP    Hyperglycemia: A1c 5.4.  Elevated due to steroids.  Low-dose sliding scale     Hypertension: Blood pressure acceptable acutely.  Continue amlodipine and metoprolol    Elevated LFTs: Mild, related to congestion versus DILI (from antibiotics?).  Hepatitis panel negative.  Right upper quadrant ultrasound shows stones with no obstruction      · Eliquis (home med) for DVT prophylaxis.  · DNR/DNI  · Discussed with patient and nursing staff.  · Anticipate discharge to SNU facility in 2-3 days.  When oxygen requirements have returned to baseline      Farhana Gibson DO  [facility]  04/06/22  12:20 EDT    Patient was placed in face mask on first look.  I wore protective equipment throughout this patient encounter including a face mask, gloves and protective eyewear.  Hand hygiene was performed before donning protective equipment and after removal when leaving the room.

## 2022-04-07 PROBLEM — R04.2 COUGH WITH HEMOPTYSIS: Status: RESOLVED | Noted: 2022-01-01 | Resolved: 2022-01-01

## 2022-04-07 NOTE — PROGRESS NOTES
[facility]  [phone]  Dr. Ronny Hwang    Subjective:  LOS: 6    Chief Complaint: Pneumonia    Sitting in a chair and feels better.  States she is expectorating phlegm.  Using flutter valve and I-S.    Objective   Vital Signs past 24hrs    Temp range: Temp (24hrs), Av.8 °F (36.6 °C), Min:97.5 °F (36.4 °C), Max:97.9 °F (36.6 °C)    BP range: BP: (123-141)/(61-78) 140/77  Pulse range: Heart Rate:  [76-92] 90  Resp rate range: Resp:  [18] 18    Device (Oxygen Therapy): humidified;nasal cannulaFlow (L/min):  [2-6] 2  Oxygen range:SpO2:  [95 %-100 %] 100 %      87.4 kg (192 lb 9.6 oz); Body mass index is 35.23 kg/m².    Intake/Output Summary (Last 24 hours) at 2022 1229  Last data filed at 2022 0803  Gross per 24 hour   Intake 720 ml   Output 1050 ml   Net -330 ml       Physical Exam  Constitutional:       Appearance: She is obese.   Eyes:      Pupils: Pupils are equal, round, and reactive to light.   Cardiovascular:      Rate and Rhythm: Normal rate and regular rhythm.      Heart sounds: No murmur heard.  Pulmonary:      Effort: Pulmonary effort is normal.      Breath sounds: Decreased breath sounds and wheezing (bilateral mild coarse) present.   Abdominal:      General: Bowel sounds are normal.      Palpations: Abdomen is soft. There is no mass.      Tenderness: There is no abdominal tenderness.   Musculoskeletal:         General: No swelling.   Neurological:      General: No focal deficit present.      Mental Status: She is alert.       Results Review:    I have reviewed the laboratory and imaging data since the last note by [facility] physician.  My annotations are noted in assessment and plan.    Medication Review:  I have reviewed the current MAR.  My annotations are noted in assessment and plan.       Plan   PCCM Problems  AHRF  Multi-lobar patchy pneumonia  Acute asthma exacerbation  Chronic hypoxia on 3L O2  Hemoptysis  Afib on AC with Eliquis  Severe pulmonary hypertension  BRENDA on CKD  3      THESE ARE NEW MEDICAL PROBLEMS TO ME.    Plan of Treatment    Continue supplemental oxygen though appears to be near or at baseline oxygen flows at home.    Multi lobar patchy pneumonia and completing antibiotics.    Wheezing still heard.  Currently on Solu-Medrol every 8 hours.  Will add acetylcysteine nebs to help with expectoration.  If not improved will require bronchoscopy.    Denies any hemoptysis today.  Remains on apixaban for A. fib.    Severe pulmonary hypertension likely WHO class II.    Acute kidney injury appears to be improving.    Ronny Hwang MD  04/07/22  12:29 EDT    While in the room and during my examination of the patient I wore gloves, gown, mask, eye protection and followed enhanced droplet/contact isolation protocol and precautions.  I washed my hands before and after this patient encounter.    Part of this note may be an electronic transcription/translation of spoken language to printed text using the Dragon Dictation System.

## 2022-04-07 NOTE — THERAPY TREATMENT NOTE
Patient Name: Yazmin Javier  : 1935    MRN: [id number]                              Today's Date: 2022       Admit Date: 3/31/2022    Visit Dx:     ICD-10-CM ICD-9-CM   1. Pneumonia of right lower lobe due to infectious organism  J18.9 486   2. Fever and chills  R50.9 780.60     Patient Active Problem List   Diagnosis   • Acute on chronic congestive heart failure (HCC)   • Hypoxia   • Paroxysmal atrial fibrillation (HCC)   • Chronic anticoagulation   • Hypothyroidism (acquired)   • Stage 3b chronic kidney disease (HCC)   • Right arm weakness   • Chronic diastolic CHF (congestive heart failure) (HCC)   • Syncope   • Acute respiratory failure with hypoxia and hypercapnia (HCC)   • Acute pulmonary edema (HCC)   • Acute on chronic respiratory failure with hypoxia (HCC)   • Fever in adult   • History of asthma   • Hyperglycemia   • Hypokalemia   • BRENDA (acute kidney injury) (HCC)   • Cough with hemoptysis   • Hyperbilirubinemia   • Transaminitis   • Pneumonia involving right lung   • Pulmonary hypertension (HCC)     Past Medical History:   Diagnosis Date   • CHF (congestive heart failure) (HCC)    • Sleep apnea    • Stroke (HCC)      History reviewed. No pertinent surgical history.   General Information     Row Name 22 1130          Physical Therapy Time and Intention    Document Type therapy note (daily note)  -SV     Mode of Treatment individual therapy;physical therapy  -SV     Row Name 22 8020          General Information    Patient Profile Reviewed yes  -SV           User Key  (r) = Recorded By, (t) = Taken By, (c) = Cosigned By    Initials Name Provider Type    SV Manuela Corral, PT Physical Therapist               Mobility     Row Name 22 1149          Bed Mobility    Supine-Sit [place] (Bed Mobility) not tested  -SV     Sit-Supine [place] (Bed Mobility) not tested  -SV     Comment, (Bed Mobility) uic  -SV     Row Name 22 1149          Sit-Stand Transfer    Sit-Stand  [place] (Transfers) standby assist  -SV     Assistive Device (Sit-Stand Transfers) walker, front-wheeled  -SV     Comment, (Sit-Stand Transfer) no unsteadiness  -SV     Row Name 04/07/22 1149          Gait/Stairs (Locomotion)    [place] Level (Gait) contact guard  -SV     Assistive Device (Gait) walker, front-wheeled  -SV     Distance in Feet (Gait) 100'x2 seated rest, 2 L SOA noted, 92% upon return to recliner on 2 L  -SV           User Key  (r) = Recorded By, (t) = Taken By, (c) = Cosigned By    Initials Name Provider Type    Manuela Moise, PT Physical Therapist               Obj/Interventions    No documentation.                Goals/Plan    No documentation.                Clinical Impression     Row Name 04/07/22 1151          Pain    Pretreatment Pain Rating 0/10 - no pain  -SV     Posttreatment Pain Rating 0/10 - no pain  -SV     Row Name 04/07/22 1151          Vital Signs    Pre SpO2 (%) 95  -SV     O2 Delivery Pre Treatment supplemental O2  -SV     O2 Delivery Intra Treatment supplemental O2  -SV     Post SpO2 (%) 92  2L  -SV     O2 Delivery Post Treatment supplemental O2  -SV     Pre Patient Position Sitting  -SV     Intra Patient Position Standing  -SV     Post Patient Position Sitting  -SV     Row Name 04/07/22 1151          Positioning and Restraints    Pre-Treatment Position sitting in chair/recliner  -SV     Post Treatment Position chair  -SV     In Chair sitting;call light within reach;encouraged to call for assist;exit alarm on  -SV           User Key  (r) = Recorded By, (t) = Taken By, (c) = Cosigned By    Initials Name Provider Type    Manuela Moise, PT Physical Therapist               Outcome Measures     Row Name 04/07/22 1151          How much help from another person do you currently need...    Turning from your back to your side while in flat bed without using bedrails? 4  -SV     Moving from lying on back to sitting on the side of a flat bed without bedrails? 4  -SV      Moving to and from a bed to a chair (including a wheelchair)? 3  -SV     Standing up from a chair using your arms (e.g., wheelchair, bedside chair)? 4  -SV     Climbing 3-5 steps with a railing? 2  -SV     To walk in hospital room? 3  -SV     AM-PAC 6 Clicks Score (PT) 20  -SV     Row Name 04/07/22 1151          Functional Assessment    Outcome Measure Options AM-PAC 6 Clicks Basic Mobility (PT)  -SV           User Key  (r) = Recorded By, (t) = Taken By, (c) = Cosigned By    Initials Name Provider Type    SV Manuela Corral, PT Physical Therapist                             Physical Therapy Education                 Title: PT OT SLP Therapies (In Progress)     Topic: Physical Therapy (In Progress)     Point: Mobility training (In Progress)     Learning Progress Summary           Patient Acceptance, E, NR by SV at 4/7/2022 1152    Acceptance, E, VU,NR by DJ at 4/5/2022 1451    Acceptance, E, VU by KH at 4/2/2022 1617                   Point: Home exercise program (In Progress)     Learning Progress Summary           Patient Acceptance, E, NR by SV at 4/7/2022 1152    Acceptance, E, VU,NR by DJ at 4/5/2022 1451    Acceptance, E, VU by KH at 4/2/2022 1617                   Point: Body mechanics (In Progress)     Learning Progress Summary           Patient Acceptance, E, NR by SV at 4/7/2022 1152    Acceptance, E, VU,NR by DJ at 4/5/2022 1451    Acceptance, E, VU by KH at 4/2/2022 1617                   Point: Precautions (In Progress)     Learning Progress Summary           Patient Acceptance, E, NR by SV at 4/7/2022 1152    Acceptance, E, VU,NR by DJ at 4/5/2022 1451    Acceptance, E, VU by KH at 4/2/2022 1617                               User Key     Initials Effective Dates Name Provider Type Discipline    SV 06/16/21 -  Manuela Corral, PT Physical Therapist PT    DJ 10/25/19 -  Dorota Huizar, PT Physical Therapist PT     11/03/21 -  Janett Rodriguez, PT Physical Therapist PT              PT Recommendation and  Plan           Time Calculation:    PT Charges     Row Name 04/07/22 1154             Time Calculation    Start Time 1130  -SV      Stop Time 1148  -SV      Time Calculation (min) 18 min  -SV      PT Received On 04/07/22  -SV      PT - Next Appointment 04/08/22  -SV            User Key  (r) = Recorded By, (t) = Taken By, (c) = Cosigned By    Initials Name Provider Type    SV Manuela Corral, PT Physical Therapist              Therapy Charges for Today     Code Description Service Date Service Provider Modifiers Qty    [id number]  PT THERAPEUTIC ACT EA 15 MIN 4/7/2022 Manuela Corral, PT GP 1          PT G-Codes  Outcome Measure Options: AM-PAC 6 Clicks Basic Mobility (PT)  AM-PAC 6 Clicks Score (PT): 20    Manuela Corral, PT  4/7/2022

## 2022-04-07 NOTE — PLAN OF CARE
Goal Outcome Evaluation:  Plan of Care Reviewed With: patient        Progress: no change  Outcome Evaluation: VSS. Alert and oriented x4. Continues on PO abx. O2 weaned to 4L nasal cannula with O2 sats in mid 90s. Desats with exertion but recovers quickly. IV steroids continued. Assist x1 to [facility]. Pt continues with audible wheezing at times. Good pulmonary hygiene encouraged. Will continue to provide supportive care.

## 2022-04-07 NOTE — CASE MANAGEMENT/SOCIAL WORK
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 CCP will continue to follow.               Discharge Codes    No documentation.               Expected Discharge Date and Time     Expected Discharge Date Expected Discharge Time    Apr 8, 2022             Abeba Gonzalez RN

## 2022-04-07 NOTE — PLAN OF CARE
Goal Outcome Evaluation:         Pt uic on 2 L O 2 agreeable to amb. STS to rwx with sba. Pt amb with rwx and cga on 2 L 100' soa noted. Pt reported fatigue. Pt seated rest with return to recliner another 100' with cga. O2 sat 92% on 2 L once at rest. Pt tolerated well today though significantly SOA with exertion.

## 2022-04-07 NOTE — PROGRESS NOTES
Name: Yazmin Javier ADMIT: 3/31/2022   : 1935  PCP: Morenita Silverio MD    MRN: [id number] LOS: 6 days   AGE/SEX: 86 y.o. female  ROOM: [facility]     Subjective   Subjective   Feeling well this morning, bright and pleasant affect as always.    Review of Systems  Denies chest pain, fevers, abdominal pain, lower extremity swelling     Objective   Objective   Vital Signs  Temp:  [97.5 °F (36.4 °C)-97.9 °F (36.6 °C)] 97.5 °F (36.4 °C)  Heart Rate:  [76-92] 90  Resp:  [18] 18  BP: (123-141)/(61-78) 140/77  SpO2:  [95 %-100 %] 100 %  on  Flow (L/min):  [2-6] 2;   Device (Oxygen Therapy): humidified;nasal cannula  Body mass index is 35.23 kg/m².  Physical Exam  Constitutional:       General: She is not in acute distress.     Appearance: She is not diaphoretic.   Cardiovascular:      Rate and Rhythm: Normal rate and regular rhythm.   Pulmonary:      Effort: Pulmonary effort is normal. No respiratory distress.      Breath sounds: Wheezing present. No rhonchi.   Abdominal:      Palpations: Abdomen is soft.      Tenderness: There is no abdominal tenderness. There is no guarding.   Skin:     General: Skin is warm and dry.   Neurological:      Mental Status: She is alert.   Psychiatric:         Mood and Affect: Mood normal.         Results Review     I reviewed the patient's new clinical results.  Results from last 7 days   Lab Units 22  0541 22  0541 22  1159   WBC 10*3/mm3 12.76* 11.19* 10.00 9.49   HEMOGLOBIN g/dL 9.2* 9.2* 9.2* 9.4*   PLATELETS 10*3/mm3 189 193 178 164     Results from last 7 days   Lab Units 22  0541 22  0541 22  0622  1159   SODIUM mmol/L 144 145 143 141   POTASSIUM mmol/L 4.3 3.2* 3.3* 3.6   CHLORIDE mmol/L 108* 106 104 105   CO2 mmol/L 23.4 25.2 23.8 23.0   BUN mg/dL 40* 44* 48* 45*   CREATININE mg/dL 1.43* 1.44* 1.81* 1.73*   GLUCOSE mg/dL 210* 161* 181* 158*   Estimated Creatinine Clearance: 30.1 mL/min (A) (by C-G formula based on  SCr of 1.43 mg/dL (H)).  Results from last 7 days   Lab Units 04/07/22  0541 04/06/22  0541 04/05/22  0619 04/04/22  0733   ALBUMIN g/dL 3.40* 3.30* 3.30* 3.50   BILIRUBIN mg/dL 1.3* 1.5* 1.6* 1.8*   ALK PHOS U/L 93 89 97 90   AST (SGOT) U/L 67* 74* 52* 47*   ALT (SGPT) U/L 114* 108* 79* 61*     Results from last 7 days   Lab Units 04/07/22  0541 04/06/22  0541 04/05/22  0619 04/04/22  1159 04/04/22  0733 04/03/22  0736 04/02/22  0533   CALCIUM mg/dL 8.6 8.7 8.9 8.7 8.6   < > 8.9   ALBUMIN g/dL 3.40* 3.30* 3.30*  --  3.50   < > 3.60   MAGNESIUM mg/dL 2.4  --   --   --   --   --  1.9   PHOSPHORUS mg/dL 3.2  --   --   --   --   --  3.0    < > = values in this interval not displayed.     Results from last 7 days   Lab Units 04/03/22  0736 03/31/22 2054   PROCALCITONIN ng/mL 0.07 0.08   LACTATE mmol/L  --  2.0     COVID19   Date Value Ref Range Status   03/31/2022 Not Detected Not Detected - Ref. Range Final   02/14/2022 Not Detected Not Detected - Ref. Range Final     Glucose   Date/Time Value Ref Range Status   04/07/2022 1119 244 (H) 70 - 130 mg/dL Final     Comment:     Meter: [id number] : [id number] Tevin Anabelle NA   04/06/2022 2025 227 (H) 70 - 130 mg/dL Final     Comment:     Meter: [id number] : [id number] Marco DURÁNA   04/06/2022 1622 175 (H) 70 - 130 mg/dL Final     Comment:     Meter: [id number] : [id number] Anshu Young NA   04/06/2022 1115 207 (H) 70 - 130 mg/dL Final     Comment:     Meter: [id number] : [id number] Rodriguezflavio Lubinviviane CASH   04/06/2022 0541 164 (H) 70 - 130 mg/dL Final     Comment:     Meter: [id number] : [id number] Wally CASH   04/05/2022 2052 237 (H) 70 - 130 mg/dL Final     Comment:     Meter: [id number] : [id number] Wally CASH   04/05/2022 1559 176 (H) 70 - 130 mg/dL Final     Comment:     Meter: [id number] : [id number] Herman CASH       US Abdomen Limited  Narrative: RIGHT UPPER OUTER ULTRASOUND     HISTORY: Abnormal elevated liver  function tests     COMPARISON: 2022     TECHNIQUE: Grayscale and color Doppler sonographic images were obtained  through the right upper quadrant.     FINDINGS:  There is limited visualization of the pancreas. It does appear somewhat  atrophic. No focal hepatic lesions are seen. Hepatic echotexture appears  normal. There is no intra or extrahepatic biliary dilatation. Common  bile duct measures 4 mm. There is cholelithiasis, but there is no  evidence of acute cholecystitis. There is no gallbladder wall thickening  or pericholecystic fluid. Gallbladder wall measures 3 mm in thickness.  Right kidney measures 8.7 x 4.5 0.5 cm. There is no hydronephrosis.  There is a simple appearing right renal cyst, measuring 9 x 9 x 9 mm.     Impression:    1. There is no intra or extrahepatic biliary dilatation.  2. Cholelithiasis.     This report was finalized on 2022 5:53 AM by Dr. Joanna Gracia M.D.     XR Chest 1 Vw  RADIOLOGY REPORT    FACILITY:  [facility]  UNIT/AGE/GENDER: J.ED  ER      AGE:84 Y          SEX:F  PATIENT NAME/:  PRAVIN MAN A    1935  UNIT NUMBER:  [id number]  ACCOUNT NUMBER:  [id number]  ACCESSION NUMBER:  [id number]    EXAMINATION: CHEST AP PORTABLE ERECT.    DATE OF EXAM(S): 2020.    CLINICAL HISTORY: productive cough, fatigue.    FINDINGS: Lungs are well expanded and clear. Heart and mediastinal contours are normal. Pulmonary vascularity is normal. No pleural fluid is present.    IMPRESSION: No active disease.    Dictated by: Antonio Shin M.D.    Images and Report reviewed and interpreted by: Antonio Shin M.D.    <PS><Electronically signed by: Antonio Shin M.D.>  2020 1338    D: 2020 1337  T: 2020 1337  CT Head Wo Contrast  RADIOLOGY REPORT    FACILITY:  [facility]  UNIT/AGE/GENDER: J.ED  ER      AGE:84 Y          SEX:F  PATIENT NAME/:  PRAVIN MAN A    1935  UNIT NUMBER:  [id number]  ACCOUNT  NUMBER:  [id number]  ACCESSION NUMBER:  [id number]    EXAMINATION: HEAD CT WITHOUT CONTRAST.    DATE: 2020.    HISTORY: fatigue and confusion. H/O A fib on eliquis.    COMPARISON: None available    MIPS: Dose reduction techniques were employed per ALARA protocol.    FINDINGS: Ventricles and sulci are normal for age. Mild low-attenuation in the periventricular white matter is nonspecific, but most commonly reflects chronic small vessel ischemic disease.    No mass, hemorrhage, or acute infarct is detected. Three punctate calcifications lie in a left parietal sulcus, possibly dystrophic (images 33-35). A punctate parenchymal calcification in the right basal ganglia and medial left temporal lobe are also   likely dystrophic (image 19).    The calvarium is intact. The included paranasal sinuses and mastoid air cells are well-aerated.    IMPRESSION:  1. Mild chronic ischemic change of the hemispheric white matter.  2. No acute intracranial abnormality.    Dictated by: Antonio Shin M.D.    Images and Report reviewed and interpreted by: Antonio Shin M.D.    <PS><Electronically signed by: Antonio Shin M.D.>  2020 1434    D: 2020 1429  T: 2020 1429  CT Head Wo Contrast  RADIOLOGY REPORT    FACILITY:  [facility]  UNIT/AGE/GENDER: ALEJANDRA3W  IN      AGE:84 Y          SEX:F  PATIENT NAME/:  PRAVIN MAN A    1935  UNIT NUMBER:  [id number]  ACCOUNT NUMBER:  [id number]  ACCESSION NUMBER:  [id number]    EXAMINATION: CT HEAD WO CONTRAST    DATE: 2020    COMPARISON: Head CT from 2020.    HISTORY: Head trauma, coagulopathy (Age 19-64y)  patient fell and hit head. Follow-up    TECHNIQUE:  Routine CT imaging through the brain was performed without intravenous contrast.  The radiation dose reduction device was turned on for this study per the ALARA protocol.    FINDINGS:  The ventricles and cerebral sulci are unchanged and are again within normal limits for  the patient's age.. No change in the small amount of periventricular and deep white matter low-density change scattered within both frontal, parietal and   occipital lobes compatible with chronic ischemic change from underlying microangiopathy. No suspected infarcts are seen. There was no evidence of intracerebral hemorrhage or mass lesion.  No extracerebral fluid collections were seen. There is partial   calcification of the distal aspects of both internal carotid arteries and the distal aspect of the right vertebral artery. No diagnostic bony abnormalities are seen.    IMPRESSION:.  1. Stable chronic intracranial findings.  2. No suspected acute intracranial findings were seen.    Dictated by: Ty Tejada M.D.    Images and Report reviewed and interpreted by: Ty Tejada M.D.    <PS><Electronically signed by: Ty Tejada M.D.>  2020    D: 2020  T: 2020  XR Chest 1 Vw  RADIOLOGY REPORT    [facility]  UNIT/AGE/GENDER: J.ED  ER      AGE:85 Y          SEX:F  PATIENT NAME/:  MAN PRAVIN, A    1935  UNIT NUMBER:  [id number]  ACCOUNT NUMBER:  [id number]  ACCESSION NUMBER:  [id number]    XR CHEST 1 VW    DATE: 2020    COMPARISON: 2020    INDICATION: soa.    FINDINGS: Cardia mediastinal silhouette is stable. The left lung appears relatively clear. There is slight increased haziness of right lung interstitial markings best seen in the upper lung and right infrahilar region. This could be accentuated by   summation artifact from the chest wall. Subtle infiltrate or edema is not excluded. No focal consolidation. No pleural effusion or pneumothorax is seen.    IMPRESSION: Questionable subtle hazy edema or infiltrate in the right lung. Suggest follow-up with dedicated PA and lateral chest x-ray and/or chest CT if clinically indicated.    ADDENDUM:  This study was reviewed via teleradiology by [employer] and a preliminary  report was faxed.     ADDENDUM: Preliminary report described no evidence for acute cardiac or pulmonary process. Due to the possible discrepancy, report was called to the emergency room at the time of this interpretation. The report was given to nurse Navarro on 2020 at   0650.    Dictated by: Foster Summers M.D.    Images and Report reviewed and interpreted by: Foster Summers M.D.    <PS><Electronically signed by: Foster Summers M.D.>  202052    D: 2020  T: 202047  CT Chest Wo Contrast  RADIOLOGY REPORT    FACILITY:  [facility]  UNIT/AGE/GENDER: J.ED  ER      AGE:85 Y          SEX:F  PATIENT NAME/:  PRAVIN MAN A    1935  UNIT NUMBER:  [id number]  ACCOUNT NUMBER:  [id number]  ACCESSION NUMBER:  [id number]    DATE: 2020    EXAMINATION:  Computed tomography of the chest without intravenous contrast    HISTORY: Shortness of breath, possible COVID 19 pneumonia    TECHNIQUE:  Transaxial computed tomographic images of the chest were obtained without intravenous contrast according to the standard protocol. Dose reduction technique performed per ALARA protocol.    COMPARISON: None    FINDINGS:    Heart is mildly enlarged. There is no pericardial effusion. The thoracic aorta is normal in caliber with severe atherosclerosis. Dense coronary artery calcifications as well as dense calcification of the mitral valve are present. The pulmonary arteries   are not dilated. No axillary, supraclavicular, or mediastinal lymphadenopathy. Calcified hilar nodes are present.    There is mosaic attenuation of the lung with areas of interlobular septal thickening at the lung bases. No focal peripheral consolidative or groundglass opacities highly suspicious for COVID 19. No pleural effusion. Numerous calcified granulomas are   present.    No acute findings in the imaged upper abdomen. No acute osseous abnormality.    IMPRESSION:      1.Mosaic attenuation of the  lungs with areas of interlobular septal thickening. Findings are most compatible with interstitial pulmonary edema. Indeterminate for COVID 19 pneumonia.  2.Cardiomegaly and dense mitral valve calcification.  3.Severe atherosclerosis including coronary artery calcifications.    Dictated by: Attila Foreman D.O.    Images and Report reviewed and interpreted by: Attila Foreman D.O.    <PS><Electronically signed by: Attila Foreman D.O.>  202010    D: 2020  T: 2020 0806  US Renal Bilateral  RE[facility] RESULTS [facility] IS NOT A SUBSTITUTE FOR DISCUSSING THOSE RESULTS WITH YOUR HEALTH CARE PROVIDER.  PLEASE CONTACT YOUR PROVIDER [facility]IA [facility] TO DISCUSS ANY QUESTIONS OR CONCERNS YOU MAY HAVE REGARDING THESE TEST RESULTS.    RADIOLOGY REPORT    [facility]AC[facility]  UNIT/AGE/GENDER: M.US  OP      AGE:86 Y          SEX:F  PATIENT NAME/:  PRAVIN MAN A    1935  UNIT NUMBER:  [id number]  ACCOUNT NUMBER:  [id number]  ACCESSION NUMBER:  [id number]    Exam: Ultrasound kidney/retroperitoneum complete exam.    DATE: 2021    HISTORY: Stage IV chronic kidney disease    FINDINGS: The right kidney and left kidney measure 9.2 x 4.2 x 4.9 and 8.8 x 5.2 x 4.4 cm respectively.  There is no hydronephrosis. No solid masses or stones are seen. The kidneys show increased echogenicity and decreased thickness of the renal cortex   consistent with medical renal disease. The bladder is trabeculated with a possible small diverticulum posteriorly on the right.    Incidental note is made of multiple small gallstones within the gallbladder.    IMPRESSION: Increased echogenicity and decreased thickness of the renal cortex consistent with medical renal disease. Trabeculation of bladder with possible posterior diverticulum on the right. CT scanning may be helpful to more definitively evaluate.    Incidental note made of multiple small  gallstones within the gallbladder.    Dictated by: Hernandez Marie M.D.    Images and Report reviewed and interpreted by: Hernandez Marie M.D.    <PS><Electronically signed by: Hernandez Marie M.D.>  09/13/2021 1633    D: 09/13/2021 1623  T: 09/13/2021 1623  Holter monitor - 72 hour  Narrative: This result has an attachment that is not available.  Impression: Holter monitor    Date: 1935    Indication: Assess  Atrial fibrillation.    Findings: Patient was monitored for 3 days and 3 minutes.  A total of 375,305 QRS complexes were analyzed.  Average heart rate was 87 bpm.  Maximum heart rate 150 bpm, minimum heart rate 44 bpm.  Maximum occurred at 12 noon on day 4, minimum occurred on day 1 at 7 PM.  There were 12 episodes of bradycardia less than 50 bpm.  Longest pause was 2.6 seconds which occurred at 5:30 AM on day 4.    Patient was in atrial fibrillation 100% of time.  There was no ventricular ectopy.  There were no patient symptoms noted, and there were no patient activated events.    Impression: Average heart rate in atrial fibrillation 87 bpm, with pauses 2.6 seconds at 5:30 AM and 2.6 seconds at 1 AM, day 4 and day 3 respectively.    Overall heart rate control is acceptable.  Pauses noted, not needing pacemaker at this time.     I reviewed the patient's daily medications.  Scheduled Medications  acetylcysteine, 4 mL, Nebulization, Q8H - RT  allopurinol, 300 mg, Oral, Daily  amLODIPine, 5 mg, Oral, Daily  apixaban, 5 mg, Oral, BID  atorvastatin, 80 mg, Oral, Nightly  doxycycline, 100 mg, Oral, Q12H  folic acid, 1 mg, Oral, Daily  furosemide, 40 mg, Oral, Daily  guaiFENesin, 600 mg, Oral, Q12H  insulin glargine, 5 Units, Subcutaneous, Nightly  insulin lispro, 0-9 Units, Subcutaneous, 4x Daily With Meals & Nightly  ipratropium-albuterol, 3 mL, Nebulization, Q4H - RT  levothyroxine, 50 mcg, Oral, Daily  methylPREDNISolone sodium succinate, 40 mg, Intravenous, Q8H  metoprolol tartrate, 25 mg, Oral, BID  PARoxetine,  10 mg, Oral, Daily  vitamin B-12, 1,000 mcg, Oral, Daily    Infusions   Diet  Diet Regular; Cardiac       Assessment/Plan     Active Hospital Problems    Diagnosis  POA   • **Acute on chronic respiratory failure with hypoxia (HCC) [J96.21]  Yes   • Pneumonia involving right lung [J18.9]  Yes   • Pulmonary hypertension (HCC) [I27.20]  Yes   • Hyperbilirubinemia [E80.6]  Yes   • Transaminitis [R74.01]  Yes   • BRENDA (acute kidney injury) (HCC) [N17.9]  Yes   • Fever in adult [R50.9]  Yes   • History of asthma [Z87.09]  Not Applicable   • Hyperglycemia [R73.9]  Yes   • Hypokalemia [E87.6]  Yes   • Chronic diastolic CHF (congestive heart failure) (HCC) [I50.32]  Yes   • Chronic anticoagulation [Z79.01]  Not Applicable   • Paroxysmal atrial fibrillation (HCC) [I48.0]  Yes   • Stage 3b chronic kidney disease (HCC) [N18.32]  Yes      Resolved Hospital Problems    Diagnosis Date Resolved POA   • Cough with hemoptysis [R04.2] 04/07/2022 No     86-year-old female with a history of COPD on 3 L home oxygen who presented with fever, shortness of breath and cough productive of yellow sputum.  She was febrile on admission and CT abdomen showed patchy groundglass opacities at the right lung base.  She is admitted for COPD exacerbation due to pneumonia.     Today: Wheezing still present but much improved.  Still on IV steroids, hopeful to wean these down soon.  Oxygen requirement is back to baseline 3 L  Hyperglycemia due to steroids-add low-dose basal insulin today  Hemoptysis has resolved      COPD/asthma exacerbation, acute on chronic hypoxic respiratory failure, on 3 L home oxygen:   -Continue IV steroids, duo nebs and treatment of pneumonia as below.  Pulmonology following  -Encourage incentive spirometry, chest PT, out of bed ambulation      Right lower lobe pneumonia.  Hemoptysis:   -Treated with Zosyn and doxycycline as she has had repeated hospitalizations, at risk for Pseudomonas and atypical infections due to structural  lung disease.   Minimal sputum collection.  Strep pneumo and Legionella urine antigens, MRSA PCR negative  -Hemoptysis has resolved    A. Fib:  Continue metoprolol and Eliquis    Severe pulmonary hypertension: Started p.o. Lasix.  Pulmonology following    BRENDA on CKD stage III: Baseline creatinine around 1.4.  Creatinine peaked and stabilized around 1.8.  Avoid nephrotoxic medications, monitor daily BMP    Hyperglycemia: A1c 5.4.  Glucose elevated due to steroids.  Low-dose sliding scale     Hypertension: Blood pressure acceptable acutely.  Continue amlodipine and metoprolol    Elevated LFTs: Mild, related to congestion versus DILI (from antibiotics?  Steroids?).  Hepatitis panel negative.  Right upper quadrant ultrasound shows stones with no obstruction.  Downtrending today, recheck tomorrow      · Eliquis (home med) for DVT prophylaxis.  · DNR/DNI  · Discussed with patient and nursing staff.  · Anticipate discharge to SNU facility in 2-3 days.  Oxygen requirements are back to baseline but she remains on IV steroids      Farhana Gibson DO  [facility]  04/07/22  13:26 EDT    Patient was placed in face mask on first look.  I wore protective equipment throughout this patient encounter including a face mask, gloves and protective eyewear.  Hand hygiene was performed before donning protective equipment and after removal when leaving the room.

## 2022-04-08 NOTE — PROGRESS NOTES
"Patient is expectorating moderate amount thick \"jelly\" consistency bloody secretions post treatment.--Reported to nurse.  "

## 2022-04-08 NOTE — PROGRESS NOTES
Name: Yazmin Javier ADMIT: 3/31/2022   : 1935  PCP: Morenita Silverio MD    MRN: [id number] LOS: 7 days   AGE/SEX: 86 y.o. female  ROOM: [facility]     Subjective   Subjective   Sitting up in chair, appears comfortable.  Bright and pleasant affect as always.  Very appreciative of the care she has received here    Review of Systems  Denies chest pain, fevers, abdominal pain, lower extremity swelling     Objective   Objective   Vital Signs  Temp:  [97.5 °F (36.4 °C)-97.6 °F (36.4 °C)] 97.5 °F (36.4 °C)  Heart Rate:  [] 80  Resp:  [16-20] 18  BP: (120-135)/() 135/71  SpO2:  [93 %-100 %] 93 %  on  Flow (L/min):  [2-3] 2;   Device (Oxygen Therapy): nasal cannula  Body mass index is 35.67 kg/m².  Physical Exam  Constitutional:       General: She is not in acute distress.     Appearance: She is not diaphoretic.   Cardiovascular:      Rate and Rhythm: Normal rate and regular rhythm.   Pulmonary:      Effort: Pulmonary effort is normal. No respiratory distress.      Breath sounds: Wheezing (mild expiratory) present. No rhonchi.   Abdominal:      Palpations: Abdomen is soft.      Tenderness: There is no abdominal tenderness. There is no guarding.   Musculoskeletal:      Right lower leg: No edema.      Left lower leg: No edema.   Neurological:      Mental Status: She is alert.   Psychiatric:         Mood and Affect: Mood normal.         Results Review     I reviewed the patient's new clinical results.  Results from last 7 days   Lab Units 22  0649 22  0541 22  0541 22  0619   WBC 10*3/mm3 12.61* 12.76* 11.19* 10.00   HEMOGLOBIN g/dL 8.8* 9.2* 9.2* 9.2*   PLATELETS 10*3/mm3 186 189 193 178     Results from last 7 days   Lab Units 22  0649 22  0541 22  0541 22  0619   SODIUM mmol/L 142 144 145 143   POTASSIUM mmol/L 4.0 4.3 3.2* 3.3*   CHLORIDE mmol/L 106 108* 106 104   CO2 mmol/L 24.4 23.4 25.2 23.8   BUN mg/dL 41* 40* 44* 48*   CREATININE mg/dL 1.30* 1.43*  1.44* 1.81*   GLUCOSE mg/dL 194* 210* 161* 181*   Estimated Creatinine Clearance: 33.3 mL/min (A) (by C-G formula based on SCr of 1.3 mg/dL (H)).  Results from last 7 days   Lab Units 04/08/22  0649 04/07/22  0541 04/06/22  0541 04/05/22  0619   ALBUMIN g/dL 3.10* 3.40* 3.30* 3.30*   BILIRUBIN mg/dL 1.7* 1.3* 1.5* 1.6*   ALK PHOS U/L 89 93 89 97   AST (SGOT) U/L 41* 67* 74* 52*   ALT (SGPT) U/L 93* 114* 108* 79*     Results from last 7 days   Lab Units 04/08/22  0649 04/07/22  0541 04/06/22  0541 04/05/22  0619 04/03/22  0736 04/02/22  0533   CALCIUM mg/dL 8.8 8.6 8.7 8.9   < > 8.9   ALBUMIN g/dL 3.10* 3.40* 3.30* 3.30*   < > 3.60   MAGNESIUM mg/dL 2.4 2.4  --   --   --  1.9   PHOSPHORUS mg/dL 3.7 3.2  --   --   --  3.0    < > = values in this interval not displayed.     Results from last 7 days   Lab Units 04/03/22  0736   PROCALCITONIN ng/mL 0.07     COVID19   Date Value Ref Range Status   03/31/2022 Not Detected Not Detected - Ref. Range Final   02/14/2022 Not Detected Not Detected - Ref. Range Final     Glucose   Date/Time Value Ref Range Status   04/08/2022 0556 201 (H) 70 - 130 mg/dL Final     Comment:     Meter: [id number] : [id number] Ariela Carvalho RN   04/07/2022 2150 180 (H) 70 - 130 mg/dL Final     Comment:     Meter: [id number] : [id number] Ariela Carvalho RN   04/07/2022 1613 213 (H) 70 - 130 mg/dL Final     Comment:     Meter: [id number] : [id number] Tevin CASH   04/07/2022 1119 244 (H) 70 - 130 mg/dL Final     Comment:     Meter: [id number] : [id number] Tevin CASH   04/06/2022 2025 227 (H) 70 - 130 mg/dL Final     Comment:     Meter: [id number] : [id number] Marco Gonzales CNA   04/06/2022 1622 175 (H) 70 - 130 mg/dL Final     Comment:     Meter: [id number] : [id number] Anshu Young NA   04/06/2022 1115 207 (H) 70 - 130 mg/dL Final     Comment:     Meter: [id number] : [id number] Michael CASH       US Abdomen Limited  Narrative: RIGHT UPPER OUTER ULTRASOUND      HISTORY: Abnormal elevated liver function tests     COMPARISON: 2022     TECHNIQUE: Grayscale and color Doppler sonographic images were obtained  through the right upper quadrant.     FINDINGS:  There is limited visualization of the pancreas. It does appear somewhat  atrophic. No focal hepatic lesions are seen. Hepatic echotexture appears  normal. There is no intra or extrahepatic biliary dilatation. Common  bile duct measures 4 mm. There is cholelithiasis, but there is no  evidence of acute cholecystitis. There is no gallbladder wall thickening  or pericholecystic fluid. Gallbladder wall measures 3 mm in thickness.  Right kidney measures 8.7 x 4.5 0.5 cm. There is no hydronephrosis.  There is a simple appearing right renal cyst, measuring 9 x 9 x 9 mm.     Impression:    1. There is no intra or extrahepatic biliary dilatation.  2. Cholelithiasis.     This report was finalized on 2022 5:53 AM by Dr. Joanna Gracia M.D.     XR Chest 1 Vw  RADIOLOGY REPORT    FACILITY:  [facility]  UNIT/AGE/GENDER: J.ED  ER      AGE:84 Y          SEX:F  PATIENT NAME/:  PRAVIN MAN A    1935  UNIT NUMBER:  [id number]  ACCOUNT NUMBER:  [id number]  ACCESSION NUMBER:  [id number]    EXAMINATION: CHEST AP PORTABLE ERECT.    DATE OF EXAM(S): 2020.    CLINICAL HISTORY: productive cough, fatigue.    FINDINGS: Lungs are well expanded and clear. Heart and mediastinal contours are normal. Pulmonary vascularity is normal. No pleural fluid is present.    IMPRESSION: No active disease.    Dictated by: Antonio Shin M.D.    Images and Report reviewed and interpreted by: Antonio Shin M.D.    <PS><Electronically signed by: Antonio Shin M.D.>  2020 1338    D: 2020 1337  T: 2020 1337  CT Head Wo Contrast  RADIOLOGY REPORT    FACILITY:  [facility]  UNIT/AGE/GENDER: J.ED  ER      AGE:84 Y          SEX:F  PATIENT NAME/:  PRAVIN MAN A     1935  UNIT NUMBER:  [id number]  ACCOUNT NUMBER:  [id number]  ACCESSION NUMBER:  [id number]    EXAMINATION: HEAD CT WITHOUT CONTRAST.    DATE: 2020.    HISTORY: fatigue and confusion. H/O A fib on eliquis.    COMPARISON: None available    MIPS: Dose reduction techniques were employed per ALARA protocol.    FINDINGS: Ventricles and sulci are normal for age. Mild low-attenuation in the periventricular white matter is nonspecific, but most commonly reflects chronic small vessel ischemic disease.    No mass, hemorrhage, or acute infarct is detected. Three punctate calcifications lie in a left parietal sulcus, possibly dystrophic (images 33-35). A punctate parenchymal calcification in the right basal ganglia and medial left temporal lobe are also   likely dystrophic (image 19).    The calvarium is intact. The included paranasal sinuses and mastoid air cells are well-aerated.    IMPRESSION:  1. Mild chronic ischemic change of the hemispheric white matter.  2. No acute intracranial abnormality.    Dictated by: Antonio Shin M.D.    Images and Report reviewed and interpreted by: Antonio Shin M.D.    <PS><Electronically signed by: Antonio Shin M.D.>  2020 1434    D: 2020 1429  T: 2020 1429  CT Head Wo Contrast  RADIOLOGY REPORT    FACILITY:  [facility]  UNIT/AGE/GENDER: EVANGELISTW  IN      AGE:84 Y          SEX:F  PATIENT NAME/:  PRAVIN MAN A    1935  UNIT NUMBER:  [id number]  ACCOUNT NUMBER:  [id number]  ACCESSION NUMBER:  [id number]    EXAMINATION: CT HEAD WO CONTRAST    DATE: 2020    COMPARISON: Head CT from 2020.    HISTORY: Head trauma, coagulopathy (Age 19-64y)  patient fell and hit head. Follow-up    TECHNIQUE:  Routine CT imaging through the brain was performed without intravenous contrast.  The radiation dose reduction device was turned on for this study per the ALARA protocol.    FINDINGS:  The ventricles and cerebral sulci are  unchanged and are again within normal limits for the patient's age.. No change in the small amount of periventricular and deep white matter low-density change scattered within both frontal, parietal and   occipital lobes compatible with chronic ischemic change from underlying microangiopathy. No suspected infarcts are seen. There was no evidence of intracerebral hemorrhage or mass lesion.  No extracerebral fluid collections were seen. There is partial   calcification of the distal aspects of both internal carotid arteries and the distal aspect of the right vertebral artery. No diagnostic bony abnormalities are seen.    IMPRESSION:.  1. Stable chronic intracranial findings.  2. No suspected acute intracranial findings were seen.    Dictated by: Ty Tejada M.D.    Images and Report reviewed and interpreted by: Ty Tejada M.D.    <PS><Electronically signed by: Ty Tejada M.D.>  2020    D: 2020  T: 2020  XR Chest 1 Vw  RADIOLOGY REPORT    FACILITY:  [facility]  UNIT/AGE/GENDER: J.ED  ER      AGE:85 Y          SEX:F  PATIENT NAME/:  PRAVIN MAN ALICIA    1935  UNIT NUMBER:  [id number]  ACCOUNT NUMBER:  [id number]  ACCESSION NUMBER:  [id number]    XR CHEST 1 VW    DATE: 2020    COMPARISON: 2020    INDICATION: soa.    FINDINGS: Cardia mediastinal silhouette is stable. The left lung appears relatively clear. There is slight increased haziness of right lung interstitial markings best seen in the upper lung and right infrahilar region. This could be accentuated by   summation artifact from the chest wall. Subtle infiltrate or edema is not excluded. No focal consolidation. No pleural effusion or pneumothorax is seen.    IMPRESSION: Questionable subtle hazy edema or infiltrate in the right lung. Suggest follow-up with dedicated PA and lateral chest x-ray and/or chest CT if clinically indicated.    ADDENDUM:  This study was reviewed via  teleradiology by [employer] and a preliminary report was faxed.     ADDENDUM: Preliminary report described no evidence for acute cardiac or pulmonary process. Due to the possible discrepancy, report was called to the emergency room at the time of this interpretation. The report was given to nurse Navarro on 2020 at   0650.    Dictated by: Foster Summers M.D.    Images and Report reviewed and interpreted by: Foster Summers M.D.    <PS><Electronically signed by: Foster Summers M.D.>  202052    D: 202047  T: 2020 0647  CT Chest Wo Contrast  RADIOLOGY REPORT    FACILITY:  [facility]  UNIT/AGE/GENDER: J.ED  ER      AGE:85 Y          SEX:F  PATIENT NAME/:  PRAVIN MAN A    1935  UNIT NUMBER:  [id number]  ACCOUNT NUMBER:  [id number]  ACCESSION NUMBER:  [id number]    DATE: 2020    EXAMINATION:  Computed tomography of the chest without intravenous contrast    HISTORY: Shortness of breath, possible COVID 19 pneumonia    TECHNIQUE:  Transaxial computed tomographic images of the chest were obtained without intravenous contrast according to the standard protocol. Dose reduction technique performed per ALARA protocol.    COMPARISON: None    FINDINGS:    Heart is mildly enlarged. There is no pericardial effusion. The thoracic aorta is normal in caliber with severe atherosclerosis. Dense coronary artery calcifications as well as dense calcification of the mitral valve are present. The pulmonary arteries   are not dilated. No axillary, supraclavicular, or mediastinal lymphadenopathy. Calcified hilar nodes are present.    There is mosaic attenuation of the lung with areas of interlobular septal thickening at the lung bases. No focal peripheral consolidative or groundglass opacities highly suspicious for COVID 19. No pleural effusion. Numerous calcified granulomas are   present.    No acute findings in the imaged upper abdomen. No acute osseous  abnormality.    IMPRESSION:      1.Mosaic attenuation of the lungs with areas of interlobular septal thickening. Findings are most compatible with interstitial pulmonary edema. Indeterminate for COVID 19 pneumonia.  2.Cardiomegaly and dense mitral valve calcification.  3.Severe atherosclerosis including coronary artery calcifications.    Dictated by: Attila Foreman D.O.    Images and Report reviewed and interpreted by: Attila Foreman D.O.    <PS><Electronically signed by: Attila Foreman D.O.>  202010    D: 2020  T: 2020 08   Renal Bilateral  RE[facility]ING YOUR TEST RESULTS [facility] IS NOT A SUBSTITUTE FOR DISCUSSING THOSE RESULTS WITH YOUR HEALTH CARE PROVIDER.  PLEASE CONTACT YOUR PROVIDER [facility]IA [facility] TO DISCUSS ANY QUESTIONS OR CONCERNS YOU MAY HAVE REGARDING THESE TEST RESULTS.    RADIOLOGY REPORT    FACILITY:  [facility]  UNIT/AGE/GENDER: M.US  OP      AGE:86 Y          SEX:F  PATIENT NAME/:  DONOVAN ALICIA COSTELLO    1935  UNIT NUMBER:  [id number]  ACCOUNT NUMBER:  [id number]  ACCESSION NUMBER:  [id number]    Exam: Ultrasound kidney/retroperitoneum complete exam.    DATE: 2021    HISTORY: Stage IV chronic kidney disease    FINDINGS: The right kidney and left kidney measure 9.2 x 4.2 x 4.9 and 8.8 x 5.2 x 4.4 cm respectively.  There is no hydronephrosis. No solid masses or stones are seen. The kidneys show increased echogenicity and decreased thickness of the renal cortex   consistent with medical renal disease. The bladder is trabeculated with a possible small diverticulum posteriorly on the right.    Incidental note is made of multiple small gallstones within the gallbladder.    IMPRESSION: Increased echogenicity and decreased thickness of the renal cortex consistent with medical renal disease. Trabeculation of bladder with possible posterior diverticulum on the right. CT scanning may be helpful to more definitively  evaluate.    Incidental note made of multiple small gallstones within the gallbladder.    Dictated by: Hernandez Marie M.D.    Images and Report reviewed and interpreted by: Hernandez Marie M.D.    <PS><Electronically signed by: Hernandez Marie M.D.>  09/13/2021 1633    D: 09/13/2021 1623  T: 09/13/2021 1623  Holter monitor - 72 hour  Narrative: This result has an attachment that is not available.  Impression: Holter monitor    Date: 1935    Indication: Assess  Atrial fibrillation.    Findings: Patient was monitored for 3 days and 3 minutes.  A total of 375,305 QRS complexes were analyzed.  Average heart rate was 87 bpm.  Maximum heart rate 150 bpm, minimum heart rate 44 bpm.  Maximum occurred at 12 noon on day 4, minimum occurred on day 1 at 7 PM.  There were 12 episodes of bradycardia less than 50 bpm.  Longest pause was 2.6 seconds which occurred at 5:30 AM on day 4.    Patient was in atrial fibrillation 100% of time.  There was no ventricular ectopy.  There were no patient symptoms noted, and there were no patient activated events.    Impression: Average heart rate in atrial fibrillation 87 bpm, with pauses 2.6 seconds at 5:30 AM and 2.6 seconds at 1 AM, day 4 and day 3 respectively.    Overall heart rate control is acceptable.  Pauses noted, not needing pacemaker at this time.     I reviewed the patient's daily medications.  Scheduled Medications  acetylcysteine, 4 mL, Nebulization, Q8H - RT  allopurinol, 300 mg, Oral, Daily  amLODIPine, 5 mg, Oral, Daily  apixaban, 5 mg, Oral, BID  atorvastatin, 80 mg, Oral, Nightly  doxycycline, 100 mg, Oral, Q12H  folic acid, 1 mg, Oral, Daily  furosemide, 40 mg, Oral, Daily  guaiFENesin, 600 mg, Oral, Q12H  insulin glargine, 5 Units, Subcutaneous, Nightly  insulin lispro, 0-9 Units, Subcutaneous, 4x Daily With Meals & Nightly  ipratropium-albuterol, 3 mL, Nebulization, Q4H - RT  levothyroxine, 50 mcg, Oral, Daily  methylPREDNISolone sodium succinate, 40 mg, Intravenous,  Q8H  metoprolol tartrate, 25 mg, Oral, BID  PARoxetine, 10 mg, Oral, Daily  [START ON 4/9/2022] predniSONE, 40 mg, Oral, Daily With Breakfast  vitamin B-12, 1,000 mcg, Oral, Daily    Infusions   Diet  Diet Regular; Cardiac       Assessment/Plan     Active Hospital Problems    Diagnosis  POA   • **Acute on chronic respiratory failure with hypoxia (HCC) [J96.21]  Yes   • Pneumonia involving right lung [J18.9]  Yes   • Pulmonary hypertension (HCC) [I27.20]  Yes   • Hyperbilirubinemia [E80.6]  Yes   • Transaminitis [R74.01]  Yes   • BRENDA (acute kidney injury) (HCC) [N17.9]  Yes   • Fever in adult [R50.9]  Yes   • History of asthma [Z87.09]  Not Applicable   • Hyperglycemia [R73.9]  Yes   • Hypokalemia [E87.6]  Yes   • Chronic diastolic CHF (congestive heart failure) (HCC) [I50.32]  Yes   • Chronic anticoagulation [Z79.01]  Not Applicable   • Paroxysmal atrial fibrillation (HCC) [I48.0]  Yes   • Stage 3b chronic kidney disease (HCC) [N18.32]  Yes      Resolved Hospital Problems    Diagnosis Date Resolved POA   • Cough with hemoptysis [R04.2] 04/07/2022 No     86-year-old female with a history of COPD on 3 L home oxygen who presented with fever, shortness of breath and cough productive of yellow sputum.  She was febrile on admission and CT abdomen showed patchy groundglass opacities at the right lung base.  She is admitted for COPD exacerbation due to pneumonia.     Today: Wheezing continues to improve.  Down to 2 L of oxygen.  Noted pulmonology plans to change to oral steroids tomorrow.  Glucose still above goal, but expect this will improve with decrease dose of steroids.  No further increases in insulin today.  LFTs are downtrending  Reviewed EKG done yesterday, shows A. fib, rates in the 90s.  Expect tachycardia will improve once she is done with breathing treatments and steroids.  Keep metoprolol at same dose  Hopeful for home tomorrow with steroid taper.      COPD/asthma exacerbation, acute on chronic hypoxic  respiratory failure, on 3 L home oxygen:   -Continue steroids, duo nebs and treatment of pneumonia as below.  Pulmonology following  -Encourage incentive spirometry, chest PT, out of bed ambulation      Right lower lobe pneumonia.  Hemoptysis:   -Treated with Zosyn and doxycycline as she has had repeated hospitalizations, at risk for Pseudomonas and atypical infections due to structural lung disease.   Minimal sputum collection.  Strep pneumo and Legionella urine antigens, MRSA PCR negative  -Hemoptysis has resolved    A. Fib:  Continue metoprolol and Eliquis    Severe pulmonary hypertension: Started p.o. Lasix.  Pulmonology following.  Currently appears euvolemic    BRENDA on CKD stage III: Baseline creatinine around 1.4.  Creatinine peaked and stabilized around 1.8.  Avoid nephrotoxic medications, monitor daily BMP    Hyperglycemia: A1c 5.4.  Glucose elevated due to steroids.  Low-dose sliding scale     Hypertension: Blood pressure acceptable acutely.  Continue amlodipine and metoprolol    Elevated LFTs: Mild, related to congestion versus DILI (from antibiotics?  Steroids?).  Hepatitis panel negative.  Right upper quadrant ultrasound shows stones with no obstruction.  Downtrending again today.  Recheck outpatient      · Eliquis (home med) for DVT prophylaxis.  · DNR/DNI  · Discussed with patient and nursing staff.  · Anticipate discharge back to assisted living tomorrow      Farhana Gibson DO  [facility]  04/08/22  11:30 EDT    Patient was placed in face mask on first look.  I wore protective equipment throughout this patient encounter including a face mask, gloves and protective eyewear.  Hand hygiene was performed before donning protective equipment and after removal when leaving the room.

## 2022-04-08 NOTE — PROGRESS NOTES
[facility] INPATIENT PROGRESS NOTE         [address]    2022      PATIENT IDENTIFICATION:  Name: Yazmin Javier ADMIT: 3/31/2022   : 1935  PCP: Morenita Silverio MD    MRN: [id number] LOS: 7 days   AGE/SEX: 86 y.o. female  ROOM: [facility]                     LOS 7    Reason for visit: Respiratory failure with asthma exacerbation      SUBJECTIVE:      Says that she feels that she is breathing better.  Her cough has improved.  On 2 L supplemental oxygen with saturations 92% at time of visit.  Still with mild wheezing but better overall.  No further hemoptysis.      Objective   OBJECTIVE:    Vital Sign Min/Max for last 24 hours  Temp  Min: 97.5 °F (36.4 °C)  Max: 97.6 °F (36.4 °C)   BP  Min: 120/66  Max: 140/77   Pulse  Min: 82  Max: 109   Resp  Min: 16  Max: 18   SpO2  Min: 94 %  Max: 100 %   No data recorded   Weight  Min: 88.5 kg (195 lb)  Max: 88.5 kg (195 lb)                         Body mass index is 35.67 kg/m².    Intake/Output Summary (Last 24 hours) at 2022 0914  Last data filed at 2022 0558  Gross per 24 hour   Intake 240 ml   Output 200 ml   Net 40 ml         Exam:  GEN:  No distress, appears stated age  EYES:   PERRL, anicteric sclerae  ENT:    External ears/nose normal, OP clear  NECK:  No adenopathy, midline trachea  LUNGS: Normal chest on inspection, palpation and mild wheeze bilaterally on auscultation  CV:  Normal S1S2, without murmur  ABD:  Nontender, nondistended, no hepatosplenomegaly, +BS  EXT:  No edema.  No cyanosis or clubbing.  No mottling and normal cap refill.    Assessment     Scheduled meds:  acetylcysteine, 4 mL, Nebulization, Q8H - RT  allopurinol, 300 mg, Oral, Daily  amLODIPine, 5 mg, Oral, Daily  apixaban, 5 mg, Oral, BID  atorvastatin, 80 mg, Oral, Nightly  doxycycline, 100 mg, Oral, Q12H  folic acid, 1 mg, Oral, Daily  furosemide, 40 mg, Oral, Daily  guaiFENesin, 600 mg, Oral, Q12H  insulin glargine, 5 Units, Subcutaneous, Nightly  insulin  lispro, 0-9 Units, Subcutaneous, 4x Daily With Meals & Nightly  ipratropium-albuterol, 3 mL, Nebulization, Q4H - RT  levothyroxine, 50 mcg, Oral, Daily  methylPREDNISolone sodium succinate, 40 mg, Intravenous, Q8H  metoprolol tartrate, 25 mg, Oral, BID  PARoxetine, 10 mg, Oral, Daily  vitamin B-12, 1,000 mcg, Oral, Daily      IV meds:                         Data Review:  Results from last 7 days   Lab Units 04/08/22  0649 04/07/22  0541 04/06/22  0541 04/05/22  0619 04/04/22  1159   SODIUM mmol/L 142 144 145 143 141   POTASSIUM mmol/L 4.0 4.3 3.2* 3.3* 3.6   CHLORIDE mmol/L 106 108* 106 104 105   CO2 mmol/L 24.4 23.4 25.2 23.8 23.0   BUN mg/dL 41* 40* 44* 48* 45*   CREATININE mg/dL 1.30* 1.43* 1.44* 1.81* 1.73*   GLUCOSE mg/dL 194* 210* 161* 181* 158*   CALCIUM mg/dL 8.8 8.6 8.7 8.9 8.7         Estimated Creatinine Clearance: 33.3 mL/min (A) (by C-G formula based on SCr of 1.3 mg/dL (H)).  Results from last 7 days   Lab Units 04/08/22  0649 04/07/22  0541 04/06/22  0541 04/05/22  0619 04/04/22  1159   WBC 10*3/mm3 12.61* 12.76* 11.19* 10.00 9.49   HEMOGLOBIN g/dL 8.8* 9.2* 9.2* 9.2* 9.4*   PLATELETS 10*3/mm3 186 189 193 178 164         Results from last 7 days   Lab Units 04/08/22  0649 04/07/22  0541 04/06/22  0541 04/05/22  0619 04/04/22  0733   ALT (SGPT) U/L 93* 114* 108* 79* 61*   AST (SGOT) U/L 41* 67* 74* 52* 47*     Results from last 7 days   Lab Units 04/03/22  1146   PH, ARTERIAL pH units 7.488*   PO2 ART mm Hg 49.2*   PCO2, ARTERIAL mm Hg 32.6*   HCO3 ART mmol/L 24.7     Results from last 7 days   Lab Units 04/03/22  0736   PROCALCITONIN ng/mL 0.07         Glucose   Date/Time Value Ref Range Status   04/08/2022 0556 201 (H) 70 - 130 mg/dL Final     Comment:     Meter: [id number] : [id number] Ariela Carvalho RN   04/07/2022 2150 180 (H) 70 - 130 mg/dL Final     Comment:     Meter: [id number] : [id number] Ariela Carvalho RN   04/07/2022 1613 213 (H) 70 - 130 mg/dL Final     Comment:     Meter:  [id number] : [id number] Tevin Ahn NA   04/07/2022 1119 244 (H) 70 - 130 mg/dL Final     Comment:     Meter: [id number] : [id number] Tevin Ahn NA   04/06/2022 2025 227 (H) 70 - 130 mg/dL Final     Comment:     Meter: [id number] : [id number] Mraco Gonzales CNA   04/06/2022 1622 175 (H) 70 - 130 mg/dL Final     Comment:     Meter: [id number] : [id number] Anshu Young NA   04/06/2022 1115 207 (H) 70 - 130 mg/dL Final     Comment:     Meter: [id number] : [id number] Michael CASH       2D echo reviewed: EF 60%.  Moderately dilated left atrium and right ventricle with estimated pulmonary pressures 56 mmHg.    CT chest 4/3 reviewed shows patchy pulmonary infiltrates with multifocal airspace disease.  Right pleural effusion is seen.  Cardiomegaly and mild mediastinal reactive lymphadenopathy.      Microbiology reviewed            Active Hospital Problems    Diagnosis  POA   • **Acute on chronic respiratory failure with hypoxia (HCC) [J96.21]  Yes   • Pneumonia involving right lung [J18.9]  Yes   • Pulmonary hypertension (HCC) [I27.20]  Yes   • Hyperbilirubinemia [E80.6]  Yes   • Transaminitis [R74.01]  Yes   • BRENDA (acute kidney injury) (HCC) [N17.9]  Yes   • Fever in adult [R50.9]  Yes   • History of asthma [Z87.09]  Not Applicable   • Hyperglycemia [R73.9]  Yes   • Hypokalemia [E87.6]  Yes   • Chronic diastolic CHF (congestive heart failure) (HCC) [I50.32]  Yes   • Chronic anticoagulation [Z79.01]  Not Applicable   • Paroxysmal atrial fibrillation (HCC) [I48.0]  Yes   • Stage 3b chronic kidney disease (HCC) [N18.32]  Yes      Resolved Hospital Problems    Diagnosis Date Resolved POA   • Cough with hemoptysis [R04.2] 04/07/2022 No         ASSESSMENT:    Acute on chronic hypoxemic respiratory failure  Asthma acute exacerbation  Acute kidney injury on chronic kidney disease  Pneumonia  Paroxysmal atrial fibrillation  Chronic diastolic CHF  Probable LILIANA  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  Encourage pulmonary toilet.  Continue bronchodilators scheduled and as needed and steroid with taper.  Change to p.o. prednisone tomorrow.  Continue antibiotics.    Isauro Ramirez MD  Pulmonary and Critical Care Medicine  [facility], [facility]  4/8/2022    09:14 EDT

## 2022-04-08 NOTE — PROGRESS NOTES
Breath sounds have improved this afternoon - patient has been working on IS and [employer] and reports productive cough.

## 2022-04-08 NOTE — PLAN OF CARE
Problem: Adult Inpatient Plan of Care  Goal: Plan of Care Review  Outcome: Ongoing, Progressing  Flowsheets (Taken 4/7/2022 2234)  Progress: improving  Outcome Evaluation: THIS IS A CINDY 87 YO FEMAL WHO CONTINUED TO IMPROVE WITH LESS S/SX OF RESP DISTRESS WITH ACTIVITY. REPORTS BROWN SECRETIONS WITH PRODUCTIVE COUGH. NO BLOOD NOTED. PURE WICK IN PLACE. UP TO BSC WITH ASSIST OF ONE FOR BM. LAST BM TODAY. TAKING PO WELL. BLOOD SUGARS CONTINUE TO BE MONITORED AND MANAGED WITH SS AND SCHEDULED LANTUS INSULIN R/T STEROID USE. O2 @ 3 LPM EFFECTIVELY MAINTAINING SATURATION. NO S/SX OF DISTRESS. REST PEROIDS PROVIDED DURING ACTIVITY. SLOW TO REBOUND BUT REPORTEDLY IMPROVING. NOTED TO BE USING FLUTTER VALVE AND INCENTIVE SPIROMETER INDEPENDENTLY. A. FIB ON MONITOR. RATE 90's TO 's. AFEBRILE. MONITORING.  Goal: Patient-Specific Goal (Individualized)  Outcome: Ongoing, Progressing  Goal: Absence of Hospital-Acquired Illness or Injury  Outcome: Ongoing, Progressing  Intervention: Identify and Manage Fall Risk  Description: Perform standard risk assessment on admission using a validated tool or comprehensive approach appropriate to the patient; reassess fall risk frequently, with change in status or transfer to another level of care.  Communicate fall injury risk to interprofessional healthcare team.  Determine need for increased observation, equipment and environmental modification, such as low bed, signage and supportive, nonskid footwear.  Adjust safety measures to individual developmental age, stage and identified risk factors.  Reinforce the importance of safety and physical activity with patient and family.  Perform regular intentional rounding to assess need for position change, pain assessment and personal needs, including assistance with toileting.  Recent Flowsheet Documentation  Taken 4/7/2022 2210 by Maia Titus, RN  Safety Promotion/Fall Prevention:   assistive device/personal items within reach    clutter free environment maintained   fall prevention program maintained   nonskid shoes/slippers when out of bed   safety round/check completed   room organization consistent  Taken 4/7/2022 2010 by Maia Titus RN  Safety Promotion/Fall Prevention:   clutter free environment maintained   assistive device/personal items within reach   fall prevention program maintained   nonskid shoes/slippers when out of bed   room organization consistent   safety round/check completed   lighting adjusted  Intervention: Prevent Skin Injury  Description: Perform a screening for skin injury risk, such as pressure or moisture associated skin damage on admission and at regular intervals throughout hospital stay.  Keep all areas of skin (especially folds) clean and dry.  Maintain adequate skin hydration.  Relieve and redistribute pressure and protect bony prominences; implement measures based on patient-specific risk factors.  Match turning and repositioning schedule to clinical condition.  Encourage weight shift frequently; assist with reposition if unable to complete independently.  Float heels off bed; avoid pressure on the Achilles tendon.  Keep skin free from extended contact with medical devices.  Encourage functional activity and mobility, as early as tolerated.  Use aids (e.g., slide boards, mechanical lift) during transfer.  Recent Flowsheet Documentation  Taken 4/7/2022 2210 by Maia Titus, RN  Body Position:   supine   neutral body alignment   neutral head position  Taken 4/7/2022 2010 by Maia Titus RN  Body Position:   weight shifting   right   tilted   neutral body alignment   neutral head position  Skin Protection:   adhesive use limited   incontinence pads utilized   tubing/devices free from skin contact  Intervention: Prevent and Manage VTE (Venous Thromboembolism) Risk  Description: Assess for VTE (venous thromboembolism) risk.  Encourage and assist with early ambulation.  Initiate and maintain compression  or other therapy, as indicated, based on identified risk in accordance with organizational protocol and provider order.  Encourage both active and passive leg exercises while in bed, if unable to ambulate.  Recent Flowsheet Documentation  Taken 4/7/2022 2210 by Maia Titus RN  Activity Management: activity adjusted per tolerance  Taken 4/7/2022 2010 by Maia Titus RN  Activity Management: activity adjusted per tolerance  Intervention: Prevent Infection  Description: Maintain skin and mucous membrane integrity; promote hand, oral and pulmonary hygiene.  Optimize fluid balance, nutrition, sleep and glycemic control to maximize infection resistance.  Identify potential sources of infection early to prevent or mitigate progression of infection (e.g., wound, lines, devices).  Evaluate ongoing need for invasive devices; remove promptly when no longer indicated.  Recent Flowsheet Documentation  Taken 4/7/2022 2210 by Maia Titus RN  Infection Prevention:   environmental surveillance performed   equipment surfaces disinfected   hand hygiene promoted   personal protective equipment utilized   rest/sleep promoted   single patient room provided   visitors restricted/screened  Taken 4/7/2022 2010 by Maia Titus RN  Infection Prevention:   environmental surveillance performed   equipment surfaces disinfected   hand hygiene promoted   personal protective equipment utilized   rest/sleep promoted   single patient room provided   visitors restricted/screened  Goal: Optimal Comfort and Wellbeing  Outcome: Ongoing, Progressing  Intervention: Monitor Pain and Promote Comfort  Description: Assess pain level, treatment efficacy and patient response at regular intervals using a consistent pain scale.  Consider the presence and impact of preexisting chronic pain.  Encourage patient and caregiver involvement in pain assessment, interventions and safety measures.  Recent Flowsheet Documentation  Taken 4/7/2022 2210 by Ariela  Maia, RN  Pain Management Interventions:   care clustered   diversional activity provided   medication offered but refused   pillow support provided   position adjusted  Intervention: Provide Person-Centered Care  Description: Use a family-focused approach to care.  Develop trust and rapport by proactively providing information, encouraging questions, addressing concerns and offering reassurance.  Acknowledge emotional response to hospitalization.  Recognize and utilize personal coping strategies.  Honor spiritual and cultural preferences.  Recent Flowsheet Documentation  Taken 4/7/2022 2010 by Maia Titus, RN  Trust Relationship/Rapport:   care explained   choices provided   empathic listening provided   emotional support provided   questions answered   reassurance provided   questions encouraged   thoughts/feelings acknowledged  Goal: Readiness for Transition of Care  Outcome: Ongoing, Progressing     Problem: Fall Injury Risk  Goal: Absence of Fall and Fall-Related Injury  Outcome: Ongoing, Progressing  Intervention: Identify and Manage Contributors  Description: Develop a fall prevention plan with the patient and caregiver/family.  Provide reorientation, appropriate sensory stimulation and routines with changes in mental status to decrease risk of fall.  Promote use of personal vision and auditory aids.  Assess assistance level required for safe and effective self-care; provide support as needed, such as toileting, mobilization. For age 65 and older, implement timed toileting with assistance.  Encourage physical activity, such as performance of mobility and self-care at highest level of patient ability, multicomponent exercise program and provision of appropriate assistive devices.  If fall occurs, assess the severity of injury; implement fall injury protocol. Determine the cause and revise fall injury prevention plan.  Regularly review medication contribution to fall risk; adjust medication administration  times to minimize risk of falling.  Consider risk related to polypharmacy and age.  Balance adequate pain management with potential for oversedation.  Recent Flowsheet Documentation  Taken 4/7/2022 2210 by Maia Titus RN  Medication Review/Management: medications reviewed  Taken 4/7/2022 2010 by Maia Titus RN  Medication Review/Management: medications reviewed  Intervention: Promote Injury-Free Environment  Description: Provide a safe, barrier-free environment that encourages independent activity.  Keep care area uncluttered and well-lighted.  Determine need for increased observation or monitoring.  Avoid use of devices that minimize mobility, such as restraints or indwelling urinary catheter.  Recent Flowsheet Documentation  Taken 4/7/2022 2210 by Maia Titus RN  Safety Promotion/Fall Prevention:   assistive device/personal items within reach   clutter free environment maintained   fall prevention program maintained   nonskid shoes/slippers when out of bed   safety round/check completed   room organization consistent  Taken 4/7/2022 2010 by Maia Titus RN  Safety Promotion/Fall Prevention:   clutter free environment maintained   assistive device/personal items within reach   fall prevention program maintained   nonskid shoes/slippers when out of bed   room organization consistent   safety round/check completed   lighting adjusted     Problem: Skin Injury Risk Increased  Goal: Skin Health and Integrity  Outcome: Ongoing, Progressing  Intervention: Optimize Skin Protection  Description: Perform a full pressure injury risk assessment, as indicated by screening, upon admission to care unit.  Reassess skin (injury risk, full inspection) frequently (e.g., scheduled interval, with change in condition) to provide optimal early detection and prevention.  Maintain adequate tissue perfusion (e.g., encourage fluid balance; avoid crossing legs, constrictive clothing or devices) to promote tissue  oxygenation.  Maintain head of bed at lowest degree of elevation tolerated, considering medical condition and other restrictions.  Avoid positioning onto an area that remains reddened.  Minimize incontinence and moisture (e.g., toileting schedule; moisture-wicking pad, diaper or incontinence collection device; skin moisture barrier).  Cleanse skin promptly and gently when soiled utilizing a pH-balanced cleanser.  Relieve and redistribute pressure (e.g., scheduled position changes, weight shifts, use of support surface, medical device repositioning, protective dressing application, use of positioning device, microclimate control, use of pressure-injury-monitor  Encourage increased activity, such as sitting in a chair at the bedside or early mobilization, when able to tolerate.  Recent Flowsheet Documentation  Taken 4/7/2022 2210 by Maia Titus, RN  Head of Bed (HOB) Positioning: HOB at 30-45 degrees  Taken 4/7/2022 2010 by Maia Titus, RN  Pressure Reduction Techniques:   frequent weight shift encouraged   weight shift assistance provided   positioned off wounds   heels elevated off bed   rest period provided between sit times  Head of Bed (HOB) Positioning: HOB at 30-45 degrees  Pressure Reduction Devices: alternating pressure pump (ADD)  Skin Protection:   adhesive use limited   incontinence pads utilized   tubing/devices free from skin contact     Problem: Heart Failure Comorbidity  Goal: Maintenance of Heart Failure Symptom Control  Outcome: Ongoing, Progressing  Intervention: Maintain Heart Failure-Management  Description: Evaluate adherence to home heart failure self-care regimen (e.g., medication, fluid balance, sodium intake, daily weight, physical activity, telemonitoring, support).  Advocate continuation of home medication and schedule.  Consider pharmacologic therapy administration time and effects (e.g., avoid giving diuretic prior to bedtime or nitrates on empty stomach).  Monitor response to  pharmacologic therapy, including weight fluctuations, blood pressure and electrolyte levels.  Monitor for signs and symptoms of anxiety and depression, including severity and duration; if present, provide psychosocial support.  Consider need for heart failure clinic or palliative care consult.  Recent Flowsheet Documentation  Taken 4/7/2022 2210 by Maia Titus, RN  Medication Review/Management: medications reviewed  Taken 4/7/2022 2010 by Maia Titus, RN  Medication Review/Management: medications reviewed     Problem: Obstructive Sleep Apnea Risk or Actual Comorbidity Management  Goal: Unobstructed Breathing During Sleep  Outcome: Ongoing, Progressing   Goal Outcome Evaluation:           Progress: improving  Outcome Evaluation: THIS IS A CINDY 87 YO FEMAL WHO CONTINUED TO IMPROVE WITH LESS S/SX OF RESP DISTRESS WITH ACTIVITY. REPORTS BROWN SECRETIONS WITH PRODUCTIVE COUGH. NO BLOOD NOTED. PURE WICK IN PLACE. UP TO BSC WITH ASSIST OF ONE FOR BM. LAST BM TODAY. TAKING PO WELL. BLOOD SUGARS CONTINUE TO BE MONITORED AND MANAGED WITH SS AND SCHEDULED LANTUS INSULIN R/T STEROID USE. O2 @ 3 LPM EFFECTIVELY MAINTAINING SATURATION. NO S/SX OF DISTRESS. REST PEROIDS PROVIDED DURING ACTIVITY. SLOW TO REBOUND BUT REPORTEDLY IMPROVING. NOTED TO BE USING FLUTTER VALVE AND INCENTIVE SPIROMETER INDEPENDENTLY. A. FIB ON MONITOR. RATE 90's TO 's. AFEBRILE. MONITORING.

## 2022-04-08 NOTE — PLAN OF CARE
Problem: Adult Inpatient Plan of Care  Goal: Plan of Care Review  Outcome: Ongoing, Progressing  Flowsheets (Taken 4/8/2022 1615)  Progress: improving  Plan of Care Reviewed With: patient  Goal: Patient-Specific Goal (Individualized)  Outcome: Ongoing, Progressing  Goal: Absence of Hospital-Acquired Illness or Injury  Outcome: Ongoing, Progressing  Intervention: Identify and Manage Fall Risk  Description: Perform standard risk assessment on admission using a validated tool or comprehensive approach appropriate to the patient; reassess fall risk frequently, with change in status or transfer to another level of care.  Communicate fall injury risk to interprofessional healthcare team.  Determine need for increased observation, equipment and environmental modification, such as low bed, signage and supportive, nonskid footwear.  Adjust safety measures to individual developmental age, stage and identified risk factors.  Reinforce the importance of safety and physical activity with patient and family.  Perform regular intentional rounding to assess need for position change, pain assessment and personal needs, including assistance with toileting.  Recent Flowsheet Documentation  Taken 4/8/2022 1602 by Anitha Ryder, RN  Safety Promotion/Fall Prevention:   safety round/check completed   room organization consistent   nonskid shoes/slippers when out of bed  Taken 4/8/2022 0838 by Anitha Ryder, RN  Safety Promotion/Fall Prevention:   safety round/check completed   room organization consistent   nonskid shoes/slippers when out of bed  Intervention: Prevent Skin Injury  Description: Perform a screening for skin injury risk, such as pressure or moisture associated skin damage on admission and at regular intervals throughout hospital stay.  Keep all areas of skin (especially folds) clean and dry.  Maintain adequate skin hydration.  Relieve and redistribute pressure and protect bony prominences; implement measures  based on patient-specific risk factors.  Match turning and repositioning schedule to clinical condition.  Encourage weight shift frequently; assist with reposition if unable to complete independently.  Float heels off bed; avoid pressure on the Achilles tendon.  Keep skin free from extended contact with medical devices.  Encourage functional activity and mobility, as early as tolerated.  Use aids (e.g., slide boards, mechanical lift) during transfer.  Recent Flowsheet Documentation  Taken 4/8/2022 1602 by Anitha Ryder RN  Skin Protection:   adhesive use limited   incontinence pads utilized   tubing/devices free from skin contact  Taken 4/8/2022 0838 by Anitha Ryder RN  Body Position:   position changed independently   sitting up in bed  Skin Protection:   adhesive use limited   incontinence pads utilized   tubing/devices free from skin contact  Intervention: Prevent and Manage VTE (Venous Thromboembolism) Risk  Description: Assess for VTE (venous thromboembolism) risk.  Encourage and assist with early ambulation.  Initiate and maintain compression or other therapy, as indicated, based on identified risk in accordance with organizational protocol and provider order.  Encourage both active and passive leg exercises while in bed, if unable to ambulate.  Recent Flowsheet Documentation  Taken 4/8/2022 1602 by Anitha Ryder RN  Activity Management:   activity adjusted per tolerance   up in chair  VTE Prevention/Management:   bilateral   dorsiflexion/plantar flexion performed  Taken 4/8/2022 0838 by Anitha Ryder RN  Activity Management: activity adjusted per tolerance  VTE Prevention/Management:   bilateral   dorsiflexion/plantar flexion performed  Range of Motion: active ROM (range of motion) encouraged  Intervention: Prevent Infection  Description: Maintain skin and mucous membrane integrity; promote hand, oral and pulmonary hygiene.  Optimize fluid balance, nutrition, sleep and glycemic  control to maximize infection resistance.  Identify potential sources of infection early to prevent or mitigate progression of infection (e.g., wound, lines, devices).  Evaluate ongoing need for invasive devices; remove promptly when no longer indicated.  Recent Flowsheet Documentation  Taken 4/8/2022 1600 by Anitha Ryder RN  Infection Prevention:   hand hygiene promoted   personal protective equipment utilized   single patient room provided   visitors restricted/screened  Taken 4/8/2022 1200 by Anitha Ryder RN  Infection Prevention:   hand hygiene promoted   personal protective equipment utilized   single patient room provided   visitors restricted/screened  Taken 4/8/2022 1000 by Anitha Ryder RN  Infection Prevention:   hand hygiene promoted   personal protective equipment utilized   single patient room provided   visitors restricted/screened  Taken 4/8/2022 0800 by Anitha Ryder RN  Infection Prevention:   hand hygiene promoted   personal protective equipment utilized   single patient room provided   visitors restricted/screened  Goal: Optimal Comfort and Wellbeing  Outcome: Ongoing, Progressing  Intervention: Provide Person-Centered Care  Description: Use a family-focused approach to care.  Develop trust and rapport by proactively providing information, encouraging questions, addressing concerns and offering reassurance.  Acknowledge emotional response to hospitalization.  Recognize and utilize personal coping strategies.  Honor spiritual and cultural preferences.  Recent Flowsheet Documentation  Taken 4/8/2022 1602 by Anitha Ryder RN  Trust Relationship/Rapport:   care explained   choices provided   emotional support provided   empathic listening provided   questions answered   questions encouraged   reassurance provided   thoughts/feelings acknowledged  Taken 4/8/2022 0838 by Anitha Ryder RN  Trust Relationship/Rapport:   care explained   choices provided   emotional  support provided   empathic listening provided   questions answered   questions encouraged   reassurance provided   thoughts/feelings acknowledged  Goal: Readiness for Transition of Care  Outcome: Ongoing, Progressing     Problem: Fall Injury Risk  Goal: Absence of Fall and Fall-Related Injury  Outcome: Ongoing, Progressing  Intervention: Identify and Manage Contributors  Description: Develop a fall prevention plan with the patient and caregiver/family.  Provide reorientation, appropriate sensory stimulation and routines with changes in mental status to decrease risk of fall.  Promote use of personal vision and auditory aids.  Assess assistance level required for safe and effective self-care; provide support as needed, such as toileting, mobilization. For age 65 and older, implement timed toileting with assistance.  Encourage physical activity, such as performance of mobility and self-care at highest level of patient ability, multicomponent exercise program and provision of appropriate assistive devices.  If fall occurs, assess the severity of injury; implement fall injury protocol. Determine the cause and revise fall injury prevention plan.  Regularly review medication contribution to fall risk; adjust medication administration times to minimize risk of falling.  Consider risk related to polypharmacy and age.  Balance adequate pain management with potential for oversedation.  Recent Flowsheet Documentation  Taken 4/8/2022 1602 by Anitha Ryder, RN  Medication Review/Management: medications reviewed  Taken 4/8/2022 0838 by Anitha Ryder, RN  Medication Review/Management: medications reviewed  Intervention: Promote Injury-Free Environment  Description: Provide a safe, barrier-free environment that encourages independent activity.  Keep care area uncluttered and well-lighted.  Determine need for increased observation or monitoring.  Avoid use of devices that minimize mobility, such as restraints or  indwelling urinary catheter.  Recent Flowsheet Documentation  Taken 4/8/2022 1602 by Anitha Ryder RN  Safety Promotion/Fall Prevention:   safety round/check completed   room organization consistent   nonskid shoes/slippers when out of bed  Taken 4/8/2022 0838 by Anitha Ryder RN  Safety Promotion/Fall Prevention:   safety round/check completed   room organization consistent   nonskid shoes/slippers when out of bed     Problem: Skin Injury Risk Increased  Goal: Skin Health and Integrity  Outcome: Ongoing, Progressing  Intervention: Optimize Skin Protection  Description: Perform a full pressure injury risk assessment, as indicated by screening, upon admission to care unit.  Reassess skin (injury risk, full inspection) frequently (e.g., scheduled interval, with change in condition) to provide optimal early detection and prevention.  Maintain adequate tissue perfusion (e.g., encourage fluid balance; avoid crossing legs, constrictive clothing or devices) to promote tissue oxygenation.  Maintain head of bed at lowest degree of elevation tolerated, considering medical condition and other restrictions.  Avoid positioning onto an area that remains reddened.  Minimize incontinence and moisture (e.g., toileting schedule; moisture-wicking pad, diaper or incontinence collection device; skin moisture barrier).  Cleanse skin promptly and gently when soiled utilizing a pH-balanced cleanser.  Relieve and redistribute pressure (e.g., scheduled position changes, weight shifts, use of support surface, medical device repositioning, protective dressing application, use of positioning device, microclimate control, use of pressure-injury-monitor  Encourage increased activity, such as sitting in a chair at the bedside or early mobilization, when able to tolerate.  Recent Flowsheet Documentation  Taken 4/8/2022 1602 by Anitha Ryder RN  Pressure Reduction Techniques: frequent weight shift encouraged  Pressure Reduction  Devices: alternating pressure pump (ADD)  Skin Protection:   adhesive use limited   incontinence pads utilized   tubing/devices free from skin contact  Taken 4/8/2022 0838 by Anitha Ryder RN  Pressure Reduction Techniques: frequent weight shift encouraged  Head of Bed (HOB) Positioning: HOB elevated  Pressure Reduction Devices: alternating pressure pump (ADD)  Skin Protection:   adhesive use limited   incontinence pads utilized   tubing/devices free from skin contact     Problem: Heart Failure Comorbidity  Goal: Maintenance of Heart Failure Symptom Control  Outcome: Ongoing, Progressing  Intervention: Maintain Heart Failure-Management  Description: Evaluate adherence to home heart failure self-care regimen (e.g., medication, fluid balance, sodium intake, daily weight, physical activity, telemonitoring, support).  Advocate continuation of home medication and schedule.  Consider pharmacologic therapy administration time and effects (e.g., avoid giving diuretic prior to bedtime or nitrates on empty stomach).  Monitor response to pharmacologic therapy, including weight fluctuations, blood pressure and electrolyte levels.  Monitor for signs and symptoms of anxiety and depression, including severity and duration; if present, provide psychosocial support.  Consider need for heart failure clinic or palliative care consult.  Recent Flowsheet Documentation  Taken 4/8/2022 1602 by Anitha Ryder, RN  Medication Review/Management: medications reviewed  Taken 4/8/2022 0838 by Anitha Ryder, RN  Medication Review/Management: medications reviewed     Problem: Obstructive Sleep Apnea Risk or Actual Comorbidity Management  Goal: Unobstructed Breathing During Sleep  Outcome: Ongoing, Progressing   Goal Outcome Evaluation:  Plan of Care Reviewed With: patient        Progress: improving

## 2022-04-09 NOTE — PLAN OF CARE
Goal Outcome Evaluation:  Pt discharge home, back to [facility], with niece/POA transporting her.

## 2022-04-09 NOTE — DISCHARGE SUMMARY
Patient Name: Yazmin Javier  : 1935  MRN: [id number]    Date of Admission: 3/31/2022  Date of Discharge:  2022  Primary Care Physician: Morenita Silverio MD      Chief Complaint:   Shortness of Breath      Discharge Diagnoses     Active Hospital Problems    Diagnosis  POA   • **Acute on chronic respiratory failure with hypoxia (HCC) [J96.21]  Yes   • Pneumonia involving right lung [J18.9]  Yes   • Pulmonary hypertension (HCC) [I27.20]  Yes   • Hyperbilirubinemia [E80.6]  Yes   • Transaminitis [R74.01]  Yes   • BRENDA (acute kidney injury) (HCC) [N17.9]  Yes   • Fever in adult [R50.9]  Yes   • History of asthma [Z87.09]  Not Applicable   • Hyperglycemia [R73.9]  Yes   • Hypokalemia [E87.6]  Yes   • Chronic diastolic CHF (congestive heart failure) (HCC) [I50.32]  Yes   • Chronic anticoagulation [Z79.01]  Not Applicable   • Paroxysmal atrial fibrillation (HCC) [I48.0]  Yes   • Stage 3b chronic kidney disease (HCC) [N18.32]  Yes      Resolved Hospital Problems    Diagnosis Date Resolved POA   • Cough with hemoptysis [R04.2] 2022 No        Hospital Course     Ms. Javier is a 86-year-old female with a history of COPD on 3 L home oxygen, A. fib on Eliquis, and severe pulmonary hypertension who presented with fever, shortness of breath and cough productive of yellow sputum.  She was febrile on admission and CT chest showed multilobar patchy groundglass opacities, greatest at the right lung base.  She was admitted for COPD exacerbation due to pneumonia.  Hospital course was complicated by BRENDA on CKD stage III, steroid-induced hyperglycemia and elevated LFTs.      COPD exacerbation, acute on chronic hypoxic respiratory failure, on 3 L home oxygen: She was treated with high-dose steroids, duo nebs and antibiotics as below.  She required up to 8 L oxygen but has been weaned down to her baseline 3 L on day of discharge.  She had significant wheezing throughout the admission, which resolved on day of  discharge.  She is discharged with a steroid taper.      Right lower lobe pneumonia with hemoptysis: She was treated with 7 days of Zosyn and doxycycline as she has had repeated hospitalizations, and is at risk for Pseudomonas and atypical infections due to structural lung disease.     Hemoptysis has resolved.    BRENDA on CKD stage III: Creatinine peaked and stabilized around 1.8.    Creatinine returned to baseline around 1.4.     Steroid-induced hyperglycemia: A1c 5.4.  Expect this to improve as her steroids are tapered down.    Follow-up with PCP.     Elevated LFTs: Unknown etiology.  Possibly related to congestion from pulmonary hypertension vs drug-induced liver injury from antibiotics.  Hepatitis panel negative.  Right upper quadrant ultrasound shows stones with no obstruction. Downtrending on day of discharge.  Recommend PCP recheck CMP.       At the time of discharge patient was told to take all medications as prescribed, keep all follow-up appointments, and call their doctor or return to the hospital with any worsening or concerning symptoms.    Day of Discharge     Subjective:  Resting comfortably in bed.  Feeling ready to go home today.    Review of Systems   Constitutional: Negative for chills and fever.   Respiratory: Negative for cough and shortness of breath.    Cardiovascular: Negative for chest pain and palpitations.   Gastrointestinal: Negative for nausea and vomiting.       Physical Exam:  Temp:  [97.4 °F (36.3 °C)-97.8 °F (36.6 °C)] 97.5 °F (36.4 °C)  Heart Rate:  [100-142] 118  Resp:  [18-22] 22  BP: (115-142)/(79-93) 142/83  Body mass index is 35.5 kg/m².  Physical Exam  Constitutional:       General: She is not in acute distress.     Appearance: She is not diaphoretic.   Cardiovascular:      Rate and Rhythm: Normal rate and regular rhythm.   Pulmonary:      Effort: Pulmonary effort is normal. No respiratory distress.      Breath sounds: No wheezing (mild expiratory) or rhonchi.   Abdominal:       Palpations: Abdomen is soft.      Tenderness: There is no abdominal tenderness. There is no guarding.   Musculoskeletal:      Right lower leg: No edema.      Left lower leg: No edema.   Neurological:      Mental Status: She is alert.   Psychiatric:         Mood and Affect: Mood normal.         Consultants     Consult Orders (all) (From admission, onward)     Start     Ordered    04/03/22 0539  Inpatient Pulmonology Consult  Once        Specialty:  Pulmonary Disease  Provider:  Zac Hart MD    04/03/22 0540    04/01/22 0003  [facility] (on-call MD unless specified) Details  Once        Specialty:  Hospitalist  Provider:  (Not yet assigned)    04/01/22 0002              Procedures     Imaging Results (All)     Procedure Component Value Units Date/Time    US Abdomen Limited [[id number]] Collected: 04/06/22 0550     Updated: 04/06/22 0556    Narrative:      RIGHT UPPER OUTER ULTRASOUND     HISTORY: Abnormal elevated liver function tests     COMPARISON: 03/31/2022     TECHNIQUE: Grayscale and color Doppler sonographic images were obtained  through the right upper quadrant.     FINDINGS:  There is limited visualization of the pancreas. It does appear somewhat  atrophic. No focal hepatic lesions are seen. Hepatic echotexture appears  normal. There is no intra or extrahepatic biliary dilatation. Common  bile duct measures 4 mm. There is cholelithiasis, but there is no  evidence of acute cholecystitis. There is no gallbladder wall thickening  or pericholecystic fluid. Gallbladder wall measures 3 mm in thickness.  Right kidney measures 8.7 x 4.5 0.5 cm. There is no hydronephrosis.  There is a simple appearing right renal cyst, measuring 9 x 9 x 9 mm.       Impression:         1. There is no intra or extrahepatic biliary dilatation.  2. Cholelithiasis.     This report was finalized on 4/6/2022 5:53 AM by Dr. Joanna Gracia M.D.       CT Chest Without Contrast Diagnostic [[id number]] Collected: 04/03/22 1528     Updated:  04/03/22 1600    Narrative:      CT CHEST WITHOUT CONTRAST     HISTORY: Increasing shortness of air. Respiratory failure.     TECHNIQUE: Chest CT includes axial imaging from the thoracic inlet to  upper abdomen without IV contrast     COMPARISON: AP chest 04/03/2022, CT angiogram chest 04/04/2021.     FINDINGS: Small bilateral pleural effusions are present and the right  pleural effusion is partly loculated within the superior aspect of the  major fissure. Since the previous CT 04/04/2021, there has developed  abnormal patchy airspace disease consistent with infiltrates and this is  greatest within the right lower lobe but also present within the right  middle lobe, lingula, left lower lobe. This is superimposed on areas of  bilateral ground glass opacity with mosaic pattern and areas of air  trapping that appears to be chronic. The heart size is enlarged and  coronary arterial calcifications are present. No endotracheal or  endobronchial lesion is evident. Imaging through the upper abdomen  demonstrates multiple gallstones layering dependently within the  gallbladder.     Within the left superior mediastinum there is a lymph node that measures  8 mm short axis. A right paratracheal node measures 1 cm which is  borderline enlarged. There is an AP window lymph node measuring 2.3 x 1  cm which is mildly enlarged. A subcarinal lymph node is mildly enlarged  measuring 1.6 cm short axis.       Impression:      1. Multilobar patchy pulmonary infiltrates, greatest within the right  lower lobe. Infiltrates are superimposed on areas of chronic ground  glass opacity with a mosaic pattern and areas of air trapping and this  may be related to chronic small airways disease.  2. Small bilateral pleural effusions. Right pleural effusion is partly  loculated within the superior aspect of the right major fissure.  3. Cardiomegaly. Atherosclerotic disease with coronary arterial  calcifications.  4. Mild mediastinal nimco enlargement,  potentially reactive.     Radiation dose reduction techniques were utilized, including automated  exposure control and exposure modulation based on body size.     This report was finalized on 4/3/2022 3:57 PM by Dr. Mason Mcgee M.D.       XR Chest 1 View [[id number]] Collected: 04/03/22 1243     Updated: 04/03/22 1250    Narrative:      ONE VIEW PORTABLE CHEST AT 12:07 PM     HISTORY: Hypoxia. Shortness of breath.     FINDINGS: There is cardiomegaly with considerable vascular congestion  and areas of somewhat more localized alveolar haziness and consolidation  and showing worsening since a study of 3 days ago. The findings suggest  either worsening congestive heart failure or bilateral areas of  pneumonia and clinical correlation is recommended.     This report was finalized on 4/3/2022 12:47 PM by Dr. Ab Garcia M.D.       CT Abdomen Pelvis With Contrast [[id number]] Collected: 03/31/22 2327     Updated: 03/31/22 2339    Narrative:      CT ABDOMEN PELVIS W CONTRAST-     CLINICAL HISTORY: Acute onset nonlocalized abdominal pain.     TECHNIQUE: Spiral CT images were acquired through the abdomen and pelvis  with IV contrast and were reconstructed in 3 mm thick slices.     Radiation dose reduction techniques were utilized, including automated  exposure control and exposure modulation based on body size.     COMPARISON: None     FINDINGS: The liver, spleen, pancreas, and adrenal glands appear within  normal limits. Multiple small gallstones are layering posteriorly within  the gallbladder lumen. The gallbladder is otherwise unremarkable. There  is no bile duct dilatation. There is a tiny cortical cyst in the  anterior aspect of the right kidney. The kidneys are otherwise  unremarkable. The stomach and small bowel appear within normal limits.  There is no bowel distention. There are a few diverticuli scattered  throughout the left colon and proximal sigmoid colon. There is no  definite CT evidence of  diverticulitis. There is prominent focal spasm  of the ascending colon that may or may not be of any clinical  significance. Localized colitis could produce these findings. Images  through the lung bases demonstrate patchy groundglass opacities in the  right lower lobe suspicious for pneumonia.       Impression:      Cholelithiasis. Mild colonic diverticulosis without definite  evidence of diverticulitis. Lumbar spasm of the ascending colon  suspicious for colitis. The remainder of the colon does not appear  inflamed and contains formed stool. Patchy groundglass opacities at the  right lung base suspicious for pneumonia. Otherwise unremarkable CT scan  of the abdomen and pelvis.     This report was finalized on 3/31/2022 11:36 PM by Dr. Tevin Lopez M.D.       XR Chest 1 View [[id number]] Collected: 03/31/22 2140     Updated: 03/31/22 2145    Narrative:      PORTABLE CHEST 03/31/2022 AT 9:22 PM     CLINICAL HISTORY: Dyspnea     Compared to a previous chest x-ray dated 02/16/2022.     The current chest x-ray is lordotic in projection which accentuates the  heart size. Allowing for this, the heart appears within normal limits in  size and unchanged. The lungs are well-expanded and clear. There are no  pleural effusions. The pulmonary vasculature is within normal limits.  Mild vascular congestion shown on the previous chest x-ray has resolved.     This report was finalized on 3/31/2022 9:41 PM by Dr. Tevin Lopez M.D.             Pertinent Labs     Results from last 7 days   Lab Units 04/09/22  0616 04/08/22  0649 04/07/22  0541 04/06/22  0541   WBC 10*3/mm3 12.01* 12.61* 12.76* 11.19*   HEMOGLOBIN g/dL 9.3* 8.8* 9.2* 9.2*   PLATELETS 10*3/mm3 175 186 189 193     Results from last 7 days   Lab Units 04/09/22  0616 04/08/22  0649 04/07/22  0541 04/06/22  0541   SODIUM mmol/L 139 142 144 145   POTASSIUM mmol/L 4.1 4.0 4.3 3.2*   CHLORIDE mmol/L 105 106 108* 106   CO2 mmol/L 25.0 24.4 23.4 25.2   BUN mg/dL 45* 41*  40* 44*   CREATININE mg/dL 1.35* 1.30* 1.43* 1.44*   GLUCOSE mg/dL 207* 194* 210* 161*   Estimated Creatinine Clearance: 32 mL/min (A) (by C-G formula based on SCr of 1.35 mg/dL (H)).  Results from last 7 days   Lab Units 04/09/22  0616 04/08/22  0649 04/07/22  0541 04/06/22  0541   ALBUMIN g/dL 3.60 3.10* 3.40* 3.30*   BILIRUBIN mg/dL 1.7* 1.7* 1.3* 1.5*   ALK PHOS U/L 107 89 93 89   AST (SGOT) U/L 37* 41* 67* 74*   ALT (SGPT) U/L 83* 93* 114* 108*     Results from last 7 days   Lab Units 04/09/22  0616 04/08/22  0649 04/07/22  0541 04/06/22  0541   CALCIUM mg/dL 8.6 8.8 8.6 8.7   ALBUMIN g/dL 3.60 3.10* 3.40* 3.30*   MAGNESIUM mg/dL  --  2.4 2.4  --    PHOSPHORUS mg/dL  --  3.7 3.2  --        Results from last 7 days   Lab Units 04/07/22  0541 04/03/22  0736   CK TOTAL U/L 339*  --    PROBNP pg/mL  --  2,776.0*           Invalid input(s): LDLCALC  Results from last 7 days   Lab Units 04/08/22 2037 04/02/22  1443   RESPCX  Growth present, too young to evaluate Scant growth (1+) Normal respiratory venancio. No S. aureus or Pseudomonas aeruginosa detected. Final report.       Test Results Pending at Discharge     Pending Labs     Order Current Status    Respiratory Culture - Sputum, Cough Preliminary result          Discharge Details        Discharge Medications      New Medications      Instructions Start Date   doxycycline 100 MG capsule  Commonly known as: MONODOX   100 mg, Oral, Every 12 Hours Scheduled      Mucus Relief 600 MG 12 hr tablet  Generic drug: guaiFENesin   600 mg, Oral, Every 12 Hours Scheduled      predniSONE 10 MG tablet  Commonly known as: DELTASONE   Take 4 tablets by mouth Daily for 4 days, THEN 2 tablets Daily for 4 days, THEN 1 tablet Daily for 4 days.   Start Date: April 9, 2022        Continue These Medications      Instructions Start Date   albuterol sulfate  (90 Base) MCG/ACT inhaler  Commonly known as: PROVENTIL HFA;VENTOLIN HFA;PROAIR HFA   2 puffs, Inhalation, Every 4 Hours PRN       allopurinol 300 MG tablet  Commonly known as: ZYLOPRIM   300 mg, Oral, Daily      amLODIPine 5 MG tablet  Commonly known as: NORVASC   5 mg, Oral, Daily      apixaban 5 MG tablet tablet  Commonly known as: ELIQUIS   5 mg, Oral, 2 Times Daily      atorvastatin 80 MG tablet  Commonly known as: LIPITOR   80 mg, Oral, Nightly      folic acid 1 MG tablet  Commonly known as: FOLVITE   1 mg, Oral, Daily      furosemide 40 MG tablet  Commonly known as: LASIX   40 mg, Oral, 2 Times Daily      levothyroxine 50 MCG tablet  Commonly known as: SYNTHROID, LEVOTHROID   50 mcg, Oral, Daily      metoprolol tartrate 25 MG tablet  Commonly known as: LOPRESSOR   25 mg, Oral, 2 Times Daily      PARoxetine 10 MG tablet  Commonly known as: PAXIL   10 mg, Oral, Daily      potassium chloride 10 MEQ CR tablet   20 mEq, Oral, Daily      vitamin B-12 1000 MCG tablet  Commonly known as: CYANOCOBALAMIN   1,000 mcg, Oral, Daily             Allergies   Allergen Reactions   • Cefazolin Other (See Comments)         Discharge Disposition:  Home or Self Care    Discharge Diet:  Diet Order   Procedures   • Diet Regular; Cardiac       Discharge Activity:   As tolerated    CODE STATUS:    Code Status and Medical Interventions:   Ordered at: 04/01/22 0757     Medical Intervention Limits:    NO intubation (DNI)     Code Status (Patient has no pulse and is not breathing):    No CPR (Do Not Attempt to Resuscitate)     Medical Interventions (Patient has pulse or is breathing):    Limited Support       No future appointments.   Follow-up Information     Morenita Silverio MD. Schedule an appointment as soon as possible for a visit in 1 week(s).    Specialty: Internal Medicine  Contact information:  [facility] [address] [phone]                         Time Spent on Discharge:  Greater than 30 minutes      Farhana Gibson DO  [facility]  04/09/22  10:45 EDT

## 2022-04-09 NOTE — PROGRESS NOTES
[facility] INPATIENT PROGRESS NOTE         [address]    2022      PATIENT IDENTIFICATION:  Name: Yazmin Javier ADMIT: 3/31/2022   : 1935  PCP: Morenita Silverio MD    MRN: [id number] LOS: 8 days   AGE/SEX: 86 y.o. female  ROOM: [facility]                     LOS 8    Reason for visit: Respiratory failure with asthma exacerbation      SUBJECTIVE:      Resting comfortably.  Says that her shortness of breath is improving.  Diminished breath sounds with prolonged expiratory phase and mild wheezing but overall much improved over the last couple of days.      Objective   OBJECTIVE:    Vital Sign Min/Max for last 24 hours  Temp  Min: 97.4 °F (36.3 °C)  Max: 97.8 °F (36.6 °C)   BP  Min: 115/90  Max: 135/71   Pulse  Min: 80  Max: 142   Resp  Min: 18  Max: 20   SpO2  Min: 92 %  Max: 99 %   No data recorded   Weight  Min: 88 kg (194 lb 1.6 oz)  Max: 88 kg (194 lb 1.6 oz)                         Body mass index is 35.5 kg/m².    Intake/Output Summary (Last 24 hours) at 2022 0927  Last data filed at 2022 0553  Gross per 24 hour   Intake 600 ml   Output 400 ml   Net 200 ml         Exam:  GEN:  No distress, appears stated age  EYES:   PERRL, anicteric sclerae  ENT:    External ears/nose normal, OP clear  NECK:  No adenopathy, midline trachea  LUNGS: Normal chest on inspection, palpation and mild wheeze bilaterally on auscultation  CV:  Normal S1S2, without murmur  ABD:  Nontender, nondistended, no hepatosplenomegaly, +BS  EXT:  No edema.  No cyanosis or clubbing.  No mottling and normal cap refill.    Assessment     Scheduled meds:  acetylcysteine, 4 mL, Nebulization, Q8H - RT  allopurinol, 300 mg, Oral, Daily  amLODIPine, 5 mg, Oral, Daily  apixaban, 5 mg, Oral, BID  atorvastatin, 80 mg, Oral, Nightly  doxycycline, 100 mg, Oral, Q12H  folic acid, 1 mg, Oral, Daily  furosemide, 40 mg, Oral, Daily  guaiFENesin, 600 mg, Oral, Q12H  insulin glargine, 5 Units, Subcutaneous, Nightly  insulin lispro,  0-9 Units, Subcutaneous, 4x Daily With Meals & Nightly  ipratropium-albuterol, 3 mL, Nebulization, Q4H - RT  levothyroxine, 50 mcg, Oral, Daily  methylPREDNISolone sodium succinate, 40 mg, Intravenous, Q8H  metoprolol tartrate, 25 mg, Oral, BID  PARoxetine, 10 mg, Oral, Daily  predniSONE, 40 mg, Oral, Daily With Breakfast  vitamin B-12, 1,000 mcg, Oral, Daily      IV meds:                         Data Review:  Results from last 7 days   Lab Units 04/09/22  0616 04/08/22  0649 04/07/22  0541 04/06/22  0541 04/05/22  0619   SODIUM mmol/L 139 142 144 145 143   POTASSIUM mmol/L 4.1 4.0 4.3 3.2* 3.3*   CHLORIDE mmol/L 105 106 108* 106 104   CO2 mmol/L 25.0 24.4 23.4 25.2 23.8   BUN mg/dL 45* 41* 40* 44* 48*   CREATININE mg/dL 1.35* 1.30* 1.43* 1.44* 1.81*   GLUCOSE mg/dL 207* 194* 210* 161* 181*   CALCIUM mg/dL 8.6 8.8 8.6 8.7 8.9         Estimated Creatinine Clearance: 32 mL/min (A) (by C-G formula based on SCr of 1.35 mg/dL (H)).  Results from last 7 days   Lab Units 04/09/22  0616 04/08/22  0649 04/07/22  0541 04/06/22  0541 04/05/22  0619   WBC 10*3/mm3 12.01* 12.61* 12.76* 11.19* 10.00   HEMOGLOBIN g/dL 9.3* 8.8* 9.2* 9.2* 9.2*   PLATELETS 10*3/mm3 175 186 189 193 178         Results from last 7 days   Lab Units 04/09/22  0616 04/08/22  0649 04/07/22  0541 04/06/22  0541 04/05/22  0619   ALT (SGPT) U/L 83* 93* 114* 108* 79*   AST (SGOT) U/L 37* 41* 67* 74* 52*     Results from last 7 days   Lab Units 04/03/22  1146   PH, ARTERIAL pH units 7.488*   PO2 ART mm Hg 49.2*   PCO2, ARTERIAL mm Hg 32.6*   HCO3 ART mmol/L 24.7     Results from last 7 days   Lab Units 04/03/22  0736   PROCALCITONIN ng/mL 0.07         Glucose   Date/Time Value Ref Range Status   04/09/2022 0631 199 (H) 70 - 130 mg/dL Final     Comment:     Meter: [id number] : [id number] Marco Gonzales [facility]   04/08/2022 2047 243 (H) 70 - 130 mg/dL Final     Comment:     Meter: [id number] : [id number] Marco Gonzales [facility]   04/08/2022 1544 201 (H) 70 - 130  mg/dL Final     Comment:     Meter: [id number] : [id number] Tevin Ahn NA   04/08/2022 1148 233 (H) 70 - 130 mg/dL Final     Comment:     Meter: [id number] : [id number] Tevin Ahn NA   04/08/2022 0556 201 (H) 70 - 130 mg/dL Final     Comment:     Meter: [id number] : [id number] Ariela Carvalho RN   04/07/2022 2150 180 (H) 70 - 130 mg/dL Final     Comment:     Meter: [id number] : [id number] Ariela Carvalho RN   04/07/2022 1613 213 (H) 70 - 130 mg/dL Final     Comment:     Meter: [id number] : [id number] Tevin Ahn NA       2D echo reviewed: EF 60%.  Moderately dilated left atrium and right ventricle with estimated pulmonary pressures 56 mmHg.    CT chest 4/3 reviewed shows patchy pulmonary infiltrates with multifocal airspace disease.  Right pleural effusion is seen.  Cardiomegaly and mild mediastinal reactive lymphadenopathy.      Microbiology reviewed            Active Hospital Problems    Diagnosis  POA   • **Acute on chronic respiratory failure with hypoxia (HCC) [J96.21]  Yes   • Pneumonia involving right lung [J18.9]  Yes   • Pulmonary hypertension (HCC) [I27.20]  Yes   • Hyperbilirubinemia [E80.6]  Yes   • Transaminitis [R74.01]  Yes   • BRENDA (acute kidney injury) (HCC) [N17.9]  Yes   • Fever in adult [R50.9]  Yes   • History of asthma [Z87.09]  Not Applicable   • Hyperglycemia [R73.9]  Yes   • Hypokalemia [E87.6]  Yes   • Chronic diastolic CHF (congestive heart failure) (HCC) [I50.32]  Yes   • Chronic anticoagulation [Z79.01]  Not Applicable   • Paroxysmal atrial fibrillation (HCC) [I48.0]  Yes   • Stage 3b chronic kidney disease (HCC) [N18.32]  Yes      Resolved Hospital Problems    Diagnosis Date Resolved POA   • Cough with hemoptysis [R04.2] 04/07/2022 No         ASSESSMENT:    Acute on chronic hypoxemic respiratory failure  Asthma acute exacerbation  Acute kidney injury on chronic kidney disease  Pneumonia  Paroxysmal atrial fibrillation  Chronic diastolic CHF  Probable LILIANA  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  Encourage pulmonary toilet.  Continue bronchodilators scheduled and as needed and steroid with taper.  Change to p.o. prednisone.  Continue antibiotics.  Likely back to assisted living soon.  No objection from my standpoint.    Isauro Ramirez MD  Pulmonary and Critical Care Medicine  [facility], [facility]  4/9/2022    09:27 EDT

## 2022-04-09 NOTE — PLAN OF CARE
Goal Outcome Evaluation:  Plan of Care Reviewed With: patient        Progress: improving  Outcome Evaluation: Monitor pain,labs,and vitals. VSS. O2 2-3L NC. No c/o pain on current shift. Sputum sample collected per orders. Plan is for D/C to Assisted living facility today. Will continue to monitor.

## 2022-04-10 NOTE — OUTREACH NOTE
Prep Survey    Flowsheet Row Responses   [facility] facility patient discharged from? [place]   Is LACE score < 7 ? No   Emergency Room discharge w/ pulse ox? No   Eligibility Readm Mgmt   Discharge diagnosis ac Resp failure/PNA   Does the patient have one of the following disease processes/diagnoses(primary or secondary)? COPD/Pneumonia   Does the patient have Home health ordered? No   Is there a DME ordered? No   Prep survey completed? Yes          BENNETT VARGAS - Registered Nurse

## 2022-04-11 NOTE — CASE MANAGEMENT/SOCIAL WORK
Case Management Discharge Note      Final Note: d/c back to her asst living facility.         Selected Continued Care - Discharged on 4/9/2022 Admission date: 3/31/2022 - Discharge disposition: Home or Self Care    Destination    No services have been selected for the patient.              Durable Medical Equipment    No services have been selected for the patient.              Dialysis/Infusion    No services have been selected for the patient.              Home Medical Care    No services have been selected for the patient.              Therapy    No services have been selected for the patient.              Community Resources    No services have been selected for the patient.              Community & DME    No services have been selected for the patient.                  Transportation Services  Private: Car    Final Discharge Disposition Code: 01 - home or self-care

## 2022-04-13 NOTE — OUTREACH NOTE
COPD/PN Week 1 Survey    Flowsheet Row Responses   [facility] facility patient discharged from? [place]   Does the patient have one of the following disease processes/diagnoses(primary or secondary)? COPD/Pneumonia   Was the primary reason for admission: COPD exacerbation  [Pneumonia also.]   Week 1 attempt successful? No   Unsuccessful attempts Attempt 1          CONCHITA BARNES - Registered Nurse

## 2022-04-19 NOTE — OUTREACH NOTE
COPD/PN Week 1 Survey    Flowsheet Row Responses   [facility] patient discharged from? [place]   Does the patient have one of the following disease processes/diagnoses(primary or secondary)? COPD/Pneumonia   Was the primary reason for admission: COPD exacerbation   Week 1 attempt successful? Yes   Call start time 1619   Call end time 1623   Discharge diagnosis ac Resp failure/PNA   Is patient permission given to speak with other caregiver? Yes   Person spoke with today (if not patient) and relationship Anant   Meds reviewed with patient/caregiver? Yes   Is the patient having any side effects they believe may be caused by any medication additions or changes? No   Does the patient have all medications ordered at discharge? Yes   Is the patient taking all medications as directed (includes completed medication regime)? Yes   Does the patient have a primary care provider?  Yes   Does the patient have an appointment with their PCP or specialist within 7 days of discharge? Yes   Has the patient kept scheduled appointments due by today? Yes   Has home health visited the patient within 72 hours of discharge? Yes   Pulse Ox monitoring Intermittent   Pulse Ox device source Patient   O2 Sat comments 95% on 3L of O2   O2 Sat: education provided Sat levels, Monitoring frequency, When to seek care   O2 Sat education comments If 90% or below and stays there, call 911   Psychosocial issues? No   Did the patient receive a copy of their discharge instructions? Yes   Nursing interventions Reviewed instructions with patient   What is the patient's perception of their health status since discharge? Improving   Nursing Interventions Nurse provided patient education   Are the patient's immunizations up to date?  Yes   Nursing interventions Educated on importance of maintaining up to date immunizations as advised by provider   If the patient is a current smoker, are they able to teach back resources for cessation? Not a smoker    Is the patient/caregiver able to teach back the hierarchy of who to call/visit for symptoms/problems? PCP, Specialist, Home health nurse, Urgent Care, ED, 911 Yes   Additional teach back comments Surrogate says she is weak and has a poor appetite, but it is improving.   Is the patient/caregiver able to teach back signs and symptoms of worsening condition: Fever/chills, Shortness of breath, Chest pain   Is the patient/caregiver able to teach back importance of completing antibiotic course of treatment? Yes   Week 1 call completed? Yes   Wrap up additional comments She is doing well, no issues to report today.          SMITHA ROBLEDO - Registered Nurse

## 2022-04-27 NOTE — OUTREACH NOTE
COPD/PN Week 2 Survey    Flowsheet Row Responses   [facility] facility patient discharged from? [place]   Does the patient have one of the following disease processes/diagnoses(primary or secondary)? COPD/Pneumonia   Was the primary reason for admission: COPD exacerbation   Week 2 attempt successful? No   Unsuccessful attempts Attempt 1          BRUCE BROOKE - Licensed Nurse

## 2022-05-03 NOTE — OUTREACH NOTE
COPD/PN Week 4 Survey    Flowsheet Row Responses   [facility] facility patient discharged from? [place]   Does the patient have one of the following disease processes/diagnoses(primary or secondary)? COPD/Pneumonia   Was the primary reason for admission: COPD exacerbation   Week 4 attempt successful? No          SEVERO EDWARDS - Registered Nurse

## 2022-05-05 PROBLEM — I48.91 ATRIAL FIBRILLATION WITH RVR (HCC): Status: ACTIVE | Noted: 2022-01-01

## 2022-05-05 NOTE — ED TRIAGE NOTES
Initial call for HR in 150s, Decreased UOP x 1 week + dark urine, SOA , hx CHF, afib , 3L O2.  EMS EKG  Shown 150 HR

## 2022-05-05 NOTE — ED NOTES
Nursing report ED to floor  Yazmin Javier  86 y.o.  female    HPI :   Chief Complaint   Patient presents with   • Rapid Heart Rate       Admitting doctor:   Emelyn Dhillon MD    Admitting diagnosis:   The primary encounter diagnosis was Atrial fibrillation with RVR (HCC). A diagnosis of Pneumonia of both lower lobes due to infectious organism was also pertinent to this visit.    Code status:   Current Code Status     Date Active Code Status Order ID Comments User Context       Prior    Advance Care Planning Activity          Allergies:   Cefazolin    Intake and Output    Intake/Output Summary (Last 24 hours) at 5/5/2022 1821  Last data filed at 5/5/2022 1817  Gross per 24 hour   Intake 550 ml   Output --   Net 550 ml       Weight:   There were no vitals filed for this visit.    Most recent vitals:   Vitals:    05/05/22 1535 05/05/22 1536 05/05/22 1751 05/05/22 1816   BP:   115/77 111/59   Pulse: 106 99 89 85   Resp:       Temp:       TempSrc:       SpO2: 94% 98% 93% 96%       Active LDAs/IV Access:   Lines, Drains & Airways     Active LDAs     Name Placement date Placement time Site Days    Peripheral IV 05/05/22 Left Antecubital 05/05/22  --  Antecubital  less than 1    Peripheral IV 05/05/22 1646 Right Antecubital 05/05/22  1646  Antecubital  less than 1                Labs (abnormal labs have a star):   Labs Reviewed   COMPREHENSIVE METABOLIC PANEL - Abnormal; Notable for the following components:       Result Value    Glucose 127 (*)     Creatinine 1.47 (*)     Albumin 3.30 (*)     Alkaline Phosphatase 126 (*)     eGFR 35.1 (*)     All other components within normal limits    Narrative:     GFR Normal >60  Chronic Kidney Disease <60  Kidney Failure <15     BNP (IN-HOUSE) - Abnormal; Notable for the following components:    proBNP 2,198.0 (*)     All other components within normal limits    Narrative:     Among patients with dyspnea, NT-proBNP is highly sensitive for the detection of acute congestive  heart failure. In addition NT-proBNP of <300 pg/ml effectively rules out acute congestive heart failure with 99% negative predictive value.    Results may be falsely decreased if patient taking Biotin.     LACTIC ACID, PLASMA - Abnormal; Notable for the following components:    Lactate 2.4 (*)     All other components within normal limits   CBC WITH AUTO DIFFERENTIAL - Abnormal; Notable for the following components:    .6 (*)     MCHC 30.8 (*)     RDW 16.2 (*)     RDW-SD 61.4 (*)     Neutrophil % 35.5 (*)     Lymphocyte % 49.4 (*)     Monocyte % 13.0 (*)     Lymphocytes, Absolute 3.23 (*)     All other components within normal limits   MAGNESIUM - Abnormal; Notable for the following components:    Magnesium 1.5 (*)     All other components within normal limits   TROPONIN (IN-HOUSE) - Normal    Narrative:     Troponin T Reference Range:  <= 0.03 ng/mL-   Negative for AMI  >0.03 ng/mL-     Abnormal for myocardial necrosis.  Clinicians would have to utilize clinical acumen, EKG, Troponin and serial changes to determine if it is an Acute Myocardial Infarction or myocardial injury due to an underlying chronic condition.       Results may be falsely decreased if patient taking Biotin.     TROPONIN (IN-HOUSE) - Normal    Narrative:     Troponin T Reference Range:  <= 0.03 ng/mL-   Negative for AMI  >0.03 ng/mL-     Abnormal for myocardial necrosis.  Clinicians would have to utilize clinical acumen, EKG, Troponin and serial changes to determine if it is an Acute Myocardial Infarction or myocardial injury due to an underlying chronic condition.       Results may be falsely decreased if patient taking Biotin.     PROCALCITONIN - Normal    Narrative:     As a Marker for Sepsis (Non-Neonates):    1. <0.5 ng/mL represents a low risk of severe sepsis and/or septic shock.  2. >2 ng/mL represents a high risk of severe sepsis and/or septic shock.    As a Marker for Lower Respiratory Tract Infections that require antibiotic  "therapy:    PCT on Admission    Antibiotic Therapy       6-12 Hrs later    >0.5                Strongly Recommended  >0.25 - <0.5        Recommended   0.1 - 0.25          Discouraged              Remeasure/reassess PCT  <0.1                Strongly Discouraged     Remeasure/reassess PCT    As 28 day mortality risk marker: \"Change in Procalcitonin Result\" (>80% or <=80%) if Day 0 (or Day 1) and Day 4 values are available. Refer to http://www.[facility]pct-calculator.com    Change in PCT <=80%  A decrease of PCT levels below or equal to 80% defines a positive change in PCT test result representing a higher risk for 28-day all-cause mortality of patients diagnosed with severe sepsis for septic shock.    Change in PCT >80%  A decrease of PCT levels of more than 80% defines a negative change in PCT result representing a lower risk for 28-day all-cause mortality of patients diagnosed with severe sepsis or septic shock.      BLOOD CULTURE   BLOOD CULTURE   COVID PRE-OP / PRE-PROCEDURE SCREENING ORDER (NO ISOLATION)    Narrative:     The following orders were created for panel order COVID PRE-OP / PRE-PROCEDURE SCREENING ORDER (NO ISOLATION) - Swab, Nasopharynx.  Procedure                               Abnormality         Status                     ---------                               -----------         ------                     COVID-19,APTIMA PANTHER(...[[id number]]                      In process                   Please view results for these tests on the individual orders.   COVID-19,APTIMA PANTHER ([facility], NP/OP SWAB IN UTM/VTM/SALINE TRANSPORT MEDIA,24 HR TAT   URINALYSIS W/ MICROSCOPIC IF INDICATED (NO CULTURE)   LACTIC ACID, REFLEX   CBC AND DIFFERENTIAL    Narrative:     The following orders were created for panel order CBC & Differential.  Procedure                               Abnormality         Status                     ---------                               -----------         ------            "          CBC Auto Differential[[id number]]        Abnormal            Final result                 Please view results for these tests on the individual orders.       EKG:   ECG 12 Lead   Preliminary Result   HEART RATE= 103  bpm   RR Interval= 583  ms   ID Interval=   ms   P Horizontal Axis=   deg   P Front Axis=   deg   QRSD Interval= 122  ms   QT Interval= 339  ms   QRS Axis= 49  deg   T Wave Axis= 200  deg   - ABNORMAL ECG -   Atrial flutter   Nonspecific intraventricular conduction delay   Repol abnrm, severe global ischemia (LM/MVD)   Electronically Signed By:    Date and Time of Study: 2022-05-05 16:01:15      ECG 12 Lead   Final Result   HEART RATE= 149  bpm   RR Interval= 403  ms   ID Interval= 105  ms   P Horizontal Axis= 198  deg   P Front Axis= 259  deg   QRSD Interval= 83  ms   QT Interval= 272  ms   QRS Axis= 33  deg   T Wave Axis= 162  deg   - ABNORMAL ECG -   Supraventricular tachycardia possible atrial flutter   LVH with secondary repolarization abnormality   ST depression, probably rate related   Rapid rate and ST changes new   Electronically Signed By: Jay Jay Nieves ([facility]) 05-May-2022 15:42:50   Date and Time of Study: 2022-05-05 14:24:19          Meds given in ED:   Medications   sodium chloride 0.9 % flush 10 mL (has no administration in time range)   Magnesium Sulfate 2 gram Bolus, followed by 8 gram infusion (total Mg dose 10 grams)- Mg less than or equal to 1mg/dL ( Intravenous Not Given:  See Alt 5/5/22 1817)     Or   Magnesium Sulfate 2 gram / 50mL Infusion (GIVE X 3 BAGS TO EQUAL 6GM TOTAL DOSE) - Mg 1.1 - 1.5 mg/dl (0 g Intravenous Stopped 5/5/22 1817)     Or   Magnesium Sulfate 4 gram infusion- Mg 1.6-1.9 mg/dL ( Intravenous Not Given:  See Alt 5/5/22 1817)   piperacillin-tazobactam (ZOSYN) 3.375 g in iso-osmotic dextrose 50 ml (premix) (has no administration in time range)   sodium chloride 0.9 % bolus 500 mL (0 mL Intravenous Stopped 5/5/22 1552)   digoxin (LANOXIN) injection 500 mcg  (500 mcg Intravenous Given 5/5/22 1523)       Imaging results:  XR Chest 1 View    Result Date: 5/5/2022  Interval improvement. Minimal likely atelectasis in both lungs. Cardiomegaly. Tortuous aorta.  This report was finalized on 5/5/2022 2:22 PM by Dr. Arthur Villatoro M.D.        Ambulatory status:   - assist x1 w/ walker     Social issues:   Social History     Socioeconomic History   • Marital status: Single   Tobacco Use   • Smoking status: Never Smoker   • Smokeless tobacco: Never Used   Substance and Sexual Activity   • Alcohol use: Never   • Drug use: Never   • Sexual activity: Defer       NIH Stroke Scale:        Nursing report ED to floor:

## 2022-05-05 NOTE — ED PROVIDER NOTES
EMERGENCY DEPARTMENT ENCOUNTER    Room Number:  28/28  Date of encounter:  5/5/2022  PCP: Morenita Silverio MD  Patient Care Team:  Morenita Silverio MD as PCP - General (Internal Medicine)   Historian: EMS, patient, nursing home records    HPI:  Chief Complaint: Tachycardia  A complete HPI/ROS/PMH/PSH/SH/FH are unobtainable due to: Nothing    Context: Yazmin Javier is a 86 y.o. female who presents to the ED c/o having tachycardia.  EMS reports they were called for tachycardia this morning.  She has had decreased urine output for the last week.  She has a history of CHF and atrial fibrillation.  EMS was concerned for sepsis.  The patient denies any abdominal pain or passing out.  She reports some shortness of breath.  She denies chest pain.  She was given some IV fluids by EMS.  Nothing makes her symptoms worse or better.  It is unclear when her symptoms started.  She is on continuous oxygen at 3 L.  She has had a productive cough with thick sputum.    Prior record review: Discharge summary 4/9/2022 with atrial fibrillation on Eliquis, pulmonary hypertension which is severe.  She had productive cough with yellow sputum.  She had multifocal pneumonia.    PAST MEDICAL HISTORY  Active Ambulatory Problems     Diagnosis Date Noted   • Acute on chronic congestive heart failure (HCC) 04/04/2021   • Hypoxia 04/04/2021   • Paroxysmal atrial fibrillation (HCC) 04/04/2021   • Chronic anticoagulation 04/04/2021   • Hypothyroidism (acquired) 04/04/2021   • Stage 3b chronic kidney disease (HCC) 04/04/2021   • Right arm weakness 04/22/2021   • Chronic diastolic CHF (congestive heart failure) (HCC) 04/22/2021   • Syncope 10/31/2021   • Acute respiratory failure with hypoxia and hypercapnia (HCC) 02/14/2022   • Acute pulmonary edema (HCC) 02/14/2022   • Acute on chronic respiratory failure with hypoxia (HCC) 04/01/2022   • Fever in adult 04/01/2022   • History of asthma 04/01/2022   • Hyperglycemia 04/01/2022   • Hypokalemia  04/01/2022   • BRENDA (acute kidney injury) (HCC) 04/02/2022   • Hyperbilirubinemia 04/03/2022   • Transaminitis 04/03/2022   • Pneumonia involving right lung 04/04/2022   • Pulmonary hypertension (HCC) 04/04/2022     Resolved Ambulatory Problems     Diagnosis Date Noted   • Acute embolic stroke (HCC) 04/24/2021   • Cough with hemoptysis 04/03/2022     Past Medical History:   Diagnosis Date   • CHF (congestive heart failure) (HCC)    • Sleep apnea    • Stroke (HCC)        The patient has a COVID HM Topic on their chart, and they are fully vaccinated.    PAST SURGICAL HISTORY  No past surgical history on file.      FAMILY HISTORY  No family history on file.      SOCIAL HISTORY  Social History     Socioeconomic History   • Marital status: Single   Tobacco Use   • Smoking status: Never Smoker   • Smokeless tobacco: Never Used   Substance and Sexual Activity   • Alcohol use: Never   • Drug use: Never   • Sexual activity: Defer         ALLERGIES  Cefazolin        REVIEW OF SYSTEMS  Review of Systems   No fever, no chills, no abdominal pain, no chest pain, positive shortness of breath, negative nausea, negative vomiting  All systems reviewed and negative except for those discussed in HPI.       PHYSICAL EXAM    I have reviewed the triage vital signs and nursing notes.    ED Triage Vitals   Temp Pulse Resp BP SpO2   -- -- -- -- --      Temp src Heart Rate Source Patient Position BP Location FiO2 (%)   -- -- -- -- --       Physical Exam  GENERAL: Awake, alert, no acute distress  SKIN: Warm, dry  HENT: Normocephalic, atraumatic  EYES: no scleral icterus  CV: irregular rhythm, tachycardic rate  RESPIRATORY: normal effort, lungs with crackles and rhonchi bilaterally  ABDOMEN: soft, nontender, nondistended  MUSCULOSKELETAL: no deformity  NEURO: alert, moves all extremities, follows commands          LAB RESULTS  Recent Results (from the past 24 hour(s))   Comprehensive Metabolic Panel    Collection Time: 05/05/22  2:16 PM     Specimen: Blood   Result Value Ref Range    Glucose 127 (H) 65 - 99 mg/dL    BUN 15 8 - 23 mg/dL    Creatinine 1.47 (H) 0.57 - 1.00 mg/dL    Sodium 142 136 - 145 mmol/L    Potassium 3.7 3.5 - 5.2 mmol/L    Chloride 101 98 - 107 mmol/L    CO2 27.0 22.0 - 29.0 mmol/L    Calcium 9.0 8.6 - 10.5 mg/dL    Total Protein 7.0 6.0 - 8.5 g/dL    Albumin 3.30 (L) 3.50 - 5.20 g/dL    ALT (SGPT) 19 1 - 33 U/L    AST (SGOT) 27 1 - 32 U/L    Alkaline Phosphatase 126 (H) 39 - 117 U/L    Total Bilirubin 1.2 0.0 - 1.2 mg/dL    Globulin 3.7 gm/dL    A/G Ratio 0.9 g/dL    BUN/Creatinine Ratio 10.2 7.0 - 25.0    Anion Gap 14.0 5.0 - 15.0 mmol/L    eGFR 35.1 (L) >60.0 mL/min/1.73   BNP    Collection Time: 05/05/22  2:16 PM    Specimen: Blood   Result Value Ref Range    proBNP 2,198.0 (H) 0.0 - 1,800.0 pg/mL   Troponin    Collection Time: 05/05/22  2:16 PM    Specimen: Blood   Result Value Ref Range    Troponin T 0.021 0.000 - 0.030 ng/mL   Lactic Acid, Plasma    Collection Time: 05/05/22  2:16 PM    Specimen: Blood   Result Value Ref Range    Lactate 2.4 (C) 0.5 - 2.0 mmol/L   Procalcitonin    Collection Time: 05/05/22  2:16 PM    Specimen: Blood   Result Value Ref Range    Procalcitonin 0.16 0.00 - 0.25 ng/mL   CBC Auto Differential    Collection Time: 05/05/22  2:16 PM    Specimen: Blood   Result Value Ref Range    WBC 6.54 3.40 - 10.80 10*3/mm3    RBC 3.80 3.77 - 5.28 10*6/mm3    Hemoglobin 12.0 12.0 - 15.9 g/dL    Hematocrit 39.0 34.0 - 46.6 %    .6 (H) 79.0 - 97.0 fL    MCH 31.6 26.6 - 33.0 pg    MCHC 30.8 (L) 31.5 - 35.7 g/dL    RDW 16.2 (H) 12.3 - 15.4 %    RDW-SD 61.4 (H) 37.0 - 54.0 fl    MPV 9.0 6.0 - 12.0 fL    Platelets 252 140 - 450 10*3/mm3    Neutrophil % 35.5 (L) 42.7 - 76.0 %    Lymphocyte % 49.4 (H) 19.6 - 45.3 %    Monocyte % 13.0 (H) 5.0 - 12.0 %    Eosinophil % 0.9 0.3 - 6.2 %    Basophil % 0.9 0.0 - 1.5 %    Immature Grans % 0.3 0.0 - 0.5 %    Neutrophils, Absolute 2.32 1.70 - 7.00 10*3/mm3    Lymphocytes,  Absolute 3.23 (H) 0.70 - 3.10 10*3/mm3    Monocytes, Absolute 0.85 0.10 - 0.90 10*3/mm3    Eosinophils, Absolute 0.06 0.00 - 0.40 10*3/mm3    Basophils, Absolute 0.06 0.00 - 0.20 10*3/mm3    Immature Grans, Absolute 0.02 0.00 - 0.05 10*3/mm3    nRBC 0.2 0.0 - 0.2 /100 WBC   Magnesium    Collection Time: 05/05/22  2:16 PM    Specimen: Blood   Result Value Ref Range    Magnesium 1.5 (L) 1.6 - 2.4 mg/dL   ECG 12 Lead    Collection Time: 05/05/22  2:24 PM   Result Value Ref Range    QT Interval 272 ms   ECG 12 Lead    Collection Time: 05/05/22  4:01 PM   Result Value Ref Range    QT Interval 339 ms   Troponin    Collection Time: 05/05/22  4:26 PM    Specimen: Blood   Result Value Ref Range    Troponin T 0.013 0.000 - 0.030 ng/mL       Ordered the above labs and independently reviewed the results.        RADIOLOGY  XR Chest 1 View    Result Date: 5/5/2022  XR CHEST 1 VW-  HISTORY: Female who is 86 years-old,  sepsis  TECHNIQUE: Frontal view of the chest  COMPARISON: 04/03/2022  FINDINGS: Heart is enlarged. Aorta is tortuous, calcified. Pulmonary vasculature is unremarkable. Aeration of the lungs is improved. Minimal likely atelectasis in both lungs. No focal pulmonary consolidation, pleural effusion, or pneumothorax. No acute osseous process.      Interval improvement. Minimal likely atelectasis in both lungs. Cardiomegaly. Tortuous aorta.  This report was finalized on 5/5/2022 2:22 PM by Dr. Arthur Villatoro M.D.        I ordered the above noted radiological studies. Reviewed by me and discussed with radiologist.  See dictation for official radiology interpretation.      PROCEDURES    Procedures      MEDICATIONS GIVEN IN ER    Medications   sodium chloride 0.9 % flush 10 mL (has no administration in time range)   Magnesium Sulfate 2 gram Bolus, followed by 8 gram infusion (total Mg dose 10 grams)- Mg less than or equal to 1mg/dL ( Intravenous Not Given:  See Alt 5/5/22 1531)     Or   Magnesium Sulfate 2 gram / 50mL  Infusion (GIVE X 3 BAGS TO EQUAL 6GM TOTAL DOSE) - Mg 1.1 - 1.5 mg/dl (2 g Intravenous New Bag 5/5/22 1531)     Or   Magnesium Sulfate 4 gram infusion- Mg 1.6-1.9 mg/dL ( Intravenous Not Given:  See Alt 5/5/22 1531)   piperacillin-tazobactam (ZOSYN) 3.375 g in iso-osmotic dextrose 50 ml (premix) (has no administration in time range)   sodium chloride 0.9 % bolus 500 mL (0 mL Intravenous Stopped 5/5/22 1552)   digoxin (LANOXIN) injection 500 mcg (500 mcg Intravenous Given 5/5/22 1523)         PROGRESS, DATA ANALYSIS, CONSULTS, AND MEDICAL DECISION MAKING    All labs have been independently reviewed by me.  All radiology studies have been reviewed by me and discussed with radiologist dictating the report.   EKG's independently viewed and interpreted by me.  Discussion below represents my analysis of pertinent findings related to patient's condition, differential diagnosis, treatment plan and final disposition.    Differential diagnosis includes but is not limited to pneumonia, CHF exacerbation, volume overload, dehydration, renal failure, sepsis, hypoxia.    ED Course as of 05/05/22 1723   Thu May 05, 2022   1453 Lactate(!!): 2.4 [TR]   1453 proBNP(!): 2,198.0 [TR]   1457 BP: 99/74 [TR]   1457 Heart Rate(!): 152 [TR]   1527 EKG          EKG time: 1420:  Rhythm/Rate: A. fib, rate 149  P waves and WI: Absent  QRS, axis: Narrow QRS, normal axis  ST and T waves: T waves except for some right related ST slurring in V4, V5, V6    Interpreted Contemporaneously by me, independently viewed  Increased rate with A. fib with RVR changed compared to prior 4/7/2022   [TR]   1630 EKG          EKG time: 1601  Rhythm/Rate: Atrial flutter  P waves and WI: Absent  QRS, axis: Narrow QRS, normal axis  ST and T waves: ST depression V4, V5, V6    Interpreted Contemporaneously by me, independently viewed  Improved rate changed compared to prior 5/5/2022   [TR]   1701 She is coughing up thick sputum at the bedside.  With her history of recent  pneumonia, elevated lactic acid, new uncontrolled A. fib, plan to admit her for antibiotics, cardiology consult. [TR]   1721 Speaking with Dr. Dhillon with [facility].  Will admit.  I am concerned with her newly uncontrolled atrial fibrillation, elevated lactic acid, coughing up thick sputum at the bedside, mildly low blood pressure, and recent pneumonia that she has early sepsis with recurrent pneumonia. [TR]      ED Course User Index  [TR] Jewel Guadalupe MD           PPE: The patient wore a mask and I wore an N95 mask throughout the entire patient encounter.       AS OF 17:23 EDT VITALS:    BP - 150/93  HR - 99  TEMP - 98.6 °F (37 °C) (Oral)  O2 SATS - 98%        DIAGNOSIS  Final diagnoses:   Atrial fibrillation with RVR (HCC)   Pneumonia of both lower lobes due to infectious organism         DISPOSITION  ED Disposition     ED Disposition   Decision to Admit    Condition   --    Comment   Level of Care: Telemetry [5]   Diagnosis: Atrial fibrillation with RVR (HCC) [[id number]]   Admitting Physician: YVETTE DHILLON [[id number]]   Attending Physician: YVETTE DHILLON [[id number]]                     Jewel Guadalupe MD  05/05/22 [id number]

## 2022-05-06 PROBLEM — I10 ESSENTIAL HYPERTENSION: Status: ACTIVE | Noted: 2022-01-01

## 2022-05-06 PROBLEM — E03.9 HYPOTHYROIDISM (ACQUIRED): Status: ACTIVE | Noted: 2022-01-01

## 2022-05-06 PROBLEM — N18.30 CHRONIC KIDNEY FAILURE, STAGE 3 (MODERATE) (HCC): Status: ACTIVE | Noted: 2022-01-01

## 2022-05-06 PROBLEM — I16.0 HYPERTENSIVE URGENCY: Status: ACTIVE | Noted: 2022-01-01

## 2022-05-06 PROBLEM — R33.8 ACUTE URINARY RETENTION: Status: ACTIVE | Noted: 2022-01-01

## 2022-05-06 PROBLEM — J96.11 CHRONIC HYPOXEMIC RESPIRATORY FAILURE (HCC): Status: ACTIVE | Noted: 2022-01-01

## 2022-05-06 PROBLEM — I50.43 ACUTE ON CHRONIC COMBINED SYSTOLIC AND DIASTOLIC CHF (CONGESTIVE HEART FAILURE): Status: ACTIVE | Noted: 2022-01-01

## 2022-05-06 PROBLEM — E87.6 HYPOKALEMIA: Status: ACTIVE | Noted: 2022-01-01

## 2022-05-06 PROBLEM — E83.42 HYPOMAGNESEMIA: Status: ACTIVE | Noted: 2022-01-01

## 2022-05-06 PROBLEM — J44.1 COPD WITH ACUTE EXACERBATION (HCC): Status: ACTIVE | Noted: 2022-01-01

## 2022-05-06 PROBLEM — Z66 DNR (DO NOT RESUSCITATE): Status: ACTIVE | Noted: 2022-01-01

## 2022-05-06 NOTE — CONSULTS
Yazmin Javier   86 y.o.  female    LOS: 0 days   Patient Care Team:  Morenita Silverio MD as PCP - General (Internal Medicine)      Subjective     Chief Complaint:    History of Present Illness:   86-year-old white female patient who resides in the assisted care facility and is followed by my colleague Dr. Horton was brought to the emergency room with the complaints of tachycardia.  She was diagnosed with a pneumonia.  She has home oxygen 3 L.  Her initial EKG shows atrial fibrillation with a heart rate around 103/min.  EKG today shows atrial fibrillation and the pause of about a 3-second.  She denies any chest pains.  She has history of hypertension no diabetes or hyperlipidemia.  She has history of diastolic heart failure sleep apnea stroke and history of severe pulmonary hypertension.  At home she was on Eliquis and beta-blockers.  Echocardiogram on 4/3/2022 showed ejection fraction 60%.  She has some cough and dyspnea.  Dr. Horton last saw her on 3/16/2022.  She has some chronic kidney disease and she is followed by pulmonologist.        Past Medical History:   Diagnosis Date   • CHF (congestive heart failure) (HCC)    • Sleep apnea    • Stroke (HCC)      No past surgical history on file.  Medications Prior to Admission   Medication Sig Dispense Refill Last Dose   • allopurinol (ZYLOPRIM) 300 MG tablet Take 300 mg by mouth Daily.   5/5/2022 at 0800   • amLODIPine (NORVASC) 5 MG tablet Take 5 mg by mouth Daily.   5/5/2022 at 0800   • apixaban (ELIQUIS) 5 MG tablet tablet Take 5 mg by mouth 2 (Two) Times a Day.   5/5/2022 at 0800   • atorvastatin (LIPITOR) 80 MG tablet Take 80 mg by mouth Every Night.   5/4/2022 at 2000   • folic acid (FOLVITE) 1 MG tablet Take 1 tablet by mouth Daily. 30 tablet 0 5/5/2022 at 0800   • furosemide (LASIX) 40 MG tablet Take 1 tablet by mouth 2 (Two) Times a Day. 60 tablet 0 5/5/2022 at 1200   • levothyroxine (SYNTHROID, LEVOTHROID) 50 MCG tablet Take 50 mcg by mouth Daily.    5/5/2022 at 0800   • metoprolol tartrate (LOPRESSOR) 25 MG tablet Take 1 tablet by mouth 2 (Two) Times a Day. 60 tablet 0 5/5/2022 at 0800   • PARoxetine (PAXIL) 10 MG tablet Take 10 mg by mouth Daily.   5/5/2022 at 0800   • potassium chloride 10 MEQ CR tablet Take 20 mEq by mouth Daily.   5/5/2022 at 0800   • vitamin B-12 (CYANOCOBALAMIN) 1000 MCG tablet Take 1,000 mcg by mouth Daily.   5/5/2022 at 0800   • albuterol sulfate  (90 Base) MCG/ACT inhaler Inhale 2 puffs Every 4 (Four) Hours As Needed for Wheezing.   Unknown at Unknown time       No family history on file.  Social History     Socioeconomic History   • Marital status: Single   Tobacco Use   • Smoking status: Never Smoker   • Smokeless tobacco: Never Used   Substance and Sexual Activity   • Alcohol use: Never   • Drug use: Never   • Sexual activity: Defer     Objective       Review of Systems:   Constitutional: Negative for diaphoresis, fatigue, fever and unexpected weight change.   HENT: Negative.    Eyes: Negative.    Respiratory: Negative for cough, shortness of breath and wheezing.    Cardiovascular: Negative for chest pain, palpitations and leg swelling.   Gastrointestinal: Negative for abdominal pain, blood in stool, constipation, diarrhea, nausea and vomiting.   Endocrine: Negative.    Genitourinary: Negative for difficulty urinating, dysuria and frequency.   Musculoskeletal: Negative.    Skin: Negative.    Allergic/Immunologic: Negative for environmental allergies and food allergies.   Neurological: Negative.    Hematological: Negative.    Psychiatric/Behavioral: Negative.        Current Facility-Administered Medications:   •  acetaminophen (TYLENOL) tablet 650 mg, 650 mg, Oral, Q4H PRN, Emelyn Dhillon MD, 650 mg at 05/05/22 7776  •  allopurinol (ZYLOPRIM) tablet 300 mg, 300 mg, Oral, Daily, Emelyn Dhillon MD  •  amLODIPine (NORVASC) tablet 5 mg, 5 mg, Oral, Daily, Emelyn Dhillon MD  •  apixaban (ELIQUIS) tablet 5  mg, 5 mg, Oral, BID, Emelyn Dhillon MD, 5 mg at 05/05/22 2207  •  atorvastatin (LIPITOR) tablet 80 mg, 80 mg, Oral, Nightly, Emelyn Dhillon MD, 80 mg at 05/05/22 2207  •  folic acid (FOLVITE) tablet 1 mg, 1 mg, Oral, Daily, Emelyn Dhillon MD  •  furosemide (LASIX) tablet 40 mg, 40 mg, Oral, BID, Emelyn Dhillon MD  •  ipratropium-albuterol (DUO-NEB) nebulizer solution 3 mL, 3 mL, Nebulization, 4x Daily - RT, Isauro Ramirez MD  •  ipratropium-albuterol (DUO-NEB) nebulizer solution 3 mL, 3 mL, Nebulization, Q4H PRN, Isauro Ramirez MD  •  levothyroxine (SYNTHROID, LEVOTHROID) tablet 50 mcg, 50 mcg, Oral, Q AM, Emelyn Dhillon MD, 50 mcg at 05/06/22 0704  •  Magnesium Sulfate 2 gram Bolus, followed by 8 gram infusion (total Mg dose 10 grams)- Mg less than or equal to 1mg/dL, 2 g, Intravenous, PRN **OR** Magnesium Sulfate 2 gram / 50mL Infusion (GIVE X 3 BAGS TO EQUAL 6GM TOTAL DOSE) - Mg 1.1 - 1.5 mg/dl, 2 g, Intravenous, PRN, Last Rate: 25 mL/hr at 05/05/22 2207, 2 g at 05/05/22 2207 **OR** Magnesium Sulfate 4 gram infusion- Mg 1.6-1.9 mg/dL, 4 g, Intravenous, PRN, Jewel Guadalupe MD  •  melatonin tablet 3 mg, 3 mg, Oral, Nightly PRN, Emelyn Dhillon MD  •  metoprolol tartrate (LOPRESSOR) tablet 25 mg, 25 mg, Oral, BID, Emelyn Dhillon MD, 25 mg at 05/05/22 2207  •  nitroglycerin (NITROSTAT) SL tablet 0.4 mg, 0.4 mg, Sublingual, Q5 Min PRN, Emelyn Dhillon MD  •  ondansetron (ZOFRAN) tablet 4 mg, 4 mg, Oral, Q6H PRN **OR** ondansetron (ZOFRAN) injection 4 mg, 4 mg, Intravenous, Q6H PRN, Emelyn Dhillon MD  •  PARoxetine (PAXIL) tablet 10 mg, 10 mg, Oral, Daily, Emelyn Dhillon MD  •  potassium chloride (K-DUR,KLOR-CON) ER tablet 20 mEq, 20 mEq, Oral, Daily, Emelyn Dhillon MD  •  [COMPLETED] Insert peripheral IV, , , Once **AND** sodium chloride 0.9 % flush 10 mL, 10 mL, Intravenous, PRN, Jewel Guadalupe MD  •  vitamin B-12  (CYANOCOBALAMIN) tablet 1,000 mcg, 1,000 mcg, Oral, Daily, Emelyn Dhillon MD      Physical Exam:   Vital Sign Min/Max for last 24 hours  Temp  Min: 97.3 °F (36.3 °C)  Max: 98.6 °F (37 °C)   BP  Min: 99/74  Max: 150/93    Pulse  Min: 74  Max: 152     Wt Readings from Last 3 Encounters:   05/06/22 81.6 kg (179 lb 12.8 oz)   04/09/22 88 kg (194 lb 1.6 oz)   02/18/22 85.2 kg (187 lb 12.8 oz)       General Appearance:  Awake,  Alert, cooperative, in no acute distress   Head:  Normocephalic, without obvious abnormality, atraumatic   Eyes:          Conjunctivae normal, anicteric, eom intact    Neck: No adenopathy, supple, trachea midline, no thyromegaly, no   carotid bruit, no JVD, no elevated cvp   Lungs:   Clear to auscultation,respirations regular, even and                  unlabored    Heart:  Regular rhythm and normal rate, normal S1 and S2,            No murmur, no gallop, no rub, no click    Chest Wall:  No abnormalities observed   Abdomen:   Normal bowel sounds, no masses, soft nontender, nondistended                    Rectal:   Deferred   Extremities: No edema. Moves all extremities well, no cyanosis, no erythema   Pulses: Pulses palpable and equal bilaterally   Skin: No bleeding, bruising or rash   Neurologic: Speech clear and appropriate, no facial drooping     :       MONITOR:    Results Review:     Sodium Sodium   Date Value Ref Range Status   05/06/2022 138 136 - 145 mmol/L Final   05/05/2022 142 136 - 145 mmol/L Final      Potassium Potassium   Date Value Ref Range Status   05/06/2022 3.4 (L) 3.5 - 5.2 mmol/L Final   05/05/2022 3.7 3.5 - 5.2 mmol/L Final      Chloride Chloride   Date Value Ref Range Status   05/06/2022 102 98 - 107 mmol/L Final   05/05/2022 101 98 - 107 mmol/L Final      Bicarbonate No results found for: PLASMABICARB   BUN BUN   Date Value Ref Range Status   05/06/2022 18 8 - 23 mg/dL Final   05/05/2022 15 8 - 23 mg/dL Final      Creatinine Creatinine   Date Value Ref Range Status    05/06/2022 1.48 (H) 0.57 - 1.00 mg/dL Final   05/05/2022 1.47 (H) 0.57 - 1.00 mg/dL Final      Calcium Calcium   Date Value Ref Range Status   05/06/2022 8.6 8.6 - 10.5 mg/dL Final   05/05/2022 9.0 8.6 - 10.5 mg/dL Final      Magnesium Magnesium   Date Value Ref Range Status   05/06/2022 2.9 (H) 1.6 - 2.4 mg/dL Final   05/05/2022 1.5 (L) 1.6 - 2.4 mg/dL Final        Results from last 7 days   Lab Units 05/06/22  0354   WBC 10*3/mm3 5.70   HEMOGLOBIN g/dL 10.3*   HEMATOCRIT % 33.3*   PLATELETS 10*3/mm3 218     Lab Results   Lab Value Date/Time    TROPONINT 0.013 05/05/2022 1626    TROPONINT 0.021 05/05/2022 1416    TROPONINT <0.010 03/31/2022 2054    TROPONINT <0.010 02/15/2022 0525    TROPONINT <0.010 02/14/2022 0458    TROPONINT <0.010 10/31/2021 2334    TROPONINT <0.010 10/31/2021 1907    TROPONINT <0.010 04/21/2021 2331    TROPONINT <0.010 04/04/2021 0910     Lab Results   Component Value Date    CHOL 156 04/22/2021     Lab Results   Component Value Date    HDL 37 (L) 04/22/2021     Lab Results   Component Value Date    LDL 97 04/22/2021     Lab Results   Component Value Date    TRIG 119 04/22/2021     No components found for: CHOLHDL  No results found for: PTT  No components found for: PT/INR  Lab Results   Component Value Date    HGBA1C 5.40 04/02/2022      Lab Results   Component Value Date    TSH 1.990 02/15/2022        Echo EF Estimated  )  Lab Results   Component Value Date    ECHOEFEST 60 04/03/2022     4/3/22     Sedation Narrator Report    Sedation Narrator Report        Interpretation Summary    · Calculated left ventricular EF = 60.1% Estimated left ventricular EF = 60% Estimated left ventricular EF was in agreement with the calculated left ventricular EF. Left ventricular systolic function is normal. The left ventricular cavity is small in size. Left ventricular wall thickness is consistent with moderate concentric hypertrophy. All left ventricular wall segments contract normally. Left ventricular  diastolic function was indeterminate.  · The right ventricular cavity is moderately dilated. Normal right ventricular systolic function noted.  · The left atrial cavity is moderately dilated  · The right atrial cavity is moderately dilated.  · No aortic valve regurgitation or stenosis is present. The aortic valve is abnormal in structure. There is calcification of the aortic valve.  · Severe mitral annular calcification is present. Mild mitral valve regurgitation is present. Mean mitral valve gradient is elevated at 6 mmHg at a heart rate of 117 bpm. This is likely due to tachycardia though cannot rule out early mitral valve stenosis  · Mild tricuspid valve regurgitation is present. Estimated right ventricular systolic pressure from tricuspid regurgitation is markedly elevated (>55 mmHg). Calculated right ventricular systolic pressure from tricuspid regurgitation is 56 mmHg.            Assessment/ Plan    Active Hospital Problems    Diagnosis  POA   • Atrial fibrillation with RVR ([facility]) [I48.91]  Yes   Hypertension  Pneumonia  History of stroke  Possible sick sinus syndrome     Sedation Narrator Report    Sedation Narrator Report        I at this time I will continue Eliquis and low-dose beta-blockers.  As an outpatient she will need an event monitor to decide whether she needs a pacemaker              Leslie Lantigua MD  05/06/22  08:44 EDT    Discussed with  Time:

## 2022-05-06 NOTE — PROGRESS NOTES
Discharge Planning Assessment  [facility]     Patient Name: Yazmin Javier  MRN: [id number]  Today's Date: 5/6/2022    Admit Date: 5/5/2022     Discharge Needs Assessment     Row Name 05/06/22 1444       Living Environment    People in Home facility resident    Current Living Arrangements assisted living facility    Provides Primary Care For no one    Family Caregiver if Needed other relative(s)    Family Caregiver Names Niece/HCS on file, Anant Mulligan, [phone]    Quality of Family Relationships helpful;involved;supportive    Able to Return to Prior Arrangements yes       Resource/Environmental Concerns    Resource/Environmental Concerns none       Transition Planning    Patient/Family Anticipates Transition to inpatient rehabilitation facility    Patient/Family Anticipated Services at Transition skilled nursing;rehabilitation services    Transportation Anticipated family or friend will provide;agency       Discharge Needs Assessment    Current Outpatient/Agency/Support Group assisted living facility    Equipment Currently Used at Home walker, rolling;wheelchair;oxygen    Concerns to be Addressed discharge planning    Outpatient/Agency/Support Group Needs skilled nursing facility    Discharge Facility/Level of Care Needs nursing facility, skilled    Provided Post Acute Provider List? Yes    Post Acute Provider List Nursing Home    Discharge Coordination/Progress SNF referrals pending               Discharge Plan     Row Name 05/06/22 1446       Plan    Plan SNF referrals pending    Patient/Family in Agreement with Plan yes    Plan Comments CCP spoke with pt's niece/healthcare surrogate on file (Anant Mulligan, [phone]) re: d/c planning. Pt resides in assisted living at [facility] where she ambulates via walker, and has a wheelchair and continuous O2 via [employer]. Pt has home based primary care via [facility], and uses [facility] pharmacy. Pt has used [facility]  in the past and been to [facility] and [facility] for past rehab. PT eval pending. Evy requests SNF referrals to highly rated facilities in [place] and [place] to assess for rehab and possible consideration to transition to long-term care. Pt has private pay funds to contribute to placement. CCP to follow for facility evals. Rose Marie Tsai LCSW              Continued Care and Services - Admitted Since 5/5/2022     Destination     Service Provider Request Status Selected Services Address Phone Fax Patient Preferred    [place] [facility] [place]  Pending - Request Sent N/A [address] [facility] [id number] [phone] --    [facility] [place]  Pending - Request Sent N/A [address] [facility] [address] [phone] --    [facility]  [facility] - Request Sent N/A [address] [facility] [address] [phone] --    [facility]  [facility] - Request Sent N/A [address] [facility] [address] [phone] --    [facility]  Pending - Request Sent N/A [address] [facility] [id number] [phone] --    [facility]  Pending - Request Sent N/A [address] [facility] [id number] [phone] --    [facility]  Pending - Request Sent N/A [address] [facility] [address] [phone] --    [facility]  Pending - Request Sent N/A [address] [address] [phone] --    [facility]  Pending - Request Sent N/A [address] [facility] [address] [phone] --    [facility]  Pending - Request Sent N/A [address], [facility] [address] [phone] --            Selected Continued Care - Prior Encounters Includes selections from prior encounters from 2/4/2022 to 5/6/2022     Discharged on 2/18/2022 Admission date: 2/14/2022 - Discharge disposition: Home-Health Care [facility]    Home Medical Care     Service Provider Selected Services Address Phone Fax Patient Preferred    [facility]  [facility] [address] [facility], [address] [address] [phone] -- --                       Demographic Summary     Row Name 05/06/22 1443       General Information    Admission Type observation    Arrived From home    Required Notices Provided Observation Status Notice    Referral Source admission list    Reason for Consult discharge planning    Preferred Language English               Functional Status     Row Name 05/06/22 1443       Functional Status    Usual Activity Tolerance good    Current Activity Tolerance moderate       Functional Status, IADL    Medications assistive equipment and person    Meal Preparation assistive equipment and person    Housekeeping assistive equipment and person    Laundry assistive equipment and person    Shopping assistive equipment and person       Mental Status Summary    Recent Changes in Mental Status/Cognitive Functioning unable to assess               Psychosocial    No documentation.                Abuse/Neglect    No documentation.                Legal    No documentation.                Substance Abuse    No documentation.                Patient Forms    No documentation.                   Neela Tsai LCSW

## 2022-05-06 NOTE — CONSULTS
Patient Identification:  Yazmin Javier  86 y.o.  female  1935  [id number]          LOS 0    Requesting physician: Dr Dhillon    Reason for Consultation:  PNA    History of Present Illness:     Sedation for pneumonia.  Patient has chronic respiratory failure and and  OPD and uses 3 L supplemental oxygen at home.  Presented with complaints of shortness of breath and hypertension.  Chest x-ray reviewed shows atelectasis as opposed to pneumonia.  She denies productive cough, fevers.  Suspect this is predominantly pulmonary edema and atelectasis related to her hypertension and cardiac issues.  Not terribly impressed with this for pneumonia.  Moderate wheezing on auscultation.    Past Medical History:  Past Medical History:   Diagnosis Date   • CHF (congestive heart failure) (HCC)    • Sleep apnea    • Stroke (HCC)        Past Surgical History:  No past surgical history on file.     Home Meds:  Medications Prior to Admission   Medication Sig Dispense Refill Last Dose   • allopurinol (ZYLOPRIM) 300 MG tablet Take 300 mg by mouth Daily.   5/5/2022 at 0800   • amLODIPine (NORVASC) 5 MG tablet Take 5 mg by mouth Daily.   5/5/2022 at 0800   • apixaban (ELIQUIS) 5 MG tablet tablet Take 5 mg by mouth 2 (Two) Times a Day.   5/5/2022 at 0800   • atorvastatin (LIPITOR) 80 MG tablet Take 80 mg by mouth Every Night.   5/4/2022 at 2000   • folic acid (FOLVITE) 1 MG tablet Take 1 tablet by mouth Daily. 30 tablet 0 5/5/2022 at 0800   • furosemide (LASIX) 40 MG tablet Take 1 tablet by mouth 2 (Two) Times a Day. 60 tablet 0 5/5/2022 at 1200   • levothyroxine (SYNTHROID, LEVOTHROID) 50 MCG tablet Take 50 mcg by mouth Daily.   5/5/2022 at 0800   • metoprolol tartrate (LOPRESSOR) 25 MG tablet Take 1 tablet by mouth 2 (Two) Times a Day. 60 tablet 0 5/5/2022 at 0800   • PARoxetine (PAXIL) 10 MG tablet Take 10 mg by mouth Daily.   5/5/2022 at 0800   • potassium chloride 10 MEQ CR tablet Take 20 mEq by mouth Daily.   5/5/2022 at 0800    • vitamin B-12 (CYANOCOBALAMIN) 1000 MCG tablet Take 1,000 mcg by mouth Daily.   5/5/2022 at 0800   • albuterol sulfate  (90 Base) MCG/ACT inhaler Inhale 2 puffs Every 4 (Four) Hours As Needed for Wheezing.   Unknown at Unknown time         Allergies:  Allergies   Allergen Reactions   • Cefazolin Other (See Comments)       Social History:   Social History     Socioeconomic History   • Marital status: Single   Tobacco Use   • Smoking status: Never Smoker   • Smokeless tobacco: Never Used   Substance and Sexual Activity   • Alcohol use: Never   • Drug use: Never   • Sexual activity: Defer       Family History:  No family history on file.    Review of Systems:  Denies fevers or chills  Denies nausea or vomiting  No new vision or hearing changes  No chest pain  shortness of breath  No diarrhea, hematemesis or hematochezia, no dysuria or frequency  No musculoskeletal complaints  No heat or cold intolerance  No skin rashes  No dizziness or confusion.  No seizure activity  No new anxiety or depression  12 system review of systems performed and all else negative    Objective:    PHYSICAL EXAM:    /62 (BP Location: Left arm, Patient Position: Lying)   Pulse 74   Temp 98 °F (36.7 °C) (Oral)   Resp 16   Wt 81.6 kg (179 lb 12.8 oz)   SpO2 93%   BMI 32.89 kg/m²  Body mass index is 32.89 kg/m². 93% 81.6 kg (179 lb 12.8 oz)    GENERAL APPEARANCE:   · Well developed  · Well nourished  · No acute distress   EYES:    · PERRL                                                                           · Conjunctivae normal  · Sclerae nonicteric.  HENT:   · Atraumatic, normocephalic  · External ears and nose normal  · Moist mucous membranes and no ulcers  NECK:  · Thyroid not enlarged  · Trachea midline   RESPIRATORY:    · Nonlabored breathing   · Normal breath sounds  · No rales.  Moderate bilateral wheezing  · No dullness  CARDIOVASCULAR:    · RRR  · Normal S1, S2  · No murmur  · Lower extremity edema: Trace  GI:    · Bowel sounds normal  · Abdomen soft , nondistended, nontender  · No abdominal masses  MUSCULOSKELETAL:  · Normal movement of extremities  · No tenderness, no deformities  · No clubbing or cyanosis   Skin:    · No visible rashes  · No palpable nodules  · Cap refill normal.  No mottling.   PSYCHIATRIC:  · Speech and behavior appropriate  · Normal mood and affect  · Oriented to person, place and time  NEUROLOGIC:  Cranial nerves II through XII grossly intact.  Sensation intact.      Lab Review:   Results from last 7 days   Lab Units 05/06/22  0354 05/05/22  1416   WBC 10*3/mm3 5.70 6.54   HEMOGLOBIN g/dL 10.3* 12.0   HEMATOCRIT % 33.3* 39.0   PLATELETS 10*3/mm3 218 252     Results from last 7 days   Lab Units 05/06/22  0354 05/05/22  1416   SODIUM mmol/L 138 142   POTASSIUM mmol/L 3.4* 3.7   CHLORIDE mmol/L 102 101   CO2 mmol/L 23.2 27.0   BUN mg/dL 18 15   CREATININE mg/dL 1.48* 1.47*   GLUCOSE mg/dL 110* 127*   CALCIUM mg/dL 8.6 9.0                       Imaging reviewed  chest X-ray shows basilar atelectasis.  Compared to previous imaging this shows improvement          2D echo 4/3/2022 reviewed showed EF 60% with indeterminate diastolic function.  Moderately dilated left atrium.  Mitral regurgitation and questionable mitral valve stenosis.  Right ventricular systolic pressure estimated 56 mmHg.       Assessment:    Atrial fibrillation with RVR  Hypertensive urgency  Pulmonary vascular congestion  Atelectasis  Chronic hypoxemic respiratory failure  COPD with exacerbation  Pulm hypertension  Chronic kidney disease III  Hypothyroidism        Recommendations:    Not impressed with the x-ray for pneumonia and clinically she does not have significant symptoms to suggest pneumonia.  Afebrile without significant leukocytosis and procalcitonin is not elevated either.  Suspect that this is atelectasis with pulmonary edema related to her atrial fibrillation and hypertension.  We will discontinue antibiotics and monitor  off.  Add scheduled and as needed bronchodilators for wheezing.  If does not show significant improvement with bronchodilator we will add steroid.      Thank you for allowing me to participate in the care of this patient.      Isauro Ramirez MD  [facility], [facility]  Pulmonary and Critical Care Medicine    5/6/2022  07:41 EDT

## 2022-05-06 NOTE — CONSULTS
[facility] CONSULT      Patient Identification:  NAME:  Yazmin Javier  Age:  86 y.o.   Sex:  female   :  1935   MRN:  [id number]       Chief complaint: Urinary Retention    History of present illness:  Yazmin Javier is a 86 y.o. admitted with atrial fibrillation, shortness of breath. History of CHF. On Home oxygen.  Found to have difficulty voiding and bladder spasms in the setting of urinary retention > 500 mL.  She has been straight-cathed at least once. Denies previous issues with urinary retention. Denies flank pain    Cr 1.48   Wbc 5.7   UA 30 protein, otherwise neg    No pertinent imaging to review     Past medical history:  Past Medical History:   Diagnosis Date   • CHF (congestive heart failure) (HCC)    • Sleep apnea    • Stroke (HCC)        Past surgical history:  No past surgical history on file.    Allergies:  Cefazolin    Home medications:  Medications Prior to Admission   Medication Sig Dispense Refill Last Dose   • allopurinol (ZYLOPRIM) 300 MG tablet Take 300 mg by mouth Daily.   2022 at 0800   • amLODIPine (NORVASC) 5 MG tablet Take 5 mg by mouth Daily.   2022 at 0800   • apixaban (ELIQUIS) 5 MG tablet tablet Take 5 mg by mouth 2 (Two) Times a Day.   2022 at 0800   • atorvastatin (LIPITOR) 80 MG tablet Take 80 mg by mouth Every Night.   2022 at 2000   • folic acid (FOLVITE) 1 MG tablet Take 1 tablet by mouth Daily. 30 tablet 0 2022 at 0800   • furosemide (LASIX) 40 MG tablet Take 1 tablet by mouth 2 (Two) Times a Day. 60 tablet 0 2022 at 1200   • levothyroxine (SYNTHROID, LEVOTHROID) 50 MCG tablet Take 50 mcg by mouth Daily.   2022 at 0800   • metoprolol tartrate (LOPRESSOR) 25 MG tablet Take 1 tablet by mouth 2 (Two) Times a Day. 60 tablet 0 2022 at 0800   • PARoxetine (PAXIL) 10 MG tablet Take 10 mg by mouth Daily.   2022 at 0800   • potassium chloride 10 MEQ CR tablet Take 20 mEq by mouth Daily.   2022 at 0800   • vitamin B-12  (CYANOCOBALAMIN) 1000 MCG tablet Take 1,000 mcg by mouth Daily.   2022 at 0800   • albuterol sulfate  (90 Base) MCG/ACT inhaler Inhale 2 puffs Every 4 (Four) Hours As Needed for Wheezing.   Unknown at Unknown time        Hospital medications:  allopurinol, 300 mg, Oral, Daily  amLODIPine, 5 mg, Oral, Daily  apixaban, 5 mg, Oral, BID  atorvastatin, 80 mg, Oral, Nightly  folic acid, 1 mg, Oral, Daily  furosemide, 40 mg, Oral, BID  ipratropium-albuterol, 3 mL, Nebulization, 4x Daily - RT  levothyroxine, 50 mcg, Oral, Q AM  metoprolol tartrate, 25 mg, Oral, BID  oxybutynin XL, 10 mg, Oral, Daily  PARoxetine, 10 mg, Oral, Daily  potassium chloride, 20 mEq, Oral, Daily  vitamin B-12, 1,000 mcg, Oral, Daily         •  acetaminophen  •  ipratropium-albuterol  •  magnesium sulfate **OR** magnesium sulfate **OR** magnesium sulfate  •  melatonin  •  nitroglycerin  •  ondansetron **OR** ondansetron  •  [COMPLETED] Insert peripheral IV **AND** sodium chloride    Family history:  No family history on file.    Social history:  Social History     Tobacco Use   • Smoking status: Never Smoker   • Smokeless tobacco: Never Used   Substance Use Topics   • Alcohol use: Never   • Drug use: Never       REVIEW OF SYSTEMS:  Constitutional - Negative for fevers/chills  Eyes/Ears/Nose/Mouth/Throat - Negative for changes in vision  Cardiovascular - Negative for chest pain, dysrhythmia  Respiratory - Negative for dyspnea  Gastrointestinal - Negative for nausea or vomiting  Genitourinary - Negative for dysuria  Hematologic/Lymphatic - Negative for bruising  Skin - Negative for erythema  Endocrine - Negative for history of diabetes    Objective:  TMax 24 hours:   Temp (24hrs), Av.7 °F (36.5 °C), Min:97.3 °F (36.3 °C), Max:98.2 °F (36.8 °C)      Vitals Ranges:   Temp:  [97.3 °F (36.3 °C)-98.2 °F (36.8 °C)] 98.2 °F (36.8 °C)  Heart Rate:  [59-89] 59  Resp:  [16-18] 18  BP: ()/() 98/81    Intake/Output Last 3 shifts:  I/O  last 3 completed shifts:  In: 550 [I.V.:50; IV Piggyback:500]  Out: -      Physical Exam:    General Appearance:    Alert, cooperative, NAD   HEENT:    No trauma, pupils reactive, hearing intact   Back:     Not examined   Lungs:     Respirations unlabored, no wheezing    Heart:    RRR, intact peripheral pulses   Abdomen:     Soft, NDNT, no masses, no guarding   :    Not examined     Extremities:   No edema, no deformity   Lymphatic:   No neck or groin LAD   Skin:   No bleeding, bruising or rashes   Neuro/Psych:   Orientation intact, mood/affect pleasant, no focal findings       Results review:   I reviewed the patient's new clinical results.    Data review:  Lab Results (last 24 hours)     Procedure Component Value Units Date/Time    Blood Culture - Blood, Arm, Left [[id number]]  (Normal) Collected: 05/05/22 1416    Specimen: Blood from Arm, Left Updated: 05/06/22 1433     Blood Culture No growth at 24 hours    Blood Culture - Blood, Arm, Left [[id number]]  (Normal) Collected: 05/05/22 1416    Specimen: Blood from Arm, Left Updated: 05/06/22 1433     Blood Culture No growth at 24 hours    Urinalysis, Microscopic Only - Urine, Clean Catch [[id number]] Collected: 05/06/22 1121    Specimen: Urine, Clean Catch Updated: 05/06/22 1142     RBC, UA 0-2 /HPF      WBC, UA 0-2 /HPF      Comment: Urine culture not indicated.        Bacteria, UA None Seen /HPF      Squamous Epithelial Cells, UA 0-2 /HPF      Hyaline Casts, UA 3-6 /LPF      Methodology Automated Microscopy    Urinalysis With Culture If Indicated - Urine, Clean Catch [[id number]]  (Abnormal) Collected: 05/06/22 1121    Specimen: Urine, Clean Catch Updated: 05/06/22 1142     Color, UA Yellow     Appearance, UA Clear     pH, UA 6.0     Specific Gravity, UA 1.016     Glucose, UA Negative     Ketones, UA Negative     Bilirubin, UA Negative     Blood, UA Negative     Protein, UA 30 mg/dL (1+)     Leuk Esterase, UA Negative     Nitrite, UA Negative     Urobilinogen, UA  0.2 E.U./dL    Magnesium [[id number]]  (Abnormal) Collected: 05/06/22 0354    Specimen: Blood Updated: 05/06/22 0456     Magnesium 2.9 mg/dL     Basic Metabolic Panel [[id number]]  (Abnormal) Collected: 05/06/22 0354    Specimen: Blood Updated: 05/06/22 0456     Glucose 110 mg/dL      BUN 18 mg/dL      Creatinine 1.48 mg/dL      Sodium 138 mmol/L      Potassium 3.4 mmol/L      Chloride 102 mmol/L      CO2 23.2 mmol/L      Calcium 8.6 mg/dL      BUN/Creatinine Ratio 12.2     Anion Gap 12.8 mmol/L      eGFR 34.8 mL/min/1.73      Comment: National Kidney Foundation and American Society of Nephrology (ASN) Task Force recommended calculation based on the Chronic Kidney Disease Epidemiology Collaboration (CKD-EPI) equation refit without adjustment for race.       Narrative:      GFR Normal >60  Chronic Kidney Disease <60  Kidney Failure <15      CBC (No Diff) [[id number]]  (Abnormal) Collected: 05/06/22 0354    Specimen: Blood Updated: 05/06/22 0434     WBC 5.70 10*3/mm3      RBC 3.30 10*6/mm3      Hemoglobin 10.3 g/dL      Hematocrit 33.3 %      .9 fL      MCH 31.2 pg      MCHC 30.9 g/dL      RDW 16.2 %      RDW-SD 60.3 fl      MPV 9.3 fL      Platelets 218 10*3/mm3     COVID PRE-OP / PRE-PROCEDURE SCREENING ORDER (NO ISOLATION) - Swab, Nasopharynx [[id number]]  (Normal) Collected: 05/05/22 1628    Specimen: Swab from Nasopharynx Updated: 05/05/22 2204    Narrative:      The following orders were created for panel order COVID PRE-OP / PRE-PROCEDURE SCREENING ORDER (NO ISOLATION) - Swab, Nasopharynx.  Procedure                               Abnormality         Status                     ---------                               -----------         ------                     COVID-19,APTIMA PANTHER(...[[id number]]  Normal              Final result                 Please view results for these tests on the individual orders.    COVID-19,APTIMA PANTHER(JHON), VIRGIL/ JAQUAN, NP/OP SWAB IN UTM/VTM/SALINE TRANSPORT MEDIA,24 HR  TAT - Swab, Nasopharynx [[id number]]  (Normal) Collected: 05/05/22 1628    Specimen: Swab from Nasopharynx Updated: 05/05/22 2204     COVID19 Not Detected    Narrative:      Fact sheet for providers: https://www.fda.gov/media/752860/download     Fact sheet for patients: https://www.fda.gov/media/651011/download    Test performed by RT PCR.    STAT Lactic Acid, Reflex [[id number]]  (Normal) Collected: 05/05/22 1754    Specimen: Blood from Arm, Left Updated: 05/05/22 2142     Lactate 1.7 mmol/L            Imaging:  Imaging Results (Last 24 Hours)     ** No results found for the last 24 hours. **             Assessment:       Atrial fibrillation with RVR (HCC)    Urinary Retention  Bladder spasms   CKD    I expect retention to resolve as acute issues subside    Plan:     - No acute Urologic surgical intervention   - Recommend indwelling urethral Padilla catheter  - Check for UTI; awaiting cultures  - Be careful/gentle with anticholinergics in 86 year old woman    - Recommend voiding trial early AM of day of Discharge  - If unable to void, PVR > 300 mL, then replace Padilla  - Follow-up with Dr. Naga Villaafna ([facility]) 1-2 weeks after Discharge - Schedulers messaged.    - Urology will sign off; please call with any questions/concerns or clinical change       Naga Villafana MD  05/06/22  17:16 EDT

## 2022-05-06 NOTE — PROGRESS NOTES
Name: Yazmin aJvier ADMIT: 2022   : 1935  PCP: Morenita Silverio MD    MRN: [id number] LOS: 0 days   AGE/SEX: 86 y.o. female  ROOM: [facility]     Subjective   Subjective   Patient reports decreased shortness of breath.  Positive occasional cough productive of clear sputum.  No chest pain.  No hemoptysis.  No fever or chills.  Decreased wheezing.  No hemoptysis.  Complaining of difficulty with urination.  No hematuria or dysuria.    Review of Systems  GI.  No abdominal pain or nausea or vomiting.     Objective   Objective   Vital Signs  Temp:  [97.3 °F (36.3 °C)-98.2 °F (36.8 °C)] 98.2 °F (36.8 °C)  Heart Rate:  [59-89] 59  Resp:  [16-18] 18  BP: ()/() 98/81  SpO2:  [93 %-99 %] 95 %  on  Flow (L/min):  [2-3] 2;   Device (Oxygen Therapy): nasal cannula    Intake/Output Summary (Last 24 hours) at 2022 1749  Last data filed at 2022 1122  Gross per 24 hour   Intake 50 ml   Output 550 ml   Net -500 ml     Body mass index is 32.89 kg/m².      22  0509   Weight: 81.6 kg (179 lb 12.8 oz)     Physical Exam  General.  Elderly female.  Alert and oriented x3.  No apparent pain/distress/diaphoresis.  Normal mood and affect.  Eyes.  Pupils equal round and reactive.  Intact extraocular musculature.  No pallor or jaundice.  Normal conjunctivae and lids.  Oral cavity.  Moist mucous membrane.  Neck.  Supple.  No JVD.  No lymphadenopathy or thyromegaly.  Cardiovascular.  Regular rate and rhythm.  Occasional ectopic beats.  Grade 2 systolic murmur.  Chest.  Poor bilateral air entry with scattered bilateral expiratory wheeze and no crackles.  Abdomen.  Soft lax.  No tenderness.  No organomegaly.  No guarding or rebound.  Extremities.  No clubbing cyanosis or edema.  CNS.  No acute focal neurological deficits.    Results Review:      Results from last 7 days   Lab Units 22  0354 22  1416   SODIUM mmol/L 138 142   POTASSIUM mmol/L 3.4* 3.7   CHLORIDE mmol/L 102 101   CO2 mmol/L 23.2 27.0    BUN mg/dL 18 15   CREATININE mg/dL 1.48* 1.47*   GLUCOSE mg/dL 110* 127*   CALCIUM mg/dL 8.6 9.0   AST (SGOT) U/L  --  27   ALT (SGPT) U/L  --  19     Estimated Creatinine Clearance: 28 mL/min (A) (by C-G formula based on SCr of 1.48 mg/dL (H)).          Results from last 7 days   Lab Units 05/05/22  1626 05/05/22  1416   TROPONIN T ng/mL 0.013 0.021     Results from last 7 days   Lab Units 05/05/22  1416   PROBNP pg/mL 2,198.0*         Results from last 7 days   Lab Units 05/06/22  0354 05/05/22  1416   MAGNESIUM mg/dL 2.9* 1.5*           Invalid input(s): LDLCALC  Results from last 7 days   Lab Units 05/06/22  0354 05/05/22  1416   WBC 10*3/mm3 5.70 6.54   HEMOGLOBIN g/dL 10.3* 12.0   HEMATOCRIT % 33.3* 39.0   PLATELETS 10*3/mm3 218 252   MCV fL 100.9* 102.6*   MCH pg 31.2 31.6   MCHC g/dL 30.9* 30.8*   RDW % 16.2* 16.2*   RDW-SD fl 60.3* 61.4*   MPV fL 9.3 9.0   NEUTROPHIL % %  --  35.5*   LYMPHOCYTE % %  --  49.4*   MONOCYTES % %  --  13.0*   EOSINOPHIL % %  --  0.9   BASOPHIL % %  --  0.9   IMM GRAN % %  --  0.3   NEUTROS ABS 10*3/mm3  --  2.32   LYMPHS ABS 10*3/mm3  --  3.23*   MONOS ABS 10*3/mm3  --  0.85   EOS ABS 10*3/mm3  --  0.06   BASOS ABS 10*3/mm3  --  0.06   IMMATURE GRANS (ABS) 10*3/mm3  --  0.02   NRBC /100 WBC  --  0.2             Results from last 7 days   Lab Units 05/05/22  1754 05/05/22  1416   PROCALCITONIN ng/mL  --  0.16   LACTATE mmol/L 1.7 2.4*             Results from last 7 days   Lab Units 05/05/22  1416   BLOODCX  No growth at 24 hours  No growth at 24 hours         Results from last 7 days   Lab Units 05/06/22  1121   NITRITE UA  Negative   WBC UA /HPF 0-2   BACTERIA UA /HPF None Seen   SQUAM EPITHEL UA /HPF 0-2           Imaging:  Imaging Results (Last 24 Hours)     ** No results found for the last 24 hours. **             I reviewed the patient's new clinical results / labs / tests / procedures      Assessment/Plan     Active Hospital Problems    Diagnosis  POA   • Atrial  fibrillation with RVR ([facility]) [I48.91]  Yes      Resolved Hospital Problems   No resolved problems to display.           · Rapid ventricular response atrial fibrillation/acute diastolic congestive heart failure in a patient with a history of A. fib/hypertension.  2D echo on 4/3/2022 revealed a normal ejection fraction.  Cardiology consult noted and appreciated.  Currently rate controlled on Eliquis and beta-blockers.  Blood pressure is on the low side I will decrease Norvasc.  Continue p.o. Lasix.  Will need Holter monitor at the time of discharge.    · Acute COPD exacerbation in patient with a chronic hypoxemic respiratory failure and pulmonary hypertension.  Pulmonary consult noted and appreciated.  Pulmonologist does not believe that there is a pneumonia and antibiotics was stopped.  Currently improving on bronchodilator.  Considering steroids if not better.  · Hypothyroidism.  Clinically euthyroid on replacement.  Will check TSH.  · Hyperglycemia.  Will check A1c.  · Stage IIIb chronic renal failure/hypomagnesemia/hypokalemia.  Stable renal function.  Appears euvolemic.  Resolved hypomagnesemia on substitution.  Continue replacing potassium.  · Chronic disease anemia.  Hemoglobin stable about the baseline of 9.  · Urinary retention/bladder spasms.  Continue Ditropan.  Continue in and out cath.  UA is negative for UTI.  Appreciate urology consultation.  · DNR status.    Discussed my findings and plan of treatment with the patient/nurse.      Rock Merrill MD  [facility]  05/06/22  17:49 EDT

## 2022-05-06 NOTE — NURSING NOTE
Nursing and cna assisted patient to bedside commode to void as patient tearful and saying she cannot use the purwick external catheter. Patient was unable to void on bsc, and bladder scan performed. 500cc of urine in bladder with scan. RN performing in/out cath.     UA obtained    Patient and family calling out requesting something for bladder spasms  Second bladder scan performed and only 75cc urine noted in bladder.   Urology consult called.  RN notified Attending family is asking for medication for bladder spasms.

## 2022-05-06 NOTE — PROGRESS NOTES
[facility] Progress Note  5/6/2022  14:42 EDT      Urology Consult Pending       Naga Villafana MD  [facility]  General & Reconstructive Urology  Office: [phone]  Fax: [phone]

## 2022-05-06 NOTE — PLAN OF CARE
Goal Outcome Evaluation:  Plan of Care Reviewed With: patient, family        Progress: no change  Outcome Evaluation: pt with some urinary retention, and c/o bladder spasms. urology consulted. ditropan added. continuing to monitor. Accumax pump applied to bed. SCD's ordered.  Problem: Adult Inpatient Plan of Care  Goal: Plan of Care Review  5/6/2022 1619 by Alison Naidu RN  Outcome: Ongoing, Not Progressing  Flowsheets (Taken 5/6/2022 1619)  Progress: no change  Plan of Care Reviewed With:   patient   family  Outcome Evaluation: pt with some urinary retention, and c/o bladder spasms. urology consulted. ditropan added. continuing to monitor. Accumax pump applied to bed. SCD's ordered.  5/6/2022 1618 by Alison Naidu RN  Outcome: Ongoing, Progressing  Flowsheets (Taken 5/6/2022 1618)  Plan of Care Reviewed With: patient  Goal: Patient-Specific Goal (Individualized)  5/6/2022 1619 by Alison Naidu RN  Outcome: Ongoing, Not Progressing  5/6/2022 1618 by Alison Naidu RN  Outcome: Ongoing, Progressing  Goal: Absence of Hospital-Acquired Illness or Injury  5/6/2022 1619 by Alison Naidu RN  Outcome: Ongoing, Not Progressing  5/6/2022 1618 by Alison Naidu RN  Outcome: Ongoing, Progressing  Intervention: Identify and Manage Fall Risk  Recent Flowsheet Documentation  Taken 5/6/2022 1416 by Alison Naidu RN  Safety Promotion/Fall Prevention:   activity supervised   assistive device/personal items within reach   clutter free environment maintained   lighting adjusted   nonskid shoes/slippers when out of bed   safety round/check completed   room organization consistent  Taken 5/6/2022 0846 by Alison Naidu RN  Safety Promotion/Fall Prevention: safety round/check completed  Intervention: Prevent Skin Injury  Recent Flowsheet Documentation  Taken 5/6/2022 1416 by Alison Naidu RN  Body Position:   upper extremity elevated   right  Taken 5/6/2022 0846 by Alison Naidu RN  Body Position: supine, legs elevated  Skin  Protection: adhesive use limited  Intervention: Prevent and Manage VTE (Venous Thromboembolism) Risk  Recent Flowsheet Documentation  Taken 5/6/2022 1416 by Alison Naidu RN  Activity Management: activity adjusted per tolerance  Taken 5/6/2022 0846 by Alison Naidu RN  Activity Management: activity minimized  Intervention: Prevent Infection  Recent Flowsheet Documentation  Taken 5/6/2022 1416 by Alison Naidu RN  Infection Prevention: environmental surveillance performed  Taken 5/6/2022 0846 by Alison Naidu RN  Infection Prevention: hand hygiene promoted  Goal: Optimal Comfort and Wellbeing  5/6/2022 1619 by Alison Naidu RN  Outcome: Ongoing, Not Progressing  5/6/2022 1618 by Alison Naidu RN  Outcome: Ongoing, Progressing  Intervention: Monitor Pain and Promote Comfort  Recent Flowsheet Documentation  Taken 5/6/2022 0846 by Alison Naidu RN  Pain Management Interventions:   care clustered   diversional activity provided   pain management plan reviewed with patient/caregiver   position adjusted   quiet environment facilitated   see MAR  Goal: Readiness for Transition of Care  5/6/2022 1619 by Alison Naidu RN  Outcome: Ongoing, Not Progressing  5/6/2022 1618 by Alison Naidu RN  Outcome: Ongoing, Progressing

## 2022-05-06 NOTE — DISCHARGE PLACEMENT REQUEST
"Yazmin Javier (86 y.o. Female)             Date of Birth   1935    Social Security Number       Address   [address]  [place] [address]    Home Phone   [phone]    MRN   [id number]       B[employer]ism   Voodoo    Marital Status   Single                            Admission Date   5/5/22    Admission Type   Emergency    Admitting Provider   Emelyn Dhillon MD    Attending Provider   Rock Merrill MD    Department, Room/Bed   [address], [address]/1       Discharge Date       Discharge Disposition       Discharge Destination                               Attending Provider: Rock Merrill MD    Allergies: Cefazolin    Isolation: None   Infection: None   Code Status: No CPR   Advance Care Planning Activity    Ht: 157.5 cm (62\")   Wt: 81.6 kg (179 lb 12.8 oz)    Admission Cmt: None   Principal Problem: None                Active Insurance as of 5/5/2022     Primary Coverage     Payor Plan Insurance Group Employer/Plan Group    MEDICARE MEDICARE A & B      Payor Plan Address Payor Plan Phone Number Payor Plan Fax Number Effective Dates    [address] [phone]  6/1/2002 - None Entered    [facility] [id number]       Subscriber Name Subscriber Birth Date Member ID       YAZMIN JAVIER 1935 [id number]           Secondary Coverage     Payor Plan Insurance Group Employer/Plan Group    [facility] SUPP [id number]     Payor Plan Address Payor Plan Phone Number Payor Plan Fax Number Effective Dates    [address]   12/1/2016 - None Entered    [facility], [place] [address]       Subscriber Name Subscriber Birth Date Member ID       YAZMIN JAVIER 1935 [id number]                 Emergency Contacts      (Rel.) Home Phone Work Phone Mobile Phone    WALTER SERVIN (Surrogate) [phone] -- [phone]              "

## 2022-05-06 NOTE — H&P
HISTORY AND PHYSICAL   [facility]        Date of Admission: 2022  Patient Identification:  Name: Yazmin Javier  Age: 86 y.o.  Sex: female  :  1935  MRN: [id number]                     Primary Care Physician: Morenita Silverio MD    Chief Complaint:  86 year old female who presented to the emergency room with palpitations and a  Fast heart rate; she has a history of atrial fibrillation; she denies fever or chills; no chest pain; she is on home oxygen at 3 liters;; she denies shortness of breath worse than her baseline    History of Present Illness:   As above    Past Medical History:  Past Medical History:   Diagnosis Date   • CHF (congestive heart failure) (HCC)    • Sleep apnea    • Stroke (HCC)      Past Surgical History:  No past surgical history on file.   Home Meds:  Medications Prior to Admission   Medication Sig Dispense Refill Last Dose   • allopurinol (ZYLOPRIM) 300 MG tablet Take 300 mg by mouth Daily.   2022 at 0800   • amLODIPine (NORVASC) 5 MG tablet Take 5 mg by mouth Daily.   2022 at 0800   • apixaban (ELIQUIS) 5 MG tablet tablet Take 5 mg by mouth 2 (Two) Times a Day.   2022 at 0800   • atorvastatin (LIPITOR) 80 MG tablet Take 80 mg by mouth Every Night.   2022 at 2000   • folic acid (FOLVITE) 1 MG tablet Take 1 tablet by mouth Daily. 30 tablet 0 2022 at 0800   • furosemide (LASIX) 40 MG tablet Take 1 tablet by mouth 2 (Two) Times a Day. 60 tablet 0 2022 at 1200   • levothyroxine (SYNTHROID, LEVOTHROID) 50 MCG tablet Take 50 mcg by mouth Daily.   2022 at 0800   • metoprolol tartrate (LOPRESSOR) 25 MG tablet Take 1 tablet by mouth 2 (Two) Times a Day. 60 tablet 0 2022 at 0800   • PARoxetine (PAXIL) 10 MG tablet Take 10 mg by mouth Daily.   2022 at 0800   • potassium chloride 10 MEQ CR tablet Take 20 mEq by mouth Daily.   2022 at 0800   • vitamin B-12 (CYANOCOBALAMIN) 1000 MCG tablet Take 1,000 mcg by mouth Daily.   2022 at 0800    • albuterol sulfate  (90 Base) MCG/ACT inhaler Inhale 2 puffs Every 4 (Four) Hours As Needed for Wheezing.   Unknown at Unknown time       Allergies:  Allergies   Allergen Reactions   • Cefazolin Other (See Comments)     Immunizations:  Immunization History   Administered Date(s) Administered   • COVID-19 (PFIZER) PURPLE CAP 2021, 2021, 10/17/2021   • Fluzone High Dose =>65 Years ([employer] ONLY) 10/06/2020, 10/17/2021   • Fluzone High-Dose 65+yrs 10/06/2020, 10/17/2021     Social History:   Social History     Social History Narrative   • Not on file     Social History     Socioeconomic History   • Marital status: Single   Tobacco Use   • Smoking status: Never Smoker   • Smokeless tobacco: Never Used   Substance and Sexual Activity   • Alcohol use: Never   • Drug use: Never   • Sexual activity: Defer       Family History:  No family history on file.     Review of Systems  See history of present illness and past medical history.  Patient denies headache, dizziness, syncope, falls, trauma, change in vision, change in hearing, change in taste, changes in weight, changes in appetite, focal weakness, numbness, or paresthesia.  Patient denies chest pain,  dyspnea, orthopnea, PND, cough, sinus pressure, rhinorrhea, epistaxis, hemoptysis, nausea, vomiting,hematemesis, diarrhea, constipation or hematchezia.  Denies cold or heat intolerance, polydipsia, polyuria, polyphagia. Denies hematuria, pyuria, dysuria, hesitancy, frequency or urgency. Denies consumption of raw and under cooked meats foods or change in water source.  Denies fever, chills, sweats, night sweats.  Denies missing any routine medications. Remainder of ROS is negative.    Objective:  T Max 24 hrs: Temp (24hrs), Av.1 °F (36.7 °C), Min:97.6 °F (36.4 °C), Max:98.6 °F (37 °C)    Vitals Ranges:   Temp:  [97.6 °F (36.4 °C)-98.6 °F (37 °C)] 97.6 °F (36.4 °C)  Heart Rate:  [] 76  Resp:  [16] 16  BP: ()/()  123/104      Exam:  BP (!) 123/104 (BP Location: Left arm, Patient Position: Lying)   Pulse 76   Temp 97.6 °F (36.4 °C) (Oral)   Resp 16   SpO2 99%     General Appearance:    Alert, cooperative, no distress, appears stated age   Head:    Normocephalic, without obvious abnormality, atraumatic   Eyes:    PERRL, conjunctivae/corneas clear, EOM's intact, both eyes   Ears:    Normal external ear canals, both ears   Nose:   Nares normal, septum midline, mucosa normal, no drainage    or sinus tenderness   Throat:   Lips, mucosa, and tongue normal   Neck:   Supple, symmetrical, trachea midline, no adenopathy;     thyroid:  no enlargement/tenderness/nodules; no carotid    bruit or JVD   Back:     Symmetric, no curvature, ROM normal, no CVA tenderness   Lungs:     Decreased breath sounds bilaterally, respirations unlabored   Chest Wall:    No tenderness or deformity    Heart:    Regular rate and rhythm, S1 and S2 normal, no murmur, rub   or gallop   Abdomen:     Soft, nontender, bowel sounds active all four quadrants,     no masses, no hepatomegaly, no splenomegaly   Extremities:   Extremities normal, atraumatic, no cyanosis or edema   Pulses:   2+ and symmetric all extremities   Skin:   Skin color, texture, turgor normal, no rashes or lesions   Lymph nodes:   Cervical, supraclavicular, and axillary nodes normal   Neurologic:   CNII-XII intact, normal strength, sensation intact throughout      .    Data Review:  Labs in chart were reviewed.  WBC   Date Value Ref Range Status   05/05/2022 6.54 3.40 - 10.80 10*3/mm3 Final     Hemoglobin   Date Value Ref Range Status   05/05/2022 12.0 12.0 - 15.9 g/dL Final     Hematocrit   Date Value Ref Range Status   05/05/2022 39.0 34.0 - 46.6 % Final     Platelets   Date Value Ref Range Status   05/05/2022 252 140 - 450 10*3/mm3 Final     Sodium   Date Value Ref Range Status   05/05/2022 142 136 - 145 mmol/L Final     Potassium   Date Value Ref Range Status   05/05/2022 3.7 3.5 - 5.2  mmol/L Final     Chloride   Date Value Ref Range Status   05/05/2022 101 98 - 107 mmol/L Final     CO2   Date Value Ref Range Status   05/05/2022 27.0 22.0 - 29.0 mmol/L Final     BUN   Date Value Ref Range Status   05/05/2022 15 8 - 23 mg/dL Final     Creatinine   Date Value Ref Range Status   05/05/2022 1.47 (H) 0.57 - 1.00 mg/dL Final     Glucose   Date Value Ref Range Status   05/05/2022 127 (H) 65 - 99 mg/dL Final     Calcium   Date Value Ref Range Status   05/05/2022 9.0 8.6 - 10.5 mg/dL Final     Magnesium   Date Value Ref Range Status   05/05/2022 1.5 (L) 1.6 - 2.4 mg/dL Final     AST (SGOT)   Date Value Ref Range Status   05/05/2022 27 1 - 32 U/L Final     ALT (SGPT)   Date Value Ref Range Status   05/05/2022 19 1 - 33 U/L Final     Alkaline Phosphatase   Date Value Ref Range Status   05/05/2022 126 (H) 39 - 117 U/L Final                Imaging Results (All)     Procedure Component Value Units Date/Time    XR Chest 1 View [[id number]] Collected: 05/05/22 1419     Updated: 05/05/22 1425    Narrative:      XR CHEST 1 VW-     HISTORY: Female who is 86 years-old,  sepsis     TECHNIQUE: Frontal view of the chest     COMPARISON: 04/03/2022     FINDINGS: Heart is enlarged. Aorta is tortuous, calcified. Pulmonary  vasculature is unremarkable. Aeration of the lungs is improved. Minimal  likely atelectasis in both lungs. No focal pulmonary consolidation,  pleural effusion, or pneumothorax. No acute osseous process.       Impression:      Interval improvement. Minimal likely atelectasis in both  lungs. Cardiomegaly. Tortuous aorta.     This report was finalized on 5/5/2022 2:22 PM by Dr. Arthur Villatoro M.D.               Assessment:  Active Hospital Problems    Diagnosis  POA   • Atrial fibrillation with RVR (HCC) [I48.91]  Yes      Resolved Hospital Problems   No resolved problems to display.   hypothyroidism  ckd3  Pneumonia  Chronic hypoxic respiratory failure  Pulmonary hypertension    Plan:  Continue  antibiotics  Ask cardiology to see her  Ask pulmonary to see her  Monitor on telemetry  She has ruled out for acs  D.w patient and ED provider    Emelyn Dhillon MD  5/5/2022  21:05 EDT

## 2022-05-06 NOTE — PLAN OF CARE
Goal Outcome Evaluation:  Plan of Care Reviewed With: patient        Progress: improving  Outcome Evaluation: Pt arrived early morning, potassium protocol, 3.1 sec pause- EKG done, call light explained,

## 2022-05-07 PROBLEM — R73.03 PREDIABETES: Status: ACTIVE | Noted: 2022-01-01

## 2022-05-07 NOTE — PLAN OF CARE
Problem: Adult Inpatient Plan of Care  Goal: Plan of Care Review  Outcome: Ongoing, Progressing  Flowsheets (Taken 5/7/2022 0453)  Progress: no change  Plan of Care Reviewed With: patient  Outcome Evaluation:   VSS. A/Ox4. 3L NC. urinary retention, bladder scanned   straight cath x1. resting well throughout shift.  Goal: Patient-Specific Goal (Individualized)  Outcome: Ongoing, Progressing  Goal: Absence of Hospital-Acquired Illness or Injury  Outcome: Ongoing, Progressing  Intervention: Identify and Manage Fall Risk  Recent Flowsheet Documentation  Taken 5/7/2022 0419 by Gayathri Laurent RN  Safety Promotion/Fall Prevention:   safety round/check completed   room organization consistent   clutter free environment maintained   assistive device/personal items within reach  Taken 5/7/2022 0215 by Gayathri Laurent RN  Safety Promotion/Fall Prevention:   safety round/check completed   room organization consistent   clutter free environment maintained   assistive device/personal items within reach  Taken 5/7/2022 0022 by Gayathri Laurent RN  Safety Promotion/Fall Prevention:   safety round/check completed   room organization consistent   assistive device/personal items within reach   clutter free environment maintained  Taken 5/6/2022 2218 by Gayathri Laurent RN  Safety Promotion/Fall Prevention:   safety round/check completed   room organization consistent   clutter free environment maintained   assistive device/personal items within reach  Taken 5/6/2022 2125 by Gayathri Laurent RN  Safety Promotion/Fall Prevention:   safety round/check completed   room organization consistent   clutter free environment maintained   assistive device/personal items within reach  Intervention: Prevent Skin Injury  Recent Flowsheet Documentation  Taken 5/6/2022 2125 by Gayathri Laurent RN  Skin Protection:   incontinence pads utilized   tubing/devices free from skin contact  Intervention: Prevent and Manage VTE (Venous  Thromboembolism) Risk  Recent Flowsheet Documentation  Taken 5/6/2022 2125 by Gayathri Laurent RN  VTE Prevention/Management:   bilateral   dorsiflexion/plantar flexion performed  Intervention: Prevent Infection  Recent Flowsheet Documentation  Taken 5/7/2022 0419 by Gayathri Laurent RN  Infection Prevention: rest/sleep promoted  Taken 5/7/2022 0215 by Gayathri Laurent RN  Infection Prevention: rest/sleep promoted  Taken 5/7/2022 0022 by Gayathri Laurent RN  Infection Prevention: rest/sleep promoted  Taken 5/6/2022 2218 by Gayathri Laurent RN  Infection Prevention: rest/sleep promoted  Taken 5/6/2022 2125 by Gayathri Laurent RN  Infection Prevention: rest/sleep promoted  Goal: Optimal Comfort and Wellbeing  Outcome: Ongoing, Progressing  Intervention: Monitor Pain and Promote Comfort  Recent Flowsheet Documentation  Taken 5/6/2022 2218 by Gayathri Laurent RN  Pain Management Interventions: see MAR  Intervention: Provide Person-Centered Care  Recent Flowsheet Documentation  Taken 5/7/2022 0022 by Gayathri Laurent RN  Trust Relationship/Rapport:   care explained   empathic listening provided   emotional support provided  Taken 5/6/2022 2125 by Gayathri Laurent RN  Trust Relationship/Rapport:   care explained   emotional support provided   empathic listening provided  Goal: Readiness for Transition of Care  Outcome: Ongoing, Progressing     Problem: Skin Injury Risk Increased  Goal: Skin Health and Integrity  Outcome: Ongoing, Progressing  Intervention: Optimize Skin Protection  Recent Flowsheet Documentation  Taken 5/6/2022 2125 by Gayathri Laurent RN  Pressure Reduction Techniques: frequent weight shift encouraged  Pressure Reduction Devices: alternating pressure pump (ADD)  Skin Protection:   incontinence pads utilized   tubing/devices free from skin contact     Problem: Fall Injury Risk  Goal: Absence of Fall and Fall-Related Injury  Outcome: Ongoing, Progressing  Intervention: Identify and  Manage Contributors  Recent Flowsheet Documentation  Taken 5/6/2022 2218 by Gayathri Laurent RN  Medication Review/Management: medications reviewed  Taken 5/6/2022 2125 by Gayathri Laurent RN  Medication Review/Management: medications reviewed  Intervention: Promote Injury-Free Environment  Recent Flowsheet Documentation  Taken 5/7/2022 0419 by Gayathri Laurent RN  Safety Promotion/Fall Prevention:   safety round/check completed   room organization consistent   clutter free environment maintained   assistive device/personal items within reach  Taken 5/7/2022 0215 by Gayathri Laurent RN  Safety Promotion/Fall Prevention:   safety round/check completed   room organization consistent   clutter free environment maintained   assistive device/personal items within reach  Taken 5/7/2022 0022 by Gayathri Laurent RN  Safety Promotion/Fall Prevention:   safety round/check completed   room organization consistent   assistive device/personal items within reach   clutter free environment maintained  Taken 5/6/2022 2218 by Gayathri Laurent RN  Safety Promotion/Fall Prevention:   safety round/check completed   room organization consistent   clutter free environment maintained   assistive device/personal items within reach  Taken 5/6/2022 2125 by Gayathri Laurent RN  Safety Promotion/Fall Prevention:   safety round/check completed   room organization consistent   clutter free environment maintained   assistive device/personal items within reach   Goal Outcome Evaluation:  Plan of Care Reviewed With: patient        Progress: no change  Outcome Evaluation: VSS. A/Ox4. 3L NC. urinary retention, bladder scanned; straight cath x1. resting well throughout shift.

## 2022-05-07 NOTE — THERAPY EVALUATION
Patient Name: Yazmin Javier  : 1935    MRN: [id number]                              Today's Date: 2022       Admit Date: 2022    Visit Dx:     ICD-10-CM ICD-9-CM   1. Atrial fibrillation with RVR (HCC)  I48.91 427.31   2. Pneumonia of both lower lobes due to infectious organism  J18.9 486     Patient Active Problem List   Diagnosis   • Acute on chronic congestive heart failure (HCC)   • Hypoxia   • Paroxysmal atrial fibrillation (HCC)   • Chronic anticoagulation   • Hypothyroidism (acquired)   • Stage 3b chronic kidney disease (HCC)   • Right arm weakness   • Chronic diastolic CHF (congestive heart failure) (HCC)   • Syncope   • Acute respiratory failure with hypoxia and hypercapnia (HCC)   • Acute pulmonary edema (HCC)   • Acute on chronic respiratory failure with hypoxia (HCC)   • Fever in adult   • History of asthma   • Hyperglycemia   • Hypokalemia   • BRENDA (acute kidney injury) (HCC)   • Hyperbilirubinemia   • Transaminitis   • Pneumonia involving right lung   • Pulmonary hypertension (HCC)   • Atrial fibrillation with RVR (HCC)   • Acute on chronic combined systolic and diastolic CHF (congestive heart failure) (HCC)   • Essential hypertension   • Chronic hypoxemic respiratory failure (HCC)   • COPD with acute exacerbation (HCC)   • Hypothyroidism (acquired)   • Chronic kidney failure, stage 3 (moderate) (HCC)   • Hypokalemia   • Hypomagnesemia   • Acute urinary retention   • DNR (do not resuscitate)   • Prediabetes     Past Medical History:   Diagnosis Date   • CHF (congestive heart failure) (HCC)    • Sleep apnea    • Stroke (HCC)      No past surgical history on file.   General Information     Row Name 22 1520          Physical Therapy Time and Intention    Document Type evaluation  Pt. admitted with A. Fib. (RVR)  -MS     Mode of Treatment physical therapy;individual therapy  -MS     Row Name 22 152          General Information    Patient Profile Reviewed yes  -MS     Prior  Level of Function independent:  Uses Rwx for ambulation prior to admission per pt. report  -MS     Existing Precautions/Restrictions fall;oxygen therapy device and L/min   Exit alarm  -MS     Barriers to Rehab none identified  -MS     Row Name 05/07/22 1520          Cognition    Orientation Status (Cognition) oriented x 3  -MS           User Key  (r) = Recorded By, (t) = Taken By, (c) = Cosigned By    Initials Name Provider Type    Jf Kelley, PT Physical Therapist               Mobility     Row Name 05/07/22 1521          Bed Mobility    Bed Mobility supine-sit;sit-supine  -MS     Supine-Sit [place] (Bed Mobility) moderate assist (50% patient effort)  -MS     Sit-Supine [place] (Bed Mobility) moderate assist (50% patient effort)  -MS     Comment, (Bed Mobility) Once sitting EOB, pt. reports increased fatigue and weakness and would only sit ~ 1 minute before trying to return to bed supine.  -MS     Row Name 05/07/22 1521          Transfers    Comment, (Transfers) Pt. refused due to fatigue/weakness.  -MS           User Key  (r) = Recorded By, (t) = Taken By, (c) = Cosigned By    Initials Name Provider Type    Jf Kelley, PT Physical Therapist               Obj/Interventions     Row Name 05/07/22 1522          Range of Motion Comprehensive    Comment, General Range of Motion B Shld (Imp. 25%);  Otherwise (WFL's) x 4 extremities  -MS     Row Name 05/07/22 1522          Strength Comprehensive (MMT)    Comment, General Manual Muscle Testing (MMT) Assessment B Shld (3-/5); Otherwise (3/5) x 4 extremities  -MS     Row Name 05/07/22 1522          Motor Skills    Therapeutic Exercise --  BUE/LE ther. ex. program x 5-10 reps completed (Ankle pumps, Heel Slides, Hip Abduction, Shld Flexion)  -MS           User Key  (r) = Recorded By, (t) = Taken By, (c) = Cosigned By    Initials Name Provider Type    Jf Kelley, PT Physical Therapist               Goals/Plan     Row Name 05/07/22 1524           Bed Mobility Goal 1 (PT)    Activity/Assistive Device (Bed Mobility Goal 1, PT) bed mobility activities, all  -MS     [place] Level/Cues Needed (Bed Mobility Goal 1, PT) contact guard required  -MS     Time Frame (Bed Mobility Goal 1, PT) long term goal (LTG);1 week  -MS     [facility] Name 05/07/22 1524          Transfer Goal 1 (PT)    Activity/Assistive Device (Transfer Goal 1, PT) transfers, all;walker, rolling  -MS     [place] Level/Cues Needed (Transfer Goal 1, PT) contact guard required  -MS     Time Frame (Transfer Goal 1, PT) long term goal (LTG);1 week  -MS     [facility] Name 05/07/22 1524          Gait Training Goal 1 (PT)    Activity/Assistive Device (Gait Training Goal 1, PT) gait (walking locomotion);walker, rolling  -MS     [place] Level (Gait Training Goal 1, PT) contact guard required  -MS     Distance (Gait Training Goal 1, PT) 50 feet  -MS     Time Frame (Gait Training Goal 1, PT) long term goal (LTG);1 week  -MS     [facility] Name 05/07/22 1524          Therapy Assessment/Plan (PT)    Planned Therapy Interventions (PT) balance training;bed mobility training;gait training;home exercise program;patient/family education;postural re-education;transfer training;strengthening;ROM (range of motion)  -MS           User Key  (r) = Recorded By, (t) = Taken By, (c) = Cosigned By    Initials Name Provider Type    MS HidalgoJf fitzgerald, PT Physical Therapist               Clinical Impression     Row Name 05/07/22 1524          Pain    Pretreatment Pain Rating 0/10 - no pain  -MS     Posttreatment Pain Rating 0/10 - no pain  -MS     [facility] Name 05/07/22 1524          Plan of Care Review    Plan of Care Reviewed With patient  -MS     [facility] Name 05/07/22 1524          Therapy Assessment/Plan (PT)    Rehab Potential (PT) good, to achieve stated therapy goals  -MS     Criteria for Skilled Interventions Met (PT) skilled treatment is necessary  -MS     Therapy Frequency (PT) 6 times/wk  -MS     Row Name 05/07/22 1524           Positioning and Restraints    Pre-Treatment Position in bed  -MS     Post Treatment Position bed  -MS     In Bed notified nsg;supine;call light within reach;encouraged to call for assist;exit alarm on  All lines intact.  -MS           User Key  (r) = Recorded By, (t) = Taken By, (c) = Cosigned By    Initials Name Provider Type    MS HidalgoJf, PT Physical Therapist               Outcome Measures     Row Name 05/07/22 1525          How much help from another person do you currently need...    Turning from your back to your side while in flat bed without using bedrails? 2  -MS     Moving from lying on back to sitting on the side of a flat bed without bedrails? 2  -MS     Moving to and from a bed to a chair (including a wheelchair)? 2  -MS     Standing up from a chair using your arms (e.g., wheelchair, bedside chair)? 2  -MS     Climbing 3-5 steps with a railing? 2  -MS     To walk in hospital room? 2  -MS     AM-PAC 6 Clicks Score (PT) 12  -MS     Highest level of mobility 4 --> Transferred to chair/commode  -MS     Row Name 05/07/22 1525          Functional Assessment    Outcome Measure Options AM-PAC 6 Clicks Basic Mobility (PT)  -MS           User Key  (r) = Recorded By, (t) = Taken By, (c) = Cosigned By    Initials Name Provider Type    MS HidalgoJf, PT Physical Therapist                             Physical Therapy Education                 Title: PT OT SLP Therapies (Done)     Topic: Physical Therapy (Done)     Point: Mobility training (Done)     Learning Progress Summary           Patient Acceptance, E,D, VU,NR by MS at 5/7/2022 1525                   Point: Home exercise program (Done)     Learning Progress Summary           Patient Acceptance, E,D, VU,NR by MS at 5/7/2022 1525                   Point: Body mechanics (Done)     Learning Progress Summary           Patient Acceptance, E,D, VU,NR by MS at 5/7/2022 1525                   Point: Precautions (Done)     Learning Progress  Summary           Patient Acceptance, E,D, VU,NR by MS at 5/7/2022 1525                               User Key     Initials Effective Dates Name Provider Type Discipline    MS 06/16/21 -  Jf Hidalgo PT Physical Therapist PT              PT Recommendation and Plan  Planned Therapy Interventions (PT): balance training, bed mobility training, gait training, home exercise program, patient/family education, postural re-education, transfer training, strengthening, ROM (range of motion)  Plan of Care Reviewed With: patient  Outcome Evaluation: Pt. is an 86 year old Female admitted to the hospital with A. Fib. (RVR).  Pt. reports that prior to admission she was independent with functional mobility and used a Rwx for ambulation.  Pt. currently presents with decreased strength, decreased ROM, decreased balance, and decreased tolerance to functional activity.  Pt. will benefit from skilled inpt. P.T. to address her functional deficits and to assist pt. in regaining her maximum level of independence with functional mobility.     Time Calculation:    PT Charges     Row Name 05/07/22 1527             Time Calculation    Start Time 1400  -MS      Stop Time 1415  -MS      Time Calculation (min) 15 min  -MS      PT Received On 05/07/22  -MS      PT - Next Appointment 05/09/22  -MS      PT Goal Re-Cert Due Date 05/14/22  -MS              Time Calculation- PT    Total Timed Code Minutes- PT 14 minute(s)  -MS            User Key  (r) = Recorded By, (t) = Taken By, (c) = Cosigned By    Initials Name Provider Type    MS HidalgoJf PT Physical Therapist              Therapy Charges for Today     Code Description Service Date Service Provider Modifiers Qty    [id number]  PT EVAL MOD COMPLEXITY 1 5/7/2022 Jf Hidalgo, PT GP 1    [id number]  PT THER PROC EA 15 MIN 5/7/2022 Jf Hidalgo, PT GP 1          PT G-Codes  Outcome Measure Options: AM-PAC 6 Clicks Basic Mobility (PT)  AM-PAC 6 Clicks Score (PT):  12    Jf Hidalgo, PT  5/7/2022

## 2022-05-07 NOTE — PROGRESS NOTES
Trev Solitario MD                          [phone]      Patient ID:    Name:  Yazmin Javier    MRN:  [id number]    1935   86 y.o.  female            Patient Care Team:  Morenita Silverio MD as PCP - General (Internal Medicine)    CC/ Reason for visit: Persistent wheezing, congestive heart failure, A. fib RVR, hypertensive urgency, chronic respiratory failure    Subjective: Pt seen and examined this AM. No acute overnight events noted. Doing same.  Started on steroids for ongoing wheezing.  Ongoing diuresis with some response.    ROS: Denies any subjective fevers, syncope or presyncopal events, new neurological deficits, nausea or vomiting currently    Objective     Vital Signs past 24hrs    BP range: BP: (100-125)/(44-79) 100/65  Pulse range: Heart Rate:  [] 107  Resp rate range: Resp:  [16-20] 18  Temp range: Temp (24hrs), Av.9 °F (36.6 °C), Min:97.5 °F (36.4 °C), Max:98.1 °F (36.7 °C)      Ventilator/Non-Invasive Ventilation Settings (From admission, onward)            None          Device (Oxygen Therapy): nasal cannula       78.9 kg (174 lb); Body mass index is 31.83 kg/m².      Intake/Output Summary (Last 24 hours) at 2022 1832  Last data filed at 2022 1800  Gross per 24 hour   Intake 660 ml   Output 1300 ml   Net -640 ml       PHYSICAL EXAM   Constitutional: Middle-aged pt in bed, no acute respiratory distress, + accessory muscle use  Head: - NCAT  Eyes: No pallor.  Anicteric sclerae, EOMI.  ENMT:  Mallampati 4, no oral thrush. Moist MM.   NECK: Trachea midline, No thyromegaly, no palpable cervical lymphadenopathy  Heart: RRR, no murmur. 1+ pedal edema   Lungs: DAVID +, Occ wheezes. No crackles heard    Abdomen: Soft. No tenderness, guarding or rigidity. No palpable masses  Extremities: Extremities warm and well perfused. No cyanosis/ clubbing  Neuro: Conscious, answers appropriately, no gross focal neuro deficits  Psych: Mood and affect  appropriate    PPE recommended per [facility] infectious disease Isolation protocol for the current clinical scenario (as mentioned below) was followed.     Scheduled meds:  allopurinol, 300 mg, Oral, Daily  apixaban, 5 mg, Oral, BID  atorvastatin, 80 mg, Oral, Nightly  dilTIAZem, 30 mg, Oral, Q6H  folic acid, 1 mg, Oral, Daily  furosemide, 40 mg, Oral, BID  ipratropium-albuterol, 3 mL, Nebulization, 4x Daily - RT  levothyroxine, 50 mcg, Oral, Q AM  PARoxetine, 10 mg, Oral, Daily  potassium chloride, 20 mEq, Oral, BID With Meals  predniSONE, 40 mg, Oral, Daily With Breakfast  vitamin B-12, 1,000 mcg, Oral, Daily        IV meds:                           Data Review:      Results from last 7 days   Lab Units 05/07/22  0343 05/07/22  0334 05/06/22  0354 05/05/22  1416   SODIUM mmol/L  --  137 138 142   POTASSIUM mmol/L  --  3.4* 3.4* 3.7   CHLORIDE mmol/L  --  101 102 101   CO2 mmol/L  --  25.0 23.2 27.0   BUN mg/dL  --  19 18 15   CREATININE mg/dL  --  1.71* 1.48* 1.47*   CALCIUM mg/dL  --  8.1* 8.6 9.0   BILIRUBIN mg/dL  --   --   --  1.2   ALK PHOS U/L  --   --   --  126*   ALT (SGPT) U/L  --   --   --  19   AST (SGOT) U/L  --   --   --  27   GLUCOSE mg/dL  --  106* 110* 127*   WBC 10*3/mm3 7.22  --  5.70 6.54   HEMOGLOBIN g/dL 10.2*  --  10.3* 12.0   PLATELETS 10*3/mm3 216  --  218 252   PROBNP pg/mL  --   --   --  2,198.0*   PROCALCITONIN ng/mL  --   --   --  0.16       Lab Results   Component Value Date    CALCIUM 8.1 (L) 05/07/2022    PHOS 3.7 04/08/2022       Results from last 7 days   Lab Units 05/05/22  1416   BLOODCX  No growth at 2 days  No growth at 2 days              Results Review:    I have reviewed the relevant laboratory results and independently reviewed the chest imaging from this hospitalization including the available echocardiogram reports personally and summarized them if/ when appropriate below    Assessment    Persistent wheezing  Pulm infiltrates on CAT scan  Acute on chronic  congestive heart failure  A. fib with RVR  Hypertensive urgency  Chronic hypoxic respiratory failure ?  Etiology  Pulmonary hypertension  CKD 3  Hypothyroidism  Advanced age  DNR/DNI    PLAN:  All problems are new to me  Work-up done so far as well as recommendations from prior pulmonologist noted.  Patient having ongoing wheezing and is now on steroids but she tells me that she has never smoked in her life and she is not clear why she is needing oxygen continuously as well.  Does not seem to have a pulmonologist as well. Will get resp viral PCR marcellus given CT chest ? Atypical pna and bronchospasm   Agree with concern for atelectasis and need for clearance  Ongoing diuresis per cardiology  Bronchodilators to help with mucus clearance  Out of bed and physical therapy    Trev Solitario MD  5/7/2022

## 2022-05-07 NOTE — PLAN OF CARE
Goal Outcome Evaluation:  Plan of Care Reviewed With: patient           Outcome Evaluation: Pt. is an 86 year old Female admitted to the hospital with HILLARY Aguila (RVR).  Pt. reports that prior to admission she was independent with functional mobility and used a Rwx for ambulation.  Pt. currently presents with decreased strength, decreased ROM, decreased balance, and decreased tolerance to functional activity.  Pt. will benefit from skilled inpt. P.T. to address her functional deficits and to assist pt. in regaining her maximum level of independence with functional mobility.    Patient was wearing a face mask during this therapy encounter. Therapist used appropriate personal protective equipment including eye protection, mask, and gloves.  Mask used was standard procedure mask. Appropriate PPE was worn during the entire therapy session. Hand hygiene was completed before and after therapy session. Patient is not in enhanced droplet precautions.

## 2022-05-07 NOTE — PROGRESS NOTES
Name: Yazmin Javier ADMIT: 2022   : 1935  PCP: Morenita Silverio MD    MRN: [id number] LOS: 1 days   AGE/SEX: 86 y.o. female  ROOM: [facility]     Subjective   Subjective   Patient reports decreased shortness of breath.  Positive occasional cough productive of clear sputum.  Worsening wheeze today.  No chest pain.  No hemoptysis.  No fever or chills.  Decreased wheezing.  No hemoptysis.  Continues to have inability to pass urine and requiring in and out cath.    Review of Systems  GI.  No abdominal pain or nausea or vomiting.  Normal bowel habits without constipation/diarrhea/bleeding per rectum/melena     Objective   Objective   Vital Signs  Temp:  [97.5 °F (36.4 °C)-98.2 °F (36.8 °C)] 97.5 °F (36.4 °C)  Heart Rate:  [59-81] 81  Resp:  [16-20] 16  BP: ()/(44-81) 121/79  SpO2:  [94 %-98 %] 94 %  on  Flow (L/min):  [2-3] 2;   Device (Oxygen Therapy): nasal cannula    Intake/Output Summary (Last 24 hours) at 2022 1102  Last data filed at 2022 0030  Gross per 24 hour   Intake --   Output 950 ml   Net -950 ml     Body mass index is 31.83 kg/m².      22  0509 22  0515   Weight: 81.6 kg (179 lb 12.8 oz) 78.9 kg (174 lb)     Physical Exam    General.  Elderly female.  Alert and oriented x3.  No apparent pain/distress/diaphoresis.  Normal mood and affect.  Positive audible wheeze  Eyes.  Pupils equal round and reactive.  Intact extraocular musculature.  No pallor or jaundice.  Normal conjunctivae and lids.  Oral cavity.  Moist mucous membrane.  Neck.  Supple.  No JVD.  No lymphadenopathy or thyromegaly.  Cardiovascular.  Regular rate and rhythm.  Occasional ectopic beats.  Grade 2 systolic murmur.  Chest.  Poor bilateral air entry with scattered bilateral expiratory wheeze and no crackles.  Abdomen.  Soft lax.  No tenderness.  No organomegaly.  No guarding or rebound.  Extremities.  No clubbing cyanosis or edema.  CNS.  No acute focal neurological deficits.    Results Review:       Results from last 7 days   Lab Units 05/07/22  0334 05/06/22  0354 05/05/22  1416   SODIUM mmol/L 137 138 142   POTASSIUM mmol/L 3.4* 3.4* 3.7   CHLORIDE mmol/L 101 102 101   CO2 mmol/L 25.0 23.2 27.0   BUN mg/dL 19 18 15   CREATININE mg/dL 1.71* 1.48* 1.47*   GLUCOSE mg/dL 106* 110* 127*   CALCIUM mg/dL 8.1* 8.6 9.0   AST (SGOT) U/L  --   --  27   ALT (SGPT) U/L  --   --  19     Estimated Creatinine Clearance: 23.8 mL/min (A) (by C-G formula based on SCr of 1.71 mg/dL (H)).  Results from last 7 days   Lab Units 05/06/22 2022   HEMOGLOBIN A1C % 5.90*         Results from last 7 days   Lab Units 05/05/22  1626 05/05/22  1416   TROPONIN T ng/mL 0.013 0.021     Results from last 7 days   Lab Units 05/05/22  1416   PROBNP pg/mL 2,198.0*     Results from last 7 days   Lab Units 05/06/22 2022   TSH uIU/mL 1.390     Results from last 7 days   Lab Units 05/07/22  0334 05/06/22  0354 05/05/22  1416   MAGNESIUM mg/dL 2.3 2.9* 1.5*           Invalid input(s): LDLCALC  Results from last 7 days   Lab Units 05/07/22  0343 05/06/22  0354 05/05/22  1416   WBC 10*3/mm3 7.22 5.70 6.54   HEMOGLOBIN g/dL 10.2* 10.3* 12.0   HEMATOCRIT % 31.0* 33.3* 39.0   PLATELETS 10*3/mm3 216 218 252   MCV fL 96.0 100.9* 102.6*   MCH pg 31.6 31.2 31.6   MCHC g/dL 32.9 30.9* 30.8*   RDW % 15.9* 16.2* 16.2*   RDW-SD fl 56.6* 60.3* 61.4*   MPV fL 9.4 9.3 9.0   NEUTROPHIL % %  --   --  35.5*   LYMPHOCYTE % %  --   --  49.4*   MONOCYTES % %  --   --  13.0*   EOSINOPHIL % %  --   --  0.9   BASOPHIL % %  --   --  0.9   IMM GRAN % %  --   --  0.3   NEUTROS ABS 10*3/mm3  --   --  2.32   LYMPHS ABS 10*3/mm3  --   --  3.23*   MONOS ABS 10*3/mm3  --   --  0.85   EOS ABS 10*3/mm3  --   --  0.06   BASOS ABS 10*3/mm3  --   --  0.06   IMMATURE GRANS (ABS) 10*3/mm3  --   --  0.02   NRBC /100 WBC  --   --  0.2             Results from last 7 days   Lab Units 05/05/22  1754 05/05/22  1416   PROCALCITONIN ng/mL  --  0.16   LACTATE mmol/L 1.7 2.4*              Results from last 7 days   Lab Units 05/05/22  1416   BLOODCX  No growth at 24 hours  No growth at 24 hours         Results from last 7 days   Lab Units 05/06/22  1121   NITRITE UA  Negative   WBC UA /HPF 0-2   BACTERIA UA /HPF None Seen   SQUAM EPITHEL UA /HPF 0-2           Imaging:  Imaging Results (Last 24 Hours)     ** No results found for the last 24 hours. **             I reviewed the patient's new clinical results / labs / tests / procedures      Assessment/Plan     Active Hospital Problems    Diagnosis  POA   • **Atrial fibrillation with RVR ([facility]) [I48.91]  Yes   • Prediabetes [R73.03]  Yes   • Acute on chronic combined systolic and diastolic CHF (congestive heart failure) ([facility]) [I50.43]  Yes   • Essential hypertension [I10]  Yes   • Chronic hypoxemic respiratory failure ([facility]) [J96.11]  Yes   • COPD with acute exacerbation ([facility]) [J44.1]  Yes   • Hypothyroidism (acquired) [E03.9]  Yes   • Chronic kidney failure, stage 3 (moderate) ([facility]) [N18.30]  Yes   • Hypokalemia [E87.6]  Yes   • Hypomagnesemia [E83.42]  Yes   • Acute urinary retention [R33.8]  Yes   • DNR (do not resuscitate) [Z66]  Yes      Resolved Hospital Problems   No resolved problems to display.           · Rapid ventricular response atrial fibrillation/acute diastolic congestive heart failure in a patient with a history of A. fib/hypertension.  2D echo on 4/3/2022 revealed a normal ejection fraction and no significant valvular disease..  Cardiology consult noted and appreciated.  Currently rate controlled on Eliquis and beta-blockers.  Improved hypotension with decreasing Norvasc.  Continue p.o. Lasix.  Will need Holter monitor at the time of discharge.    · Acute COPD exacerbation in patient with a chronic hypoxemic respiratory failure and pulmonary hypertension.  Pulmonary consult noted and appreciated.  Pulmonologist does not believe that there is a pneumonia and antibiotics was stopped.  Currently on bronchodilators with worsening wheeze  we will add p.o. steroids.   · Hypothyroidism.  Clinically euthyroid on replacement.  Normal TSH.    · Prediabetes.  A1c is 5.9.  Will need diabetic diet at discharge.  · Stage IIIb chronic renal failure/hypomagnesemia/hypokalemia.  Stable renal function.  Appears euvolemic.  Resolved hypomagnesemia on substitution.  Continue replacing potassium.  · Chronic disease anemia.  Hemoglobin stable about the baseline of 9.  · Urinary retention/bladder spasms.  S/p urology consultation.  Will insert Padilla catheter and try voiding trial in 1 to 2 weeks with urology follow-up per their recommendation.  We will stop Ditropan.  UA negative for UTI.    · Hypokalemia.  Will substitute.  · DNR status.    Discussed my findings and plan of treatment with the patient/nurse.  Disposition.  Probably to skilled facility in 2 days after shortness of breath and wheeze improved.  CCP working on finding a place for skilled.      Rock Merrill MD  [facility]  05/07/22  11:02 EDT

## 2022-05-07 NOTE — PLAN OF CARE
Problem: Adult Inpatient Plan of Care  Goal: Plan of Care Review  Outcome: Ongoing, Progressing  Flowsheets  Taken 5/7/2022 1809 by La Kate, RN  Progress: no change  Outcome Evaluation: Still A-fib, rate crept higher to 110-120s by end of shift. Meds adjusted per cards d/t increased wheezing today, switched to PO cardizem. O2 weaned to 2L NC. Pt has no c/o. Q2 turn, worked w/PT. Still unable to void independently, wynne placed as ordered. Will closely monitor.  Taken 5/7/2022 1525 by Jf Hidalgo PT  Plan of Care Reviewed With: patient   Goal Outcome Evaluation:           Progress: no change  Outcome Evaluation: Still A-fib, rate crept higher to 110-120s by end of shift. Meds adjusted per cards d/t increased wheezing today, switched to PO cardizem. O2 weaned to 2L NC. Pt has no c/o. Q2 turn, worked w/PT. Still unable to void independently, wynne placed as ordered. Will closely monitor.

## 2022-05-08 NOTE — PROGRESS NOTES
Yazmin Concepcion   86 y.o.  female    LOS: 2 days   Patient Care Team:  Morenita Silverio MD as PCP - General (Internal Medicine)      Subjective     Interval History:     Patient Complaints: Dyspnea, wheezing    Review of Systems:   The following systems were reviewed and negative;  cardiovascular    Objective     Vital Sign Min/Max for last 24 hours  Temp  Min: 97.4 °F (36.3 °C)  Max: 98.1 °F (36.7 °C)   BP  Min: 100/65  Max: 121/79    Pulse  Min: 65  Max: 108       Intake/Output Summary (Last 24 hours) at 5/8/2022 1317  Last data filed at 5/8/2022 0915  Gross per 24 hour   Intake 600 ml   Output 900 ml   Net -300 ml       Daily Weights:  Wt Readings from Last 4 Encounters:   05/08/22 0515 77.7 kg (171 lb 6.4 oz)   05/07/22 0515 78.9 kg (174 lb)   05/06/22 0509 81.6 kg (179 lb 12.8 oz)   04/09/22 0517 88 kg (194 lb 1.6 oz)   04/08/22 0500 88.5 kg (195 lb)   04/07/22 0626 87.4 kg (192 lb 9.6 oz)   04/06/22 0545 87.8 kg (193 lb 9.6 oz)   04/05/22 0500 87.7 kg (193 lb 6.4 oz)   04/04/22 0537 88.1 kg (194 lb 3.2 oz)   04/02/22 0555 87.3 kg (192 lb 6.4 oz)   04/01/22 0637 86.8 kg (191 lb 6.4 oz)   04/01/22 0138 82.6 kg (182 lb)   03/31/22 2100 82.6 kg (182 lb)   02/18/22 0607 85.2 kg (187 lb 12.8 oz)   02/17/22 1046 85.7 kg (189 lb)   02/16/22 0519 85.7 kg (189 lb)   02/15/22 1129 88.9 kg (196 lb)   02/15/22 0555 88.9 kg (196 lb)   02/14/22 0425 85.3 kg (188 lb)   11/01/21 0810 89 kg (196 lb 3.4 oz)   11/01/21 0100 89.4 kg (197 lb)       Physical Exam:      General Appearance:    Well developed and well nourished in no acute distress   Head:    Normocephalic, atraumatic   Neck:   supple, trachea midline, no thyromegaly, no   carotid bruit, no JVD   Lungs:    Wheezing last on 5/8/2022 bilaterally with resolved egophony at the bases without rales to auscultation,respirations regular,     Heart:    Regular rhythm and normal rate, normal S1 and S2,                          murmur, no gallop, no rub, no click/   Chest Wall:     No abnormalities observed   Abdomen:     Normal bowel sounds, no masses, no organomegaly, soft        non-tender, non-distended, no guarding, no rebound                tenderness   Rectal:     Deferred   Extremities:   <1+ edema. Moves all extremities well, no cyanosis, no erythema   Pulses:   Pulses palpable and equal bilaterally   Skin:   No bleeding, bruising or rash   Neurologic:   awake alert and oriented x3, speech clear and approp, no facial drooping      Results Review:     Potassium Potassium   Date Value Ref Range Status   05/08/2022 4.5 3.5 - 5.2 mmol/L Final   05/07/2022 3.4 (L) 3.5 - 5.2 mmol/L Final   05/06/2022 3.4 (L) 3.5 - 5.2 mmol/L Final   05/05/2022 3.7 3.5 - 5.2 mmol/L Final          Creatinine Creatinine   Date Value Ref Range Status   05/08/2022 1.80 (H) 0.57 - 1.00 mg/dL Final   05/07/2022 1.71 (H) 0.57 - 1.00 mg/dL Final   05/06/2022 1.48 (H) 0.57 - 1.00 mg/dL Final   05/05/2022 1.47 (H) 0.57 - 1.00 mg/dL Final      Magnesium Magnesium   Date Value Ref Range Status   05/07/2022 2.3 1.6 - 2.4 mg/dL Final   05/06/2022 2.9 (H) 1.6 - 2.4 mg/dL Final   05/05/2022 1.5 (L) 1.6 - 2.4 mg/dL Final        Results from last 7 days   Lab Units 05/08/22  0344 05/07/22  0334 05/06/22  0354 05/05/22  1416   POTASSIUM mmol/L 4.5 3.4* 3.4* 3.7   CREATININE mg/dL 1.80* 1.71* 1.48* 1.47*   BILIRUBIN mg/dL  --   --   --  1.2   MAGNESIUM mg/dL  --  2.3 2.9* 1.5*       Results from last 7 days   Lab Units 05/08/22  0344   WBC 10*3/mm3 6.75   HEMOGLOBIN g/dL 9.6*   HEMATOCRIT % 29.0*   PLATELETS 10*3/mm3 222     Lab Results   Lab Value Date/Time    TROPONINT 0.013 05/05/2022 1626    TROPONINT 0.021 05/05/2022 1416    TROPONINT <0.010 03/31/2022 2054    TROPONINT <0.010 02/15/2022 0525    TROPONINT <0.010 02/14/2022 0458    TROPONINT <0.010 10/31/2021 2334    TROPONINT <0.010 10/31/2021 1907    TROPONINT <0.010 04/21/2021 2331    TROPONINT <0.010 04/04/2021 0910     Lab Results   Component Value Date    CHOL 156  04/22/2021     Lab Results   Component Value Date    HDL 37 (L) 04/22/2021     Lab Results   Component Value Date    LDL 97 04/22/2021     Lab Results   Component Value Date    TRIG 119 04/22/2021     No components found for: CHOLHDL  Lab Results   Component Value Date    TSH 1.390 05/06/2022     Lab Results   Component Value Date    INR 1.51 (H) 03/31/2022    INR 1.59 (H) 02/14/2022    INR 1.29 (H) 04/21/2021    PTT 41.0 (H) 03/31/2022    PTT 39.2 (H) 04/21/2021    PTT 33.5 06/12/2020       I reviewed the patient's new clinical results.    Echo EF Estimated  Lab Results   Component Value Date    ECHOEFEST 60 04/03/2022           Assessment/Plan       Atrial fibrillation with RVR (HCC)    Acute on chronic combined systolic and diastolic CHF (congestive heart failure) (HCC)    Essential hypertension    Chronic hypoxemic respiratory failure (HCC)    COPD with acute exacerbation (HCC)    Hypothyroidism (acquired)    Chronic kidney failure, stage 3 (moderate) (HCC)    Hypokalemia    Hypomagnesemia    Acute urinary retention    DNR (do not resuscitate)    Prediabetes      Wheezing on exam on 5/7/2022.  Amlodipine and beta-blocker discontinued, changed to diltiazem.      Heart rate dropped to the 20s last evening during sleep.  Patient has LILIANA, currently not using his CPAP.  She states she will restart soon.  Wheezing improved, likely related to the steroids and this continuation of the beta-blocker.  We will use half dose of diltiazem tablets 30 mg tablets 3 times daily, to allow better heart rates at night..        Munir Sparrow MD  05/08/22  13:17 EDT      Time: 25    Please note that portions of this note were completed with a voice recognition program.

## 2022-05-08 NOTE — PROGRESS NOTES
LOS: 2 days     Name: Yazmin Javier  Age: 86 y.o.  Sex: female  :  1935  MRN: [id number]         Primary Care Physician: Morenita Silverio MD    Subjective   Subjective  Nurse reports some mild visual hallucinations overnight last night.  Denies shortness of breath.  Still with wheezing and slight cough.    Objective   Vital Signs  Temp:  [97.4 °F (36.3 °C)-98.1 °F (36.7 °C)] 97.4 °F (36.3 °C)  Heart Rate:  [] 72  Resp:  [16-20] 20  BP: (100-121)/(63-81) 114/63  Body mass index is 31.35 kg/m².    Objective:  General Appearance:  Comfortable and in no acute distress (Looks chronically ill).    Vital signs: (most recent): Blood pressure 114/63, pulse 72, temperature 97.4 °F (36.3 °C), temperature source Oral, resp. rate 20, weight 77.7 kg (171 lb 6.4 oz), SpO2 91 %.    Lungs:  Normal effort and normal respiratory rate.  There are decreased breath sounds and wheezes.    Heart: Normal rate.  Regular rhythm.    Abdomen: Abdomen is soft.  Bowel sounds are normal.   There is no abdominal tenderness.     Extremities: There is no dependent edema or local swelling.    Neurological: Patient is alert and oriented to person, place and time.    Skin:  Warm and dry.              Results Review:       I reviewed the patient's new clinical results.    Results from last 7 days   Lab Units 22  03422  0343 22  0354 22  1416   WBC 10*3/mm3 6.75 7.22 5.70 6.54   HEMOGLOBIN g/dL 9.6* 10.2* 10.3* 12.0   PLATELETS 10*3/mm3 222 216 218 252     Results from last 7 days   Lab Units 22  0344 22  0334 22  0354 22  1416   SODIUM mmol/L 137 137 138 142   POTASSIUM mmol/L 4.5 3.4* 3.4* 3.7   CHLORIDE mmol/L 101 101 102 101   CO2 mmol/L 22.0 25.0 23.2 27.0   BUN mg/dL 31* 19 18 15   CREATININE mg/dL 1.80* 1.71* 1.48* 1.47*   CALCIUM mg/dL 8.7 8.1* 8.6 9.0   GLUCOSE mg/dL 131* 106* 110* 127*                 Scheduled Meds:   allopurinol, 300 mg, Oral, Daily  apixaban, 5 mg, Oral,  BID  atorvastatin, 80 mg, Oral, Nightly  dilTIAZem, 30 mg, Oral, Q6H  folic acid, 1 mg, Oral, Daily  furosemide, 40 mg, Oral, BID  ipratropium-albuterol, 3 mL, Nebulization, 4x Daily - RT  levothyroxine, 50 mcg, Oral, Q AM  PARoxetine, 10 mg, Oral, Daily  potassium chloride, 20 mEq, Oral, BID With Meals  predniSONE, 40 mg, Oral, Daily With Breakfast  vitamin B-12, 1,000 mcg, Oral, Daily      PRN Meds:   •  acetaminophen  •  ipratropium-albuterol  •  magnesium sulfate **OR** magnesium sulfate **OR** magnesium sulfate  •  melatonin  •  nitroglycerin  •  ondansetron **OR** ondansetron  •  [COMPLETED] Insert peripheral IV **AND** sodium chloride  Continuous Infusions:       Assessment/Plan   Active Hospital Problems    Diagnosis  POA   • **Atrial fibrillation with RVR ([facility]) [I48.91]  Yes   • Prediabetes [R73.03]  Yes   • Acute on chronic combined systolic and diastolic CHF (congestive heart failure) ([facility]) [I50.43]  Yes   • Essential hypertension [I10]  Yes   • Chronic hypoxemic respiratory failure ([facility]) [J96.11]  Yes   • COPD with acute exacerbation ([facility]) [J44.1]  Yes   • Hypothyroidism (acquired) [E03.9]  Yes   • Chronic kidney failure, stage 3 (moderate) ([facility]) [N18.30]  Yes   • Hypokalemia [E87.6]  Yes   • Hypomagnesemia [E83.42]  Yes   • Acute urinary retention [R33.8]  Yes   • DNR (do not resuscitate) [Z66]  Yes      Resolved Hospital Problems   No resolved problems to display.       Assessment & Plan    · Rapid ventricular response atrial fibrillation/acute diastolic congestive heart failure in a patient with a history of A. fib/hypertension.  2D echo on 4/3/2022 revealed a normal ejection fraction and no significant valvular disease..  Cardiology consult noted and appreciated.  Currently rate controlled on Eliquis and beta-blockers.  Improved hypotension with decreasing Norvasc.    On p.o. Lasix.  Slight increase of creatinine noted.  Monitor renal function. Will need Holter monitor at the time of discharge.     · Wheezing.   Discussed with pulmonology.  Patient has no formal diagnosis of COPD and is a non-smoker.  Perhaps some post viral syndrome.  RVP was negative.  Continue bronchodilators and steroids.  · Visual hallucinations.  Improved this morning.  Utilize delirium precautions.  Melatonin at bedtime to help promote sleep  · Hypothyroidism.  Clinically euthyroid on replacement.  Normal TSH.    · Prediabetes.  A1c is 5.9.  Will need diabetic diet at discharge.  · Stage IIIb chronic renal failure/hypomagnesemia/hypokalemia.    Monitor renal function with diuresis.  · Chronic disease anemia.  Hemoglobin stable about the baseline of 9.  · Urinary retention/bladder spasms.  S/p urology consultation.    Padilla catheter currently in place.  We will plan for voiding trial the day of discharge   · Hypokalemia.  Will substitute as needed  · DNR status.     Discussed my findings and plan of treatment with the patient/nurse.  Disposition.    SNF upon discharge.  Possibly in 1 to 2 days.      I wore full protective equipment throughout the patient encounter including eye protection and facemask.  Hand hygiene was performed before donning protective equipment and after removal when leaving the room.    Jai Neal MD  [facility]  05/08/22  11:54 EDT

## 2022-05-08 NOTE — PLAN OF CARE
Problem: Adult Inpatient Plan of Care  Goal: Plan of Care Review  Outcome: Ongoing, Progressing  Flowsheets  Taken 5/8/2022 1951 by La Kate, RN  Progress: improving  Outcome Evaluation: Pt encouraged to do more activity today. Turns Q2, has been awake most of day. F/C in place. Good appetite. A-fib on monitor, meds adjusted per cards to avoid bradycardia at night. O2 at 1L NC. no c/o from patient. Placement soon. will closely monitor.  Taken 5/7/2022 1525 by Jf Hidalgo PT  Plan of Care Reviewed With: patient   Goal Outcome Evaluation:           Progress: improving  Outcome Evaluation: Pt encouraged to do more activity today. Turns Q2, has been awake most of day. F/C in place. Good appetite. A-fib on monitor, meds adjusted per cards to avoid bradycardia at night. O2 at 1L NC. no c/o from patient. Placement soon. will closely monitor.

## 2022-05-08 NOTE — PROGRESS NOTES
Trev Solitario MD                          [phone]      Patient ID:    Name:  Yazmin Javier    MRN:  [id number]    1935   86 y.o.  female            Patient Care Team:  Morenita Silverio MD as PCP - General (Internal Medicine)    CC/ Reason for visit: Persistent wheezing, congestive heart failure, A. fib RVR, hypertensive urgency, chronic respiratory failure    Subjective: Pt seen and examined this AM. No acute overnight events noted. Doing same.  Reportedly having some hallucinations and confusion but states that breathing is better.  Discontinue oxygen.  Likely does have undiagnosed sleep apnea.  On steroids still    ROS: Denies any subjective fevers, syncope or presyncopal events, new neurological deficits, nausea or vomiting currently    Objective     Vital Signs past 24hrs    BP range: BP: (100-116)/(63-81) 114/63  Pulse range: Heart Rate:  [] 65  Resp rate range: Resp:  [16-20] 20  Temp range: Temp (24hrs), Av.8 °F (36.6 °C), Min:97.4 °F (36.3 °C), Max:98.1 °F (36.7 °C)      Ventilator/Non-Invasive Ventilation Settings (From admission, onward)            None          Device (Oxygen Therapy): nasal cannula       77.7 kg (171 lb 6.4 oz); Body mass index is 31.35 kg/m².      Intake/Output Summary (Last 24 hours) at 2022 1430  Last data filed at 2022 0915  Gross per 24 hour   Intake 600 ml   Output 900 ml   Net -300 ml       PHYSICAL EXAM   Constitutional: Middle-aged pt in bed, no acute respiratory distress, No accessory muscle use  Head: - NCAT  Eyes: No pallor.  Anicteric sclerae, EOMI.  ENMT:  Mallampati 4, no oral thrush. Moist MM.   NECK: Trachea midline, No thyromegaly, no palpable cervical lymphadenopathy  Heart: RRR, no murmur. Trace pedal edema   Lungs: DAVID +, Occ wheezes. No crackles heard    Abdomen: Soft. No tenderness, guarding or rigidity. No palpable masses  Extremities: Extremities warm and well perfused. No cyanosis/  clubbing  Neuro: Conscious, answers appropriately, no gross focal neuro deficits  Psych: Mood and affect appropriate    PPE recommended per [facility] infectious disease Isolation protocol for the current clinical scenario (as mentioned below) was followed.     Scheduled meds:  allopurinol, 300 mg, Oral, Daily  apixaban, 5 mg, Oral, BID  atorvastatin, 80 mg, Oral, Nightly  dilTIAZem, 30 mg, Oral, Q12H  folic acid, 1 mg, Oral, Daily  furosemide, 40 mg, Oral, BID  ipratropium-albuterol, 3 mL, Nebulization, 4x Daily - RT  levothyroxine, 50 mcg, Oral, Q AM  melatonin, 3 mg, Oral, Nightly  PARoxetine, 10 mg, Oral, Daily  potassium chloride, 20 mEq, Oral, BID With Meals  predniSONE, 40 mg, Oral, Daily With Breakfast  vitamin B-12, 1,000 mcg, Oral, Daily        IV meds:                           Data Review:      Results from last 7 days   Lab Units 05/08/22  0344 05/07/22  0343 05/07/22  0334 05/06/22  0354 05/05/22  1416   SODIUM mmol/L 137  --  137 138 142   POTASSIUM mmol/L 4.5  --  3.4* 3.4* 3.7   CHLORIDE mmol/L 101  --  101 102 101   CO2 mmol/L 22.0  --  25.0 23.2 27.0   BUN mg/dL 31*  --  19 18 15   CREATININE mg/dL 1.80*  --  1.71* 1.48* 1.47*   CALCIUM mg/dL 8.7  --  8.1* 8.6 9.0   BILIRUBIN mg/dL  --   --   --   --  1.2   ALK PHOS U/L  --   --   --   --  126*   ALT (SGPT) U/L  --   --   --   --  19   AST (SGOT) U/L  --   --   --   --  27   GLUCOSE mg/dL 131*  --  106* 110* 127*   WBC 10*3/mm3 6.75 7.22  --  5.70 6.54   HEMOGLOBIN g/dL 9.6* 10.2*  --  10.3* 12.0   PLATELETS 10*3/mm3 222 216  --  218 252   PROBNP pg/mL  --   --   --   --  2,198.0*   PROCALCITONIN ng/mL  --   --   --   --  0.16       Lab Results   Component Value Date    CALCIUM 8.7 05/08/2022    PHOS 3.7 04/08/2022       Results from last 7 days   Lab Units 05/05/22  1416   BLOODCX  No growth at 2 days  No growth at 2 days              Results Review:    I have reviewed the relevant laboratory results and independently reviewed the chest  imaging from this hospitalization including the available echocardiogram reports personally and summarized them if/ when appropriate below    Assessment    Post infectious RADS  Acute on chronic congestive heart failure; Diastolic  A. fib with RVR  Ac Hypertensive urgency  Chronic hypoxic respiratory failure ?  Recent pna 4/22  Pulmonary hypertension  CKD 3  Hypothyroidism  Suspect undiagnosed LILIANA  Advanced age  DNR/DNI    PLAN:  Patient is stable from a respiratory standpoint.  Sats in the high 90s while on 2 L nasal cannula and oxygen was discontinued during my evaluation.  Diuresing some with Lasix.  Renal function stable  Intermittent wheezing noted mainly in the right likely more secretions versus small airways disease.  Did have some nonspecific nodular infiltrates and mosaic attenuation on previous scan in April.  Would repeat scan if patient is having persistent oxygen needs  Respiratory viral PCR is negative  Continue with diuretics to keep patient as negative as possible.  Bronchodilators to help with mucus clearance  Out of bed and physical therapy    Trev Solitario MD  5/8/2022

## 2022-05-09 NOTE — PLAN OF CARE
Goal Outcome Evaluation:  Plan of Care Reviewed With: patient    Outcome Evaluation: Pt is an 87 y/o female admitted d/t Afib with RVR, PNA of martinez lower lobes. Hx of CHF, COPD, resp failure. She resides at an [facility] and req's assist for bathing and meals, otherwise is indep with ADLs and fxl mobility using a RW, occasional WC for longer distances. She utilizes 3L O2 at baseline. Pt presents AOx3, SBA for bed mobility but becomes SOB quickly, cues for PLB. LB dressing with set-up/spv at EOB. STS 2x with CGA-Pat using RW. She takes a few lateral steps at bedside before returning to seatd position with c/o exhaustion, SpO2 89% briefly but returns to mid 90s quickly. Pt will benefit from OT services in the acute care setting to address endurance, act tolerance and strength. Recommend d/c SNF pending progress    Hand hygiene completed before and after session. Appropriate PPE worn t/o

## 2022-05-09 NOTE — PROGRESS NOTES
Continued Stay Note  [facility]     Patient Name: Yazmin Javier  MRN: [id number]  Today's Date: 5/9/2022    Admit Date: 5/5/2022     Discharge Plan     Row Name 05/09/22 1423       Plan    Plan [facility]    Plan Comments Spoke with [facility] is following. [facility] does not have a bed today, possible bed tomorrow pending participation with therapy and hallucinations resolved. CCP will follow up.               Discharge Codes    No documentation.               Expected Discharge Date and Time     Expected Discharge Date Expected Discharge Time    May 9, 2022             Chapis Johnson RN

## 2022-05-09 NOTE — PROGRESS NOTES
Yazmin Javier   86 y.o.  female    LOS: 3 days   Patient Care Team:  Morenita Silverio MD as PCP - General (Internal Medicine)      Subjective     Interval History:     Patient Complaints:     Review of Systems:       Medication Review:   Current Facility-Administered Medications:   •  acetaminophen (TYLENOL) tablet 650 mg, 650 mg, Oral, Q4H PRN, Emelyn Dhillon MD, 650 mg at 05/08/22 0202  •  allopurinol (ZYLOPRIM) tablet 300 mg, 300 mg, Oral, Daily, Emelyn Dhillon MD, 300 mg at 05/08/22 0918  •  apixaban (ELIQUIS) tablet 5 mg, 5 mg, Oral, BID, Emelyn Dhillon MD, 5 mg at 05/08/22 2212  •  atorvastatin (LIPITOR) tablet 80 mg, 80 mg, Oral, Nightly, Emelyn Dhillon MD, 80 mg at 05/08/22 2212  •  dilTIAZem (CARDIZEM) tablet 15 mg, 15 mg, Oral, TID With Meals, Munir Sparrow MD, 15 mg at 05/08/22 1801  •  folic acid (FOLVITE) tablet 1 mg, 1 mg, Oral, Daily, Emelyn Dhillon MD, 1 mg at 05/08/22 0918  •  furosemide (LASIX) tablet 40 mg, 40 mg, Oral, BID, Emelyn Dhillon MD, 40 mg at 05/08/22 2212  •  ipratropium-albuterol (DUO-NEB) nebulizer solution 3 mL, 3 mL, Nebulization, 4x Daily - RT, Isauro Ramirez MD, 3 mL at 05/08/22 2011  •  ipratropium-albuterol (DUO-NEB) nebulizer solution 3 mL, 3 mL, Nebulization, Q4H PRN, Isauro Ramirez MD  •  levothyroxine (SYNTHROID, LEVOTHROID) tablet 50 mcg, 50 mcg, Oral, Q AM, Emelyn Dhillon MD, 50 mcg at 05/09/22 0633  •  Magnesium Sulfate 2 gram Bolus, followed by 8 gram infusion (total Mg dose 10 grams)- Mg less than or equal to 1mg/dL, 2 g, Intravenous, PRN **OR** Magnesium Sulfate 2 gram / 50mL Infusion (GIVE X 3 BAGS TO EQUAL 6GM TOTAL DOSE) - Mg 1.1 - 1.5 mg/dl, 2 g, Intravenous, PRN, Last Rate: 25 mL/hr at 05/05/22 2207, 2 g at 05/05/22 2207 **OR** Magnesium Sulfate 4 gram infusion- Mg 1.6-1.9 mg/dL, 4 g, Intravenous, PRN, Jewel Guadalupe MD  •  melatonin tablet 3 mg, 3 mg, Oral, Nightly, Jai Neal  MD Josh, 3 mg at 05/08/22 2212  •  nitroglycerin (NITROSTAT) SL tablet 0.4 mg, 0.4 mg, Sublingual, Q5 Min PRN, Emelyn Dhillon MD  •  ondansetron (ZOFRAN) tablet 4 mg, 4 mg, Oral, Q6H PRN **OR** ondansetron (ZOFRAN) injection 4 mg, 4 mg, Intravenous, Q6H PRN, Emelyn Dhillon MD  •  PARoxetine (PAXIL) tablet 10 mg, 10 mg, Oral, Daily, Emelyn Dhillon MD, 10 mg at 05/08/22 0918  •  potassium chloride (K-DUR,KLOR-CON) ER tablet 20 mEq, 20 mEq, Oral, BID With Meals, Rock Merrill MD, 20 mEq at 05/08/22 1801  •  predniSONE (DELTASONE) tablet 40 mg, 40 mg, Oral, Daily With Breakfast, Rock Merrill MD, 40 mg at 05/08/22 0918  •  [COMPLETED] Insert peripheral IV, , , Once **AND** sodium chloride 0.9 % flush 10 mL, 10 mL, Intravenous, PRN, Jewel Guadalupe MD  •  vitamin B-12 (CYANOCOBALAMIN) tablet 1,000 mcg, 1,000 mcg, Oral, Daily, Emelyn Dhillon MD, 1,000 mcg at 05/08/22 0918      Objective   Vital Sign Min/Max for last 24 hours  Temp  Min: 97.5 °F (36.4 °C)  Max: 98.2 °F (36.8 °C)   BP  Min: 114/54  Max: 123/82    Pulse  Min: 65  Max: 102     Wt Readings from Last 3 Encounters:   05/09/22 80.3 kg (177 lb)   04/09/22 88 kg (194 lb 1.6 oz)   02/18/22 85.2 kg (187 lb 12.8 oz)        Intake/Output Summary (Last 24 hours) at 5/9/2022 0822  Last data filed at 5/9/2022 0403  Gross per 24 hour   Intake 660 ml   Output 1050 ml   Net -390 ml     Physical Exam:      General Appearance:    Well developed and well nourished elderly wf in no acute distress   Head:    Normocephalic, atraumatic   Eyes:            Conjunctivae normal, anicteric, no xanthelasma   Neck:   supple, trachea midline, no thyromegaly, no carotid bruit, no JVD, no elevated CVP   Lungs:     Exp wheezing martinez, respirations regular, even and                  unlabored    Heart:    Regular rhythm and normal rate, normal S1 and S2,            No murmur, no gallop, no rub, no click   Chest Wall:    No abnormalities observed    Abdomen:     Normal bowel sounds, no masses, no organomegaly, soft        nontender, nondistended, no guarding, no rebound                tenderness   Rectal:     Deferred   Extremities:   No edema. Moves all extremities well, no cyanosis, no erythema   Pulses:   Pulses palpable and equal bilaterally   Skin:   No bleeding, bruising or rash   Neurologic:   awake alert and oriented x3, speech clear and approp, no facial drooping     : fc pale straw uo   Monitor:   nsr    Results Review:         Sodium Sodium   Date Value Ref Range Status   05/09/2022 136 136 - 145 mmol/L Final   05/08/2022 137 136 - 145 mmol/L Final   05/07/2022 137 136 - 145 mmol/L Final      Potassium Potassium   Date Value Ref Range Status   05/09/2022 4.5 3.5 - 5.2 mmol/L Final   05/08/2022 4.5 3.5 - 5.2 mmol/L Final   05/07/2022 3.4 (L) 3.5 - 5.2 mmol/L Final      Chloride Chloride   Date Value Ref Range Status   05/09/2022 101 98 - 107 mmol/L Final   05/08/2022 101 98 - 107 mmol/L Final   05/07/2022 101 98 - 107 mmol/L Final      Bicarbonate No results found for: PLASMABICARB   BUN BUN   Date Value Ref Range Status   05/09/2022 38 (H) 8 - 23 mg/dL Final   05/08/2022 31 (H) 8 - 23 mg/dL Final   05/07/2022 19 8 - 23 mg/dL Final      Creatinine Creatinine   Date Value Ref Range Status   05/09/2022 1.78 (H) 0.57 - 1.00 mg/dL Final   05/08/2022 1.80 (H) 0.57 - 1.00 mg/dL Final   05/07/2022 1.71 (H) 0.57 - 1.00 mg/dL Final      Calcium Calcium   Date Value Ref Range Status   05/09/2022 8.9 8.6 - 10.5 mg/dL Final   05/08/2022 8.7 8.6 - 10.5 mg/dL Final   05/07/2022 8.1 (L) 8.6 - 10.5 mg/dL Final      Magnesium Magnesium   Date Value Ref Range Status   05/07/2022 2.3 1.6 - 2.4 mg/dL Final        Results from last 7 days   Lab Units 05/09/22  0432   WBC 10*3/mm3 9.92   HEMOGLOBIN g/dL 9.4*   HEMATOCRIT % 29.8*   PLATELETS 10*3/mm3 216     Lab Results   Lab Value Date/Time    TROPONINT 0.013 05/05/2022 1626    TROPONINT 0.021 05/05/2022 1416     TROPONINT <0.010 03/31/2022 2054    TROPONINT <0.010 02/15/2022 0525    TROPONINT <0.010 02/14/2022 0458    TROPONINT <0.010 10/31/2021 2334    TROPONINT <0.010 10/31/2021 1907    TROPONINT <0.010 04/21/2021 2331    TROPONINT <0.010 04/04/2021 0910            Echo EF Estimated  Lab Results   Component Value Date    ECHOEFEST 60 04/03/2022   Interpretation Summary    · Calculated left ventricular EF = 60.1% Estimated left ventricular EF = 60% Estimated left ventricular EF was in agreement with the calculated left ventricular EF. Left ventricular systolic function is normal. The left ventricular cavity is small in size. Left ventricular wall thickness is consistent with moderate concentric hypertrophy. All left ventricular wall segments contract normally. Left ventricular diastolic function was indeterminate.  · The right ventricular cavity is moderately dilated. Normal right ventricular systolic function noted.  · The left atrial cavity is moderately dilated  · The right atrial cavity is moderately dilated.  · No aortic valve regurgitation or stenosis is present. The aortic valve is abnormal in structure. There is calcification of the aortic valve.  · Severe mitral annular calcification is present. Mild mitral valve regurgitation is present. Mean mitral valve gradient is elevated at 6 mmHg at a heart rate of 117 bpm. This is likely due to tachycardia though cannot rule out early mitral valve stenosis  · Mild tricuspid valve regurgitation is present. Estimated right ventricular systolic pressure from tricuspid regurgitation is markedly elevated (>55 mmHg). Calculated right ventricular systolic pressure from tricuspid regurgitation is 56 mmHg.          Assessment/ Plan  Assessment/Plan   Active Hospital Problems    Diagnosis  POA   • **Atrial fibrillation with RVR ([facility]) [I48.91]  Yes     Priority: Low   • Prediabetes [R73.03]  Yes     Priority: Low   • Acute on chronic combined systolic and diastolic CHF (congestive  heart failure) ([facility]) [I50.43]  Yes     Priority: Low   • Essential hypertension [I10]  Yes     Priority: Low   • Chronic hypoxemic respiratory failure ([facility]) [J96.11]  Yes     Priority: Low   • COPD with acute exacerbation ([facility]) [J44.1]  Yes     Priority: Low   • Hypothyroidism (acquired) [E03.9]  Yes     Priority: Low   • Chronic kidney failure, stage 3 (moderate) ([facility]) [N18.30]  Yes     Priority: Low   • Hypokalemia [E87.6]  Yes     Priority: Low   • Hypomagnesemia [E83.42]  Yes     Priority: Low   • Acute urinary retention [R33.8]  Yes     Priority: Low   • DNR (do not resuscitate) [Z66]  Yes     Priority: Low           Atrial fibrillation with RVR -- now nsr    Acute on chronic combined systolic and diastolic CHF EF = 60.1%     Essential hypertension    Chronic hypoxemic respiratory failure     COPD with acute exacerbation   LILIANA    Hypothyroidism (acquired)    Chronic kidney failure, stage 3 (moderate)     Hypokalemia    Hypomagnesemia    Acute urinary retention    DNR (do not resuscitate)    Prediabetes        Amlodipine and beta-blocker discontinued 2/2 wheezing, changed to diltiazem.    Continues to have exp wheezing martinez         Corinna Mcnamara, JADON  05/09/22  08:22 EDT    Patient seen and examined  Heart S1-S2 normal  Rales and wheezes noted  Abdomen soft bowel sounds active  Agree with the Corinna Mcnamara's note

## 2022-05-09 NOTE — PLAN OF CARE
Goal Outcome Evaluation:  Plan of Care Reviewed With: patient           Outcome Evaluation: VSS. wheezing continued even with med changes. Repeat CT chest ordered currently on 2L NC. No signs of distress.

## 2022-05-09 NOTE — PROGRESS NOTES
Trev Solitario MD                          [phone]      Patient ID:    Name:  Yazmin Javier    MRN:  [id number]    1935   86 y.o.  female            Patient Care Team:  Morenita Silverio MD as PCP - General (Internal Medicine)    CC/ Reason for visit: Persistent wheezing, congestive heart failure, A. fib RVR, hypertensive urgency, chronic respiratory failure    Subjective: Pt seen and examined this AM. No acute overnight events noted. Doing same.  Still having ongoing wheezing with some upper airway sounds.  Not improved while on steroids.  Still requiring 2 L oxygen.  Still in bed.  Seems to be a little confused    ROS:  unable to obtain more than about due to dementia and confusion    Objective     Vital Signs past 24hrs    BP range: BP: (114-123)/(54-82) 121/65  Pulse range: Heart Rate:  [] 77  Resp rate range: Resp:  [18-20] 18  Temp range: Temp (24hrs), Av.7 °F (36.5 °C), Min:97.5 °F (36.4 °C), Max:98.2 °F (36.8 °C)      Ventilator/Non-Invasive Ventilation Settings (From admission, onward)            None          Device (Oxygen Therapy): nasal cannula       80.3 kg (177 lb); Body mass index is 32.37 kg/m².      Intake/Output Summary (Last 24 hours) at 2022 1551  Last data filed at 2022 1514  Gross per 24 hour   Intake 920 ml   Output 2250 ml   Net -1330 ml       PHYSICAL EXAM   Constitutional: Middle-aged pt in bed, no acute respiratory distress, No accessory muscle use  Head: - NCAT  Eyes: No pallor.  Anicteric sclerae, EOMI.  ENMT:  Mallampati 4, no oral thrush. Moist MM.   NECK: Trachea midline, No thyromegaly, no palpable cervical lymphadenopathy  Heart: RRR, no murmur. Trace pedal edema   Lungs: DAVID +, + wheezes. No crackles heard    Abdomen: Soft. No tenderness, guarding or rigidity. No palpable masses  Extremities: Extremities warm and well perfused. No cyanosis/ clubbing  Neuro: Conscious, answers appropriately, no gross focal neuro  deficits  Psych: Mood and affect appropriate    PPE recommended per [facility] infectious disease Isolation protocol for the current clinical scenario (as mentioned below) was followed.     Scheduled meds:  allopurinol, 300 mg, Oral, Daily  apixaban, 2.5 mg, Oral, BID  atorvastatin, 80 mg, Oral, Nightly  dilTIAZem, 15 mg, Oral, TID With Meals  folic acid, 1 mg, Oral, Daily  furosemide, 40 mg, Oral, BID  ipratropium-albuterol, 3 mL, Nebulization, 4x Daily - RT  levothyroxine, 50 mcg, Oral, Q AM  melatonin, 3 mg, Oral, Nightly  PARoxetine, 10 mg, Oral, Daily  potassium chloride, 20 mEq, Oral, BID With Meals  predniSONE, 40 mg, Oral, Daily With Breakfast  vitamin B-12, 1,000 mcg, Oral, Daily        IV meds:                           Data Review:      Results from last 7 days   Lab Units 05/09/22  0432 05/09/22  0417 05/08/22  0344 05/07/22  0343 05/07/22  0334 05/06/22  0354 05/05/22  1416   SODIUM mmol/L  --  136 137  --  137   < > 142   POTASSIUM mmol/L  --  4.5 4.5  --  3.4*   < > 3.7   CHLORIDE mmol/L  --  101 101  --  101   < > 101   CO2 mmol/L  --  22.8 22.0  --  25.0   < > 27.0   BUN mg/dL  --  38* 31*  --  19   < > 15   CREATININE mg/dL  --  1.78* 1.80*  --  1.71*   < > 1.47*   CALCIUM mg/dL  --  8.9 8.7  --  8.1*   < > 9.0   BILIRUBIN mg/dL  --   --   --   --   --   --  1.2   ALK PHOS U/L  --   --   --   --   --   --  126*   ALT (SGPT) U/L  --   --   --   --   --   --  19   AST (SGOT) U/L  --   --   --   --   --   --  27   GLUCOSE mg/dL  --  127* 131*  --  106*   < > 127*   WBC 10*3/mm3 9.92  --  6.75 7.22  --    < > 6.54   HEMOGLOBIN g/dL 9.4*  --  9.6* 10.2*  --    < > 12.0   PLATELETS 10*3/mm3 216  --  222 216  --    < > 252   PROBNP pg/mL  --   --   --   --   --   --  2,198.0*   PROCALCITONIN ng/mL  --   --   --   --   --   --  0.16    < > = values in this interval not displayed.       Lab Results   Component Value Date    CALCIUM 8.9 05/09/2022    PHOS 3.7 04/08/2022       Results from last 7 days    Lab Units 05/05/22  1416   BLOODCX  No growth at 4 days  No growth at 4 days              Results Review:    I have reviewed the relevant laboratory results and independently reviewed the chest imaging from this hospitalization including the available echocardiogram reports personally and summarized them if/ when appropriate below    Assessment    Post infectious RADS  Acute on chronic congestive heart failure; Diastolic  A. fib with RVR  Ac Hypertensive urgency  Chronic hypoxic respiratory failure ?  Recent pna 4/22  Pulmonary hypertension  CKD 3  Hypothyroidism  Suspect undiagnosed LILIANA  Advanced age  DNR/DNI    PLAN:  Patient remains on supplemental oxygen at 2 L.  Still having ongoing wheezing in spite of being on steroids.  Given prior nonspecific CAT scan changes as well as ongoing symptoms we will go ahead and get a CT of the chest to better evaluate.  Continue with steroids.  Unfortunately having issues with encephalopathy.  Continue bronchodilators  Diuresing some with Lasix.  Renal function stable  Respiratory viral PCR is negative    Out of bed and physical therapy    Trev Solitario MD  5/9/2022

## 2022-05-09 NOTE — PROGRESS NOTES
LOS: 3 days     Name: Yazmin Javier  Age: 86 y.o.  Sex: female  :  1935  MRN: [id number]         Primary Care Physician: Morenita Silverio MD    Subjective   Subjective  No reports of confusion overnight last night.  Intermittently requiring 1 L of oxygen.  Denies chest pain or shortness of breath.  Still with some wheezing.    Objective   Vital Signs  Temp:  [97.5 °F (36.4 °C)-98.2 °F (36.8 °C)] 97.5 °F (36.4 °C)  Heart Rate:  [] 76  Resp:  [18-20] 18  BP: (114-123)/(54-82) 119/64  Body mass index is 32.37 kg/m².    Objective:  General Appearance:  Comfortable and in no acute distress (Elderly, frail, weak and deconditioned appearing).    Vital signs: (most recent): Blood pressure 119/64, pulse 76, temperature 97.5 °F (36.4 °C), temperature source Oral, resp. rate 18, weight 80.3 kg (177 lb), SpO2 95 %.    Lungs:  Normal effort and normal respiratory rate.  There are decreased breath sounds and wheezes.    Heart: Normal rate.  Regular rhythm.    Abdomen: Abdomen is soft.  Bowel sounds are normal.   There is no abdominal tenderness.     Extremities: There is no dependent edema or local swelling.    Neurological: Patient is alert and oriented to person, place and time.    Skin:  Warm and dry.              Results Review:       I reviewed the patient's new clinical results.    Results from last 7 days   Lab Units 22  0432 22  0344 22  0343 22  0354 22  1416   WBC 10*3/mm3 9.92 6.75 7.22 5.70 6.54   HEMOGLOBIN g/dL 9.4* 9.6* 10.2* 10.3* 12.0   PLATELETS 10*3/mm3 216 222 216 218 252     Results from last 7 days   Lab Units 22  0417 22  0344 22  0334 22  0354 22  1416   SODIUM mmol/L 136 137 137 138 142   POTASSIUM mmol/L 4.5 4.5 3.4* 3.4* 3.7   CHLORIDE mmol/L 101 101 101 102 101   CO2 mmol/L 22.8 22.0 25.0 23.2 27.0   BUN mg/dL 38* 31* 19 18 15   CREATININE mg/dL 1.78* 1.80* 1.71* 1.48* 1.47*   CALCIUM mg/dL 8.9 8.7 8.1* 8.6 9.0   GLUCOSE mg/dL  127* 131* 106* 110* 127*                 Scheduled Meds:   allopurinol, 300 mg, Oral, Daily  apixaban, 5 mg, Oral, BID  atorvastatin, 80 mg, Oral, Nightly  dilTIAZem, 15 mg, Oral, TID With Meals  folic acid, 1 mg, Oral, Daily  furosemide, 40 mg, Oral, BID  ipratropium-albuterol, 3 mL, Nebulization, 4x Daily - RT  levothyroxine, 50 mcg, Oral, Q AM  melatonin, 3 mg, Oral, Nightly  PARoxetine, 10 mg, Oral, Daily  potassium chloride, 20 mEq, Oral, BID With Meals  predniSONE, 40 mg, Oral, Daily With Breakfast  vitamin B-12, 1,000 mcg, Oral, Daily      PRN Meds:   •  acetaminophen  •  ipratropium-albuterol  •  magnesium sulfate **OR** magnesium sulfate **OR** magnesium sulfate  •  nitroglycerin  •  ondansetron **OR** ondansetron  •  [COMPLETED] Insert peripheral IV **AND** sodium chloride  Continuous Infusions:       Assessment/Plan   Active Hospital Problems    Diagnosis  POA   • **Atrial fibrillation with RVR ([facility]) [I48.91]  Yes   • Prediabetes [R73.03]  Yes   • Acute on chronic combined systolic and diastolic CHF (congestive heart failure) ([facility]) [I50.43]  Yes   • Essential hypertension [I10]  Yes   • Chronic hypoxemic respiratory failure ([facility]) [J96.11]  Yes   • COPD with acute exacerbation ([facility]) [J44.1]  Yes   • Hypothyroidism (acquired) [E03.9]  Yes   • Chronic kidney failure, stage 3 (moderate) ([facility]) [N18.30]  Yes   • Hypokalemia [E87.6]  Yes   • Hypomagnesemia [E83.42]  Yes   • Acute urinary retention [R33.8]  Yes   • DNR (do not resuscitate) [Z66]  Yes      Resolved Hospital Problems   No resolved problems to display.       Assessment & Plan    · Rapid ventricular response atrial fibrillation/acute diastolic congestive heart failure in a patient with a history of A. fib/hypertension.  2D echo on 4/3/2022 revealed a normal ejection fraction and no significant valvular disease..  Cardiology consult noted and appreciated.  Currently rate controlled on diltiazem.  Anticoagulation with Eliquis.  On p.o. Lasix.  Monitor  renal function. Will need Holter monitor at the time of discharge.    · Wheezing.   Discussed with pulmonology.  Patient has no formal diagnosis of COPD and is a non-smoker.  Perhaps some post viral syndrome.  RVP was negative.  Continue bronchodilators and steroids.  · Visual hallucinations.   Resolved.  · Hypothyroidism.  Clinically euthyroid on replacement.  Normal TSH.    · Prediabetes.  A1c is 5.9.  Will need diabetic diet at discharge.  · Stage IIIb chronic renal failure/hypomagnesemia/hypokalemia.    Monitor renal function with diuresis.  · Chronic disease anemia.  Hemoglobin stable about the baseline of 9.  · Urinary retention/bladder spasms.  S/p urology consultation.    Padilla catheter currently in place.  We will plan for voiding trial the day of discharge   · Hypokalemia.  Will substitute as needed  · DNR status.     Discussed my findings and plan of treatment with the patient/nurse.  Disposition.    SNF upon discharge.  Possibly in 1 to 2 days.      I wore full protective equipment throughout the patient encounter including eye protection and facemask.  Hand hygiene was performed before donning protective equipment and after removal when leaving the room.    Jai Neal MD  [facility]  05/09/22  10:49 EDT

## 2022-05-09 NOTE — THERAPY TREATMENT NOTE
Patient Name: Yazmin Javier  : 1935    MRN: [id number]                              Today's Date: 2022       Admit Date: 2022    Visit Dx:     ICD-10-CM ICD-9-CM   1. Atrial fibrillation with RVR (HCC)  I48.91 427.31   2. Pneumonia of both lower lobes due to infectious organism  J18.9 486     Patient Active Problem List   Diagnosis   • Acute on chronic congestive heart failure (HCC)   • Hypoxia   • Paroxysmal atrial fibrillation (HCC)   • Chronic anticoagulation   • Hypothyroidism (acquired)   • Stage 3b chronic kidney disease (HCC)   • Right arm weakness   • Chronic diastolic CHF (congestive heart failure) (HCC)   • Syncope   • Acute respiratory failure with hypoxia and hypercapnia (HCC)   • Acute pulmonary edema (HCC)   • Acute on chronic respiratory failure with hypoxia (HCC)   • Fever in adult   • History of asthma   • Hyperglycemia   • Hypokalemia   • BRENDA (acute kidney injury) (HCC)   • Hyperbilirubinemia   • Transaminitis   • Pneumonia involving right lung   • Pulmonary hypertension (HCC)   • Atrial fibrillation with RVR (HCC)   • Acute on chronic combined systolic and diastolic CHF (congestive heart failure) (HCC)   • Essential hypertension   • Chronic hypoxemic respiratory failure (HCC)   • COPD with acute exacerbation (HCC)   • Hypothyroidism (acquired)   • Chronic kidney failure, stage 3 (moderate) (HCC)   • Hypokalemia   • Hypomagnesemia   • Acute urinary retention   • DNR (do not resuscitate)   • Prediabetes     Past Medical History:   Diagnosis Date   • CHF (congestive heart failure) (HCC)    • Sleep apnea    • Stroke (HCC)      No past surgical history on file.   General Information     Row Name 22 1533          Physical Therapy Time and Intention    Document Type therapy note (daily note)  -MS     Mode of Treatment physical therapy;individual therapy  -MS     Row Name 22 1533          General Information    Patient Profile Reviewed yes  -MS     Existing  Precautions/Restrictions fall   Exit alarm  -MS     Barriers to Rehab none identified  -MS     Row Name 05/09/22 1533          Cognition    Orientation Status (Cognition) oriented x 3  -MS     Row Name 05/09/22 1533          Safety Issues, Functional Mobility    Comment, Safety Issues/Impairments (Mobility) Gait belt used for safety.  -MS           User Key  (r) = Recorded By, (t) = Taken By, (c) = Cosigned By    Initials Name Provider Type    Jf Kelley, PT Physical Therapist               Mobility     Row Name 05/09/22 1533          Bed Mobility    Supine-Sit [place] (Bed Mobility) minimum assist (75% patient effort)  -MS     Sit-Supine [place] (Bed Mobility) minimum assist (75% patient effort)  -MS     Row Name 05/09/22 1533          Sit-Stand Transfer    Sit-Stand [place] (Transfers) minimum assist (75% patient effort)  -MS     Assistive Device (Sit-Stand Transfers) --  HHA x 1  -MS     Row Name 05/09/22 1533          Gait/Stairs (Locomotion)    [place] Level (Gait) minimum assist (75% patient effort)  -MS     Assistive Device (Gait) --  HHA x 1  -MS     Distance in Feet (Gait) Pt. able to take 4 shuffled sidesteps up toward the head of the bed.  -MS     Bilateral Gait Deviations forward flexed posture  -MS     Comment, (Gait/Stairs) Verbal/tactile cues for posture correction. Pt. very limited in upright mobility due to fatigue/SOA.  Pt. refuses attempts at forward ambulation.  -MS           User Key  (r) = Recorded By, (t) = Taken By, (c) = Cosigned By    Initials Name Provider Type    Jf Kelley, PT Physical Therapist               Obj/Interventions     Row Name 05/09/22 1534          Motor Skills    Therapeutic Exercise --  BLE ther. ex. program x 10 reps completed (Ankle pumps, Hip Flexion, LAQ's)  -MS           User Key  (r) = Recorded By, (t) = Taken By, (c) = Cosigned By    Initials Name Provider Type    Jf Kelley, PT Physical Therapist                Goals/Plan    No documentation.                Clinical Impression     Row Name 05/09/22 1535          Pain    Pretreatment Pain Rating 0/10 - no pain  -MS     Posttreatment Pain Rating 0/10 - no pain  -MS     Row Name 05/09/22 1535          Plan of Care Review    Plan of Care Reviewed With patient  -MS     Row Name 05/09/22 1535          Positioning and Restraints    Pre-Treatment Position in bed  -MS     Post Treatment Position bed  -MS     In Bed notified nsg;supine;call light within reach;encouraged to call for assist;exit alarm on  All lines intact.  -MS           User Key  (r) = Recorded By, (t) = Taken By, (c) = Cosigned By    Initials Name Provider Type    Jf Kelley LUIS ANTONIO, PT Physical Therapist               Outcome Measures     Row Name 05/09/22 1538          How much help from another person do you currently need...    Turning from your back to your side while in flat bed without using bedrails? 3  -MS     Moving from lying on back to sitting on the side of a flat bed without bedrails? 3  -MS     Moving to and from a bed to a chair (including a wheelchair)? 3  -MS     Standing up from a chair using your arms (e.g., wheelchair, bedside chair)? 3  -MS     Climbing 3-5 steps with a railing? 2  -MS     To walk in hospital room? 2  -MS     AM-PAC 6 Clicks Score (PT) 16  -MS     Highest level of mobility 5 --> Static standing  -MS     Row Name 05/09/22 1538 05/09/22 1530       Functional Assessment    Outcome Measure Options AM-PAC 6 Clicks Basic Mobility (PT)  -MS AM-PAC 6 Clicks Daily Activity (OT)  -JW          User Key  (r) = Recorded By, (t) = Taken By, (c) = Cosigned By    Initials Name Provider Type    MS HidalgoJf, PT Physical Therapist    Malaika Dent OT Occupational Therapist                             Physical Therapy Education                 Title: PT OT SLP Therapies (In Progress)     Topic: Physical Therapy (Done)     Point: Mobility training (Done)     Learning Progress Summary   "         Patient Acceptance, E,D, VU,NR by MS at 5/9/2022 1538    Acceptance, E,D, VU,NR by MS at 5/7/2022 1525                   Point: Home exercise program (Done)     Learning Progress Summary           Patient Acceptance, E,D, VU,NR by MS at 5/9/2022 1538    Acceptance, E,D, VU,NR by MS at 5/7/2022 1525                   Point: Body mechanics (Done)     Learning Progress Summary           Patient Acceptance, E,D, VU,NR by MS at 5/9/2022 1538    Acceptance, E,D, VU,NR by MS at 5/7/2022 1525                   Point: Precautions (Done)     Learning Progress Summary           Patient Acceptance, E,D, VU,NR by MS at 5/9/2022 1538    Acceptance, E,D, VU,NR by MS at 5/7/2022 1525                               User Key     Initials Effective Dates Name Provider Type Discipline    MS 06/16/21 -  Jf Hidalgo, PT Physical Therapist PT              PT Recommendation and Plan  Planned Therapy Interventions (PT): balance training, bed mobility training, gait training, home exercise program, patient/family education, postural re-education, transfer training, strengthening, ROM (range of motion)  Plan of Care Reviewed With: (P) patient  Outcome Evaluation: Upon entering room, pt. supine in bed, asleep, but awakens with verbal/tactile stimuli.  Pt. reports feeling \"tired\" but is agreeable to work with P.T. This PM, pt. requires Min. assist x 1, with HHA x 1, to perform 4 shuffled sidesteps up toward the head of the bed.  Pt. requires Min. assist x 1 for bed mobility and Min. assist x 1 for sit <-> stand transfers.  BLE ther. ex. program x 10 reps completed for general strengthening. Verbal/tactile cues given during upright mobility for posture correction. Will continue to progress functional mobility as tolerated.     Time Calculation:    PT Charges     Row Name 05/09/22 1541             Time Calculation    Start Time 1315  -MS      Stop Time 1330  -MS      Time Calculation (min) 15 min  -MS      PT Received On 05/09/22  " -MS      PT - Next Appointment 05/10/22  -MS              Time Calculation- PT    Total Timed Code Minutes- PT 14 minute(s)  -MS            User Key  (r) = Recorded By, (t) = Taken By, (c) = Cosigned By    Initials Name Provider Type    Jf Kelley, PT Physical Therapist              Therapy Charges for Today     Code Description Service Date Service Provider Modifiers Qty    [id number] HC PT THER PROC EA 15 MIN 5/9/2022 Jf Hidalgo, PT GP 1          PT G-Codes  Outcome Measure Options: AM-PAC 6 Clicks Basic Mobility (PT)  AM-PAC 6 Clicks Score (PT): 16  AM-PAC 6 Clicks Score (OT): 18    Jf Hidalgo PT  5/9/2022

## 2022-05-09 NOTE — THERAPY EVALUATION
Patient Name: Yazmin Javier  : 1935    MRN: [id number]                              Today's Date: 2022       Admit Date: 2022    Visit Dx:     ICD-10-CM ICD-9-CM   1. Atrial fibrillation with RVR (HCC)  I48.91 427.31   2. Pneumonia of both lower lobes due to infectious organism  J18.9 486     Patient Active Problem List   Diagnosis   • Acute on chronic congestive heart failure (HCC)   • Hypoxia   • Paroxysmal atrial fibrillation (HCC)   • Chronic anticoagulation   • Hypothyroidism (acquired)   • Stage 3b chronic kidney disease (HCC)   • Right arm weakness   • Chronic diastolic CHF (congestive heart failure) (HCC)   • Syncope   • Acute respiratory failure with hypoxia and hypercapnia (HCC)   • Acute pulmonary edema (HCC)   • Acute on chronic respiratory failure with hypoxia (HCC)   • Fever in adult   • History of asthma   • Hyperglycemia   • Hypokalemia   • BRENDA (acute kidney injury) (HCC)   • Hyperbilirubinemia   • Transaminitis   • Pneumonia involving right lung   • Pulmonary hypertension (HCC)   • Atrial fibrillation with RVR (HCC)   • Acute on chronic combined systolic and diastolic CHF (congestive heart failure) (HCC)   • Essential hypertension   • Chronic hypoxemic respiratory failure (HCC)   • COPD with acute exacerbation (HCC)   • Hypothyroidism (acquired)   • Chronic kidney failure, stage 3 (moderate) (HCC)   • Hypokalemia   • Hypomagnesemia   • Acute urinary retention   • DNR (do not resuscitate)   • Prediabetes     Past Medical History:   Diagnosis Date   • CHF (congestive heart failure) (HCC)    • Sleep apnea    • Stroke (HCC)      No past surgical history on file.   General Information     Row Name 22 1516          OT Time and Intention    Document Type evaluation  -JW     Mode of Treatment individual therapy;occupational therapy  -     Row Name 22 1516          General Information    Patient Profile Reviewed yes  -JW     Prior Level of Function min assist:;ADL's;all  household mobility  assist for bathing, [place] with fxl mobility with use of RW, occasional WC for longer distances  -     Existing Precautions/Restrictions fall;oxygen therapy device and L/min  -     Barriers to Rehab none identified  -     Row Name 05/09/22 1516          Living Environment    People in Home facility resident  half-way  -     Row Name 05/09/22 1516          Home Main Entrance    Number of Stairs, Main Entrance none  -     Row Name 05/09/22 1516          Cognition    Orientation Status (Cognition) oriented x 3  -     Row Name 05/09/22 1516          Safety Issues, Functional Mobility    Safety Issues Affecting Function (Mobility) insight into deficits/self-awareness  -     Impairments Affecting Function (Mobility) endurance/activity tolerance;shortness of breath;strength  -           User Key  (r) = Recorded By, (t) = Taken By, (c) = Cosigned By    Initials Name Provider Type    Malaika Dent OT Occupational Therapist                 Mobility/ADL's     Row Name 05/09/22 1517          Bed Mobility    Bed Mobility supine-sit;sit-supine  -     Supine-Sit [place] (Bed Mobility) contact guard  -     Sit-Supine [place] (Bed Mobility) contact guard  -     Assistive Device (Bed Mobility) head of bed elevated  -     Row Name 05/09/22 1517          Transfers    Transfers sit-stand transfer  -     Sit-Stand [place] (Transfers) contact guard;minimum assist (75% patient effort);verbal cues  -     Row Name 05/09/22 1517          Sit-Stand Transfer    Assistive Device (Sit-Stand Transfers) walker, front-wheeled  -     Comment, (Sit-Stand Transfer) STS 2x with CGA-Pat using RW, fatigues quickly. Cues for PLB  -     Row Name 05/09/22 1517          Functional Mobility    Functional Mobility- Comment able to take a few lateral steps towards HOB with CGA  -[facility] Name 05/09/22 1517          Activities of Daily Living    BADL Assessment/Intervention lower body  dressing;grooming  -[facility] Name 05/09/22 1517          Lower Body Dressing Assessment/Training    [place] Level (Lower Body Dressing) don;doff;socks;set up;supervision  -     Position (Lower Body Dressing) edge of bed sitting  -[facility] Name 05/09/22 1517          Grooming Assessment/Training    [place] Level (Grooming) grooming skills;wash face, hands;set up;supervision  -     Position (Grooming) edge of bed sitting  -           User Key  (r) = Recorded By, (t) = Taken By, (c) = Cosigned By    Initials Name Provider Type     Malaika Chaudhari OT Occupational Therapist               Obj/Interventions     [facility] Name 05/09/22 1519          Sensory Assessment (Somatosensory)    Sensory Assessment (Somatosensory) sensation intact  -[facility] Name 05/09/22 1519          Vision Assessment/Intervention    Visual Impairment/Limitations WNL  -[facility] Name 05/09/22 1519          Range of Motion Comprehensive    General Range of Motion bilateral upper extremity ROM L  -JW     Row Name 05/09/22 1519          Strength Comprehensive (MMT)    Comment, General Manual Muscle Testing (MMT) Assessment generalized weakness  -[facility] Name 05/09/22 1519          Motor Skills    Motor Skills functional endurance  -     Functional Endurance fair-, fatigues quickly, desats to 88% briefly but recovers with PLB  -[facility] Name 05/09/22 1519          Balance    Balance Assessment sitting static balance;sitting dynamic balance;standing static balance;standing dynamic balance  -     Static Sitting Balance modified [place]  -     Dynamic Sitting Balance supervision  -     Position, Sitting Balance unsupported;sitting edge of bed  -     Static Standing Balance contact guard  -     Dynamic Standing Balance minimal assist  -     Position/Device Used, Standing Balance supported;walker, front-wheeled  -     Balance Interventions sitting;standing;occupation based/functional task  -           User Key   (r) = Recorded By, (t) = Taken By, (c) = Cosigned By    Initials Name Provider Type    JW Malaika Chaudhari, TOM Occupational Therapist               Goals/Plan     Row Name 05/09/22 1528          Transfer Goal 1 (OT)    Activity/Assistive Device (Transfer Goal 1, OT) transfers, all;bed-to-chair/chair-to-bed;toilet;shower chair  -     [place] Level/Cues Needed (Transfer Goal 1, OT) modified independence  -     Time Frame (Transfer Goal 1, OT) short term goal (STG);2 weeks  -     Progress/Outcome (Transfer Goal 1, OT) goal ongoing  -     Row Name 05/09/22 1528          Toileting Goal 1 (OT)    Activity/Device (Toileting Goal 1, OT) toileting skills, all  -     [place] Level/Cues Needed (Toileting Goal 1, OT) modified independence  -     Time Frame (Toileting Goal 1, OT) short term goal (STG);2 weeks  -     Progress/Outcome (Toileting Goal 1, OT) goal ongoing  -[facility] Name 05/09/22 1528          Grooming Goal 1 (OT)    Activity/Device (Grooming Goal 1, OT) grooming skills, all  -     [place] (Grooming Goal 1, OT) independent  -     Time Frame (Grooming Goal 1, OT) short term goal (STG);2 weeks  -     Progress/Outcome (Grooming Goal 1, OT) goal ongoing  -[facility] Name 05/09/22 1528          Problem Specific Goal 1 (OT)    Problem Specific Goal 1 (OT) Pt will increase endurance to fair or fair+ for increased participation in ADLs and fxl mobility  -     Time Frame (Problem Specific Goal 1, OT) short term goal (STG);2 weeks  -     Progress/Outcome (Problem Specific Goal 1, OT) goal ongoing  -     Row Name 05/09/22 1528          Therapy Assessment/Plan (OT)    Planned Therapy Interventions (OT) activity tolerance training;BADL retraining;functional balance retraining;occupation/activity based interventions;patient/caregiver education/training;strengthening exercise;transfer/mobility retraining  -           User Key  (r) = Recorded By, (t) = Taken By, (c) = Cosigned By    Initials  Name Provider Type    Malaika Dent OT Occupational Therapist               Clinical Impression     Row Name 05/09/22 1520          Pain Assessment    Pretreatment Pain Rating 0/10 - no pain  -KRIS     Posttreatment Pain Rating 0/10 - no pain  -KRIS     Pre/Posttreatment Pain Comment only c/o being tired  -     Row Name 05/09/22 1520          Plan of Care Review    Plan of Care Reviewed With patient  -     Outcome Evaluation Pt is an 85 y/o female admitted d/t Afib with RVR, PNA of [place] lower lobes. Hx of CHF, COPD, resp failure. She resides at an [facility] and req's assist for bathing and meals, otherwise is indep with ADLs and fxl mobility using a RW, occasional WC for longer distances. She utilizes 3L O2 at baseline. Pt presents AOx3, SBA for bed mobility but becomes SOB quickly, cues for PLB. LB dressing with set-up/spv at EOB. STS 2x with CGA-Pat using RW. She takes a few lateral steps at bedside before returning to seatd position with c/o exhaustion, SpO2 89% briefly but returns to mid 90s quickly. Pt will benefit from OT services in the acute care setting to address endurance, act tolerance and strength. Recommend d/c SNF pending progress  -     Row Name 05/09/22 1520          Therapy Assessment/Plan (OT)    Rehab Potential (OT) good, to achieve stated therapy goals  -     Criteria for Skilled Therapeutic Interventions Met (OT) yes;skilled treatment is necessary  -     Therapy Frequency (OT) 5 times/wk  -     Row Name 05/09/22 1520          Therapy Plan Review/Discharge Plan (OT)    Anticipated Discharge Disposition (OT) skilled nursing facility  -     Row Name 05/09/22 1520          Positioning and Restraints    Pre-Treatment Position in bed  -     Post Treatment Position bed  -JW     In Bed fowlers;call light within reach;encouraged to call for assist;exit alarm on  -           User Key  (r) = Recorded By, (t) = Taken By, (c) = Cosigned By    Initials Name Provider Type    Malaika Dent OT  Occupational Therapist               Outcome Measures     Row Name 05/09/22 1530          How much help from another is currently needed...    Putting on and taking off regular lower body clothing? 3  -JW     Bathing (including washing, rinsing, and drying) 3  -JW     Toileting (which includes using toilet bed pan or urinal) 2  -JW     Putting on and taking off regular upper body clothing 3  -JW     Taking care of personal grooming (such as brushing teeth) 3  -JW     Eating meals 4  -JW     AM-PAC 6 Clicks Score (OT) 18  -     Row Name 05/09/22 1530          Functional Assessment    Outcome Measure Options AM-PAC 6 Clicks Daily Activity (OT)  -           User Key  (r) = Recorded By, (t) = Taken By, (c) = Cosigned By    Initials Name Provider Type    Malaika Dent OT Occupational Therapist                Occupational Therapy Education                 Title: PT OT SLP Therapies (In Progress)     Topic: Occupational Therapy (In Progress)     Point: ADL training (Done)     Description:   Instruct learner(s) on proper safety adaptation and remediation techniques during self care or transfers.   Instruct in proper use of assistive devices.              Learning Progress Summary           Patient Acceptance, E, VU by KRIS at 5/9/2022 1531    Comment: role of OT, plan of care, safety                   Point: Home exercise program (Not Started)     Description:   Instruct learner(s) on appropriate technique for monitoring, assisting and/or progressing therapeutic exercises/activities.              Learner Progress:  Not documented in this visit.          Point: Precautions (Done)     Description:   Instruct learner(s) on prescribed precautions during self-care and functional transfers.              Learning Progress Summary           Patient Acceptance, E, VU by KRIS at 5/9/2022 1531    Comment: role of OT, plan of care, safety                   Point: Body mechanics (Done)     Description:   Instruct learner(s) on proper  positioning and spine alignment during self-care, functional mobility activities and/or exercises.              Learning Progress Summary           Patient Acceptance, E, VU by  at 5/9/2022 1531    Comment: role of OT, plan of care, safety                               User Key     Initials Effective Dates Name Provider Type Discipline     06/10/21 -  Malaika Chaudhari OT Occupational Therapist OT              OT Recommendation and Plan  Planned Therapy Interventions (OT): activity tolerance training, BADL retraining, functional balance retraining, occupation/activity based interventions, patient/caregiver education/training, strengthening exercise, transfer/mobility retraining  Therapy Frequency (OT): 5 times/wk  Plan of Care Review  Plan of Care Reviewed With: patient  Outcome Evaluation: Pt is an 87 y/o female admitted d/t Afib with RVR, PNA of [place] lower lobes. Hx of CHF, COPD, resp failure. She resides at an [facility] and req's assist for bathing and meals, otherwise is indep with ADLs and fxl mobility using a RW, occasional WC for longer distances. She utilizes 3L O2 at baseline. Pt presents AOx3, SBA for bed mobility but becomes SOB quickly, cues for PLB. LB dressing with set-up/spv at EOB. STS 2x with CGA-Pat using RW. She takes a few lateral steps at bedside before returning to seatd position with c/o exhaustion, SpO2 89% briefly but returns to mid 90s quickly. Pt will benefit from OT services in the acute care setting to address endurance, act tolerance and strength. Recommend d/c SNF pending progress     Time Calculation:    Time Calculation- OT     Row Name 05/09/22 1531             Time Calculation-     OT Start Time 1447  -      OT Stop Time 1503  -      OT Time Calculation (min) 16 min  -      Total Timed Code Minutes- OT 8 minute(s)  -      OT Received On 05/09/22  -      OT - Next Appointment 05/10/22  -      OT Goal Re-Cert Due Date 05/23/22  -              Timed Charges    11663 - OT  Therapeutic Activity Minutes 8  -JW              Untimed Charges    OT Eval/Re-eval Minutes 8  -JW              Total Minutes    Timed Charges Total Minutes 8  -JW      Untimed Charges Total Minutes 8  -JW       Total Minutes 16  -JW            User Key  (r) = Recorded By, (t) = Taken By, (c) = Cosigned By    Initials Name Provider Type    Malaika Dent OT Occupational Therapist              Therapy Charges for Today     Code Description Service Date Service Provider Modifiers Qty    18573298066  OT THERAPEUTIC ACT EA 15 MIN 5/9/2022 Malaika Chaudhari OT GO 1    [id number]  OT EVAL MOD COMPLEXITY 2 5/9/2022 Malaika Chaudhari OT GO 1               Malaika Chaudhari OT  5/9/2022

## 2022-05-09 NOTE — CONSULTS
"Adult Nutrition  Assessment/PES    Patient Name:  Yazmin Javier  YOB: 1935  MRN: [id number]  Admit Date:  5/5/2022    Assessment Date:  5/9/2022    Comments:  MST 2. Pt admit with afib, heart failure, high bp, chronic respiratory failure, chronic bronchitis, acquired underactive thyroid, chronic kidney failure, low blood potassium, hypomagnesemia, retention of urine, prediabetes. BMI: 32.3 (obese class I).    Pt on regular; cardiac diet. 25-75% intake documented (60% average intake documented x 5 meals). Pt reported she is eating well. Pt likes egg salad sandwiches. Continue to follow per protocol.      Reason for Assessment     Row Name 05/09/22 1107          Reason for Assessment    Reason For Assessment identified at risk by screening criteria     Diagnosis other (see comments)  Pt admit with afib, heart failure, high bp, chronic respiratory failure, chronic bronchitis, acquired underactive thyroid, chronic kidney failure, low blood potassium, hypomagnesemia, retention of urine, prediabetes     Identified At Risk by Screening Criteria MST SCORE 2+                Nutrition/Diet History     Row Name 05/09/22 1108          Nutrition/Diet History    Typical Intake (Food/Fluid/EN/PN) Pt reported eating well. 25-75% intake documented.                Anthropometrics     Row Name 05/09/22 1110 05/09/22 0539       Anthropometrics    Weight -- 80.3 kg (177 lb)    Height for Calculation 1.575 m (5' 2.01\") --    Weight for Calculation 80.3 kg (177 lb 0.5 oz) --    Additional Documentation --  BMI: 32.3 --               Labs/Tests/Procedures/Meds     Row Name 05/09/22 1108          Labs/Procedures/Meds    Lab Results Reviewed reviewed     Lab Results Comments glucose (high), BUN (high), creat (high), A1C 5.90, alb (low)            Diagnostic Tests/Procedures    Diagnostic Test/Procedure Reviewed reviewed     Diagnostic Test/Procedures Comments XR chest            Medications    Pertinent Medications Reviewed " "reviewed     Pertinent Medications Comments allopurinol, eliquis, lipitor, cardizem, folic acid, lasix, levothyroxine, melatonin, paxil, potassium chloride, prednsione, vitamin b12, prn: magnesium sulfate                  Estimated/Assessed Needs - Anthropometrics     Row Name 05/09/22 1110 05/09/22 0539       Anthropometrics    Weight -- 80.3 kg (177 lb)    Height for Calculation 1.575 m (5' 2.01\") --    Weight for Calculation 80.3 kg (177 lb 0.5 oz) --    Additional Documentation --  BMI: 32.3 --               Nutrition Prescription Ordered     Row Name 05/09/22 1110          Nutrition Prescription PO    Current PO Diet Regular     Common Modifiers Cardiac                Evaluation of Received Nutrient/Fluid Intake     Row Name 05/09/22 1110          PO Evaluation    % PO Intake 25-75%                     Problem/Interventions:   Problem 1     Row Name 05/09/22 1110          Nutrition Diagnoses Problem 1    Problem 1 Overweight/Obesity     Etiology (related to) Other (comment)  caloric intake greater than caloric needs     Signs/Symptoms (evidenced by) BMI     BMI 30 - 34.9                      Intervention Goal     Row Name 05/09/22 1111          Intervention Goal    General Maintain nutrition;Improved nutrition related lab(s);Meet nutritional needs for age/condition;Disease management/therapy     PO Tolerate PO;Meet estimated needs;Increase intake     Weight Appropriate weight loss                Nutrition Intervention     Row Name 05/09/22 1111          Nutrition Intervention    RD/Tech Action Follow Tx progress;Care plan reviewd;Encourage intake                  Education/Evaluation     Row Name 05/09/22 1111          Education    Education Will Instruct as appropriate            Monitor/Evaluation    Monitor Per protocol;I&O;PO intake;Pertinent labs;Weight;Skin status;Symptoms                 Electronically signed by:  Lina Juarez RD  05/09/22 11:11 EDT  "

## 2022-05-09 NOTE — PLAN OF CARE
"Goal Outcome Evaluation:  Plan of Care Reviewed With: patient           Outcome Evaluation: Upon entering room, pt. supine in bed, asleep, but awakens with verbal/tactile stimuli.  Pt. reports feeling \"tired\" but is agreeable to work with P.T. This PM, pt. requires Min. assist x 1, with HHA x 1, to perform 4 shuffled sidesteps up toward the head of the bed.  Pt. requires Min. assist x 1 for bed mobility and Min. assist x 1 for sit <-> stand transfers.  BLE ther. ex. program x 10 reps completed for general strengthening. Verbal/tactile cues given during upright mobility for posture correction. Will continue to progress functional mobility as tolerated.    Patient was wearing a face mask during this therapy encounter. Therapist used appropriate personal protective equipment including eye protection, mask, and gloves.  Mask used was standard procedure mask. Appropriate PPE was worn during the entire therapy session. Hand hygiene was completed before and after therapy session. Patient is not in enhanced droplet precautions.     "

## 2022-05-10 PROBLEM — J45.901 REACTIVE AIRWAY DISEASE WITH ACUTE EXACERBATION: Status: ACTIVE | Noted: 2022-01-01

## 2022-05-10 NOTE — PROGRESS NOTES
[facility] Medicine Services  PROGRESS NOTE    Patient Name: Yazmin Javier  : 1935  MRN: [id number]    Date of Admission: 2022  Primary Care Physician: Morenita Silverio MD    Subjective   Subjective     CC:  Follow-up shortness of breath and wheezes    HPI:  Feels better.  Breathing better.  Poor hearing makes conversation difficult.  Currently on low-dose nasal cannula.  Reports occasional wheezes    Review of Systems  No current fevers or chills  No current nausea, vomiting, or diarrhea  No current chest pain or palpitations       Objective   Objective     Vital Signs:   Temp:  [97.5 °F (36.4 °C)-97.8 °F (36.6 °C)] 97.5 °F (36.4 °C)  Heart Rate:  [68-80] 68  Resp:  [18-24] 24  BP: (121-135)/(65-96) 122/96        Physical Exam:  Constitutional:Awake, alert, chronically ill-appearing  HENT: NCAT, mucous membranes moist, neck supple  Respiratory: Occasional cough, occasional audible wheezes, currently on supplemental oxygen, inspiration slightly decreased  Cardiovascular: RRR, normal radial pulses  Gastrointestinal: Positive bowel sounds, soft, nontender, nondistended  Musculoskeletal: Elderly frail and chronically debilitated appearance, mild lower extremity edema, BMI is 31 obese  Psychiatric: Appropriate affect, cooperative, conversational  Neurologic: No slurred speech or facial droop, follows commands  Skin: No rashes or jaundice, warm      Results Reviewed:  Results from last 7 days   Lab Units 05/10/22  0345 22  0432 22  0344 22  0354 22  1416   WBC 10*3/mm3 7.83 9.92 6.75   < > 6.54   HEMOGLOBIN g/dL 8.7* 9.4* 9.6*   < > 12.0   HEMATOCRIT % 26.4* 29.8* 29.0*   < > 39.0   PLATELETS 10*3/mm3 205 216 222   < > 252   PROCALCITONIN ng/mL  --   --   --   --  0.16    < > = values in this interval not displayed.     Results from last 7 days   Lab Units 05/10/22  0345 22  0417 22  0344 22  0354 22  1626 22  1416   SODIUM mmol/L 138 136 137   < >   --  142   POTASSIUM mmol/L 4.2 4.5 4.5   < >  --  3.7   CHLORIDE mmol/L 101 101 101   < >  --  101   CO2 mmol/L 25.0 22.8 22.0   < >  --  27.0   BUN mg/dL 38* 38* 31*   < >  --  15   CREATININE mg/dL 1.40* 1.78* 1.80*   < >  --  1.47*   GLUCOSE mg/dL 150* 127* 131*   < >  --  127*   CALCIUM mg/dL 8.8 8.9 8.7   < >  --  9.0   ALT (SGPT) U/L  --   --   --   --   --  19   AST (SGOT) U/L  --   --   --   --   --  27   TROPONIN T ng/mL  --   --   --   --  0.013 0.021   PROBNP pg/mL  --   --   --   --   --  2,198.0*    < > = values in this interval not displayed.     Estimated Creatinine Clearance: 29 mL/min (A) (by C-G formula based on SCr of 1.4 mg/dL (H)).    Microbiology Results Abnormal     Procedure Component Value - Date/Time    Blood Culture - Blood, Arm, Left [[id number]]  (Normal) Collected: 05/05/22 1416    Lab Status: Preliminary result Specimen: Blood from Arm, Left Updated: 05/09/22 1433     Blood Culture No growth at 4 days    Blood Culture - Blood, Arm, Left [[id number]]  (Normal) Collected: 05/05/22 1416    Lab Status: Preliminary result Specimen: Blood from Arm, Left Updated: 05/09/22 1433     Blood Culture No growth at 4 days    Respiratory Panel PCR w/COVID-19(SARS-CoV-2) VIRGIL/DANIEL/DEJON/PAD/COR/MAD/ANGEL LUIS In-House, NP Swab in UTM/[facility], 3-4 HR TAT - Swab, Nasopharynx [[id number]]  (Normal) Collected: 05/07/22 1855    Lab Status: Final result Specimen: Swab from Nasopharynx Updated: 05/07/22 1951     ADENOVIRUS, PCR Not Detected     Coronavirus 229E Not Detected     Coronavirus HKU1 Not Detected     Coronavirus NL63 Not Detected     Coronavirus OC43 Not Detected     COVID19 Not Detected     Human Metapneumovirus Not Detected     Human Rhinovirus/Enterovirus Not Detected     Influenza A PCR Not Detected     Influenza B PCR Not Detected     Parainfluenza Virus 1 Not Detected     Parainfluenza Virus 2 Not Detected     Parainfluenza Virus 3 Not Detected     Parainfluenza Virus 4 Not Detected     RSV, PCR Not Detected      Bordetella pertussis pcr Not Detected     Bordetella parapertussis PCR Not Detected     Chlamydophila pneumoniae PCR Not Detected     Mycoplasma pneumo by PCR Not Detected    Narrative:      In the setting of a positive respiratory panel with a viral infection PLUS a negative procalcitonin without other underlying concern for bacterial infection, consider observing off antibiotics or discontinuation of antibiotics and continue supportive care. If the respiratory panel is positive for atypical bacterial infection (Bordetella pertussis, Chlamydophila pneumoniae, or Mycoplasma pneumoniae), consider antibiotic de-escalation to target atypical bacterial infection.    COVID PRE-OP / PRE-PROCEDURE SCREENING ORDER (NO ISOLATION) - Swab, Nasopharynx [[id number]]  (Normal) Collected: 05/05/22 1628    Lab Status: Final result Specimen: Swab from Nasopharynx Updated: 05/05/22 2204    Narrative:      The following orders were created for panel order COVID PRE-OP / PRE-PROCEDURE SCREENING ORDER (NO ISOLATION) - Swab, Nasopharynx.  Procedure                               Abnormality         Status                     ---------                               -----------         ------                     COVID-19,APTIMA PANTHER(...[[id number]]  Normal              Final result                 Please view results for these tests on the individual orders.    COVID-19,APTIMA PANTHER(JHON),[facility] VIRGIL/[facility]QUAN, NP/OP SWAB IN UTM/VTM/[facility],24 HR TAT - Swab, Nasopharynx [[id number]]  (Normal) Collected: 05/05/22 1628    Lab Status: Final result Specimen: Swab from Nasopharynx Updated: 05/05/22 2204     COVID19 Not Detected    Narrative:      Fact sheet for providers: https://www.fda.gov/media/688323/download     Fact sheet for patients: https://www.fda.gov/media/702444/download    Test performed by RT PCR.          Imaging Results (Last 24 Hours)     Procedure Component Value Units Date/Time    CT Chest Without Contrast  Diagnostic [[id number]] Collected: 05/09/22 1642     Updated: 05/09/22 1653    Narrative:      CT CHEST WITHOUT CONTRAST     HISTORY: Persistent wheezing, congestive heart failure     TECHNIQUE: Radiation dose reduction techniques were utilized, including  automated exposure control and exposure modulation based on body size.  Axial images were obtained through the chest without the administration  of IV contrast. Coronal and sagittal reformatted images obtained.     COMPARISON: 04/03/2022     FINDINGS: The previously noted bilateral pleural effusions have  resolved. Mediastinal lymphadenopathy has decreased. Mildly enlarged  main pulmonary artery again noted is nonspecific but can be seen with  pulmonary arterial hypertension. There is a small hiatal hernia.  Coronary artery calcifications are present. The bilateral infiltrates  seen on the prior study have decreased but have not fully resolved.  Persistent interstitial thickening and groundglass opacity remains. A  mild mosaic attenuation pattern of the lungs also remains present which  may reflect areas of air trapping. Limited imaging of the upper abdomen  demonstrates cholelithiasis. There is no acute bony abnormality.       Impression:      Overall improved appearance of the chest. The bilateral pleural  effusions have resolved. The mediastinal lymphadenopathy has decreased.  The bilateral infiltrates have decreased but not fully resolved, which  may reflect areas of persistent pneumonia or edema. Continued follow-up  to clearing is recommended.           Radiation dose reduction techniques were utilized, including automated  exposure control and exposure modulation based on body size.     This report was finalized on 5/9/2022 4:49 PM by Dr. Addison Patel M.D.             Results for orders placed during the hospital encounter of 03/31/22    Adult Transthoracic Echo Complete W/ Cont if Necessary Per Protocol    Interpretation Summary  · Calculated left  ventricular EF = 60.1% Estimated left ventricular EF = 60% Estimated left ventricular EF was in agreement with the calculated left ventricular EF. Left ventricular systolic function is normal. The left ventricular cavity is small in size. Left ventricular wall thickness is consistent with moderate concentric hypertrophy. All left ventricular wall segments contract normally. Left ventricular diastolic function was indeterminate.  · The right ventricular cavity is moderately dilated. Normal right ventricular systolic function noted.  · The left atrial cavity is moderately dilated  · The right atrial cavity is moderately dilated.  · No aortic valve regurgitation or stenosis is present. The aortic valve is abnormal in structure. There is calcification of the aortic valve.  · Severe mitral annular calcification is present. Mild mitral valve regurgitation is present. Mean mitral valve gradient is elevated at 6 mmHg at a heart rate of 117 bpm. This is likely due to tachycardia though cannot rule out early mitral valve stenosis  · Mild tricuspid valve regurgitation is present. Estimated right ventricular systolic pressure from tricuspid regurgitation is markedly elevated (>55 mmHg). Calculated right ventricular systolic pressure from tricuspid regurgitation is 56 mmHg.      I have reviewed the medications:  Scheduled Meds:allopurinol, 300 mg, Oral, Daily  apixaban, 2.5 mg, Oral, BID  atorvastatin, 80 mg, Oral, Nightly  dilTIAZem, 15 mg, Oral, TID With Meals  folic acid, 1 mg, Oral, Daily  furosemide, 40 mg, Oral, BID  ipratropium-albuterol, 3 mL, Nebulization, 4x Daily - RT  levothyroxine, 50 mcg, Oral, Q AM  melatonin, 3 mg, Oral, Nightly  PARoxetine, 10 mg, Oral, Daily  potassium chloride, 20 mEq, Oral, BID With Meals  predniSONE, 40 mg, Oral, Daily With Breakfast  vitamin B-12, 1,000 mcg, Oral, Daily      Continuous Infusions:   PRN Meds:.•  acetaminophen  •  ipratropium-albuterol  •  magnesium sulfate **OR** magnesium  sulfate **OR** magnesium sulfate  •  nitroglycerin  •  ondansetron **OR** ondansetron  •  [COMPLETED] Insert peripheral IV **AND** sodium chloride    [unfilled]  Assessment & Plan     Active Hospital Problems    Diagnosis  POA   • **Postinfectious reactive airway disease with acute exacerbation [J45.901]  Yes   • Prediabetes [R73.03]  Yes   • Acute on chronic combined systolic and diastolic CHF (congestive heart failure) ([facility]) [I50.43]  Yes   • Essential hypertension [I10]  Yes   • Chronic hypoxemic respiratory failure ([facility]) [J96.11]  Yes   • Hypothyroidism (acquired) [E03.9]  Yes   • Chronic kidney failure, stage 3 (moderate) ([facility]) [N18.30]  Yes   • Hypokalemia [E87.6]  Yes   • Hypomagnesemia [E83.42]  Yes   • Acute urinary retention [R33.8]  Yes   • DNR (do not resuscitate) [Z66]  Yes   • Atrial fibrillation with RVR ([facility]) [I48.91]  Yes      Resolved Hospital Problems   No resolved problems to display.        Brief Hospital Course to date:  Yazmin Javier is a 86 y.o. female presents the hospital with postinfectious reactive airway disease and heart failure exacerbation.    Plan:    Reactive airway disease:  Patient reports being on chronic oxygen.  Oxygen needs are relatively at baseline.  She does have noted wheezing on examination.  Reportedly having improvement on systemic steroids.  Continue steroid therapy.  Nebulizers.    Visual hallucinations: Resolved.  Reportedly having some intermittent encephalopathy.  Currently calm on my examination.    CHF:  Furosemide diuresis.  Monitoring renal function.      Atrial fibrillation:  Echocardiogram normal ejection fraction.  Diltiazem rate controlled.  Anticoagulation on Eliquis.  Seen by cardiology.  Recommendations appreciated.  Will need Holter monitor study, possibly at discharge.    CKD:  Previous records reviewed and baseline renal function variable but seems to be mostly 1.3-1.5 range but at times as high as 1.8.    Hypothyroid: Clinically  euthyroid.  Continue replacement.  Normal TSH.    Chronic anemia: Anemia of chronic disease.  Baseline creatinine usually around the range of 9.    Urinary retention: Status post urology.  Padilla catheter in place.  Voiding trial likely prior to discharge.    DVT Prophylaxis: Anticoagulation    Disposition: Skilled facility in 1 to 2 days      CODE STATUS:   Code Status and Medical Interventions:   Ordered at: 05/06/22 1134     Medical Intervention Limits:    NO intubation (DNI)     Level Of Support Discussed With:    Next of Kin (If No Surrogate)     Code Status (Patient has no pulse and is not breathing):    No CPR (Do Not Attempt to Resuscitate)     Medical Interventions (Patient has pulse or is breathing):    Limited Support       Garry Howe MD  05/10/22

## 2022-05-10 NOTE — PROGRESS NOTES
Yazmin Javier   86 y.o.  female    LOS: 4 days   Patient Care Team:  Morenita Silverio MD as PCP - General (Internal Medicine)      Subjective     Interval History:     Patient Complaints:  No complaints    Review of Systems:       Medication Review:   Current Facility-Administered Medications:   •  acetaminophen (TYLENOL) tablet 650 mg, 650 mg, Oral, Q4H PRN, Emelyn Dhillon MD, 650 mg at 05/08/22 0202  •  allopurinol (ZYLOPRIM) tablet 300 mg, 300 mg, Oral, Daily, Emelyn Dhillon MD, 300 mg at 05/10/22 0837  •  apixaban (ELIQUIS) tablet 2.5 mg, 2.5 mg, Oral, BID, Emelyn Dhillon MD, 2.5 mg at 05/10/22 0837  •  atorvastatin (LIPITOR) tablet 80 mg, 80 mg, Oral, Nightly, Emelyn Dhillon MD, 80 mg at 05/09/22 2046  •  dilTIAZem (CARDIZEM) tablet 15 mg, 15 mg, Oral, TID With Meals, Munir Sparrow MD, 15 mg at 05/10/22 1200  •  folic acid (FOLVITE) tablet 1 mg, 1 mg, Oral, Daily, Emelyn Dhillon MD, 1 mg at 05/10/22 0838  •  furosemide (LASIX) tablet 40 mg, 40 mg, Oral, BID, Emelyn Dhillon MD, 40 mg at 05/10/22 0837  •  ipratropium-albuterol (DUO-NEB) nebulizer solution 3 mL, 3 mL, Nebulization, 4x Daily - RT, Isauro Ramirez MD, 3 mL at 05/10/22 1226  •  ipratropium-albuterol (DUO-NEB) nebulizer solution 3 mL, 3 mL, Nebulization, Q4H PRN, Isauro Ramirez MD  •  levothyroxine (SYNTHROID, LEVOTHROID) tablet 50 mcg, 50 mcg, Oral, Q AM, Emelyn Dhillon MD, 50 mcg at 05/10/22 0556  •  Magnesium Sulfate 2 gram Bolus, followed by 8 gram infusion (total Mg dose 10 grams)- Mg less than or equal to 1mg/dL, 2 g, Intravenous, PRN **OR** Magnesium Sulfate 2 gram / 50mL Infusion (GIVE X 3 BAGS TO EQUAL 6GM TOTAL DOSE) - Mg 1.1 - 1.5 mg/dl, 2 g, Intravenous, PRN, Last Rate: 25 mL/hr at 05/05/22 2207, 2 g at 05/05/22 2207 **OR** Magnesium Sulfate 4 gram infusion- Mg 1.6-1.9 mg/dL, 4 g, Intravenous, PRN, Jewel Guadalupe MD  •  melatonin tablet 3 mg, 3 mg, Oral, Nightly,  Jai Neal MD, 3 mg at 05/09/22 2046  •  nitroglycerin (NITROSTAT) SL tablet 0.4 mg, 0.4 mg, Sublingual, Q5 Min PRN, Emelyn Dhillon MD  •  ondansetron (ZOFRAN) tablet 4 mg, 4 mg, Oral, Q6H PRN **OR** ondansetron (ZOFRAN) injection 4 mg, 4 mg, Intravenous, Q6H PRN, Emelyn Dhillon MD  •  PARoxetine (PAXIL) tablet 10 mg, 10 mg, Oral, Daily, Emelyn Dhillon MD, 10 mg at 05/10/22 0837  •  potassium chloride (K-DUR,KLOR-CON) ER tablet 20 mEq, 20 mEq, Oral, BID With Meals, Rock Merrill MD, 20 mEq at 05/10/22 0837  •  predniSONE (DELTASONE) tablet 40 mg, 40 mg, Oral, Daily With Breakfast, Rock Merrill MD, 40 mg at 05/10/22 0836  •  [COMPLETED] Insert peripheral IV, , , Once **AND** sodium chloride 0.9 % flush 10 mL, 10 mL, Intravenous, PRN, Jewel Guadalupe MD  •  vitamin B-12 (CYANOCOBALAMIN) tablet 1,000 mcg, 1,000 mcg, Oral, Daily, Emelyn Dhillon MD, 1,000 mcg at 05/10/22 0837      Objective   Vital Sign Min/Max for last 24 hours  Temp  Min: 97.5 °F (36.4 °C)  Max: 97.8 °F (36.6 °C)   BP  Min: 122/96  Max: 135/71    Pulse  Min: 68  Max: 93     Wt Readings from Last 3 Encounters:   05/10/22 78.7 kg (173 lb 9.6 oz)   04/09/22 88 kg (194 lb 1.6 oz)   02/18/22 85.2 kg (187 lb 12.8 oz)        Intake/Output Summary (Last 24 hours) at 5/10/2022 1333  Last data filed at 5/10/2022 1140  Gross per 24 hour   Intake 210 ml   Output 2900 ml   Net -2690 ml     Physical Exam:      General Appearance:    Well developed and well nourished elderly wf in no acute distress   Head:    Normocephalic, atraumatic   Eyes:            Conjunctivae normal, anicteric, no xanthelasma   Neck:   supple, trachea midline, no thyromegaly, no carotid bruit, no JVD, no elevated CVP   Lungs:     Wheezing martinez,respirations regular, even and                  unlabored    Heart:    Regular rhythm and normal rate, normal S1 and S2,            No murmur, no gallop, no rub, no click   Chest Wall:    No  abnormalities observed   Abdomen:     Normal bowel sounds, no masses, no organomegaly, soft        nontender, nondistended, no guarding, no rebound                tenderness   Rectal:     Deferred   Extremities:   No edema. Moves all extremities well, no cyanosis, no erythema   Pulses:   Pulses palpable and equal bilaterally   Skin:   No bleeding, bruising or rash   Neurologic:   awake alert and oriented x3, speech clear and approp, no facial drooping     : voids   Monitor:   At fib rvr 118-146    Results Review:         Sodium Sodium   Date Value Ref Range Status   05/10/2022 138 136 - 145 mmol/L Final   05/09/2022 136 136 - 145 mmol/L Final   05/08/2022 137 136 - 145 mmol/L Final      Potassium Potassium   Date Value Ref Range Status   05/10/2022 4.2 3.5 - 5.2 mmol/L Final   05/09/2022 4.5 3.5 - 5.2 mmol/L Final   05/08/2022 4.5 3.5 - 5.2 mmol/L Final      Chloride Chloride   Date Value Ref Range Status   05/10/2022 101 98 - 107 mmol/L Final   05/09/2022 101 98 - 107 mmol/L Final   05/08/2022 101 98 - 107 mmol/L Final      Bicarbonate No results found for: PLASMABICARB   BUN BUN   Date Value Ref Range Status   05/10/2022 38 (H) 8 - 23 mg/dL Final   05/09/2022 38 (H) 8 - 23 mg/dL Final   05/08/2022 31 (H) 8 - 23 mg/dL Final      Creatinine Creatinine   Date Value Ref Range Status   05/10/2022 1.40 (H) 0.57 - 1.00 mg/dL Final   05/09/2022 1.78 (H) 0.57 - 1.00 mg/dL Final   05/08/2022 1.80 (H) 0.57 - 1.00 mg/dL Final      Calcium Calcium   Date Value Ref Range Status   05/10/2022 8.8 8.6 - 10.5 mg/dL Final   05/09/2022 8.9 8.6 - 10.5 mg/dL Final   05/08/2022 8.7 8.6 - 10.5 mg/dL Final      Magnesium No results found for: MG     Results from last 7 days   Lab Units 05/10/22  0345   WBC 10*3/mm3 7.83   HEMOGLOBIN g/dL 8.7*   HEMATOCRIT % 26.4*   PLATELETS 10*3/mm3 205     Lab Results   Lab Value Date/Time    TROPONINT 0.013 05/05/2022 1626    TROPONINT 0.021 05/05/2022 1416    TROPONINT <0.010 03/31/2022 2054     TROPONINT <0.010 02/15/2022 0525    TROPONINT <0.010 02/14/2022 0458    TROPONINT <0.010 10/31/2021 2334    TROPONINT <0.010 10/31/2021 1907    TROPONINT <0.010 04/21/2021 2331    TROPONINT <0.010 04/04/2021 0910            Echo EF Estimated  Lab Results   Component Value Date    ECHOEFEST 60 04/03/2022         Assessment/ Plan  [unfilled]  [facility] Hospital Problems    Diagnosis  POA   • **Postinfectious reactive airway disease with acute exacerbation [J45.901]  Yes     Priority: Low   • Prediabetes [R73.03]  Yes     Priority: Low   • Acute on chronic combined systolic and diastolic CHF (congestive heart failure) ([facility]) [I50.43]  Yes     Priority: Low   • Essential hypertension [I10]  Yes     Priority: Low   • Chronic hypoxemic respiratory failure (HCC) [J96.11]  Yes     Priority: Low   • Hypothyroidism (acquired) [E03.9]  Yes     Priority: Low   • Chronic kidney failure, stage 3 (moderate) ([facility]) [N18.30]  Yes     Priority: Low   • Hypokalemia [E87.6]  Yes     Priority: Low   • Hypomagnesemia [E83.42]  Yes     Priority: Low   • Acute urinary retention [R33.8]  Yes     Priority: Low   • DNR (do not resuscitate) [Z66]  Yes     Priority: Low   • Atrial fibrillation with RVR ([facility]) [I48.91]  Yes     Priority: Low       1. Atrial fibrillation with RVR   - back in afib with rvr     2.  Acute on chronic combined systolic and diastolic CHF EF = 60.1%      3. Essential hypertension    4.  Chronic hypoxemic respiratory failure     COPD with acute exacerbation   LILIANA     5.  Hypothyroidism (acquired)     6. Chronic kidney failure, stage 3 (moderate)     7. Acute urinary retention    8.  DNR (do not resuscitate)      plan   Increase ccb, 1 dose iv dig today  Continues to have exp wheezing martinez        Corinna Mcnamara, APRN  05/10/22  13:33 EDT    Patient seen and examined  Heart S1-S2 normal  Wheezing and rales present  Agree with the Corinna Mcnamara's note

## 2022-05-10 NOTE — PROGRESS NOTES
Trev Solitario MD                          [phone]      Patient ID:    Name:  Yazmin Javier    MRN:  [id number]    1935   86 y.o.  female            Patient Care Team:  Morenita Silverio MD as PCP - General (Internal Medicine)    CC/ Reason for visit: Persistent wheezing, congestive heart failure, A. fib RVR, hypertensive urgency, chronic respiratory failure    Subjective: Pt seen and examined this AM. No acute overnight events noted. Doing same.  Still having wheezing.  Does seem to have an upper airway component today.  CT of the chest reviewed and does not show any significant concerns.  Having improvement in diuresis with Lasix.    ROS:  unable to obtain more than about due to dementia and confusion    Objective     Vital Signs past 24hrs    BP range: BP: (111-135)/(70-96) 126/86  Pulse range: Heart Rate:  [] 115  Resp rate range: Resp:  [18-24] 22  Temp range: Temp (24hrs), Av.7 °F (36.5 °C), Min:97.5 °F (36.4 °C), Max:97.8 °F (36.6 °C)      Ventilator/Non-Invasive Ventilation Settings (From admission, onward)            None          Device (Oxygen Therapy): nasal cannula       78.7 kg (173 lb 9.6 oz); Body mass index is 31.75 kg/m².      Intake/Output Summary (Last 24 hours) at 5/10/2022 1733  Last data filed at 5/10/2022 1557  Gross per 24 hour   Intake 420 ml   Output 1700 ml   Net -1280 ml       PHYSICAL EXAM   Constitutional: Middle-aged pt in bed, no acute respiratory distress, No accessory muscle use  Head: - NCAT  Eyes: No pallor.  Anicteric sclerae, EOMI.  ENMT:  Mallampati 4, no oral thrush. Moist MM.   NECK: Trachea midline, No thyromegaly, no palpable cervical lymphadenopathy  Heart: RRR, no murmur. Trace pedal edema   Lungs: DAVID +, + wheezes. No crackles heard    Abdomen: Soft. No tenderness, guarding or rigidity. No palpable masses  Extremities: Extremities warm and well perfused. No cyanosis/ clubbing  Neuro: Conscious, answers  appropriately, no gross focal neuro deficits  Psych: Mood and affect appropriate    PPE recommended per [facility] infectious disease Isolation protocol for the current clinical scenario (as mentioned below) was followed.     Scheduled meds:  allopurinol, 300 mg, Oral, Daily  apixaban, 5 mg, Oral, BID  atorvastatin, 80 mg, Oral, Nightly  dilTIAZem, 30 mg, Oral, TID With Meals  folic acid, 1 mg, Oral, Daily  furosemide, 40 mg, Oral, BID  ipratropium-albuterol, 3 mL, Nebulization, 4x Daily - RT  levothyroxine, 50 mcg, Oral, Q AM  melatonin, 3 mg, Oral, Nightly  PARoxetine, 10 mg, Oral, Daily  potassium chloride, 20 mEq, Oral, BID With Meals  predniSONE, 40 mg, Oral, Daily With Breakfast  vitamin B-12, 1,000 mcg, Oral, Daily        IV meds:                           Data Review:      Results from last 7 days   Lab Units 05/10/22  0345 05/09/22  0432 05/09/22  0417 05/08/22  0344 05/06/22  0354 05/05/22  1416   SODIUM mmol/L 138  --  136 137   < > 142   POTASSIUM mmol/L 4.2  --  4.5 4.5   < > 3.7   CHLORIDE mmol/L 101  --  101 101   < > 101   CO2 mmol/L 25.0  --  22.8 22.0   < > 27.0   BUN mg/dL 38*  --  38* 31*   < > 15   CREATININE mg/dL 1.40*  --  1.78* 1.80*   < > 1.47*   CALCIUM mg/dL 8.8  --  8.9 8.7   < > 9.0   BILIRUBIN mg/dL  --   --   --   --   --  1.2   ALK PHOS U/L  --   --   --   --   --  126*   ALT (SGPT) U/L  --   --   --   --   --  19   AST (SGOT) U/L  --   --   --   --   --  27   GLUCOSE mg/dL 150*  --  127* 131*   < > 127*   WBC 10*3/mm3 7.83 9.92  --  6.75   < > 6.54   HEMOGLOBIN g/dL 8.7* 9.4*  --  9.6*   < > 12.0   PLATELETS 10*3/mm3 205 216  --  222   < > 252   PROBNP pg/mL  --   --   --   --   --  2,198.0*   PROCALCITONIN ng/mL  --   --   --   --   --  0.16    < > = values in this interval not displayed.       Lab Results   Component Value Date    CALCIUM 8.8 05/10/2022    PHOS 3.7 04/08/2022       Results from last 7 days   Lab Units 05/05/22  1416   BLOODCX  No growth at 5 days  No growth  at 5 days              Results Review:    I have reviewed the relevant laboratory results and independently reviewed the chest imaging from this hospitalization including the available echocardiogram reports personally and summarized them if/ when appropriate below    Assessment    Post infectious RADS  Acute on chronic congestive heart failure; Diastolic  A. fib with RVR  Ac Hypertensive urgency  Chronic hypoxic respiratory failure ?  Recent pna 4/22  Pulmonary hypertension  CKD 3  Hypothyroidism  Suspect undiagnosed LILIANA  Advanced age  DNR/DNI    PLAN:  Patient remains on supplemental oxygen at 2 L.  Still having ongoing wheezing in spite of being on steroids.  CT of chest findings show good improvement with no new concerns.  Does have evidence of mosaic attenuation still.  No tracheal concerns noted.  Given persistent upper airway wheezing concerns - we will get a CT of the neck with contrast.  We will also increase steroids understanding some encephalopathy issues as she seems to have persistent symptoms  Continue bronchodilators  Diuresing some with Lasix.  Renal function stable  Respiratory viral PCR is negative    Out of bed and physical therapy    Trev Solitario MD  5/10/2022

## 2022-05-10 NOTE — NURSING NOTE
Spoke w/ Corinna DIOP, ordered to give 1x dose of 15mg diltazem to control pt HR until first dose of 30 mg is administered.

## 2022-05-10 NOTE — PLAN OF CARE
Goal Outcome Evaluation:  Plan of Care Reviewed With: patient           Outcome Evaluation: Wheezing resolved at start of shift. Was noted in the afternoon when pt went into afib RVR. She denied chest pain and trouble breathing, did not show any signs of distress. 1x dose of digoxin IV and a 1x dose of 15 mg diltazem PO ordered and administered. Scheduled diltazem was increased from 15mg to 30mg TID. PO prednisone switched to IV methylprednisone. CT soft tissue neck w/ contrast ordered. [facility] alert triggered, Dr. rebolledo notified, no new orders.

## 2022-05-10 NOTE — PROGRESS NOTES
Continued Stay Note  [facility]     Patient Name: Yazmin Javier  MRN: [id number]  Today's Date: 5/10/2022    Admit Date: 5/5/2022     Discharge Plan     Row Name 05/10/22 1137       Plan    Plan [facility]    Plan Comments [facility], [facility], [facility], [facility] and [facility] SNFs have accepted Epic referrals. Follow up with pt's niece/HCS, Anant, she states  family is working with [facility] for short-term rehab followed by possible LTC needs. [facility] is first choice, Benjie will f/u with CCP once bed is confirmed (working on details for possible LTC placement). CCP will follow up and assist with dc transportation.    Row Name 05/10/22 1005       Plan    Plan --    Plan Comments --               Discharge Codes    No documentation.               Expected Discharge Date and Time     Expected Discharge Date Expected Discharge Time    May 10, 2022             Chapis Johnson RN

## 2022-05-11 NOTE — PLAN OF CARE
Goal Outcome Evaluation:               VSS. Pt alert and oriented. Up to chair this afternoon. Padilla removed; DTV by 2030.

## 2022-05-11 NOTE — PROGRESS NOTES
Yazmin Javier   86 y.o.  female    LOS: 5 days   Patient Care Team:  Morenita Silverio MD as PCP - General (Internal Medicine)      Subjective   No dyspnea today  Interval History:     Patient Complaints:     Review of Systems:       Medication Review:   Current Facility-Administered Medications:   •  acetaminophen (TYLENOL) tablet 650 mg, 650 mg, Oral, Q4H PRN, Emelyn Dhillon MD, 650 mg at 05/08/22 0202  •  allopurinol (ZYLOPRIM) tablet 300 mg, 300 mg, Oral, Daily, Emelyn Dhillon MD, 300 mg at 05/11/22 0914  •  apixaban (ELIQUIS) tablet 5 mg, 5 mg, Oral, BID, Emelyn Dhillon MD, 5 mg at 05/11/22 0915  •  atorvastatin (LIPITOR) tablet 80 mg, 80 mg, Oral, Nightly, Emelyn Dhillon MD, 80 mg at 05/10/22 2059  •  dilTIAZem (CARDIZEM) tablet 30 mg, 30 mg, Oral, TID With Meals, Corinna Mcnamara, APRN, 30 mg at 05/11/22 0915  •  folic acid (FOLVITE) tablet 1 mg, 1 mg, Oral, Daily, Emelyn Dhillon MD, 1 mg at 05/11/22 0914  •  furosemide (LASIX) tablet 40 mg, 40 mg, Oral, BID, Emelyn Dhillon MD, 40 mg at 05/11/22 0915  •  ipratropium-albuterol (DUO-NEB) nebulizer solution 3 mL, 3 mL, Nebulization, 4x Daily - RT, Isauro Ramirez MD, 3 mL at 05/11/22 0747  •  ipratropium-albuterol (DUO-NEB) nebulizer solution 3 mL, 3 mL, Nebulization, Q4H PRN, Isauro Ramirez MD  •  levothyroxine (SYNTHROID, LEVOTHROID) tablet 50 mcg, 50 mcg, Oral, Q AM, Emelyn Dhillon MD, 50 mcg at 05/11/22 0616  •  Magnesium Sulfate 2 gram Bolus, followed by 8 gram infusion (total Mg dose 10 grams)- Mg less than or equal to 1mg/dL, 2 g, Intravenous, PRN **OR** Magnesium Sulfate 2 gram / 50mL Infusion (GIVE X 3 BAGS TO EQUAL 6GM TOTAL DOSE) - Mg 1.1 - 1.5 mg/dl, 2 g, Intravenous, PRN, Last Rate: 25 mL/hr at 05/05/22 2207, 2 g at 05/05/22 2207 **OR** Magnesium Sulfate 4 gram infusion- Mg 1.6-1.9 mg/dL, 4 g, Intravenous, PRN, Jewel Guadalupe MD  •  melatonin tablet 3 mg, 3 mg, Oral, Nightly,  Jai Neal MD, 3 mg at 05/10/22 2059  •  methylPREDNISolone sodium succinate (SOLU-Medrol) injection 40 mg, 40 mg, Intravenous, Q8H, Trev Solitario MD, 40 mg at 05/11/22 0153  •  nitroglycerin (NITROSTAT) SL tablet 0.4 mg, 0.4 mg, Sublingual, Q5 Min PRN, Emelyn Dhillon MD  •  ondansetron (ZOFRAN) tablet 4 mg, 4 mg, Oral, Q6H PRN **OR** ondansetron (ZOFRAN) injection 4 mg, 4 mg, Intravenous, Q6H PRN, Emelyn Dhillon MD  •  PARoxetine (PAXIL) tablet 10 mg, 10 mg, Oral, Daily, Emelyn Dhillon MD, 10 mg at 05/11/22 0915  •  potassium chloride (K-DUR,KLOR-CON) ER tablet 20 mEq, 20 mEq, Oral, BID With Meals, Rock Merrill MD, 20 mEq at 05/11/22 0919  •  [COMPLETED] Insert peripheral IV, , , Once **AND** sodium chloride 0.9 % flush 10 mL, 10 mL, Intravenous, PRN, Jewel Guadalupe MD  •  vitamin B-12 (CYANOCOBALAMIN) tablet 1,000 mcg, 1,000 mcg, Oral, Daily, Emelyn Dhillon MD, 1,000 mcg at 05/11/22 0915      Objective   Vital Sign Min/Max for last 24 hours  Temp  Min: 97.2 °F (36.2 °C)  Max: 97.8 °F (36.6 °C)   BP  Min: 111/74  Max: 153/69    Pulse  Min: 82  Max: 116     Wt Readings from Last 3 Encounters:   05/11/22 80.2 kg (176 lb 14.4 oz)   04/09/22 88 kg (194 lb 1.6 oz)   02/18/22 85.2 kg (187 lb 12.8 oz)        Intake/Output Summary (Last 24 hours) at 5/11/2022 1050  Last data filed at 5/11/2022 0532  Gross per 24 hour   Intake 420 ml   Output 1100 ml   Net -680 ml     Physical Exam:      General Appearance:    Well developed and well nourished in no acute distress   Head:    Normocephalic, atraumatic   Eyes:            Conjunctivae normal, anicteric, no xanthelasma   Neck:   supple, trachea midline, no thyromegaly, no carotid bruit, no JVD, no elevated CVP   Lungs:     Clear to auscultation,respirations regular, even and                  unlabored    Heart:    Regular rhythm and normal rate, normal S1 and S2,            No murmur, no gallop, no rub, no click    Chest Wall:    No abnormalities observed   Abdomen:     Normal bowel sounds, no masses, no organomegaly, soft        nontender, nondistended, no guarding, no rebound                tenderness   Rectal:     Deferred   Extremities:   No edema. Moves all extremities well, no cyanosis, no erythema   Pulses:   Pulses palpable and equal bilaterally   Skin:   No bleeding, bruising or rash   Neurologic:   awake alert and oriented x3, speech clear and approp, no facial drooping     :    Monitor:      Results Review:         Sodium Sodium   Date Value Ref Range Status   05/11/2022 135 (L) 136 - 145 mmol/L Final   05/10/2022 138 136 - 145 mmol/L Final   05/09/2022 136 136 - 145 mmol/L Final      Potassium Potassium   Date Value Ref Range Status   05/11/2022 4.4 3.5 - 5.2 mmol/L Final   05/10/2022 4.2 3.5 - 5.2 mmol/L Final   05/09/2022 4.5 3.5 - 5.2 mmol/L Final      Chloride Chloride   Date Value Ref Range Status   05/11/2022 98 98 - 107 mmol/L Final   05/10/2022 101 98 - 107 mmol/L Final   05/09/2022 101 98 - 107 mmol/L Final      Bicarbonate No results found for: PLASMABICARB   BUN BUN   Date Value Ref Range Status   05/11/2022 40 (H) 8 - 23 mg/dL Final   05/10/2022 38 (H) 8 - 23 mg/dL Final   05/09/2022 38 (H) 8 - 23 mg/dL Final      Creatinine Creatinine   Date Value Ref Range Status   05/11/2022 1.70 (H) 0.57 - 1.00 mg/dL Final   05/10/2022 1.40 (H) 0.57 - 1.00 mg/dL Final   05/09/2022 1.78 (H) 0.57 - 1.00 mg/dL Final      Calcium Calcium   Date Value Ref Range Status   05/11/2022 9.3 8.6 - 10.5 mg/dL Final   05/10/2022 8.8 8.6 - 10.5 mg/dL Final   05/09/2022 8.9 8.6 - 10.5 mg/dL Final      Magnesium No results found for: MG     Results from last 7 days   Lab Units 05/11/22  0326   WBC 10*3/mm3 7.38   HEMOGLOBIN g/dL 8.8*   HEMATOCRIT % 27.1*   PLATELETS 10*3/mm3 227     Lab Results   Lab Value Date/Time    TROPONINT 0.013 05/05/2022 1626    TROPONINT 0.021 05/05/2022 1416    TROPONINT <0.010 03/31/2022 2054     TROPONINT <0.010 02/15/2022 0525    TROPONINT <0.010 02/14/2022 0458    TROPONINT <0.010 10/31/2021 2334    TROPONINT <0.010 10/31/2021 1907    TROPONINT <0.010 04/21/2021 2331    TROPONINT <0.010 04/04/2021 0910            Echo EF Estimated  Lab Results   Component Value Date    ECHOEFEST 60 04/03/2022         Assessment/ Plan  Assessment & Plan   Active Hospital Problems    Diagnosis  POA   • **Postinfectious reactive airway disease with acute exacerbation [J45.901]  Yes   • Prediabetes [R73.03]  Yes   • Acute on chronic combined systolic and diastolic CHF (congestive heart failure) (HCC) [I50.43]  Yes   • Essential hypertension [I10]  Yes   • Chronic hypoxemic respiratory failure (HCC) [J96.11]  Yes   • Hypothyroidism (acquired) [E03.9]  Yes   • Chronic kidney failure, stage 3 (moderate) (HCC) [N18.30]  Yes   • Hypokalemia [E87.6]  Yes   • Hypomagnesemia [E83.42]  Yes   • Acute urinary retention [R33.8]  Yes   • DNR (do not resuscitate) [Z66]  Yes   • Atrial fibrillation with RVR (HCC) [I48.91]  Yes       1. Atrial fibrillation with RVR   - back in afib with rvr     2.  Acute on chronic combined systolic and diastolic CHF EF = 60.1%      3. Essential hypertension     4.  Chronic hypoxemic respiratory failure     COPD with acute exacerbation   LILIANA     5.  Hypothyroidism (acquired)     6. Chronic kidney failure, stage 3 (moderate)      7. Acute urinary retention     8.  DNR (do not resuscitate)     Continue bronchodilators  Diuresing some with Lasix.  Renal function stable  Respiratory viral PCR is negative        Leslie Lantigua MD  05/11/22  10:50 EDT

## 2022-05-11 NOTE — PROGRESS NOTES
[facility]  [phone]  Dr. Ronny Hwang    Subjective:  LOS: 5    Chief Complaint: CHF    Patient denies any wheezing or cough.  She is never smoker.  He uses oxygen continuously at home at present flows.  She feels close to baseline.    Objective   Vital Signs past 24hrs    Temp range: Temp (24hrs), Av.6 °F (36.4 °C), Min:97.2 °F (36.2 °C), Max:97.8 °F (36.6 °C)    BP range: BP: (111-153)/(69-86) 153/69  Pulse range: Heart Rate:  [] 97  Resp rate range: Resp:  [18-24] 18    Device (Oxygen Therapy): nasal cannulaFlow (L/min):  [2] 2  Oxygen range:SpO2:  [91 %-100 %] 100 %      80.2 kg (176 lb 14.4 oz); Body mass index is 32.36 kg/m².    Intake/Output Summary (Last 24 hours) at 2022 1214  Last data filed at 2022 1144  Gross per 24 hour   Intake 420 ml   Output 1450 ml   Net -1030 ml       Physical Exam  Constitutional:       Appearance: She is obese.   Eyes:      Pupils: Pupils are equal, round, and reactive to light.   Cardiovascular:      Rate and Rhythm: Normal rate. Rhythm irregular.      Heart sounds: No murmur heard.  Pulmonary:      Effort: Pulmonary effort is normal.      Breath sounds: Decreased breath sounds and wheezing (occasional) present.   Abdominal:      General: Bowel sounds are normal.      Palpations: Abdomen is soft. There is no mass.      Tenderness: There is no abdominal tenderness.   Musculoskeletal:         General: No swelling.   Neurological:      Mental Status: She is alert.       Results Review:    I have reviewed the laboratory and imaging data since the last note by [facility] physician.  My annotations are noted in assessment and plan.    Medication Review:  I have reviewed the current MAR.  My annotations are noted in assessment and plan.       Plan   PCC Problems  Wheezing, postinfectious  Recent pneumonia, treated, improved pulmonary infiltrates  Chronic hypoxia on supplemental oxygen 2 L  Acute on chronic diastolic congestive heart failure  A. fib  with RVR  Hypothyroidism  CKD 3      THESE ARE NEW MEDICAL PROBLEMS TO ME.    Plan of Treatment    Patient looks very comfortable to me.  She does have some scattered occasional wheeze on exam.  However she feels subjectively completely fine.  A CT neck is unremarkable.  Her CT chest was carefully reviewed and compared to previous.  She appears to be at her baseline oxygen flows.  We will switch to oral steroids and taper on discharge.  May require nebulizer treatments on discharge.  Needs outpatient PFTs.  She is a never smoker and COPD is unlikely.  This could just be wheezing still from her previous infection.  If it does not completely resolve other work-up may be required.    Recent pneumonia treated with antibiotics and improved on CT chest but not completely resolved.  Requires follow-up CT chest in 6 to 8 weeks and follow-up with pulmonary.    Continue treatment for acute component of diastolic congestive heart failure with diuretics as directed by cardiology.    HILLARY vega and managed by cardiology.  On full anticoagulation.      Ronny Hwang MD  05/11/22  12:14 EDT    While in the room and during my examination of the patient I wore gloves, gown, mask, eye protection and followed enhanced droplet/contact isolation protocol and precautions.  I washed my hands before and after this patient encounter.    Part of this note may be an electronic transcription/translation of spoken language to printed text using the Dragon Dictation System.

## 2022-05-11 NOTE — THERAPY TREATMENT NOTE
Patient Name: Yazmin Javier  : 1935    MRN: [id number]                              Today's Date: 2022       Admit Date: 2022    Visit Dx:     ICD-10-CM ICD-9-CM   1. Atrial fibrillation with RVR (HCC)  I48.91 427.31   2. Pneumonia of both lower lobes due to infectious organism  J18.9 486     Patient Active Problem List   Diagnosis   • Acute on chronic congestive heart failure (HCC)   • Hypoxia   • Paroxysmal atrial fibrillation (HCC)   • Chronic anticoagulation   • Hypothyroidism (acquired)   • Stage 3b chronic kidney disease (HCC)   • Right arm weakness   • Chronic diastolic CHF (congestive heart failure) (HCC)   • Syncope   • Acute respiratory failure with hypoxia and hypercapnia (HCC)   • Acute pulmonary edema (HCC)   • Acute on chronic respiratory failure with hypoxia (HCC)   • Fever in adult   • History of asthma   • Hyperglycemia   • Hypokalemia   • BRENDA (acute kidney injury) (HCC)   • Hyperbilirubinemia   • Transaminitis   • Pneumonia involving right lung   • Pulmonary hypertension (HCC)   • Atrial fibrillation with RVR (HCC)   • Acute on chronic combined systolic and diastolic CHF (congestive heart failure) (HCC)   • Essential hypertension   • Chronic hypoxemic respiratory failure (HCC)   • Hypothyroidism (acquired)   • Chronic kidney failure, stage 3 (moderate) (HCC)   • Hypokalemia   • Hypomagnesemia   • Acute urinary retention   • DNR (do not resuscitate)   • Prediabetes   • Postinfectious reactive airway disease with acute exacerbation     Past Medical History:   Diagnosis Date   • CHF (congestive heart failure) (HCC)    • Sleep apnea    • Stroke (HCC)      No past surgical history on file.   General Information     Row Name 22 1538          Physical Therapy Time and Intention    Document Type therapy note (daily note)  -PH     Mode of Treatment physical therapy  -PH     Row Name 22 1538          General Information    Existing Precautions/Restrictions fall  -PH     Row Name  05/11/22 1538          Safety Issues, Functional Mobility    Impairments Affecting Function (Mobility) endurance/activity tolerance;shortness of breath;strength  -PH     Comment, Safety Issues/Impairments (Mobility) gt belt and non skid socks worn for pt safety  -PH           User Key  (r) = Recorded By, (t) = Taken By, (c) = Cosigned By    Initials Name Provider Type    PH Janet Coronado PTA Physical Therapist Assistant               Mobility     Row Name 05/11/22 1539          Bed Mobility    Supine-Sit [place] (Bed Mobility) not tested  -PH     Sit-Supine [place] (Bed Mobility) not tested  -PH     Comment, (Bed Mobility) UIC  -PH     Row Name 05/11/22 1539          Sit-Stand Transfer    Sit-Stand [place] (Transfers) contact guard;supervision  -PH     Assistive Device (Sit-Stand Transfers) walker, front-wheeled  -PH     Comment, (Sit-Stand Transfer) STS x 2 from chair/from EOB  -PH     Row Name 05/11/22 1539          Gait/Stairs (Locomotion)    [place] Level (Gait) contact guard;verbal cues  -PH     Assistive Device (Gait) walker, front-wheeled  -PH     Distance in Feet (Gait) 12' x 2  -PH     Deviations/Abnormal Patterns (Gait) gait speed decreased;stride length decreased  -PH     Bilateral Gait Deviations forward flexed posture  -PH     [place] Level (Stairs) unable to assess  -PH     Comment, (Gait/Stairs) cues for upright posture; pt noted w/ SOA after 1st trial and took seated rest of approx 3 min at EOB; O2 sats 91%; vc to breath through NC. Pt returned to chair were she took approx 2 min rest w/ SOA again noted. Pt limited by SOA and activity intolerance.  -PH           User Key  (r) = Recorded By, (t) = Taken By, (c) = Cosigned By    Initials Name Provider Type     Janet Coronado PTA Physical Therapist Assistant               Obj/Interventions     Row Name 05/11/22 1544          Motor Skills    Therapeutic Exercise other (see comments)  BAP, JORGE LUIS, seated march; x  10 reps each  -PH     Row Name 05/11/22 1544          Balance    Balance Assessment sitting static balance;standing static balance  -PH     Static Sitting Balance modified independence  -PH     Static Standing Balance supervision  -PH     Position/Device Used, Standing Balance walker, front-wheeled  -PH     Comment, Balance fairly steady both seated and standing for limited distances  -PH           User Key  (r) = Recorded By, (t) = Taken By, (c) = Cosigned By    Initials Name Provider Type     Janet Coronado PTA Physical Therapist Assistant               Goals/Plan    No documentation.                Clinical Impression     Row Name 05/11/22 1544          Pain    Pretreatment Pain Rating 0/10 - no pain  -PH     Posttreatment Pain Rating 0/10 - no pain  -PH     Row Name 05/11/22 1544          Plan of Care Review    Plan of Care Reviewed With patient  -PH     Progress improving  -PH     Outcome Evaluation Pt UIC at beg of PT session. Pt stood req SV and use of fww. Pt amb 12' x 2 req CGA and use of fww as well as 2 seated rests of 2-3 min after each trial. Pt noted w/ SOA during amb and limited by SOA and activity intolerance. Pt's O2 sats after each trial were 91%  -PH     Row Name 05/11/22 1544          Vital Signs    O2 Delivery Pre Treatment supplemental O2  -PH     Intra SpO2 (%) 91  -PH     O2 Delivery Intra Treatment supplemental O2  -PH     Post SpO2 (%) 91  -PH     O2 Delivery Post Treatment supplemental O2  -PH     Row Name 05/11/22 1544          Positioning and Restraints    Pre-Treatment Position sitting in chair/recliner  -PH     Post Treatment Position chair  -PH     In Chair reclined;call light within reach;encouraged to call for assist;exit alarm on  -PH           User Key  (r) = Recorded By, (t) = Taken By, (c) = Cosigned By    Initials Name Provider Type     Janet Coronado PTA Physical Therapist Assistant               Outcome Measures     Row Name 05/11/22 1546          How  much help from another person do you currently need...    Turning from your back to your side while in flat bed without using bedrails? 3  -PH     Moving from lying on back to sitting on the side of a flat bed without bedrails? 3  -PH     Moving to and from a bed to a chair (including a wheelchair)? 3  -PH     Standing up from a chair using your arms (e.g., wheelchair, bedside chair)? 3  -PH     Climbing 3-5 steps with a railing? 2  -PH     To walk in hospital room? 3  -PH     AM-PAC 6 Clicks Score (PT) 17  -PH     Highest level of mobility 5 --> Static standing  -PH     Row Name 05/11/22 1546          Functional Assessment    Outcome Measure Options AM-PAC 6 Clicks Basic Mobility (PT)  -PH           User Key  (r) = Recorded By, (t) = Taken By, (c) = Cosigned By    Initials Name Provider Type    Janet Bojorquez PTA Physical Therapist Assistant                             Physical Therapy Education                 Title: PT OT SLP Therapies (In Progress)     Topic: Physical Therapy (Done)     Point: Mobility training (Done)     Learning Progress Summary           Patient Acceptance, E,D, VU,NR by  at 5/11/2022 1547    Acceptance, E,D, VU,NR by MS at 5/9/2022 1538    Acceptance, E,D, VU,NR by MS at 5/7/2022 1525                   Point: Home exercise program (Done)     Learning Progress Summary           Patient Acceptance, E,D, VU,NR by  at 5/11/2022 1547    Acceptance, E,D, VU,NR by MS at 5/9/2022 1538    Acceptance, E,D, VU,NR by MS at 5/7/2022 1525                   Point: Body mechanics (Done)     Learning Progress Summary           Patient Acceptance, E,D, VU,NR by  at 5/11/2022 1547    Acceptance, E,D, VU,NR by MS at 5/9/2022 1538    Acceptance, E,D, VU,NR by MS at 5/7/2022 1525                   Point: Precautions (Done)     Learning Progress Summary           Patient Acceptance, E,D, VU,NR by  at 5/11/2022 1547    Acceptance, E,D, VU,NR by MS at 5/9/2022 1538    Acceptance, E,D, VU,NR by MS  at 5/7/2022 1525                               User Key     Initials Effective Dates Name Provider Type Discipline    MS 06/16/21 -  Jf Hidalgo PT Physical Therapist PT     06/16/21 -  Janet Coronado PTA Physical Therapist Assistant PT              PT Recommendation and Plan     Plan of Care Reviewed With: patient  Progress: improving  Outcome Evaluation: Pt UIC at beg of PT session. Pt stood req SV and use of fww. Pt amb 12' x 2 req CGA and use of fww as well as 2 seated rests of 2-3 min after each trial. Pt noted w/ SOA during amb and limited by SOA and activity intolerance. Pt's O2 sats after each trial were 91%     Time Calculation:    PT Charges     Row Name 05/11/22 1548             Time Calculation    Start Time 1524  -PH      Stop Time 1534  -PH      Time Calculation (min) 10 min  -PH      PT Received On 05/11/22  -PH      PT - Next Appointment 05/12/22  -PH              Timed Charges    13311 - PT Therapeutic Exercise Minutes 2  -PH      98046 - PT Therapeutic Activity Minutes 8  -PH              Total Minutes    Timed Charges Total Minutes 10  -PH       Total Minutes 10  -PH            User Key  (r) = Recorded By, (t) = Taken By, (c) = Cosigned By    Initials Name Provider Type    PH Janet Coronado PTA Physical Therapist Assistant              Therapy Charges for Today     Code Description Service Date Service Provider Modifiers Qty    [id number]  PT THERAPEUTIC ACT EA 15 MIN 5/11/2022 Janet Coronado PTA GP 1          PT G-Codes  Outcome Measure Options: AM-PAC 6 Clicks Basic Mobility (PT)  AM-PAC 6 Clicks Score (PT): 17  AM-PAC 6 Clicks Score (OT): 18    Janet Coronado PTA  5/11/2022

## 2022-05-11 NOTE — PROGRESS NOTES
Name: Yazmin Javier ADMIT: 2022   : 1935  PCP: Morenita Silverio MD    MRN: [id number] LOS: 5 days   AGE/SEX: 86 y.o. female  ROOM: [facility]     Subjective   Subjective   No new issues. Sats stable on 2L NC. Denies worsening shortness of breath, chest pain. Afebrile. +BM    Review of Systems   Constitutional: Negative for chills and fever.   HENT: Negative for congestion.    Respiratory: Negative for shortness of breath.         Intermittent wheezing   Cardiovascular: Negative for chest pain.   Gastrointestinal: Negative for constipation.   Genitourinary:        Indwelling catheter   Musculoskeletal: Negative for arthralgias and myalgias.   Skin: Negative for rash.   Neurological: Negative for headaches.   Psychiatric/Behavioral: Negative for sleep disturbance.        Objective   Objective   Vital Signs  Temp:  [97.2 °F (36.2 °C)-98.2 °F (36.8 °C)] 98.2 °F (36.8 °C)  Heart Rate:  [] 102  Resp:  [18-22] 18  BP: (111-153)/(62-86) 140/74  SpO2:  [91 %-100 %] 92 %  on  Flow (L/min):  [2] 2;   Device (Oxygen Therapy): nasal cannula  Body mass index is 32.36 kg/m².  Physical Exam  Vitals and nursing note reviewed.   Constitutional:       General: She is not in acute distress.     Appearance: She is obese. She is ill-appearing.      Comments: Chronically ill appearing   HENT:      Head: Normocephalic.      Mouth/Throat:      Mouth: Mucous membranes are moist.   Eyes:      Conjunctiva/sclera: Conjunctivae normal.   Cardiovascular:      Rate and Rhythm: Normal rate and regular rhythm.   Pulmonary:      Effort: Pulmonary effort is normal. No respiratory distress.      Breath sounds: No wheezing.   Abdominal:      General: Bowel sounds are normal.      Palpations: Abdomen is soft.   Musculoskeletal:      Cervical back: Neck supple.      Right lower leg: No edema.      Left lower leg: No edema.   Skin:     General: Skin is warm and dry.   Neurological:      Mental Status: She is alert and oriented to  person, place, and time.   Psychiatric:         Mood and Affect: Mood normal.         Behavior: Behavior normal.         Results Review     I reviewed the patient's new clinical results.  Results from last 7 days   Lab Units 05/11/22  0326 05/10/22  0345 05/09/22  0432 05/08/22  0344   WBC 10*3/mm3 7.38 7.83 9.92 6.75   HEMOGLOBIN g/dL 8.8* 8.7* 9.4* 9.6*   PLATELETS 10*3/mm3 227 205 216 222     Results from last 7 days   Lab Units 05/11/22  0326 05/10/22  0345 05/09/22  0417 05/08/22  0344   SODIUM mmol/L 135* 138 136 137   POTASSIUM mmol/L 4.4 4.2 4.5 4.5   CHLORIDE mmol/L 98 101 101 101   CO2 mmol/L 24.4 25.0 22.8 22.0   BUN mg/dL 40* 38* 38* 31*   CREATININE mg/dL 1.70* 1.40* 1.78* 1.80*   GLUCOSE mg/dL 223* 150* 127* 131*   EGFR mL/min/1.73 29.4* 37.2* 27.9* 27.5*     Results from last 7 days   Lab Units 05/05/22  1416   ALBUMIN g/dL 3.30*   BILIRUBIN mg/dL 1.2   ALK PHOS U/L 126*   AST (SGOT) U/L 27   ALT (SGPT) U/L 19     Results from last 7 days   Lab Units 05/11/22  0326 05/10/22  0345 05/09/22  0417 05/08/22  0344 05/07/22  0334 05/06/22  0354 05/05/22  1416   CALCIUM mg/dL 9.3 8.8 8.9 8.7 8.1* 8.6 9.0   ALBUMIN g/dL  --   --   --   --   --   --  3.30*   MAGNESIUM mg/dL  --   --   --   --  2.3 2.9* 1.5*     Results from last 7 days   Lab Units 05/05/22  1754 05/05/22  1416   PROCALCITONIN ng/mL  --  0.16   LACTATE mmol/L 1.7 2.4*     No results found for: HGBA1C, POCGLU    CT Soft Tissue Neck With Contrast    Result Date: 5/10/2022  Unremarkable CT scan of the neck. No retained foreign bodies are identified in the airway. The airway appears widely patent.  This report was finalized on 5/10/2022 7:21 PM by Dr. Tevin Lopez M.D.      CT Chest Without Contrast Diagnostic    Result Date: 5/9/2022  Overall improved appearance of the chest. The bilateral pleural effusions have resolved. The mediastinal lymphadenopathy has decreased. The bilateral infiltrates have decreased but not fully resolved, which may  reflect areas of persistent pneumonia or edema. Continued follow-up to clearing is recommended.    Radiation dose reduction techniques were utilized, including automated exposure control and exposure modulation based on body size.  This report was finalized on 5/9/2022 4:49 PM by Dr. Addison Patel M.D.      Scheduled Medications  allopurinol, 300 mg, Oral, Daily  apixaban, 5 mg, Oral, BID  atorvastatin, 80 mg, Oral, Nightly  dilTIAZem, 30 mg, Oral, TID With Meals  folic acid, 1 mg, Oral, Daily  furosemide, 40 mg, Oral, BID  ipratropium-albuterol, 3 mL, Nebulization, 4x Daily - RT  levothyroxine, 50 mcg, Oral, Q AM  melatonin, 3 mg, Oral, Nightly  methylPREDNISolone sodium succinate, 40 mg, Intravenous, Q8H  PARoxetine, 10 mg, Oral, Daily  potassium chloride, 20 mEq, Oral, BID With Meals  [START ON 5/12/2022] predniSONE, 40 mg, Oral, Daily With Breakfast  vitamin B-12, 1,000 mcg, Oral, Daily    Infusions   Diet  Diet Regular; Cardiac       Assessment/Plan     Active Hospital Problems    Diagnosis  POA   • **Postinfectious reactive airway disease with acute exacerbation [J45.901]  Yes   • Prediabetes [R73.03]  Yes   • Acute on chronic combined systolic and diastolic CHF (congestive heart failure) ([facility]) [I50.43]  Yes   • Essential hypertension [I10]  Yes   • Chronic hypoxemic respiratory failure ([facility]) [J96.11]  Yes   • Hypothyroidism (acquired) [E03.9]  Yes   • Chronic kidney failure, stage 3 (moderate) ([facility]) [N18.30]  Yes   • Hypokalemia [E87.6]  Yes   • Hypomagnesemia [E83.42]  Yes   • Acute urinary retention [R33.8]  Yes   • DNR (do not resuscitate) [Z66]  Yes   • Atrial fibrillation with RVR ([facility]) [I48.91]  Yes      Resolved Hospital Problems   No resolved problems to display.       86 y.o. female admitted with Reactive airway disease with acute exacerbation.    Yazmin Javier is a 86 y.o. female presents the hospital with postinfectious reactive airway disease and heart failure exacerbation.     Plan:  Reactive  airway disease:  Patient reports being on chronic oxygen.  Oxygen needs are relatively at baseline.  Continues with intermittent wheezing. Remains on steroids managed by Pulm.  CT neck unremarkable. CT chest follow up in 6-8 weeks     Visual hallucinations: Resolved.  Reportedly having some intermittent encephalopathy.  Currently calm & oriented      CHF:  Furosemide diuresis.  Monitoring renal function; overall stable     Atrial fibrillation:  Echocardiogram normal ejection fraction.  Diltiazem rate controlled.  Anticoagulation on Eliquis.  Seen by cardiology.  Recommendations appreciated.  Will need Holter monitor study, possibly at discharge.     CKD:  Previous records reviewed and baseline renal function variable but seems to be mostly 1.3-1.5 range but at times as high as 1.8.     Hypothyroid: Clinically euthyroid.  Continue replacement.  Normal TSH.     Chronic anemia: Anemia of chronic disease.  Baseline creatinine usually around the range of 9.     Urinary retention: Status post urology.  Padilla catheter in place.  Attempt voiding trial         · Eliquis (home med) for DVT prophylaxis.  · Limited code (no CPR, no intubation).  · Discussed with patient and Dr. Howe.  · Anticipate discharge to SNU facility once arrangements have been made.      JADON Montanez  [facility]  05/11/22  13:34 EDT

## 2022-05-11 NOTE — PLAN OF CARE
Goal Outcome Evaluation:  Plan of Care Reviewed With: patient        Progress: improving  Outcome Evaluation: Pt UIC at beg of PT session. Pt stood req SV and use of fww. Pt amb 12' x 2 req CGA and use of fww as well as 2 seated rests of 2-3 min after each trial. Pt noted w/ SOA during amb and limited by SOA and activity intolerance. Pt's O2 sats after each trial were 91%. Pt performed BLE ther-ex in chair for strengthening. PT will prog as pt jose.    Patient was not wearing a face mask during this therapy encounter. Therapist used appropriate personal protective equipment including eye protection, mask, and gloves.  Mask used was standard procedure mask. Appropriate PPE was worn during the entire therapy session. Hand hygiene was completed before and after therapy session. Patient is not in enhanced droplet precautions.

## 2022-05-12 PROBLEM — E87.1 HYPONATREMIA: Status: ACTIVE | Noted: 2022-01-01

## 2022-05-12 NOTE — PROGRESS NOTES
[facility]  [phone]  Dr. Ronny Hwang    Subjective:  LOS: 6    Chief Complaint: CHF    She is never smoker.  She uses oxygen continuously at home at present flows.  She feels close to baseline.  She is comfortably eating dinner.  No cough or wheezing.  Denies significant phlegm.    Objective   Vital Signs past 24hrs    Temp range: Temp (24hrs), Av °F (36.7 °C), Min:97.6 °F (36.4 °C), Max:98.9 °F (37.2 °C)    BP range: BP: (120-143)/(66-94) 120/66  Pulse range: Heart Rate:  [] 100  Resp rate range: Resp:  [14-18] 16    Device (Oxygen Therapy): nasal cannulaFlow (L/min):  [2-3] 3  Oxygen range:SpO2:  [89 %-97 %] 91 %      80.6 kg (177 lb 11.2 oz); Body mass index is 32.5 kg/m².    Intake/Output Summary (Last 24 hours) at 2022 1743  Last data filed at 2022 1325  Gross per 24 hour   Intake 520 ml   Output 600 ml   Net -80 ml       Physical Exam  Constitutional:       Appearance: She is obese.   Eyes:      Pupils: Pupils are equal, round, and reactive to light.   Cardiovascular:      Rate and Rhythm: Tachycardia present. Rhythm irregular.      Heart sounds: No murmur heard.  Pulmonary:      Effort: Pulmonary effort is normal.      Breath sounds: Decreased breath sounds and wheezing (occasional) present.   Abdominal:      General: Bowel sounds are normal.      Palpations: Abdomen is soft. There is no mass.      Tenderness: There is no abdominal tenderness.   Musculoskeletal:         General: No swelling.   Neurological:      Mental Status: She is alert.       Results Review:    I have reviewed the laboratory and imaging data since the last note by [facility] physician.  My annotations are noted in assessment and plan.    Medication Review:  I have reviewed the current MAR.  My annotations are noted in assessment and plan.       Plan   PCC Problems  Wheezing, postinfectious  Recent pneumonia, treated, improved pulmonary infiltrates  Chronic hypoxia on supplemental oxygen 2 L  Acute on  chronic diastolic congestive heart failure  A. fib with RVR  Hypothyroidism  CKD 3  SVT        Plan of Treatment    Patient denies any respiratory problems.  She does have some scattered occasional wheeze on exam.  However she feels subjectively completely fine.  Now on oral steroids and taper on discharge.  Continue nebulizer treatments on discharge.  Needs outpatient PFTs.  She is a never smoker and COPD is unlikely.  This could just be wheezing still from her previous infection.  If it does not completely resolve other work-up may be required.    Recent pneumonia treated with antibiotics and improved on CT chest but not completely resolved.  Requires follow-up CT chest in 6 to 8 weeks and follow-up with pulmonary.    Continue treatment for acute component of diastolic congestive heart failure with diuretics as directed by cardiology.    A. fib and managed by cardiology.  On full anticoagulation.  Note SVT with heart rate in the 160s while examining patient.  Address per cardiology.    I have asked for appointment at [facility] office in 6 to 8 weeks.  We will sign off and be available as needed    Ronny Hwang MD  05/12/22  17:43 EDT    While in the room and during my examination of the patient I wore gloves, gown, mask, eye protection and followed enhanced droplet/contact isolation protocol and precautions.  I washed my hands before and after this patient encounter.    Part of this note may be an electronic transcription/translation of spoken language to printed text using the Dragon Dictation System.

## 2022-05-12 NOTE — NURSING NOTE
Spoke with Dr. Velazco regard patient's PVR of 377.  Questioned whether or not to reinsert Padilla based on Dr. Villafana's last note of 05/06/2022.  Dr. Velazco stated he would have Dr. Villafana call back regarding further direction in the care of this patient's urinary retention. Awaiting further orders at this time.

## 2022-05-12 NOTE — PROGRESS NOTES
Continued Stay Note  [facility]     Patient Name: Yazmin Javier  MRN: [id number]  Today's Date: 5/12/2022    Admit Date: 5/5/2022     Discharge Plan     Row Name 05/12/22 0912       Plan    Plan [facility] pending    Plan Comments S/w [facility] Waldrop is working with the family to confirm LTC eligibility/financials; issue with Medicaid. Benjie will update CCP as soon as [facility] confirms they're able to meet pt's needs. CCP will follow up with family.               Discharge Codes    No documentation.               Expected Discharge Date and Time     Expected Discharge Date Expected Discharge Time    May 12, 2022             Chapis Johnson RN

## 2022-05-12 NOTE — PROGRESS NOTES
Yazmin Javier   86 y.o.  female    LOS: 6 days   Patient Care Team:  Morenita Silverio MD as PCP - General (Internal Medicine)      Subjective   Breathing better  Interval History:     Patient Complaints:     Review of Systems:       Medication Review:   Current Facility-Administered Medications:   •  acetaminophen (TYLENOL) tablet 650 mg, 650 mg, Oral, Q4H PRN, Emelyn Dhillon MD, 650 mg at 05/08/22 0202  •  allopurinol (ZYLOPRIM) tablet 300 mg, 300 mg, Oral, Daily, Emelyn Dhillon MD, 300 mg at 05/12/22 0957  •  apixaban (ELIQUIS) tablet 5 mg, 5 mg, Oral, BID, Emelyn Dhillon MD, 5 mg at 05/12/22 0957  •  atorvastatin (LIPITOR) tablet 80 mg, 80 mg, Oral, Nightly, Emelyn Dhillon MD, 80 mg at 05/11/22 2128  •  dilTIAZem (CARDIZEM) tablet 30 mg, 30 mg, Oral, TID With Meals, Corinna Mcnamara APRJOE, 30 mg at 05/12/22 0957  •  folic acid (FOLVITE) tablet 1 mg, 1 mg, Oral, Daily, Emelyn Dhillon MD, 1 mg at 05/12/22 0957  •  furosemide (LASIX) tablet 40 mg, 40 mg, Oral, BID, Emelyn Dhillon MD, 40 mg at 05/12/22 0957  •  ipratropium-albuterol (DUO-NEB) nebulizer solution 3 mL, 3 mL, Nebulization, 4x Daily - RT, Isauro Ramirez MD, 3 mL at 05/12/22 0732  •  ipratropium-albuterol (DUO-NEB) nebulizer solution 3 mL, 3 mL, Nebulization, Q4H PRN, Isauro Ramirez MD  •  levothyroxine (SYNTHROID, LEVOTHROID) tablet 50 mcg, 50 mcg, Oral, Q AM, Emelyn Dhillon MD, 50 mcg at 05/12/22 0620  •  Magnesium Sulfate 2 gram Bolus, followed by 8 gram infusion (total Mg dose 10 grams)- Mg less than or equal to 1mg/dL, 2 g, Intravenous, PRN **OR** Magnesium Sulfate 2 gram / 50mL Infusion (GIVE X 3 BAGS TO EQUAL 6GM TOTAL DOSE) - Mg 1.1 - 1.5 mg/dl, 2 g, Intravenous, PRN, Last Rate: 25 mL/hr at 05/05/22 2207, 2 g at 05/05/22 2207 **OR** Magnesium Sulfate 4 gram infusion- Mg 1.6-1.9 mg/dL, 4 g, Intravenous, PRN, Jewel Guadalupe MD  •  melatonin tablet 3 mg, 3 mg, Oral, Nightly,  Jai Neal MD, 3 mg at 05/11/22 2128  •  nitroglycerin (NITROSTAT) SL tablet 0.4 mg, 0.4 mg, Sublingual, Q5 Min PRN, Emelyn Dhillon MD  •  ondansetron (ZOFRAN) tablet 4 mg, 4 mg, Oral, Q6H PRN **OR** ondansetron (ZOFRAN) injection 4 mg, 4 mg, Intravenous, Q6H PRN, Emelyn Dhillon MD  •  PARoxetine (PAXIL) tablet 10 mg, 10 mg, Oral, Daily, Emelyn Dhillon MD, 10 mg at 05/12/22 0956  •  potassium chloride (K-DUR,KLOR-CON) ER tablet 20 mEq, 20 mEq, Oral, BID With Meals, Rock Merrill MD, 20 mEq at 05/12/22 0956  •  predniSONE (DELTASONE) tablet 40 mg, 40 mg, Oral, Daily With Breakfast, Ronny Hawng MD, 40 mg at 05/12/22 0957  •  [COMPLETED] Insert peripheral IV, , , Once **AND** sodium chloride 0.9 % flush 10 mL, 10 mL, Intravenous, PRN, Jewel Guadalupe MD  •  vitamin B-12 (CYANOCOBALAMIN) tablet 1,000 mcg, 1,000 mcg, Oral, Daily, Emelyn Dhillon MD, 1,000 mcg at 05/12/22 0957      Objective   Vital Sign Min/Max for last 24 hours  Temp  Min: 97.6 °F (36.4 °C)  Max: 98.9 °F (37.2 °C)   BP  Min: 115/62  Max: 143/67    Pulse  Min: 85  Max: 156     Wt Readings from Last 3 Encounters:   05/12/22 80.6 kg (177 lb 11.2 oz)   04/09/22 88 kg (194 lb 1.6 oz)   02/18/22 85.2 kg (187 lb 12.8 oz)        Intake/Output Summary (Last 24 hours) at 5/12/2022 1108  Last data filed at 5/12/2022 0930  Gross per 24 hour   Intake 240 ml   Output 950 ml   Net -710 ml     Physical Exam:      General Appearance:    Well developed and well nourished in no acute distress   Head:    Normocephalic, atraumatic   Eyes:            Conjunctivae normal, anicteric, no xanthelasma   Neck:   supple, trachea midline, no thyromegaly, no carotid bruit, no JVD, no elevated CVP   Lungs:     Clear to auscultation,respirations regular, even and                  unlabored    Heart:    Regular rhythm and normal rate, normal S1 and S2,            No murmur, no gallop, no rub, no click   Chest Wall:    No abnormalities  observed   Abdomen:     Normal bowel sounds, no masses, no organomegaly, soft        nontender, nondistended, no guarding, no rebound                tenderness   Rectal:     Deferred   Extremities:   No edema. Moves all extremities well, no cyanosis, no erythema   Pulses:   Pulses palpable and equal bilaterally   Skin:   No bleeding, bruising or rash   Neurologic:   awake alert and oriented x3, speech clear and approp, no facial drooping     :    Monitor:      Results Review:         Sodium Sodium   Date Value Ref Range Status   05/12/2022 131 (L) 136 - 145 mmol/L Final   05/11/2022 135 (L) 136 - 145 mmol/L Final   05/10/2022 138 136 - 145 mmol/L Final      Potassium Potassium   Date Value Ref Range Status   05/12/2022 4.6 3.5 - 5.2 mmol/L Final   05/11/2022 4.4 3.5 - 5.2 mmol/L Final   05/10/2022 4.2 3.5 - 5.2 mmol/L Final      Chloride Chloride   Date Value Ref Range Status   05/12/2022 95 (L) 98 - 107 mmol/L Final   05/11/2022 98 98 - 107 mmol/L Final   05/10/2022 101 98 - 107 mmol/L Final      Bicarbonate No results found for: PLASMABICARB   BUN BUN   Date Value Ref Range Status   05/12/2022 54 (H) 8 - 23 mg/dL Final   05/11/2022 40 (H) 8 - 23 mg/dL Final   05/10/2022 38 (H) 8 - 23 mg/dL Final      Creatinine Creatinine   Date Value Ref Range Status   05/12/2022 1.99 (H) 0.57 - 1.00 mg/dL Final   05/11/2022 1.70 (H) 0.57 - 1.00 mg/dL Final   05/10/2022 1.40 (H) 0.57 - 1.00 mg/dL Final      Calcium Calcium   Date Value Ref Range Status   05/12/2022 9.5 8.6 - 10.5 mg/dL Final   05/11/2022 9.3 8.6 - 10.5 mg/dL Final   05/10/2022 8.8 8.6 - 10.5 mg/dL Final      Magnesium No results found for: MG     Results from last 7 days   Lab Units 05/12/22  0354   WBC 10*3/mm3 12.49*   HEMOGLOBIN g/dL 9.1*   HEMATOCRIT % 28.2*   PLATELETS 10*3/mm3 272     Lab Results   Lab Value Date/Time    TROPONINT 0.013 05/05/2022 1626    TROPONINT 0.021 05/05/2022 1416    TROPONINT <0.010 03/31/2022 2054    TROPONINT <0.010 02/15/2022  0525    TROPONINT <0.010 02/14/2022 0458    TROPONINT <0.010 10/31/2021 2334    TROPONINT <0.010 10/31/2021 1907    TROPONINT <0.010 04/21/2021 2331    TROPONINT <0.010 04/04/2021 0910            Echo EF Estimated  Lab Results   Component Value Date    ECHOEFEST 60 04/03/2022         Assessment/ Plan  Assessment & Plan   Active Hospital Problems    Diagnosis  POA   • **Postinfectious reactive airway disease with acute exacerbation [J45.901]  Yes   • Prediabetes [R73.03]  Yes   • Acute on chronic combined systolic and diastolic CHF (congestive heart failure) (HCC) [I50.43]  Yes   • Essential hypertension [I10]  Yes   • Chronic hypoxemic respiratory failure (HCC) [J96.11]  Yes   • Hypothyroidism (acquired) [E03.9]  Yes   • Chronic kidney failure, stage 3 (moderate) (HCC) [N18.30]  Yes   • Hypokalemia [E87.6]  Yes   • Hypomagnesemia [E83.42]  Yes   • Acute urinary retention [R33.8]  Yes   • DNR (do not resuscitate) [Z66]  Yes   • Atrial fibrillation with RVR ([facility]) [I48.91]  Yes         1. Atrial fibrillation with RVR   - back in afib with rvr     2.  Acute on chronic combined systolic and diastolic CHF EF = 60.1%      3. Essential hypertension     4.  Chronic hypoxemic respiratory failure     COPD with acute exacerbation   LILIANA     5.  Hypothyroidism (acquired)     6. Chronic kidney failure, stage 3 (moderate)      7. Acute urinary retention     8.  DNR (do not resuscitate)     Continue bronchodilators  Diuresing some with Lasix.  Renal function stable  Respiratory viral PCR is negative  To short-term rehab soon        Leslie Lantigua MD  05/12/22  11:08 EDT

## 2022-05-12 NOTE — PROGRESS NOTES
Continued Stay Note  [facility]     Patient Name: Yazmin Javier  MRN: [id number]  Today's Date: 5/12/2022    Admit Date: 5/5/2022     Discharge Plan     Row Name 05/12/22 1709       Plan    Plan tbd    Plan Comments Spoke with Benjie, unfortunately [facility] is unable to accept d/t financial issue with [place] Medicaid eligibility. Spoke with patient's niece, Anant [phone], pt's other niece is meeting with an elder law  today at 1630. Family will follow up with CCP in the morning. Referrals placed in Epic for skilled short-term rehab, but the likelihood of LTC needs after [facility] will effect bed availability until Medicaid issue is resolved. Email sent to CCP high risk coordinator/Arielle. CCP will follow up 5/13.               Discharge Codes    No documentation.                     Chapis Johnson RN

## 2022-05-12 NOTE — PROGRESS NOTES
Name: Yazmin Javier ADMIT: 2022   : 1935  PCP: Morenita Silverio MD    MRN: [id number] LOS: 6 days   AGE/SEX: 86 y.o. female  ROOM: [facility] [place]     Subjective   Subjective   Asleep when seen. Noted HR up to 150s earlier this a.m., now 90s. Sats 97%. No distress noted.     Review of Systems   Reason unable to perform ROS: asleep.        Objective   Objective   Vital Signs  Temp:  [97.6 °F (36.4 °C)-98.9 °F (37.2 °C)] 97.7 °F (36.5 °C)  Heart Rate:  [] 116  Resp:  [14-18] 16  BP: (120-143)/(66-94) 120/66  SpO2:  [89 %-97 %] 90 %  on  Flow (L/min):  [2-3] 3;   Device (Oxygen Therapy): nasal cannula  Body mass index is 32.5 kg/m².  Physical Exam  Vitals and nursing note reviewed.   Constitutional:       General: She is sleeping. She is not in acute distress.  HENT:      Head: Normocephalic.      Mouth/Throat:      Lips: Pink.   Eyes:      General: Lids are normal.   Cardiovascular:      Rate and Rhythm: Normal rate.   Pulmonary:      Effort: Pulmonary effort is normal. No respiratory distress.   Abdominal:      Palpations: Abdomen is soft.   Musculoskeletal:      Cervical back: Neck supple.      Right lower leg: No edema.      Left lower leg: No edema.   Skin:     General: Skin is warm and dry.   Neurological:      Comments: Asleep         Results Review     I reviewed the patient's new clinical results.  Results from last 7 days   Lab Units 22  0354 22  0326 05/10/22  0345 22  0432   WBC 10*3/mm3 12.49* 7.38 7.83 9.92   HEMOGLOBIN g/dL 9.1* 8.8* 8.7* 9.4*   PLATELETS 10*3/mm3 272 227 205 216     Results from last 7 days   Lab Units 22  0354 22  0326 05/10/22  0345 22  0417   SODIUM mmol/L 131* 135* 138 136   POTASSIUM mmol/L 4.6 4.4 4.2 4.5   CHLORIDE mmol/L 95* 98 101 101   CO2 mmol/L 19.4* 24.4 25.0 22.8   BUN mg/dL 54* 40* 38* 38*   CREATININE mg/dL 1.99* 1.70* 1.40* 1.78*   GLUCOSE mg/dL 226* 223* 150* 127*   EGFR mL/min/1.73 24.4* 29.4* 37.2* 27.9*          Results from last 7 days   Lab Units 05/12/22  0354 05/11/22  0326 05/10/22  0345 05/09/22  0417 05/08/22  0344 05/07/22  0334 05/06/22  0354   CALCIUM mg/dL 9.5 9.3 8.8 8.9   < > 8.1* 8.6   MAGNESIUM mg/dL  --   --   --   --   --  2.3 2.9*    < > = values in this interval not displayed.     Results from last 7 days   Lab Units 05/05/22  1754   LACTATE mmol/L 1.7     No results found for: HGBA1C, POCGLU    CT Soft Tissue Neck With Contrast    Result Date: 5/10/2022  Unremarkable CT scan of the neck. No retained foreign bodies are identified in the airway. The airway appears widely patent.  This report was finalized on 5/10/2022 7:21 PM by Dr. Tevin Lopez M.D.      Scheduled Medications  allopurinol, 300 mg, Oral, Daily  apixaban, 5 mg, Oral, BID  atorvastatin, 80 mg, Oral, Nightly  dilTIAZem, 30 mg, Oral, TID With Meals  folic acid, 1 mg, Oral, Daily  [START ON 5/13/2022] furosemide, 40 mg, Oral, BID  ipratropium-albuterol, 3 mL, Nebulization, 4x Daily - RT  levothyroxine, 50 mcg, Oral, Q AM  melatonin, 3 mg, Oral, Nightly  PARoxetine, 10 mg, Oral, Daily  potassium chloride, 20 mEq, Oral, BID With Meals  predniSONE, 40 mg, Oral, Daily With Breakfast  vitamin B-12, 1,000 mcg, Oral, Daily    Infusions   Diet  Diet Regular; Cardiac       Assessment/Plan     Active Hospital Problems    Diagnosis  POA   • **Postinfectious reactive airway disease with acute exacerbation [J45.901]  Yes   • Hyponatremia [E87.1]  Yes   • Prediabetes [R73.03]  Yes   • Acute on chronic combined systolic and diastolic CHF (congestive heart failure) (HCC) [I50.43]  Yes   • Essential hypertension [I10]  Yes   • Chronic hypoxemic respiratory failure (HCC) [J96.11]  Yes   • Hypothyroidism (acquired) [E03.9]  Yes   • Chronic kidney failure, stage 3 (moderate) (HCC) [N18.30]  Yes   • Hypokalemia [E87.6]  Yes   • Hypomagnesemia [E83.42]  Yes   • Acute urinary retention [R33.8]  Yes   • DNR (do not resuscitate) [Z66]  Yes   • Atrial fibrillation  with RVR ([facility]) [I48.91]  Yes      Resolved Hospital Problems   No resolved problems to display.       86 y.o. female admitted with Reactive airway disease with acute exacerbation.    Yazmin Javier is a 86 y.o. female presents the hospital with postinfectious reactive airway disease and heart failure exacerbation.     Plan:  Reactive airway disease:  Patient reports being on chronic oxygen.  Oxygen needs are relatively at baseline.  Continues with intermittent wheezing. Remains on steroids managed by Pulm.  CT neck unremarkable. CT chest follow up in 6-8 weeks     Visual hallucinations: Resolved.  Reportedly having some intermittent encephalopathy; improved per nursing     CHF:  Furosemide diuresis; hold afternoon dose today due to Cr up to 1.99     Atrial fibrillation:  Echocardiogram normal ejection fraction.  Continue diltiazem. Rate up to 150s this morning, now improved.  Anticoagulation on Eliquis.  Seen by cardiology.  Will need Holter monitor study, possibly at discharge.     CKD:  Previous records reviewed and baseline renal function variable but seems to be mostly 1.3-1.5 range but at times as high as 1.8; up to 1.99 today, holding afternoon dose lasix as above     Hypothyroid: Clinically euthyroid.  Continue replacement.  Normal TSH.     Chronic anemia: Anemia of chronic disease.  Baseline Hgb usually around the range of 9.     Urinary retention: Status post urology.  Attempted voiding trial, indwelling catheter replaced    Hyponatremia: Na 131, will trend        · Eliquis (home med) for DVT prophylaxis.  · Limited code (no CPR, no intubation).  · Discussed with nursing staff and Dr. Howe.  · Anticipate discharge to SNU facility in 1-2 days.      JADON Montanez  [facility]  05/12/22  14:20 EDT

## 2022-05-13 PROBLEM — R04.2 HEMOPTYSIS: Status: ACTIVE | Noted: 2022-01-01

## 2022-05-13 NOTE — PROGRESS NOTES
[facility] Medicine Services  PROGRESS NOTE    Patient Name: Yazmin Javier  : 1935  MRN: [id number]    Date of Admission: 2022  Primary Care Physician: Morenita Silverio MD    Subjective   Subjective     CC:  Follow-up shortness of breath    HPI:  Breathing continues to improve.  Patient is eager to get to facility when possible.  Denies any new complaints.    Review of Systems  No current fevers or chills  No current nausea, vomiting, or diarrhea  No current chest pain or palpitations     Objective   Objective     Vital Signs:   Temp:  [97.7 °F (36.5 °C)-98 °F (36.7 °C)] 97.9 °F (36.6 °C)  Heart Rate:  [] 82  Resp:  [14-18] 18  BP: (120-139)/(62-72) 126/66        Physical Exam:  Constitutional:Awake, alert, chronically ill-appearing    HENT: NCAT, mucous membranes moist, neck supple    Respiratory:No cough, less wheezes,   improving inspiration  Cardiovascular: RRR, normal radial pulses    Gastrointestinal: Positive bowel sounds, soft, nontender, nondistended    Musculoskeletal: Elderly frail and chronically debilitated appearance, mild lower extremity edema, BMI is 31 obese    Psychiatric: Appropriate affect, cooperative, conversational    Neurologic: No slurred speech or facial droop, follows commands    Skin: No rashes or jaundice, warm      Results Reviewed:  Results from last 7 days   Lab Units 22  0354 22  0326   WBC 10*3/mm3 9.08 12.49* 7.38   HEMOGLOBIN g/dL 8.1* 9.1* 8.8*   HEMATOCRIT % 26.4* 28.2* 27.1*   PLATELETS 10*3/mm3 217 272 227     Results from last 7 days   Lab Units 22  03322  0354 22  0326   SODIUM mmol/L 137 131* 135*   POTASSIUM mmol/L 4.6 4.6 4.4   CHLORIDE mmol/L 101 95* 98   CO2 mmol/L 22.0 19.4* 24.4   BUN mg/dL 56* 54* 40*   CREATININE mg/dL 1.81* 1.99* 1.70*   GLUCOSE mg/dL 222* 226* 223*   CALCIUM mg/dL 8.7 9.5 9.3     Estimated Creatinine Clearance: 22.8 mL/min (A) (by C-G formula based on SCr of 1.81 mg/dL  (H)).    Microbiology Results Abnormal     Procedure Component Value - Date/Time    Blood Culture - Blood, Arm, Left [[id number]]  (Normal) Collected: 05/05/22 1416    Lab Status: Final result Specimen: Blood from Arm, Left Updated: 05/10/22 1432     Blood Culture No growth at 5 days    Blood Culture - Blood, Arm, Left [[id number]]  (Normal) Collected: 05/05/22 1416    Lab Status: Final result Specimen: Blood from Arm, Left Updated: 05/10/22 1432     Blood Culture No growth at 5 days    Respiratory Panel PCR w/COVID-19(SARS-CoV-2) VIRGIL/DANIEL/DEJON/PAD/COR/MAD/ANGEL LUIS In-House, NP Swab in UTM/VTM, 3-4 HR TAT - Swab, Nasopharynx [[id number]]  (Normal) Collected: 05/07/22 1855    Lab Status: Final result Specimen: Swab from Nasopharynx Updated: 05/07/22 1951     ADENOVIRUS, PCR Not Detected     Coronavirus 229E Not Detected     Coronavirus HKU1 Not Detected     Coronavirus NL63 Not Detected     Coronavirus OC43 Not Detected     COVID19 Not Detected     Human Metapneumovirus Not Detected     Human Rhinovirus/Enterovirus Not Detected     Influenza A PCR Not Detected     Influenza B PCR Not Detected     Parainfluenza Virus 1 Not Detected     Parainfluenza Virus 2 Not Detected     Parainfluenza Virus 3 Not Detected     Parainfluenza Virus 4 Not Detected     RSV, PCR Not Detected     Bordetella pertussis pcr Not Detected     Bordetella parapertussis PCR Not Detected     Chlamydophila pneumoniae PCR Not Detected     Mycoplasma pneumo by PCR Not Detected    Narrative:      In the setting of a positive respiratory panel with a viral infection PLUS a negative procalcitonin without other underlying concern for bacterial infection, consider observing off antibiotics or discontinuation of antibiotics and continue supportive care. If the respiratory panel is positive for atypical bacterial infection (Bordetella pertussis, Chlamydophila pneumoniae, or Mycoplasma pneumoniae), consider antibiotic de-escalation to target atypical bacterial  infection.    COVID PRE-OP / PRE-PROCEDURE SCREENING ORDER (NO ISOLATION) - Swab, Nasopharynx [[id number]]  (Normal) Collected: 05/05/22 1628    Lab Status: Final result Specimen: Swab from Nasopharynx Updated: 05/05/22 2204    Narrative:      The following orders were created for panel order COVID PRE-OP / PRE-PROCEDURE SCREENING ORDER (NO ISOLATION) - Swab, Nasopharynx.  Procedure                               Abnormality         Status                     ---------                               -----------         ------                     COVID-19,APTIMA PANTHER(...[[id number]]  Normal              Final result                 Please view results for these tests on the individual orders.    COVID-19,APTIMA PANTHER(J[facility]ON),[facility] V[facility]GIL/[facility]QUAN, NP/OP SWAB IN UTM/VTM/SALINE TRANSPORT MEDIA,24 HR TAT - Swab, Nasopharynx [[id number]]  (Normal) Collected: 05/05/22 1628    Lab Status: Final result Specimen: Swab from Nasopharynx Updated: 05/05/22 2204     COVID19 Not Detected    Narrative:      Fact sheet for providers: https://www.fda.gov/media/519294/download     Fact sheet for patients: https://www.fda.gov/media/018538/download    Test performed by RT PCR.          Imaging Results (Last 24 Hours)     ** No results found for the last 24 hours. **          Results for orders placed during the hospital encounter of 03/31/22    Adult Transthoracic Echo Complete W/ Cont if Necessary Per Protocol    Interpretation Summary  · Calculated left ventricular EF = 60.1% Estimated left ventricular EF = 60% Estimated left ventricular EF was in agreement with the calculated left ventricular EF. Left ventricular systolic function is normal. The left ventricular cavity is small in size. Left ventricular wall thickness is consistent with moderate concentric hypertrophy. All left ventricular wall segments contract normally. Left ventricular diastolic function was indeterminate.  · The right ventricular cavity is moderately dilated. Normal right  ventricular systolic function noted.  · The left atrial cavity is moderately dilated  · The right atrial cavity is moderately dilated.  · No aortic valve regurgitation or stenosis is present. The aortic valve is abnormal in structure. There is calcification of the aortic valve.  · Severe mitral annular calcification is present. Mild mitral valve regurgitation is present. Mean mitral valve gradient is elevated at 6 mmHg at a heart rate of 117 bpm. This is likely due to tachycardia though cannot rule out early mitral valve stenosis  · Mild tricuspid valve regurgitation is present. Estimated right ventricular systolic pressure from tricuspid regurgitation is markedly elevated (>55 mmHg). Calculated right ventricular systolic pressure from tricuspid regurgitation is 56 mmHg.      I have reviewed the medications:  Scheduled Meds:allopurinol, 300 mg, Oral, Daily  apixaban, 5 mg, Oral, BID  atorvastatin, 80 mg, Oral, Nightly  dilTIAZem, 30 mg, Oral, TID With Meals  folic acid, 1 mg, Oral, Daily  furosemide, 40 mg, Oral, BID  ipratropium-albuterol, 3 mL, Nebulization, 4x Daily - RT  levothyroxine, 50 mcg, Oral, Q AM  melatonin, 3 mg, Oral, Nightly  PARoxetine, 10 mg, Oral, Daily  potassium chloride, 20 mEq, Oral, BID With Meals  predniSONE, 40 mg, Oral, Daily With Breakfast  vitamin B-12, 1,000 mcg, Oral, Daily      Continuous Infusions:   PRN Meds:.•  acetaminophen  •  ipratropium-albuterol  •  magnesium sulfate **OR** magnesium sulfate **OR** magnesium sulfate  •  nitroglycerin  •  ondansetron **OR** ondansetron  •  [COMPLETED] Insert peripheral IV **AND** sodium chloride    Assessment & Plan   Assessment & Plan     Active Hospital Problems    Diagnosis  POA   • **Postinfectious reactive airway disease with acute exacerbation [J45.901]  Yes   • Hyponatremia [E87.1]  Yes   • Prediabetes [R73.03]  Yes   • Acute on chronic combined systolic and diastolic CHF (congestive heart failure) ([facility]) [I50.43]  Yes   • Essential  hypertension [I10]  Yes   • Chronic hypoxemic respiratory failure (HCC) [J96.11]  Yes   • Hypothyroidism (acquired) [E03.9]  Yes   • Chronic kidney failure, stage 3 (moderate) ([facility]) [N18.30]  Yes   • Hypokalemia [E87.6]  Yes   • Hypomagnesemia [E83.42]  Yes   • Acute urinary retention [R33.8]  Yes   • DNR (do not resuscitate) [Z66]  Yes   • Atrial fibrillation with RVR ([facility]) [I48.91]  Yes      Resolved Hospital Problems   No resolved problems to display.        Brief Hospital Course to date:  Yazmin Javier is a 86 y.o. female admitted with Reactive airway disease with acute exacerbation.     Plan:  Restart metoprolol initially at a lower dose.  Continue diltiazem.  Plan to discuss further with cardiology today.  Respiratory status improved.  Oral prednisone.  Plan to wean gradually.  Plan discussed with case management regarding placement at skilled facility.    Reactive airway disease:  Patient reports being on chronic oxygen.  Oxygen needs are relatively at baseline.  Continues with intermittent wheezing. Remains on steroids managed by Pulm.  CT neck unremarkable. CT chest follow up in 6-8 weeks     Visual hallucinations: Resolved.  Reportedly having some intermittent encephalopathy; improved per nursing     CHF:  Furosemide diuresis; improved     Atrial fibrillation:  Echocardiogram normal ejection fraction.  Continue diltiazem. Rate up to 150s this morning, now improved.  Anticoagulation on Eliquis.  Seen by cardiology.  Will need Holter monitor study, possibly at discharge.     CKD:  Previous records reviewed and baseline renal function variable but seems to be mostly 1.3-1.5 range but at times as high as 1.8; monitoring with cardiac medicines.     Hypothyroid: Clinically euthyroid.  Continue replacement.  Normal TSH.     Chronic anemia: Anemia of chronic disease.  Baseline Hgb usually around the range of 9.     Urinary retention: Status post urology.  Attempted voiding trial, indwelling catheter  replaced     Hyponatremia: Trending    Prophylaxis: Mechanical    Discharge planning: Skilled facility in 1 to 2 days.    CODE STATUS:   Code Status and Medical Interventions:   Ordered at: 05/06/22 1134     Medical Intervention Limits:    NO intubation (DNI)     Level Of Support Discussed With:    Next of Kin (If No Surrogate)     Code Status (Patient has no pulse and is not breathing):    No CPR (Do Not Attempt to Resuscitate)     Medical Interventions (Patient has pulse or is breathing):    Limited Support       Garry Howe MD  05/13/22

## 2022-05-13 NOTE — THERAPY TREATMENT NOTE
Patient Name: Yazmin Javier  : 1935    MRN: [id number]                              Today's Date: 2022       Admit Date: 2022    Visit Dx:     ICD-10-CM ICD-9-CM   1. Atrial fibrillation with RVR (HCC)  I48.91 427.31   2. Pneumonia of both lower lobes due to infectious organism  J18.9 486   3. Hemoptysis  R04.2 786.30     Patient Active Problem List   Diagnosis   • Acute on chronic congestive heart failure (HCC)   • Hypoxia   • Paroxysmal atrial fibrillation (HCC)   • Chronic anticoagulation   • Hypothyroidism (acquired)   • Stage 3b chronic kidney disease (HCC)   • Right arm weakness   • Chronic diastolic CHF (congestive heart failure) (HCC)   • Syncope   • Acute respiratory failure with hypoxia and hypercapnia (HCC)   • Acute pulmonary edema (HCC)   • Acute on chronic respiratory failure with hypoxia (HCC)   • Fever in adult   • History of asthma   • Hyperglycemia   • Hypokalemia   • BRENDA (acute kidney injury) (HCC)   • Hyperbilirubinemia   • Transaminitis   • Pneumonia involving right lung   • Pulmonary hypertension (HCC)   • Atrial fibrillation with RVR (HCC)   • Acute on chronic combined systolic and diastolic CHF (congestive heart failure) (HCC)   • Essential hypertension   • Chronic hypoxemic respiratory failure (HCC)   • Hypothyroidism (acquired)   • Chronic kidney failure, stage 3 (moderate) (HCC)   • Hypokalemia   • Hypomagnesemia   • Acute urinary retention   • DNR (do not resuscitate)   • Prediabetes   • Postinfectious reactive airway disease with acute exacerbation   • Hyponatremia   • Hemoptysis     Past Medical History:   Diagnosis Date   • CHF (congestive heart failure) (HCC)    • Sleep apnea    • Stroke (HCC)      No past surgical history on file.   General Information     Row Name 22 1552          Physical Therapy Time and Intention    Document Type therapy note (daily note)  -EE     Mode of Treatment physical therapy;individual therapy  -EE     Row Name 22 3336           General Information    Existing Precautions/Restrictions fall  -EE     Barriers to Rehab none identified  -EE     Row Name 05/13/22 1552          Cognition    Orientation Status (Cognition) oriented x 3  -EE     Row Name 05/13/22 1552          Safety Issues, Functional Mobility    Impairments Affecting Function (Mobility) endurance/activity tolerance;strength;shortness of breath  -EE           User Key  (r) = Recorded By, (t) = Taken By, (c) = Cosigned By    Initials Name Provider Type    EE Keerthi Freedman, PT Physical Therapist               Mobility     Row Name 05/13/22 1553          Bed Mobility    Comment, (Bed Mobility) not tested, pt up in chair  -EE     Row Name 05/13/22 1553          Sit-Stand Transfer    Sit-Stand [place] (Transfers) contact guard;standby assist  -EE     Assistive Device (Sit-Stand Transfers) walker, front-wheeled  -EE     Row Name 05/13/22 1553          Gait/Stairs (Locomotion)    [place] Level (Gait) contact guard;verbal cues  -EE     Assistive Device (Gait) walker, front-wheeled  -EE     Distance in Feet (Gait) 20' x 1  -EE     Deviations/Abnormal Patterns (Gait) vinicius decreased  -EE     Bilateral Gait Deviations forward flexed posture  -EE     Comment, (Gait/Stairs) fatigue and SOA limiting distance; vc's for SOA  -EE           User Key  (r) = Recorded By, (t) = Taken By, (c) = Cosigned By    Initials Name Provider Type    Keerthi Cobian, VELASQUEZ Physical Therapist               Obj/Interventions    No documentation.                Goals/Plan     Row Name 05/13/22 1556          Bed Mobility Goal 1 (PT)    Activity/Assistive Device (Bed Mobility Goal 1, PT) bed mobility activities, all  -EE     [place] Level/Cues Needed (Bed Mobility Goal 1, PT) standby assist  -EE     Time Frame (Bed Mobility Goal 1, PT) 1 week  -EE     Progress/Outcomes (Bed Mobility Goal 1, PT) goal revised this date;good progress toward goal  -EE     Row Name 05/13/22 1556          Transfer Goal 1 (PT)     Activity/Assistive Device (Transfer Goal 1, PT) sit-to-stand/stand-to-sit;walker, rolling  -EE     [place] Level/Cues Needed (Transfer Goal 1, PT) supervision required  -EE     Time Frame (Transfer Goal 1, PT) 1 week  -EE     Progress/Outcome (Transfer Goal 1, PT) goal revised this date;good progress toward goal  -EE     Row Name 05/13/22 1556          Gait Training Goal 1 (PT)    Activity/Assistive Device (Gait Training Goal 1, PT) gait (walking locomotion);walker, rolling  -EE     [place] Level (Gait Training Goal 1, PT) contact guard required  -EE     Distance (Gait Training Goal 1, PT) 50 feet  -EE     Time Frame (Gait Training Goal 1, PT) 1 week  -EE     Progress/Outcome (Gait Training Goal 1, PT) goal ongoing;continuing progress toward goal  -EE           User Key  (r) = Recorded By, (t) = Taken By, (c) = Cosigned By    Initials Name Provider Type    EE Keerthi Freedman, PT Physical Therapist               Clinical Impression     Row Name 05/13/22 1553          Pain    Pretreatment Pain Rating 0/10 - no pain  -EE     Posttreatment Pain Rating 0/10 - no pain  -EE     Row Name 05/13/22 1553          Plan of Care Review    Plan of Care Reviewed With patient  -EE     Progress improving  -EE     Outcome Evaluation Pt demonstrates slow but steady progress with mobility. Able to tolerate slightly farther ambulation; however, still unable to ambulate out of room due to fatigue and SOA. Sats remained >= 90% during activity on O2; however, pt fatigues quickly with upright mobility. Pt will continue to benefit from PT for ongoing strengthening and mobility training.  -EE     Row Name 05/13/22 1553          Therapy Assessment/Plan (PT)    Therapy Frequency (PT) 5 times/wk  -EE     Row Name 05/13/22 1553          Vital Signs    Pre SpO2 (%) 94  -EE     O2 Delivery Pre Treatment supplemental O2  -EE     Intra SpO2 (%) 90  -EE     O2 Delivery Intra Treatment supplemental O2  -EE     Post SpO2 (%) 93  -EE     O2  Delivery Post Treatment supplemental O2  -EE     Pre Patient Position Sitting  -EE     Intra Patient Position Standing  -EE     Post Patient Position Sitting  -EE     Row Name 05/13/22 1553          Positioning and Restraints    Pre-Treatment Position in bed  -EE     Post Treatment Position chair  -EE     In Chair reclined;call light within reach;encouraged to call for assist;exit alarm on;legs elevated  -EE           User Key  (r) = Recorded By, (t) = Taken By, (c) = Cosigned By    Initials Name Provider Type    Keerthi Cobian PT Physical Therapist               Outcome Measures     Row Name 05/13/22 1555          How much help from another person do you currently need...    Turning from your back to your side while in flat bed without using bedrails? 3  -EE     Moving from lying on back to sitting on the side of a flat bed without bedrails? 3  -EE     Moving to and from a bed to a chair (including a wheelchair)? 3  -EE     Standing up from a chair using your arms (e.g., wheelchair, bedside chair)? 3  -EE     Climbing 3-5 steps with a railing? 2  -EE     To walk in hospital room? 3  -EE     AM-PAC 6 Clicks Score (PT) 17  -EE     Highest level of mobility 5 --> Static standing  -EE           User Key  (r) = Recorded By, (t) = Taken By, (c) = Cosigned By    Initials Name Provider Type    Keerthi Cobian PT Physical Therapist                             Physical Therapy Education                 Title: PT OT SLP Therapies (In Progress)     Topic: Physical Therapy (Done)     Point: Mobility training (Done)     Learning Progress Summary           Patient Acceptance, E,TB, VU,NR by EE at 5/13/2022 1555    Acceptance, E,D, VU,NR by PH at 5/11/2022 1547    Acceptance, E,D, VU,NR by MS at 5/9/2022 1538    Acceptance, E,D, VU,NR by MS at 5/7/2022 1525                   Point: Home exercise program (Done)     Learning Progress Summary           Patient Acceptance, E,D, VU,NR by  at 5/11/2022 1547    Acceptance, E,D,  VU,NR by MS at 5/9/2022 1538    Acceptance, E,D, VU,NR by MS at 5/7/2022 1525                   Point: Body mechanics (Done)     Learning Progress Summary           Patient Acceptance, E,D, VU,NR by PH at 5/11/2022 1547    Acceptance, E,D, VU,NR by MS at 5/9/2022 1538    Acceptance, E,D, VU,NR by MS at 5/7/2022 1525                   Point: Precautions (Done)     Learning Progress Summary           Patient Acceptance, E,D, VU,NR by PH at 5/11/2022 1547    Acceptance, E,D, VU,NR by MS at 5/9/2022 1538    Acceptance, E,D, VU,NR by MS at 5/7/2022 1525                               User Key     Initials Effective Dates Name Provider Type Discipline    EE 06/16/21 -  Keerthi Freedman, PT Physical Therapist PT    MS 06/16/21 -  Jf Hidalgo PT Physical Therapist PT     06/16/21 -  Janet Coronado PTA Physical Therapist Assistant PT              PT Recommendation and Plan     Plan of Care Reviewed With: patient  Progress: improving  Outcome Evaluation: Pt demonstrates slow but steady progress with mobility. Able to tolerate slightly farther ambulation; however, still unable to ambulate out of room due to fatigue and SOA. Sats remained >= 90% during activity on O2; however, pt fatigues quickly with upright mobility. Pt will continue to benefit from PT for ongoing strengthening and mobility training.     Time Calculation:    PT Charges     Row Name 05/13/22 1556             Time Calculation    Start Time 1524  -EE      Stop Time 1535  -EE      Time Calculation (min) 11 min  -EE      PT Received On 05/13/22  -EE      PT - Next Appointment 05/16/22  -EE      PT Goal Re-Cert Due Date 05/20/22  -EE              Time Calculation- PT    Total Timed Code Minutes- PT 11 minute(s)  -EE              Timed Charges    12854 - Gait Training Minutes  11  -EE              Total Minutes    Timed Charges Total Minutes 11  -EE       Total Minutes 11  -EE            User Key  (r) = Recorded By, (t) = Taken By, (c) = Cosigned By     Initials Name Provider Type     Keerthi Freedman, VELASQUEZ Physical Therapist              Therapy Charges for Today     Code Description Service Date Service Provider Modifiers Qty    [id number] HC GAIT TRAINING EA 15 MIN 5/13/2022 Keerthi Freedman, VELASQUEZ GP 1          PT G-Codes  Outcome Measure Options: AM-PAC 6 Clicks Basic Mobility (PT)  AM-PAC 6 Clicks Score (PT): 17  AM-PAC 6 Clicks Score (OT): 18      Patient was not wearing a face mask during this therapy encounter. Therapist used appropriate personal protective equipment including mask and gloves.  Mask used was standard procedure mask. Appropriate PPE was worn during the entire therapy session. Hand hygiene was completed before and after therapy session. Patient is not in enhanced droplet precautions.     Keerthi Freedman PT  5/13/2022

## 2022-05-13 NOTE — PLAN OF CARE
Goal Outcome Evaluation:  Plan of Care Reviewed With: patient        Progress: improving  Outcome Evaluation: Pt demonstrates slow but steady progress with mobility. Able to tolerate slightly farther ambulation; however, still unable to ambulate out of room due to fatigue and SOA. Sats remained >= 90% during activity on O2; however, pt fatigues quickly with upright mobility. Pt will continue to benefit from PT for ongoing strengthening and mobility training.

## 2022-05-13 NOTE — CASE MANAGEMENT/SOCIAL WORK
Continued Stay Note  [facility]     Patient Name: Yazmin Javier  MRN: [id number]  Today's Date: 5/13/2022    Admit Date: 5/5/2022     Discharge Plan     Row Name 05/13/22 1654       Plan    Plan [facility]    Plan Comments Spoke with Benjie/Apollo who states that pts family had a meeting at [facility] today and  pt now accepted.[facility] will have bed available over weekend.  Pt will need wheelchair van for transport.  No further needs identified.  EVIE Mendez RN               Discharge Codes    No documentation.                     Monika Mendez, RN

## 2022-05-13 NOTE — PROGRESS NOTES
[facility]  [phone]  Dr. Ronny wHang    Subjective:  LOS: 7    Chief Complaint: CHF    She is never smoker.  She uses oxygen continuously at home at present flows.  She feels close to baseline.  Sitting by the side of the bed.  On low-flow oxygen.  Still with a congested cough.  Now some mild hemoptysis with dark-colored phlegm.  We were therefore asked to come back see her.  Her Eliquis was stopped temporarily.    Objective   Vital Signs past 24hrs    Temp range: Temp (24hrs), Av.9 °F (36.6 °C), Min:97.8 °F (36.6 °C), Max:98 °F (36.7 °C)    BP range: BP: (126-139)/(62-72) 130/65  Pulse range: Heart Rate:  [] 81  Resp rate range: Resp:  [16-18] 18    Device (Oxygen Therapy): humidified;nasal cannulaFlow (L/min):  [2-3.5] 3.5  Oxygen range:SpO2:  [90 %-100 %] 96 %      81.2 kg (179 lb); Body mass index is 32.74 kg/m².    Intake/Output Summary (Last 24 hours) at 2022 1548  Last data filed at 2022 1312  Gross per 24 hour   Intake 340 ml   Output 1225 ml   Net -885 ml       Physical Exam  Constitutional:       Appearance: She is obese.   Eyes:      Pupils: Pupils are equal, round, and reactive to light.   Cardiovascular:      Rate and Rhythm: Normal rate. Rhythm irregular.      Heart sounds: No murmur heard.  Pulmonary:      Effort: Pulmonary effort is normal.      Breath sounds: Decreased breath sounds and wheezing (mild) present.   Abdominal:      General: Bowel sounds are normal.      Palpations: Abdomen is soft. There is no mass.      Tenderness: There is no abdominal tenderness.   Musculoskeletal:         General: No swelling.   Neurological:      Mental Status: She is alert.       Results Review:    I have reviewed the laboratory and imaging data since the last note by [facility] physician.  My annotations are noted in assessment and plan.    Medication Review:  I have reviewed the current MAR.  My annotations are noted in assessment and plan.       Plan   [facility]  Problems  Wheezing, postinfectious  Recent pneumonia, treated, improved pulmonary infiltrates  Chronic hypoxia on supplemental oxygen 2 L  Acute on chronic diastolic congestive heart failure  A. fib with RVR  Hypothyroidism  CKD 3  SVT  Hemoptysis        Plan of Treatment    Now with hemoptysis.  Discussed with patient.  Unclear significance.  We will go ahead and schedule for a bronchoscopy tomorrow.  Agree with holding Eliquis for now.    Needs outpatient PFTs.  She is a never smoker and COPD is unlikely.  Still with wheezing and on steroid taper.  Outpatient work-up needed.    Recent pneumonia treated with antibiotics and improved on CT chest but not completely resolved.  Requires follow-up CT chest in 6 to 8 weeks and follow-up with pulmonary.    Continue treatment for acute component of diastolic congestive heart failure with diuretics as directed by cardiology.    A. fib and managed by cardiology.  On full anticoagulation.  Note SVT with heart rate in the 160s while examining patient.  Address per cardiology.  Anticoagulation is now on hold.    I have asked for appointment at [facility] office in 6 to 8 weeks.     Ronny Hwang MD  05/13/22  15:48 EDT    While in the room and during my examination of the patient I wore gloves, gown, mask, eye protection and followed enhanced droplet/contact isolation protocol and precautions.  I washed my hands before and after this patient encounter.    Part of this note may be an electronic transcription/translation of spoken language to printed text using the Dragon Dictation System.

## 2022-05-14 NOTE — ANESTHESIA PREPROCEDURE EVALUATION
Anesthesia Evaluation     Patient summary reviewed and Nursing notes reviewed   NPO Solid Status: > 8 hours             Airway   Mallampati: II  TM distance: >3 FB  Neck ROM: full  no difficulty expected  Dental - normal exam     Pulmonary - normal exam   (+) pneumonia , sleep apnea,   Cardiovascular - normal exam    (+) hypertension, dysrhythmias Paroxysmal Atrial Fib, CHF ,       Neuro/Psych  (+) CVA, syncope,    GI/Hepatic/Renal/Endo    (+)   renal disease CRI, thyroid problem     Musculoskeletal     Abdominal  - normal exam   Substance History      OB/GYN          Other                        Anesthesia Plan    ASA 4     general     intravenous induction     Anesthetic plan, all risks, benefits, and alternatives have been provided, discussed and informed consent has been obtained with: patient.        CODE STATUS:    While under anesthesia and immediate perioperative period:  Code Status: CPR - Full Support

## 2022-05-14 NOTE — PROGRESS NOTES
[facility] Medicine Services  PROGRESS NOTE    Patient Name: Yazmin Javier  : 1935  MRN: [id number]    Date of Admission: 2022  Primary Care Physician: Morenita Silverio MD    Subjective   Subjective     CC:  Follow-up shortness of breath    HPI:  Patient with hemoptysis yesterday.  Still breathing relatively well.  Awaiting bronchoscopy.     Review of Systems  No current fevers or chills  No current nausea, vomiting, or diarrhea  No current chest pain or palpitations     Objective   Objective     Vital Signs:   Temp:  [97.4 °F (36.3 °C)-97.9 °F (36.6 °C)] 97.8 °F (36.6 °C)  Heart Rate:  [55-82] 60  Resp:  [18-24] 18  BP: (105-134)/(60-75) 109/60        Physical Exam:  Constitutional:Awake, alert, chronically ill-appearing    HENT: NCAT, mucous membranes moist, neck supple    Respiratory:No cough, less wheezes,   improving inspiration  Cardiovascular: RRR, normal radial pulses    Gastrointestinal: Positive bowel sounds, soft, nontender, nondistended    Musculoskeletal: Elderly frail and chronically debilitated appearance, mild lower extremity edema, BMI is 31 obese    Psychiatric: Appropriate affect, cooperative, conversational    Neurologic: No slurred speech or facial droop, follows commands    Skin: No rashes or jaundice, warm      Results Reviewed:  Results from last 7 days   Lab Units 22  0354   WBC 10*3/mm3 8.94 9.08 12.49*   HEMOGLOBIN g/dL 7.6* 8.1* 9.1*   HEMATOCRIT % 24.9* 26.4* 28.2*   PLATELETS 10*3/mm3 196 217 272     Results from last 7 days   Lab Units 22  0333 22  0339 22  0354   SODIUM mmol/L 138 137 131*   POTASSIUM mmol/L 4.5 4.6 4.6   CHLORIDE mmol/L 104 101 95*   CO2 mmol/L 22.0 22.0 19.4*   BUN mg/dL 54* 56* 54*   CREATININE mg/dL 1.69* 1.81* 1.99*   GLUCOSE mg/dL 191* 222* 226*   CALCIUM mg/dL 8.5* 8.7 9.5     Estimated Creatinine Clearance: 24.4 mL/min (A) (by C-G formula based on SCr of 1.69 mg/dL (H)).    Microbiology  Results Abnormal     Procedure Component Value - Date/Time    COVID PRE-OP / PRE-PROCEDURE SCREENING ORDER (NO ISOLATION) - Swab, Nasopharynx [[id number]]  (Normal) Collected: 05/13/22 1753    Lab Status: Final result Specimen: Swab from Nasopharynx Updated: 05/13/22 1831    Narrative:      The following orders were created for panel order COVID PRE-OP / PRE-PROCEDURE SCREENING ORDER (NO ISOLATION) - Swab, Nasopharynx.  Procedure                               Abnormality         Status                     ---------                               -----------         ------                     COVID-19,[facility] IN-HOUSE...[[id number]]  Normal              Final result                 Please view results for these tests on the individual orders.    COVID-19,[facility] IN-HOUSE CEPHEID/MARY ELLEN NP SWAB IN TRANSPORT MEDIA 8-12 HR TAT - Swab, Nasopharynx [[id number]]  (Normal) Collected: 05/13/22 1753    Lab Status: Final result Specimen: Swab from Nasopharynx Updated: 05/13/22 1831     COVID19 Not Detected    Narrative:      Fact sheet for providers: https://www.fda.gov/media/134675/download    Fact sheet for patients: https://www.fda.gov/media/509955/download    Test performed by PCR.    Blood Culture - Blood, Arm, Left [[id number]]  (Normal) Collected: 05/05/22 1416    Lab Status: Final result Specimen: Blood from Arm, Left Updated: 05/10/22 1432     Blood Culture No growth at 5 days    Blood Culture - Blood, Arm, Left [[id number]]  (Normal) Collected: 05/05/22 1416    Lab Status: Final result Specimen: Blood from Arm, Left Updated: 05/10/22 1432     Blood Culture No growth at 5 days    Respiratory Panel PCR w/COVID-19(SARS-CoV-2) VIRGIL/DANIEL/DEJON/PAD/COR/MAD/ANGEL LUIS In-House, NP Swab in UTM/VTM, 3-4 HR TAT - Swab, Nasopharynx [[id number]]  (Normal) Collected: 05/07/22 1855    Lab Status: Final result Specimen: Swab from Nasopharynx Updated: 05/07/22 1951     ADENOVIRUS, PCR Not Detected     Coronavirus 229E Not Detected     Coronavirus HKU1  Not Detected     Coronavirus NL63 Not Detected     Coronavirus OC43 Not Detected     COVID19 Not Detected     Human Metapneumovirus Not Detected     Human Rhinovirus/Enterovirus Not Detected     Influenza A PCR Not Detected     Influenza B PCR Not Detected     Parainfluenza Virus 1 Not Detected     Parainfluenza Virus 2 Not Detected     Parainfluenza Virus 3 Not Detected     Parainfluenza Virus 4 Not Detected     RSV, PCR Not Detected     Bordetella pertussis pcr Not Detected     Bordetella parapertussis PCR Not Detected     Chlamydophila pneumoniae PCR Not Detected     Mycoplasma pneumo by PCR Not Detected    Narrative:      In the setting of a positive respiratory panel with a viral infection PLUS a negative procalcitonin without other underlying concern for bacterial infection, consider observing off antibiotics or discontinuation of antibiotics and continue supportive care. If the respiratory panel is positive for atypical bacterial infection (Bordetella pertussis, Chlamydophila pneumoniae, or Mycoplasma pneumoniae), consider antibiotic de-escalation to target atypical bacterial infection.    COVID PRE-OP / PRE-PROCEDURE SCREENING ORDER (NO ISOLATION) - Swab, Nasopharynx [[id number]]  (Normal) Collected: 05/05/22 1628    Lab Status: Final result Specimen: Swab from Nasopharynx Updated: 05/05/22 2204    Narrative:      The following orders were created for panel order COVID PRE-OP / PRE-PROCEDURE SCREENING ORDER (NO ISOLATION) - Swab, Nasopharynx.  Procedure                               Abnormality         Status                     ---------                               -----------         ------                     COVID-19,APTIMA PANTHER(...[[id number]]  Normal              Final result                 Please view results for these tests on the individual orders.    COVID-19,APTIMA PANTHER(JHON),[facility], NP/OP SWAB IN UTM/VTM/SALINE TRANSPORT MEDIA,24 HR TAT - Swab, Nasopharynx [[id number]]  (Normal)  Collected: 05/05/22 1628    Lab Status: Final result Specimen: Swab from Nasopharynx Updated: 05/05/22 2204     COVID19 Not Detected    Narrative:      Fact sheet for providers: https://www.fda.gov/media/562208/download     Fact sheet for patients: https://www.fda.gov/media/703537/download    Test performed by RT PCR.          Imaging Results (Last 24 Hours)     Procedure Component Value Units Date/Time    XR Chest 2 View [[id number]] Collected: 05/13/22 1827     Updated: 05/13/22 1833    Narrative:      PA AND LATERAL CHEST     CLINICAL HISTORY: Hemoptysis. Follow-up infiltrates.     Compared to the previous chest dated 05/05/2022 and a CT scan dated  05/09/2022.     There is patchy ill-defined increased density in both lungs that appears  more prominent than on the previous chest x-ray and is suspicious for  worsening bilateral pneumonia. There are no pleural effusions. The heart  is normal in size.     IMPRESSIONS: Suspect worsening bilateral pneumonia.     This report was finalized on 5/13/2022 6:30 PM by Dr. Tevin Lopez M.D.             Results for orders placed during the hospital encounter of 03/31/22    Adult Transthoracic Echo Complete W/ Cont if Necessary Per Protocol    Interpretation Summary  · Calculated left ventricular EF = 60.1% Estimated left ventricular EF = 60% Estimated left ventricular EF was in agreement with the calculated left ventricular EF. Left ventricular systolic function is normal. The left ventricular cavity is small in size. Left ventricular wall thickness is consistent with moderate concentric hypertrophy. All left ventricular wall segments contract normally. Left ventricular diastolic function was indeterminate.  · The right ventricular cavity is moderately dilated. Normal right ventricular systolic function noted.  · The left atrial cavity is moderately dilated  · The right atrial cavity is moderately dilated.  · No aortic valve regurgitation or stenosis is present. The aortic  valve is abnormal in structure. There is calcification of the aortic valve.  · Severe mitral annular calcification is present. Mild mitral valve regurgitation is present. Mean mitral valve gradient is elevated at 6 mmHg at a heart rate of 117 bpm. This is likely due to tachycardia though cannot rule out early mitral valve stenosis  · Mild tricuspid valve regurgitation is present. Estimated right ventricular systolic pressure from tricuspid regurgitation is markedly elevated (>55 mmHg). Calculated right ventricular systolic pressure from tricuspid regurgitation is 56 mmHg.      I have reviewed the medications:  Scheduled Meds:allopurinol, 300 mg, Oral, Daily  atorvastatin, 80 mg, Oral, Nightly  dilTIAZem CD, 120 mg, Oral, Q24H  folic acid, 1 mg, Oral, Daily  furosemide, 40 mg, Oral, Daily  insulin lispro, 0-7 Units, Subcutaneous, 4x Daily With Meals & Nightly  ipratropium-albuterol, 3 mL, Nebulization, 4x Daily - RT  levothyroxine, 50 mcg, Oral, Q AM  melatonin, 3 mg, Oral, Nightly  metoprolol tartrate, 12.5 mg, Oral, Q12H  PARoxetine, 10 mg, Oral, Daily  potassium chloride, 20 mEq, Oral, BID With Meals  predniSONE, 40 mg, Oral, Daily With Breakfast  vitamin B-12, 1,000 mcg, Oral, Daily      Continuous Infusions:   PRN Meds:.•  acetaminophen  •  dextrose  •  dextrose  •  glucagon (human recombinant)  •  ipratropium-albuterol  •  magnesium sulfate **OR** magnesium sulfate **OR** magnesium sulfate  •  nitroglycerin  •  ondansetron **OR** ondansetron  •  [COMPLETED] Insert peripheral IV **AND** sodium chloride    Assessment & Plan   Assessment & Plan     Active Hospital Problems    Diagnosis  POA   • **Postinfectious reactive airway disease with acute exacerbation [J45.901]  Yes   • Hyponatremia [E87.1]  Yes   • Prediabetes [R73.03]  Yes   • Acute on chronic combined systolic and diastolic CHF (congestive heart failure) ([facility]) [I50.43]  Yes   • Essential hypertension [I10]  Yes   • Chronic hypoxemic respiratory failure  ([facility]) [J96.11]  Yes   • Hypothyroidism (acquired) [E03.9]  Yes   • Chronic kidney failure, stage 3 (moderate) (HCC) [N18.30]  Yes   • Hypokalemia [E87.6]  Yes   • Hypomagnesemia [E83.42]  Yes   • Acute urinary retention [R33.8]  Yes   • DNR (do not resuscitate) [Z66]  Yes   • Atrial fibrillation with RVR ([facility]) [I48.91]  Yes   • Hemoptysis [R04.2]  Unknown      Resolved Hospital Problems   No resolved problems to display.        Brief Hospital Course to date:  Yazmin Javier is a 86 y.o. female admitted with Reactive airway disease with acute exacerbation.     Plan:  Hemoptysis occurred.  Discussed with pulmonology.  Plan is for bronchoscopy today.  Holding anticoagulation for now.  Discussed with cardiology and low-dose metoprolol started.  Cardiology approved to transition potassium to long-acting formulation.  Respiratory status improved.  Oral prednisone.  Plan to wean gradually.  Per case management can likely transition to skilled facility in 1 to 2 days.    Reactive airway disease with hemoptysis:  Patient reports being on chronic oxygen.  Oxygen needs are relatively at baseline.  Continues with intermittent wheezing. Remains on steroids managed by Pulm.  CT neck unremarkable. CT chest follow up in 6-8 weeks     Visual hallucinations: Resolved.  Reportedly having some intermittent encephalopathy; improved per nursing     CHF:  Furosemide diuresis; improved     Atrial fibrillation:  Echocardiogram normal ejection fraction.  Continue diltiazem. Rate up to 150s this morning, now improved.  Anticoagulation on Eliquis which was temporarily held for hemoptysis.  Seen by cardiology.  Will need Holter monitor study, possibly at discharge.     CKD:  Previous records reviewed and baseline renal function variable but seems to be mostly 1.3-1.5 range but at times as high as 1.8; monitoring with cardiac medicines.     Hypothyroid: Clinically euthyroid.  Continue replacement.  Normal TSH.     Chronic anemia: Anemia of  chronic disease.  Baseline Hgb usually around the range of 9.     Urinary retention: Status post urology.  Attempted voiding trial, indwelling catheter replaced     Hyponatremia: Trending    Prophylaxis: Mechanical    Discharge planning: Skilled facility in 1 to 2 days.    CODE STATUS:   Code Status and Medical Interventions:   Ordered at: 05/06/22 1134     Medical Intervention Limits:    NO intubation (DNI)     Level Of Support Discussed With:    Next of Kin (If No Surrogate)     Code Status (Patient has no pulse and is not breathing):    No CPR (Do Not Attempt to Resuscitate)     Medical Interventions (Patient has pulse or is breathing):    Limited Support       Garry Howe MD  05/14/22

## 2022-05-14 NOTE — ANESTHESIA PROCEDURE NOTES
Airway  Urgency: elective    Date/Time: 5/14/2022 3:26 PM  Airway not difficult    General Information and Staff    Patient location during procedure: OR  Anesthesiologist: Anitha Mtz MD    Indications and Patient Condition  Indications for airway management: airway protection    Preoxygenated: yes  Mask difficulty assessment: 0 - not attempted    Final Airway Details  Final airway type: supraglottic airway      Successful airway: classic and LMA  Size 4  Airway Seal Pressure (cm H2O): 20    Number of attempts at approach: 1  Assessment: lips, teeth, and gum same as pre-op and atraumatic intubation

## 2022-05-14 NOTE — PROGRESS NOTES
Yazmin Javier   86 y.o.  female    LOS: 8 days   Patient Care Team:  Morenita Silverio MD as PCP - General (Internal Medicine)      Subjective   Pt had hemoptsis  Interval History:     Patient Complaints:     Review of Systems:       Medication Review:   Current Facility-Administered Medications:   •  acetaminophen (TYLENOL) tablet 650 mg, 650 mg, Oral, Q4H PRN, Emelyn Dhillon MD, 650 mg at 05/08/22 0202  •  allopurinol (ZYLOPRIM) tablet 300 mg, 300 mg, Oral, Daily, Emelyn Dhillon MD, 300 mg at 05/14/22 1006  •  atorvastatin (LIPITOR) tablet 80 mg, 80 mg, Oral, Nightly, Emelyn Dhillon MD, 80 mg at 05/13/22 2131  •  dextrose (D50W) (25 g/50 mL) IV injection 25 g, 25 g, Intravenous, Q15 Min PRN, Garry Howe MD  •  dextrose (GLUTOSE) oral gel 15 g, 15 g, Oral, Q15 Min PRN, Garry Howe MD  •  dilTIAZem CD (CARDIZEM CD) 24 hr capsule 120 mg, 120 mg, Oral, Q24H, Garry Howe MD, 120 mg at 05/13/22 1214  •  folic acid (FOLVITE) tablet 1 mg, 1 mg, Oral, Daily, Emelyn Dhillon MD, 1 mg at 05/14/22 1005  •  furosemide (LASIX) tablet 40 mg, 40 mg, Oral, Daily, Garry Howe MD, 40 mg at 05/14/22 1006  •  glucagon (human recombinant) (GLUCAGEN DIAGNOSTIC) injection 1 mg, 1 mg, Intramuscular, Q15 Min PRN, Garry Howe MD  •  insulin lispro (ADMELOG) injection 0-7 Units, 0-7 Units, Subcutaneous, 4x Daily With Meals & Nightly, Garry Howe MD, 2 Units at 05/14/22 1005  •  ipratropium-albuterol (DUO-NEB) nebulizer solution 3 mL, 3 mL, Nebulization, 4x Daily - RT, Isauro Ramirez MD, 3 mL at 05/14/22 1127  •  ipratropium-albuterol (DUO-NEB) nebulizer solution 3 mL, 3 mL, Nebulization, Q4H PRN, Isauro Ramirez MD  •  levothyroxine (SYNTHROID, LEVOTHROID) tablet 50 mcg, 50 mcg, Oral, Q AM, StinglEmelyn MD, 50 mcg at 05/14/22 0659  •  Magnesium Sulfate 2 gram Bolus, followed by 8 gram infusion (total Mg dose 10  grams)- Mg less than or equal to 1mg/dL, 2 g, Intravenous, PRN **OR** Magnesium Sulfate 2 gram / 50mL Infusion (GIVE X 3 BAGS TO EQUAL 6GM TOTAL DOSE) - Mg 1.1 - 1.5 mg/dl, 2 g, Intravenous, PRN, Last Rate: 25 mL/hr at 05/05/22 2207, 2 g at 05/05/22 2207 **OR** Magnesium Sulfate 4 gram infusion- Mg 1.6-1.9 mg/dL, 4 g, Intravenous, PRN, Jewel Guadalupe MD  •  melatonin tablet 3 mg, 3 mg, Oral, Nightly, Jai Neal MD, 3 mg at 05/13/22 2130  •  metoprolol tartrate (LOPRESSOR) tablet 12.5 mg, 12.5 mg, Oral, Q12H, Garry Howe MD, 12.5 mg at 05/13/22 2130  •  nitroglycerin (NITROSTAT) SL tablet 0.4 mg, 0.4 mg, Sublingual, Q5 Min PRN, Emelyn Dhillon MD  •  ondansetron (ZOFRAN) tablet 4 mg, 4 mg, Oral, Q6H PRN **OR** ondansetron (ZOFRAN) injection 4 mg, 4 mg, Intravenous, Q6H PRN, Emelyn Dhillon MD  •  PARoxetine (PAXIL) tablet 10 mg, 10 mg, Oral, Daily, Emelyn Dhillon MD, 10 mg at 05/14/22 1017  •  potassium chloride (K-DUR,KLOR-CON) ER tablet 20 mEq, 20 mEq, Oral, BID With Meals, Rock Merrill MD, 20 mEq at 05/14/22 1006  •  predniSONE (DELTASONE) tablet 40 mg, 40 mg, Oral, Daily With Breakfast, Ronny Hwang MD, 40 mg at 05/14/22 1005  •  [COMPLETED] Insert peripheral IV, , , Once **AND** sodium chloride 0.9 % flush 10 mL, 10 mL, Intravenous, PRN, Jewel Guadalupe MD, 10 mL at 05/13/22 2131  •  vitamin B-12 (CYANOCOBALAMIN) tablet 1,000 mcg, 1,000 mcg, Oral, Daily, Stingl, Emelyn Duque MD, 1,000 mcg at 05/14/22 1006      Objective   Vital Sign Min/Max for last 24 hours  Temp  Min: 97.4 °F (36.3 °C)  Max: 97.9 °F (36.6 °C)   BP  Min: 105/67  Max: 134/75    Pulse  Min: 55  Max: 82     Wt Readings from Last 3 Encounters:   05/14/22 80.8 kg (178 lb 3.2 oz)   04/09/22 88 kg (194 lb 1.6 oz)   02/18/22 85.2 kg (187 lb 12.8 oz)        Intake/Output Summary (Last 24 hours) at 5/14/2022 1220  Last data filed at 5/14/2022 0540  Gross per 24 hour   Intake 330 ml   Output  1325 ml   Net -995 ml     Physical Exam:      General Appearance:    Well developed and well nourished in no acute distress   Head:    Normocephalic, atraumatic   Eyes:            Conjunctivae normal, anicteric, no xanthelasma   Neck:   supple, trachea midline, no thyromegaly, no carotid bruit, no JVD, no elevated CVP   Lungs:     Clear to auscultation,respirations regular, even and                  unlabored    Heart:    Regular rhythm and normal rate, normal S1 and S2,            No murmur, no gallop, no rub, no click   Chest Wall:    No abnormalities observed   Abdomen:     Normal bowel sounds, no masses, no organomegaly, soft        nontender, nondistended, no guarding, no rebound                tenderness   Rectal:     Deferred   Extremities:   No edema. Moves all extremities well, no cyanosis, no erythema   Pulses:   Pulses palpable and equal bilaterally   Skin:   No bleeding, bruising or rash   Neurologic:   awake alert and oriented x3, speech clear and approp, no facial drooping     :    Monitor:      Results Review:         Sodium Sodium   Date Value Ref Range Status   05/14/2022 138 136 - 145 mmol/L Final   05/13/2022 137 136 - 145 mmol/L Final   05/12/2022 131 (L) 136 - 145 mmol/L Final      Potassium Potassium   Date Value Ref Range Status   05/14/2022 4.5 3.5 - 5.2 mmol/L Final   05/13/2022 4.6 3.5 - 5.2 mmol/L Final   05/12/2022 4.6 3.5 - 5.2 mmol/L Final      Chloride Chloride   Date Value Ref Range Status   05/14/2022 104 98 - 107 mmol/L Final   05/13/2022 101 98 - 107 mmol/L Final   05/12/2022 95 (L) 98 - 107 mmol/L Final      Bicarbonate No results found for: PLASMABICARB   BUN BUN   Date Value Ref Range Status   05/14/2022 54 (H) 8 - 23 mg/dL Final   05/13/2022 56 (H) 8 - 23 mg/dL Final   05/12/2022 54 (H) 8 - 23 mg/dL Final      Creatinine Creatinine   Date Value Ref Range Status   05/14/2022 1.69 (H) 0.57 - 1.00 mg/dL Final   05/13/2022 1.81 (H) 0.57 - 1.00 mg/dL Final   05/12/2022 1.99 (H)  0.57 - 1.00 mg/dL Final      Calcium Calcium   Date Value Ref Range Status   05/14/2022 8.5 (L) 8.6 - 10.5 mg/dL Final   05/13/2022 8.7 8.6 - 10.5 mg/dL Final   05/12/2022 9.5 8.6 - 10.5 mg/dL Final      Magnesium Magnesium   Date Value Ref Range Status   05/14/2022 2.1 1.6 - 2.4 mg/dL Final        Results from last 7 days   Lab Units 05/14/22  0333   WBC 10*3/mm3 8.94   HEMOGLOBIN g/dL 7.6*   HEMATOCRIT % 24.9*   PLATELETS 10*3/mm3 196     Lab Results   Lab Value Date/Time    TROPONINT 0.013 05/05/2022 1626    TROPONINT 0.021 05/05/2022 1416    TROPONINT <0.010 03/31/2022 2054    TROPONINT <0.010 02/15/2022 0525    TROPONINT <0.010 02/14/2022 0458    TROPONINT <0.010 10/31/2021 2334    TROPONINT <0.010 10/31/2021 1907    TROPONINT <0.010 04/21/2021 2331    TROPONINT <0.010 04/04/2021 0910            Echo EF Estimated  Lab Results   Component Value Date    ECHOEFEST 60 04/03/2022         Assessment/ Plan  Assessment & Plan   Active Hospital Problems    Diagnosis  POA   • **Postinfectious reactive airway disease with acute exacerbation [J45.901]  Yes   • Hyponatremia [E87.1]  Yes   • Prediabetes [R73.03]  Yes   • Acute on chronic combined systolic and diastolic CHF (congestive heart failure) ([facility]) [I50.43]  Yes   • Essential hypertension [I10]  Yes   • Chronic hypoxemic respiratory failure ([facility]) [J96.11]  Yes   • Hypothyroidism (acquired) [E03.9]  Yes   • Chronic kidney failure, stage 3 (moderate) ([facility]) [N18.30]  Yes   • Hypokalemia [E87.6]  Yes   • Hypomagnesemia [E83.42]  Yes   • Acute urinary retention [R33.8]  Yes   • DNR (do not resuscitate) [Z66]  Yes   • Atrial fibrillation with RVR ([facility]) [I48.91]  Yes   • Hemoptysis [R04.2]  Unknown     Added automatically from request for surgery [id number]           1. Atrial fibrillation with RVR   - back in afib with rvr     2.  Acute on chronic combined systolic and diastolic CHF EF = 60.1%      3. Essential hypertension     4.  Chronic hypoxemic respiratory failure      COPD with acute exacerbation   LILIANA     5.  Hypothyroidism (acquired)     6. Chronic kidney failure, stage 3 (moderate)      7. Acute urinary retention     8.  DNR (do not resuscitate)     Continue bronchodilators  Diuresing some with Lasix.  Renal function stable  Respiratory viral PCR is negative  To short-term rehab soon    Pt pauses upto4.2 second  Will DC cardizem CD  Pt cleared for bronchoscopy        Leslie Lantigua MD  05/14/22  12:20 EDT

## 2022-05-15 NOTE — PROGRESS NOTES
[facility]  [phone]  Dr. Ronny Hwang    Subjective:  LOS: 9    Chief Complaint: CHF    She is never smoker.  She uses oxygen continuously at home at present flows.    Has not coughed up any blood since yesterday.  States she feels better and does not notice wheezing.  See below for exam.    Objective   Vital Signs past 24hrs    Temp range: Temp (24hrs), Av.8 °F (36.6 °C), Min:97.7 °F (36.5 °C), Max:98 °F (36.7 °C)    BP range: BP: (110-144)/(61-84) 118/74  Pulse range: Heart Rate:  [56-77] 76  Resp rate range: Resp:  [16-20] 18    Device (Oxygen Therapy): nasal cannulaFlow (L/min):  [2-3.5] 3  Oxygen range:SpO2:  [92 %-97 %] 97 %      80.2 kg (176 lb 12.8 oz); Body mass index is 32.34 kg/m².    Intake/Output Summary (Last 24 hours) at 5/15/2022 0941  Last data filed at 5/15/2022 0004  Gross per 24 hour   Intake 480 ml   Output 2000 ml   Net -1520 ml       Physical Exam  Constitutional:       Appearance: She is obese.   Eyes:      Pupils: Pupils are equal, round, and reactive to light.   Cardiovascular:      Rate and Rhythm: Bradycardia present. Rhythm irregular.      Heart sounds: No murmur heard.  Pulmonary:      Effort: Pulmonary effort is normal.      Breath sounds: Decreased breath sounds and wheezing (mild coarse heard best posteriorly) present.   Abdominal:      General: Bowel sounds are normal.      Palpations: Abdomen is soft. There is no mass.      Tenderness: There is no abdominal tenderness.   Musculoskeletal:         General: No swelling.   Neurological:      Mental Status: She is alert.       Results Review:    I have reviewed the laboratory and imaging data since the last note by [facility] physician.  My annotations are noted in assessment and plan.    Medication Review:  I have reviewed the current MAR.  My annotations are noted in assessment and plan.    sodium chloride, 1,000 mL, Last Rate: 1,000 mL ([id number])      Plan   PCCM Problems  Wheezing, postinfectious  Recent  pneumonia, treated, improved pulmonary infiltrates  Chronic hypoxia on supplemental oxygen 2 L  Acute on chronic diastolic congestive heart failure  HILLARY vega with RVR  Hypothyroidism  CKD 3  SVT  Hemoptysis        Plan of Treatment    Bronchoscopy showed evidence of recent bleeding but no active bleeding.  Currently holding Eliquis.  I would continue to hold anticoagulation at this time.  Await vasculitis markers.  If everything is negative and patient significantly improved then consider restarting in a week or so.    Pending vasculitis work-up.    Needs outpatient PFTs.  She is a never smoker and COPD is unlikely.  Still with wheezing and on steroids.  Do not taper and continue present dose on discharge.    Recent pneumonia treated with antibiotics and improved on CT chest but not completely resolved.  Requires follow-up CT chest in 6 to 8 weeks. CxR today.    Diuretics as directed by cardiology. HILLARY vega and managed by cardiology.    Please continue to hold anticoagulation.    I will move her appointment up in my office to 1 week from now.  We will decide on further prednisone as well as timing of restart anticoagulation.    Okay discharge home per me on prednisone 40 mg twice daily. Please inform pulmonary on discharge.    Ronny Hwang MD  05/15/22  09:41 EDT    While in the room and during my examination of the patient I wore gloves, gown, mask, eye protection and followed enhanced droplet/contact isolation protocol and precautions.  I washed my hands before and after this patient encounter.    Part of this note may be an electronic transcription/translation of spoken language to printed text using the Dragon Dictation System.

## 2022-05-15 NOTE — ANESTHESIA POSTPROCEDURE EVALUATION
Patient: Yazmin Javier    Procedure Summary     Date: 05/14/22 Room / Location:  [facility]    Anesthesia Start: 1521 Anesthesia Stop: 1553    Procedure: BRONCHOSCOPY WITH LEFT LUNG BAL (N/A Bronchus) Diagnosis:       Hemoptysis      (Hemoptysis [R04.2])    Surgeons: Ronny Hwang MD Provider: Anitha Mtz MD    Anesthesia Type: general ASA Status: 4          Anesthesia Type: general    Vitals  Vitals Value Taken Time   /69 05/14/22 1606   Temp     Pulse 72 05/14/22 1606   Resp 16 05/14/22 1606   SpO2 95 % 05/14/22 1606           Post Anesthesia Care and Evaluation    Patient location during evaluation: bedside  Patient participation: complete - patient participated  Level of consciousness: awake and alert  Pain management: adequate  Airway patency: patent  Anesthetic complications: No anesthetic complications    Cardiovascular status: acceptable  Respiratory status: acceptable  Hydration status: acceptable    Comments: /84 (BP Location: Right arm, Patient Position: Lying)   Pulse 74   Temp 36.5 °C (97.7 °F) (Oral)   Resp 16   Wt 80.8 kg (178 lb 3.2 oz)   SpO2 97%   BMI 32.59 kg/m²

## 2022-05-15 NOTE — PROGRESS NOTES
[facility] Medicine Services  PROGRESS NOTE    Patient Name: Yazmin Javier  : 1935  MRN: [id number]    Date of Admission: 2022  Primary Care Physician: Morenita Silverio MD    Subjective   Subjective     CC:  Follow-up shortness of breath    HPI:  Hemoptysis resolved.  Tolerated bronchoscopy.  Feeling better.  Patient does not quite feel ready to discharge today but thinks she will be ready tomorrow.    Review of Systems  No current fevers or chills  No current nausea, vomiting, or diarrhea  No current chest pain or palpitations     Objective   Objective     Vital Signs:   Temp:  [97.7 °F (36.5 °C)-98 °F (36.7 °C)] 98 °F (36.7 °C)  Heart Rate:  [56-77] 76  Resp:  [16-20] 18  BP: (110-144)/(61-84) 118/74        Physical Exam:  Constitutional:Awake, alert, chronically ill-appearing    HENT: NCAT, mucous membranes moist, neck supple    Respiratory:No cough, less wheezes,   improving inspiration  Cardiovascular: RRR, normal radial pulses    Gastrointestinal: Positive bowel sounds, soft, nontender, nondistended    Musculoskeletal: Elderly frail and chronically debilitated appearance, mild lower extremity edema, BMI is 31 obese    Psychiatric: Appropriate affect, cooperative, conversational    Neurologic: No slurred speech or facial droop, follows commands    Skin: No rashes or jaundice, warm      Results Reviewed:  Results from last 7 days   Lab Units 05/15/22  0435 05/14/22  0333 05/13/22  0339   WBC 10*3/mm3 6.07 8.94 9.08   HEMOGLOBIN g/dL 8.4* 7.6* 8.1*   HEMATOCRIT % 25.8* 24.9* 26.4*   PLATELETS 10*3/mm3 209 196 217     Results from last 7 days   Lab Units 05/15/22  0435 05/14/22  0333 05/13/22  0339   SODIUM mmol/L 138 138 137   POTASSIUM mmol/L 4.2 4.5 4.6   CHLORIDE mmol/L 104 104 101   CO2 mmol/L 24.0 22.0 22.0   BUN mg/dL 43* 54* 56*   CREATININE mg/dL 1.41* 1.69* 1.81*   GLUCOSE mg/dL 118* 191* 222*   CALCIUM mg/dL 8.6 8.5* 8.7     Estimated Creatinine Clearance: 31.7 mL/min (A) (by C-G  formula based on SCr of 1.41 mg/dL (H)).    Microbiology Results Abnormal     Procedure Component Value - Date/Time    COVID PRE-OP / PRE-PROCEDURE SCREENING ORDER (NO ISOLATION) - Swab, Nasopharynx [[id number]]  (Normal) Collected: 05/13/22 1753    Lab Status: Final result Specimen: Swab from Nasopharynx Updated: 05/13/22 1831    Narrative:      The following orders were created for panel order COVID PRE-OP / PRE-PROCEDURE SCREENING ORDER (NO ISOLATION) - Swab, Nasopharynx.  Procedure                               Abnormality         Status                     ---------                               -----------         ------                     COVID-19,[facility] IN-HOUSE...[[id number]]  Normal              Final result                 Please view results for these tests on the individual orders.    COVID-19,[facility] IN-HOUSE CEPHEID/MARY ELLEN NP SWAB IN TRANSPORT MEDIA 8-12 HR TAT - Swab, Nasopharynx [[id number]]  (Normal) Collected: 05/13/22 1753    Lab Status: Final result Specimen: Swab from Nasopharynx Updated: 05/13/22 1831     COVID19 Not Detected    Narrative:      Fact sheet for providers: https://www.fda.gov/media/718435/download    Fact sheet for patients: https://www.fda.gov/media/692749/download    Test performed by PCR.    Blood Culture - Blood, Arm, Left [[id number]]  (Normal) Collected: 05/05/22 1416    Lab Status: Final result Specimen: Blood from Arm, Left Updated: 05/10/22 1432     Blood Culture No growth at 5 days    Blood Culture - Blood, Arm, Left [[id number]]  (Normal) Collected: 05/05/22 1416    Lab Status: Final result Specimen: Blood from Arm, Left Updated: 05/10/22 1432     Blood Culture No growth at 5 days    Respiratory Panel PCR w/COVID-19(SARS-CoV-2) VIRGIL/DANIEL/DEJON/PAD/COR/MAD/ANGEL LUIS In-House, NP Swab in UTM/VTM, 3-4 HR TAT - Swab, Nasopharynx [[id number]]  (Normal) Collected: 05/07/22 1855    Lab Status: Final result Specimen: Swab from Nasopharynx Updated: 05/07/22 1951     ADENOVIRUS, PCR Not Detected      Coronavirus 229E Not Detected     Coronavirus HKU1 Not Detected     Coronavirus NL63 Not Detected     Coronavirus OC43 Not Detected     COVID19 Not Detected     Human Metapneumovirus Not Detected     Human Rhinovirus/Enterovirus Not Detected     Influenza A PCR Not Detected     Influenza B PCR Not Detected     Parainfluenza Virus 1 Not Detected     Parainfluenza Virus 2 Not Detected     Parainfluenza Virus 3 Not Detected     Parainfluenza Virus 4 Not Detected     RSV, PCR Not Detected     Bordetella pertussis pcr Not Detected     Bordetella parapertussis PCR Not Detected     Chlamydophila pneumoniae PCR Not Detected     Mycoplasma pneumo by PCR Not Detected    Narrative:      In the setting of a positive respiratory panel with a viral infection PLUS a negative procalcitonin without other underlying concern for bacterial infection, consider observing off antibiotics or discontinuation of antibiotics and continue supportive care. If the respiratory panel is positive for atypical bacterial infection (Bordetella pertussis, Chlamydophila pneumoniae, or Mycoplasma pneumoniae), consider antibiotic de-escalation to target atypical bacterial infection.    COVID PRE-OP / PRE-PROCEDURE SCREENING ORDER (NO ISOLATION) - Swab, Nasopharynx [[id number]]  (Normal) Collected: 05/05/22 1628    Lab Status: Final result Specimen: Swab from Nasopharynx Updated: 05/05/22 2204    Narrative:      The following orders were created for panel order COVID PRE-OP / PRE-PROCEDURE SCREENING ORDER (NO ISOLATION) - Swab, Nasopharynx.  Procedure                               Abnormality         Status                     ---------                               -----------         ------                     COVID-19,APTIMA PANTHER(...[[id number]]  Normal              Final result                 Please view results for these tests on the individual orders.    COVID-19,APTIMA PANTHER(J[facility]ON),[facility]/ JAQUAN, NP/OP SWAB IN UTM/VTM/SALINE TRANSPORT MEDIA,24  HR TAT - Swab, Nasopharynx [[id number]]  (Normal) Collected: 05/05/22 1628    Lab Status: Final result Specimen: Swab from Nasopharynx Updated: 05/05/22 2204     COVID19 Not Detected    Narrative:      Fact sheet for providers: https://www.fda.gov/media/798964/download     Fact sheet for patients: https://www.fda.gov/media/706520/download    Test performed by RT PCR.          Imaging Results (Last 24 Hours)     ** No results found for the last 24 hours. **          Results for orders placed during the hospital encounter of 03/31/22    Adult Transthoracic Echo Complete W/ Cont if Necessary Per Protocol    Interpretation Summary  · Calculated left ventricular EF = 60.1% Estimated left ventricular EF = 60% Estimated left ventricular EF was in agreement with the calculated left ventricular EF. Left ventricular systolic function is normal. The left ventricular cavity is small in size. Left ventricular wall thickness is consistent with moderate concentric hypertrophy. All left ventricular wall segments contract normally. Left ventricular diastolic function was indeterminate.  · The right ventricular cavity is moderately dilated. Normal right ventricular systolic function noted.  · The left atrial cavity is moderately dilated  · The right atrial cavity is moderately dilated.  · No aortic valve regurgitation or stenosis is present. The aortic valve is abnormal in structure. There is calcification of the aortic valve.  · Severe mitral annular calcification is present. Mild mitral valve regurgitation is present. Mean mitral valve gradient is elevated at 6 mmHg at a heart rate of 117 bpm. This is likely due to tachycardia though cannot rule out early mitral valve stenosis  · Mild tricuspid valve regurgitation is present. Estimated right ventricular systolic pressure from tricuspid regurgitation is markedly elevated (>55 mmHg). Calculated right ventricular systolic pressure from tricuspid regurgitation is 56 mmHg.      I have  reviewed the medications:  Scheduled Meds:allopurinol, 300 mg, Oral, Daily  atorvastatin, 80 mg, Oral, Nightly  folic acid, 1 mg, Oral, Daily  furosemide, 40 mg, Oral, Daily  insulin lispro, 0-7 Units, Subcutaneous, 4x Daily With Meals & Nightly  ipratropium-albuterol, 3 mL, Nebulization, 4x Daily - RT  levothyroxine, 50 mcg, Oral, Q AM  melatonin, 3 mg, Oral, Nightly  metoprolol tartrate, 12.5 mg, Oral, Q12H  PARoxetine, 10 mg, Oral, Daily  potassium chloride, 20 mEq, Oral, BID With Meals  predniSONE, 40 mg, Oral, BID With Meals  vitamin B-12, 1,000 mcg, Oral, Daily      Continuous Infusions:sodium chloride, 1,000 mL, Last Rate: 1,000 mL (05/14/22 1428)      PRN Meds:.•  acetaminophen  •  dextrose  •  dextrose  •  glucagon (human recombinant)  •  ipratropium-albuterol  •  magnesium sulfate **OR** magnesium sulfate **OR** magnesium sulfate  •  nitroglycerin  •  ondansetron **OR** ondansetron  •  [COMPLETED] Insert peripheral IV **AND** sodium chloride    Assessment & Plan   Assessment & Plan     Active Hospital Problems    Diagnosis  POA   • **Postinfectious reactive airway disease with acute exacerbation [J45.901]  Yes   • Hyponatremia [E87.1]  Yes   • Prediabetes [R73.03]  Yes   • Acute on chronic combined systolic and diastolic CHF (congestive heart failure) ([facility]) [I50.43]  Yes   • Essential hypertension [I10]  Yes   • Chronic hypoxemic respiratory failure ([facility]) [J96.11]  Yes   • Hypothyroidism (acquired) [E03.9]  Yes   • Chronic kidney failure, stage 3 (moderate) ([facility]) [N18.30]  Yes   • Hypokalemia [E87.6]  Yes   • Hypomagnesemia [E83.42]  Yes   • Acute urinary retention [R33.8]  Yes   • DNR (do not resuscitate) [Z66]  Yes   • Atrial fibrillation with RVR ([facility]) [I48.91]  Yes   • Hemoptysis [R04.2]  Unknown      Resolved Hospital Problems   No resolved problems to display.        Brief Hospital Course to date:  Yazmin Javier is a 86 y.o. female admitted with Reactive airway disease with acute  exacerbation.     Plan:  Hemoptysis now resolved.  Bronchoscopy completed and found left lower lobe as etiology but no clear lesion noted due to this likely being a distal lung issue.  Continue prednisone.  Plan to discuss restarting anticoagulation potentially today with pulmonology. Discussed with cardiology and low-dose metoprolol started.  Diltiazem now stopped due to pauses.  Respiratory status improved.  Possible discharge tomorrow pending clearance from cardiology.    Reactive airway disease with hemoptysis:  Patient reports being on chronic oxygen.  Oxygen needs are relatively at baseline.  Continues with intermittent wheezing. Remains on steroids managed by Pulm.  CT neck unremarkable. CT chest follow up in 6-8 weeks     Visual hallucinations: Resolved.  Reportedly having some intermittent encephalopathy; improved per nursing     CHF:  Furosemide diuresis; improved     Atrial fibrillation:  Echocardiogram normal ejection fraction.  Continue diltiazem. Rate up to 150s this morning, now improved.  Anticoagulation on Eliquis which was temporarily held for hemoptysis.  Seen by cardiology.  Will need Holter monitor study, possibly at discharge.     CKD:  Previous records reviewed and baseline renal function variable but seems to be mostly 1.3-1.5 range but at times as high as 1.8; monitoring with cardiac medicines.     Hypothyroid: Clinically euthyroid.  Continue replacement.  Normal TSH.     Chronic anemia: Anemia of chronic disease.  Baseline Hgb usually around the range of 9.     Urinary retention: Status post urology.  Attempted voiding trial, indwelling catheter replaced     Hyponatremia: Trending    Prophylaxis: Mechanical    Discharge planning: Skilled facility in 1 to 2 days.    CODE STATUS:   Code Status and Medical Interventions:   Ordered at: 05/06/22 1134     Medical Intervention Limits:    NO intubation (DNI)     Level Of Support Discussed With:    Next of Kin (If No Surrogate)     Code Status  (Patient has no pulse and is not breathing):    No CPR (Do Not Attempt to Resuscitate)     Medical Interventions (Patient has pulse or is breathing):    Limited Support       Garry Howe MD  05/15/22

## 2022-05-15 NOTE — PROGRESS NOTES
Yazmin Javier   86 y.o.  female    LOS: 9 days   Patient Care Team:  Morenita Silverio MD as PCP - General (Internal Medicine)      Subjective   No dyspnea today  Interval History:     Patient Complaints:     Review of Systems:       Medication Review:   Current Facility-Administered Medications:   •  acetaminophen (TYLENOL) tablet 650 mg, 650 mg, Oral, Q4H PRN, Emelyn Dhillon MD, 650 mg at 05/08/22 0202  •  allopurinol (ZYLOPRIM) tablet 300 mg, 300 mg, Oral, Daily, Emelyn Dhillon MD, 300 mg at 05/15/22 0854  •  atorvastatin (LIPITOR) tablet 80 mg, 80 mg, Oral, Nightly, Emelyn Dhillon MD, 80 mg at 05/14/22 2250  •  dextrose (D50W) (25 g/50 mL) IV injection 25 g, 25 g, Intravenous, Q15 Min PRN, Garry Howe MD  •  dextrose (GLUTOSE) oral gel 15 g, 15 g, Oral, Q15 Min PRN, Garry Howe MD  •  folic acid (FOLVITE) tablet 1 mg, 1 mg, Oral, Daily, Emelyn Dhillon MD, 1 mg at 05/15/22 0855  •  furosemide (LASIX) tablet 40 mg, 40 mg, Oral, Daily, Garry Howe MD, 40 mg at 05/15/22 0855  •  glucagon (human recombinant) (GLUCAGEN DIAGNOSTIC) injection 1 mg, 1 mg, Intramuscular, Q15 Min PRN, Garry Howe MD  •  insulin lispro (ADMELOG) injection 0-7 Units, 0-7 Units, Subcutaneous, 4x Daily With Meals & Nightly, Garry Howe MD, 4 Units at 05/14/22 2249  •  ipratropium-albuterol (DUO-NEB) nebulizer solution 3 mL, 3 mL, Nebulization, 4x Daily - RT, Isauro Ramirez MD, 3 mL at 05/15/22 0759  •  ipratropium-albuterol (DUO-NEB) nebulizer solution 3 mL, 3 mL, Nebulization, Q4H PRN, Isauro Ramirez MD  •  levothyroxine (SYNTHROID, LEVOTHROID) tablet 50 mcg, 50 mcg, Oral, Q AM, StinglEmelyn MD, 50 mcg at 05/15/22 0549  •  Magnesium Sulfate 2 gram Bolus, followed by 8 gram infusion (total Mg dose 10 grams)- Mg less than or equal to 1mg/dL, 2 g, Intravenous, PRN **OR** Magnesium Sulfate 2 gram / 50mL Infusion (GIVE X 3 BAGS  TO EQUAL 6GM TOTAL DOSE) - Mg 1.1 - 1.5 mg/dl, 2 g, Intravenous, PRN, Last Rate: 25 mL/hr at 05/05/22 2207, 2 g at 05/05/22 2207 **OR** Magnesium Sulfate 4 gram infusion- Mg 1.6-1.9 mg/dL, 4 g, Intravenous, PRN, Jewel Guadalupe MD  •  melatonin tablet 3 mg, 3 mg, Oral, Nightly, Jai Neal MD, 3 mg at 05/14/22 2250  •  metoprolol tartrate (LOPRESSOR) tablet 12.5 mg, 12.5 mg, Oral, Q12H, Garry Howe MD, 12.5 mg at 05/15/22 0854  •  nitroglycerin (NITROSTAT) SL tablet 0.4 mg, 0.4 mg, Sublingual, Q5 Min PRN, Emelyn Dhillon MD  •  ondansetron (ZOFRAN) tablet 4 mg, 4 mg, Oral, Q6H PRN **OR** ondansetron (ZOFRAN) injection 4 mg, 4 mg, Intravenous, Q6H PRN, Emelyn Dhillon MD  •  PARoxetine (PAXIL) tablet 10 mg, 10 mg, Oral, Daily, Emelyn Dhillon MD, 10 mg at 05/15/22 0854  •  potassium chloride (K-DUR,KLOR-CON) ER tablet 20 mEq, 20 mEq, Oral, BID With Meals, Rock Merrill MD, 20 mEq at 05/15/22 0854  •  predniSONE (DELTASONE) tablet 40 mg, 40 mg, Oral, BID With Meals, Ronny Hwang MD  •  [COMPLETED] Insert peripheral IV, , , Once **AND** sodium chloride 0.9 % flush 10 mL, 10 mL, Intravenous, PRN, Jewel Guadalupe MD, 10 mL at 05/15/22 0855  •  sodium chloride 0.9 % infusion 1,000 mL, 1,000 mL, Intravenous, Continuous, Ronny Hwang MD, Last Rate: 25 mL/hr at 05/14/22 1428, 1,000 mL at 05/14/22 1428  •  vitamin B-12 (CYANOCOBALAMIN) tablet 1,000 mcg, 1,000 mcg, Oral, Daily, Stingl, Emelyn Duque MD, 1,000 mcg at 05/15/22 0855      Objective   Vital Sign Min/Max for last 24 hours  Temp  Min: 97.7 °F (36.5 °C)  Max: 98 °F (36.7 °C)   BP  Min: 110/64  Max: 144/75    Pulse  Min: 56  Max: 77     Wt Readings from Last 3 Encounters:   05/15/22 80.2 kg (176 lb 12.8 oz)   04/09/22 88 kg (194 lb 1.6 oz)   02/18/22 85.2 kg (187 lb 12.8 oz)        Intake/Output Summary (Last 24 hours) at 5/15/2022 1123  Last data filed at 5/15/2022 0004  Gross per 24 hour   Intake 480 ml   Output  2000 ml   Net -1520 ml     Physical Exam:      General Appearance:    Well developed and well nourished in no acute distress   Head:    Normocephalic, atraumatic   Eyes:            Conjunctivae normal, anicteric, no xanthelasma   Neck:   supple, trachea midline, no thyromegaly, no carotid bruit, no JVD, no elevated CVP   Lungs:     Clear to auscultation,respirations regular, even and                  unlabored    Heart:    Regular rhythm and normal rate, normal S1 and S2,            No murmur, no gallop, no rub, no click   Chest Wall:    No abnormalities observed   Abdomen:     Normal bowel sounds, no masses, no organomegaly, soft        nontender, nondistended, no guarding, no rebound                tenderness   Rectal:     Deferred   Extremities:   No edema. Moves all extremities well, no cyanosis, no erythema   Pulses:   Pulses palpable and equal bilaterally   Skin:   No bleeding, bruising or rash   Neurologic:   awake alert and oriented x3, speech clear and approp, no facial drooping     :    Monitor:      Results Review:         Sodium Sodium   Date Value Ref Range Status   05/15/2022 138 136 - 145 mmol/L Final   05/14/2022 138 136 - 145 mmol/L Final   05/13/2022 137 136 - 145 mmol/L Final      Potassium Potassium   Date Value Ref Range Status   05/15/2022 4.2 3.5 - 5.2 mmol/L Final   05/14/2022 4.5 3.5 - 5.2 mmol/L Final   05/13/2022 4.6 3.5 - 5.2 mmol/L Final      Chloride Chloride   Date Value Ref Range Status   05/15/2022 104 98 - 107 mmol/L Final   05/14/2022 104 98 - 107 mmol/L Final   05/13/2022 101 98 - 107 mmol/L Final      Bicarbonate No results found for: PLASMABICARB   BUN BUN   Date Value Ref Range Status   05/15/2022 43 (H) 8 - 23 mg/dL Final   05/14/2022 54 (H) 8 - 23 mg/dL Final   05/13/2022 56 (H) 8 - 23 mg/dL Final      Creatinine Creatinine   Date Value Ref Range Status   05/15/2022 1.41 (H) 0.57 - 1.00 mg/dL Final   05/14/2022 1.69 (H) 0.57 - 1.00 mg/dL Final   05/13/2022 1.81 (H) 0.57 -  1.00 mg/dL Final      Calcium Calcium   Date Value Ref Range Status   05/15/2022 8.6 8.6 - 10.5 mg/dL Final   05/14/2022 8.5 (L) 8.6 - 10.5 mg/dL Final   05/13/2022 8.7 8.6 - 10.5 mg/dL Final      Magnesium Magnesium   Date Value Ref Range Status   05/14/2022 2.1 1.6 - 2.4 mg/dL Final        Results from last 7 days   Lab Units 05/15/22  0435   WBC 10*3/mm3 6.07   HEMOGLOBIN g/dL 8.4*   HEMATOCRIT % 25.8*   PLATELETS 10*3/mm3 209     Lab Results   Lab Value Date/Time    TROPONINT 0.013 05/05/2022 1626    TROPONINT 0.021 05/05/2022 1416    TROPONINT <0.010 03/31/2022 2054    TROPONINT <0.010 02/15/2022 0525    TROPONINT <0.010 02/14/2022 0458    TROPONINT <0.010 10/31/2021 2334    TROPONINT <0.010 10/31/2021 1907    TROPONINT <0.010 04/21/2021 2331    TROPONINT <0.010 04/04/2021 0910            Echo EF Estimated  Lab Results   Component Value Date    ECHOEFEST 60 04/03/2022         Assessment/ Plan  Assessment & Plan   Active Hospital Problems    Diagnosis  POA   • **Postinfectious reactive airway disease with acute exacerbation [J45.901]  Yes   • Hyponatremia [E87.1]  Yes   • Prediabetes [R73.03]  Yes   • Acute on chronic combined systolic and diastolic CHF (congestive heart failure) ([facility]) [I50.43]  Yes   • Essential hypertension [I10]  Yes   • Chronic hypoxemic respiratory failure ([facility]) [J96.11]  Yes   • Hypothyroidism (acquired) [E03.9]  Yes   • Chronic kidney failure, stage 3 (moderate) ([facility]) [N18.30]  Yes   • Hypokalemia [E87.6]  Yes   • Hypomagnesemia [E83.42]  Yes   • Acute urinary retention [R33.8]  Yes   • DNR (do not resuscitate) [Z66]  Yes   • Atrial fibrillation with RVR ([facility]) [I48.91]  Yes   • Hemoptysis [R04.2]  Unknown     Added automatically from request for surgery [id number]           1. Atrial fibrillation with RVR   - back in afib with rvr     2.  Acute on chronic combined systolic and diastolic CHF EF = 60.1%      3. Essential hypertension     4.  Chronic hypoxemic respiratory failure     COPD with  acute exacerbation   LILIANA     5.  Hypothyroidism (acquired)     6. Chronic kidney failure, stage 3 (moderate)      7. Acute urinary retention     8.  DNR (do not resuscitate)     Continue bronchodilators  Diuresing some with Lasix.  Renal function stable  Respiratory viral PCR is negative  To short-term rehab soon     Pt pauses upto4.2 second  Will DC cardizem CD       Bronchoscopy showed evidence of recent bleeding but no active bleeding.  Currently holding Eliquis.  I would continue to hold anticoagulation at this time.  Await vasculitis markers.  If everything is negative and patient significantly improved then consider restarting in a week or so per punmology  Will disscuss with Dr.Gaitonde Leslie Lantigua MD  05/15/22  11:23 EDT

## 2022-05-15 NOTE — PLAN OF CARE
Goal Outcome Evaluation:  Plan of Care Reviewed With: patient           Outcome Evaluation: pt is A OX4, 3L NC, afib, ac/hs, assist X1 BSC, f/c remains in place, pt wasn't ready to be discharged today but possibly tomorrow.  Problem: Adult Inpatient Plan of Care  Goal: Plan of Care Review  Outcome: Ongoing, Progressing  Flowsheets (Taken 5/15/2022 1319)  Plan of Care Reviewed With: patient  Outcome Evaluation: pt is A OX4, 3L NC, afib, ac/hs, assist X1 BSC, f/c remains in place, pt wasn't ready to be discharged today but possibly tomorrow.  Goal: Patient-Specific Goal (Individualized)  Outcome: Ongoing, Progressing  Goal: Absence of Hospital-Acquired Illness or Injury  Outcome: Ongoing, Progressing  Intervention: Identify and Manage Fall Risk  Description: Perform standard risk assessment on admission using a validated tool or comprehensive approach appropriate to the patient; reassess fall risk frequently, with change in status or transfer to another level of care.  Communicate fall injury risk to interprofessional healthcare team.  Determine need for increased observation, equipment and environmental modification, such as low bed, signage and supportive, nonskid footwear.  Adjust safety measures to individual developmental age, stage and identified risk factors.  Reinforce the importance of safety and physical activity with patient and family.  Perform regular intentional rounding to assess need for position change, pain assessment and personal needs, including assistance with toileting.  Recent Flowsheet Documentation  Taken 5/15/2022 1203 by Adriane Juares, RN  Safety Promotion/Fall Prevention:   activity supervised   assistive device/personal items within reach   clutter free environment maintained   fall prevention program maintained   nonskid shoes/slippers when out of bed   safety round/check completed   room organization consistent  Taken 5/15/2022 1050 by Adriane Juares, RN  Safety Promotion/Fall  Prevention:   activity supervised   assistive device/personal items within reach   clutter free environment maintained   fall prevention program maintained   nonskid shoes/slippers when out of bed   room organization consistent   safety round/check completed  Intervention: Prevent and Manage VTE (Venous Thromboembolism) Risk  Description: Assess for VTE (venous thromboembolism) risk.  Encourage and assist with early ambulation.  Initiate and maintain compression or other therapy, as indicated, based on identified risk in accordance with organizational protocol and provider order.  Encourage both active and passive leg exercises while in bed, if unable to ambulate.  Recent Flowsheet Documentation  Taken 5/15/2022 1203 by Adriane Juares RN  Activity Management:   activity adjusted per tolerance   activity encouraged  Taken 5/15/2022 1050 by Adriane Juares RN  Activity Management:   activity adjusted per tolerance   activity encouraged  Taken 5/15/2022 0922 by Adriane Juares RN  VTE Prevention/Management:   bilateral   dorsiflexion/plantar flexion performed  Intervention: Prevent Infection  Description: Maintain skin and mucous membrane integrity; promote hand, oral and pulmonary hygiene.  Optimize fluid balance, nutrition, sleep and glycemic control to maximize infection resistance.  Identify potential sources of infection early to prevent or mitigate progression of infection (e.g., wound, lines, devices).  Evaluate ongoing need for invasive devices; remove promptly when no longer indicated.  Recent Flowsheet Documentation  Taken 5/15/2022 1203 by Adriane Juares RN  Infection Prevention: single patient room provided  Taken 5/15/2022 1050 by Adriane Juares RN  Infection Prevention: single patient room provided  Taken 5/15/2022 0922 by Adriane Juares RN  Infection Prevention: single patient room provided  Goal: Optimal Comfort and Wellbeing  Outcome: Ongoing, Progressing  Intervention: Provide  Person-Centered Care  Description: Use a family-focused approach to care.  Develop trust and rapport by proactively providing information, encouraging questions, addressing concerns and offering reassurance.  Acknowledge emotional response to hospitalization.  Recognize and utilize personal coping strategies.  Honor spiritual and cultural preferences.  Recent Flowsheet Documentation  Taken 5/15/2022 0922 by Adriane Juares RN  Trust Relationship/Rapport:   care explained   thoughts/feelings acknowledged  Goal: Readiness for Transition of Care  Outcome: Ongoing, Progressing     Problem: Skin Injury Risk Increased  Goal: Skin Health and Integrity  Outcome: Ongoing, Progressing  Intervention: Optimize Skin Protection  Description: Perform a full pressure injury risk assessment, as indicated by screening, upon admission to care unit.  Reassess skin (injury risk, full inspection) frequently (e.g., scheduled interval, with change in condition) to provide optimal early detection and prevention.  Maintain adequate tissue perfusion (e.g., encourage fluid balance; avoid crossing legs, constrictive clothing or devices) to promote tissue oxygenation.  Maintain head of bed at lowest degree of elevation tolerated, considering medical condition and other restrictions.  Avoid positioning onto an area that remains reddened.  Minimize incontinence and moisture (e.g., toileting schedule; moisture-wicking pad, diaper or incontinence collection device; skin moisture barrier).  Cleanse skin promptly and gently when soiled utilizing a pH-balanced cleanser.  Relieve and redistribute pressure (e.g., scheduled position changes, weight shifts, use of support surface, medical device repositioning, protective dressing application, use of positioning device, microclimate control, use of pressure-injury-monitor  Encourage increased activity, such as sitting in a chair at the bedside or early mobilization, when able to tolerate.  Recent Flowsheet  Documentation  Taken 5/15/2022 0922 by Adriane Juares RN  Pressure Reduction Techniques: frequent weight shift encouraged  Pressure Reduction Devices: alternating pressure pump (ADD)     Problem: Fall Injury Risk  Goal: Absence of Fall and Fall-Related Injury  Outcome: Ongoing, Progressing  Intervention: Identify and Manage Contributors  Description: Develop a fall prevention plan with the patient and caregiver/family.  Provide reorientation, appropriate sensory stimulation and routines with changes in mental status to decrease risk of fall.  Promote use of personal vision and auditory aids.  Assess assistance level required for safe and effective self-care; provide support as needed, such as toileting, mobilization. For age 65 and older, implement timed toileting with assistance.  Encourage physical activity, such as performance of mobility and self-care at highest level of patient ability, multicomponent exercise program and provision of appropriate assistive devices.  If fall occurs, assess the severity of injury; implement fall injury protocol. Determine the cause and revise fall injury prevention plan.  Regularly review medication contribution to fall risk; adjust medication administration times to minimize risk of falling.  Consider risk related to polypharmacy and age.  Balance adequate pain management with potential for oversedation.  Recent Flowsheet Documentation  Taken 5/15/2022 1203 by Adriane Juares, RN  Medication Review/Management: medications reviewed  Taken 5/15/2022 1050 by Adriane Juares RN  Medication Review/Management: medications reviewed  Taken 5/15/2022 0922 by Adriane Juares RN  Medication Review/Management: medications reviewed  Intervention: Promote Injury-Free Environment  Description: Provide a safe, barrier-free environment that encourages independent activity.  Keep care area uncluttered and well-lighted.  Determine need for increased observation or monitoring.  Avoid use of  devices that minimize mobility, such as restraints or indwelling urinary catheter.  Recent Flowsheet Documentation  Taken 5/15/2022 1203 by Adriane Juares RN  Safety Promotion/Fall Prevention:   activity supervised   assistive device/personal items within reach   clutter free environment maintained   fall prevention program maintained   nonskid shoes/slippers when out of bed   safety round/check completed   room organization consistent  Taken 5/15/2022 1050 by Adriane Juares RN  Safety Promotion/Fall Prevention:   activity supervised   assistive device/personal items within reach   clutter free environment maintained   fall prevention program maintained   nonskid shoes/slippers when out of bed   room organization consistent   safety round/check completed     Problem: Dysrhythmia  Goal: Normalized Cardiac Rhythm  Outcome: Ongoing, Progressing  Intervention: Monitor and Manage Cardiac Rhythm Effect  Description: Monitor electrocardiogram closely for rate and rhythm changes; evaluate response to treatment.  Recognize elevated risk for VTE (venous thromboembolism); implement prophylaxis.  Anticipate administration of pharmacologic therapy to prevent or manage dysrhythmia, such as an antidysrhythmic agent, electrolyte replacement or binding agent.  If hypotensive, lower head of bed to enhance cerebral blood flow.  Provide oxygen therapy judiciously to avoid hyperoxemia; adjust to achieve oxygenation goal.  Promote comfort to prevent dysrhythmia triggered by increased sympathetic tone; consider pharmacologic and nonpharmacologic strategies (e.g., calm environment, rest, stress-reduction).  Anticipate need for additional therapy, such as cardioversion or cardiac rhythm management device, to improve perfusion and hemodynamic stability.  Initiate emergency protocol if life-threatening rhythm disturbance occurs.  Acknowledge fear and anxiety; encourage questions; provide support and information.  Recent Flowsheet  Documentation  Taken 5/15/2022 0922 by Adriane Juares, RN  VTE Prevention/Management:   bilateral   dorsiflexion/plantar flexion performed

## 2022-05-16 PROBLEM — E83.42 HYPOMAGNESEMIA: Status: RESOLVED | Noted: 2022-01-01 | Resolved: 2022-01-01

## 2022-05-16 PROBLEM — R04.2 HEMOPTYSIS: Status: RESOLVED | Noted: 2022-01-01 | Resolved: 2022-01-01

## 2022-05-16 PROBLEM — E87.6 HYPOKALEMIA: Status: RESOLVED | Noted: 2022-01-01 | Resolved: 2022-01-01

## 2022-05-16 PROBLEM — J15.6 PNEUMONIA DUE TO GRAM-NEGATIVE BACTERIA (HCC): Status: ACTIVE | Noted: 2022-01-01

## 2022-05-16 NOTE — PROGRESS NOTES
[facility]  [phone]  Dr. Ronny Hwang    Subjective:  LOS: 10    Chief Complaint: CHF    She is never smoker.  She uses oxygen continuously at home at present flows.    Sitting beside a bed and generally feels better.  Denies cough or phlegm.  Does not notice any subjective wheezing.    Objective   Vital Signs past 24hrs    Temp range: Temp (24hrs), Av.8 °F (36.6 °C), Min:97.7 °F (36.5 °C), Max:97.9 °F (36.6 °C)    BP range: BP: (121-145)/(66-79) 121/76  Pulse range: Heart Rate:  [74-77] 77  Resp rate range: Resp:  [14-20] 14    Device (Oxygen Therapy): nasal cannulaFlow (L/min):  [2-3] 2  Oxygen range:SpO2:  [89 %-100 %] 94 %      78.9 kg (173 lb 14.4 oz); Body mass index is 28.07 kg/m².    Intake/Output Summary (Last 24 hours) at 2022 1150  Last data filed at 5/15/2022 2318  Gross per 24 hour   Intake 240 ml   Output 1400 ml   Net -1160 ml       Physical Exam  Constitutional:       Appearance: She is obese.   Eyes:      Pupils: Pupils are equal, round, and reactive to light.   Cardiovascular:      Rate and Rhythm: Normal rate. Rhythm irregular.      Heart sounds: No murmur heard.  Pulmonary:      Effort: Pulmonary effort is normal.      Breath sounds: Decreased breath sounds and wheezing (very mild) present.   Abdominal:      General: Bowel sounds are normal.      Palpations: Abdomen is soft. There is no mass.      Tenderness: There is no abdominal tenderness.   Musculoskeletal:         General: No swelling.   Neurological:      Mental Status: She is alert.       Results Review:    I have reviewed the laboratory and imaging data since the last note by [facility] physician.  My annotations are noted in assessment and plan.    Medication Review:  I have reviewed the current MAR.  My annotations are noted in assessment and plan.       Plan   PCCM Problems  Wheezing, postinfectious  Recent pneumonia, treated, improved pulmonary infiltrates  Chronic hypoxia on supplemental oxygen 2 L  Acute on  chronic diastolic congestive heart failure  ALICIA. gary with RVR  Hypothyroidism  CKD 3  SVT  Hemoptysis        Plan of Treatment    Bronchoscopy showed evidence of recent bleeding but no active bleeding.  Currently holding Eliquis.  I would continue to hold anticoagulation at this time.  Await vasculitis markers.  If everything is negative and patient significantly improved then consider restarting in a week or so.  In the meantime continue prednisone 40 mg twice daily.  Also see below.    Pending vasculitis work-up.    Needs outpatient PFTs.  She is a never smoker and COPD is unlikely.  Still with wheezing and on steroids.  Do not taper and continue present dose on discharge.    Bronchoscopy showed persistent Klebsiella and Serratia.  Await sensitivity.  For now treat with Levaquin.  Discussed with hospitalist.    Diuretics as directed by cardiology. HILLARY vega and managed by cardiology.    Please continue to hold anticoagulation.    I have moved her appointment to 1 week from now.    Okay discharge home per me on prednisone 40 mg twice daily and Levaquin as above.     Outpatient plan is to review respiratory cultures, sputum cytology from bronchoscopy and vasculitis markers.  If patient is appropriately treated for infectious process and the vasculitis markers are negative then I will plan on quick taper of steroids over the next subsequent 3 weeks.  I will also then consider resuming her Eliquis provided she has not been coughing up any further blood.    Ronny Hwang MD  05/16/22  11:50 EDT    While in the room and during my examination of the patient I wore gloves, gown, mask, eye protection and followed enhanced droplet/contact isolation protocol and precautions.  I washed my hands before and after this patient encounter.    Part of this note may be an electronic transcription/translation of spoken language to printed text using the Dragon Dictation System.

## 2022-05-16 NOTE — PLAN OF CARE
Goal Outcome Evaluation:  Plan of Care Reviewed With: patient        Progress: improving  Outcome Evaluation: Pt continues to demonstrate slow but steady progress with strength and endurance. Able to tolerate increased ambulation distance today. Pt continues to require assist x1 and cues for safety with mobility; will continue to benefit from PT for continued strengthening and mobility training.

## 2022-05-16 NOTE — THERAPY TREATMENT NOTE
Patient Name: Yazmin Javier  : 1935    MRN: [id number]                              Today's Date: 2022       Admit Date: 2022    Visit Dx:     ICD-10-CM ICD-9-CM   1. Atrial fibrillation with RVR (HCC)  I48.91 427.31   2. Pneumonia of both lower lobes due to infectious organism  J18.9 486   3. Hemoptysis  R04.2 786.30     Patient Active Problem List   Diagnosis   • Acute on chronic congestive heart failure (HCC)   • Hypoxia   • Paroxysmal atrial fibrillation (HCC)   • Chronic anticoagulation   • Hypothyroidism (acquired)   • Stage 3b chronic kidney disease (HCC)   • Right arm weakness   • Chronic diastolic CHF (congestive heart failure) (HCC)   • Syncope   • Acute respiratory failure with hypoxia and hypercapnia (HCC)   • Acute pulmonary edema (HCC)   • Acute on chronic respiratory failure with hypoxia (HCC)   • Fever in adult   • History of asthma   • Hyperglycemia   • Hypokalemia   • BRENDA (acute kidney injury) (HCC)   • Hyperbilirubinemia   • Transaminitis   • Pneumonia involving right lung   • Pulmonary hypertension (HCC)   • Atrial fibrillation with RVR (HCC)   • Acute on chronic combined systolic and diastolic CHF (congestive heart failure) (HCC)   • Essential hypertension   • Chronic hypoxemic respiratory failure (HCC)   • Hypothyroidism (acquired)   • Chronic kidney failure, stage 3 (moderate) (HCC)   • Hypokalemia   • Hypomagnesemia   • Acute urinary retention   • DNR (do not resuscitate)   • Prediabetes   • Postinfectious reactive airway disease with acute exacerbation   • Hyponatremia   • Hemoptysis     Past Medical History:   Diagnosis Date   • CHF (congestive heart failure) (HCC)    • Sleep apnea    • Stroke (HCC)      Past Surgical History:   Procedure Laterality Date   • BRONCHOSCOPY N/A 2022    Procedure: BRONCHOSCOPY WITH LEFT LUNG BAL;  Surgeon: Ronny Hwang MD;  Location: [facility] ENDOSCOPY;  Service: Pulmonary;  Laterality: N/A;  HEMOPTYSIS      General Information     Row Name  05/16/22 0935          Physical Therapy Time and Intention    Document Type therapy note (daily note)  -EE     Mode of Treatment physical therapy;individual therapy  -EE     Row Name 05/16/22 0935          General Information    Existing Precautions/Restrictions fall;oxygen therapy device and L/min  -EE     Barriers to Rehab none identified  -EE     Row Name 05/16/22 0935          Cognition    Orientation Status (Cognition) oriented x 3  -EE     Row Name 05/16/22 0935          Safety Issues, Functional Mobility    Impairments Affecting Function (Mobility) balance;endurance/activity tolerance;shortness of breath;strength  -EE           User Key  (r) = Recorded By, (t) = Taken By, (c) = Cosigned By    Initials Name Provider Type    EE Keerthi Freedman, PT Physical Therapist               Mobility     Row Name 05/16/22 0935          Bed Mobility    Supine-Sit Roberts (Bed Mobility) standby assist  -EE     Assistive Device (Bed Mobility) head of bed elevated;bed rails  -EE     Row Name 05/16/22 0935          Transfers    Comment, (Transfers) Vc's for hand placement during STS transfers. STS x 1 from EOB, x 2 from chair surface. Pt demonstrated 1 LOB posteriorly when backing up to sit in chair, required mod A to recover balance. Cued to make sure she feels chair with legs and hands prior to sitting and pt verbalized understanding.  -EE     Row Name 05/16/22 0935          Sit-Stand Transfer    Sit-Stand Roberts (Transfers) contact guard;verbal cues  -EE     Assistive Device (Sit-Stand Transfers) walker, front-wheeled  -EE     Row Name 05/16/22 0935          Gait/Stairs (Locomotion)    Roberts Level (Gait) contact guard;verbal cues  -EE     Assistive Device (Gait) walker, front-wheeled  -EE     Distance in Feet (Gait) 20' x 2  -EE     Deviations/Abnormal Patterns (Gait) vinicius decreased;stride length decreased  -EE     Bilateral Gait Deviations forward flexed posture  -EE     Comment, (Gait/Stairs) vc's for  upright posture and pursed lip breathing during activity  -EE           User Key  (r) = Recorded By, (t) = Taken By, (c) = Cosigned By    Initials Name Provider Type    EE Keerthi Freedman PT Physical Therapist               Obj/Interventions     Row Name 05/16/22 0937          Motor Skills    Therapeutic Exercise other (see comments)  sitting B ankle pumps, LAQ x 10 reps each  -EE           User Key  (r) = Recorded By, (t) = Taken By, (c) = Cosigned By    Initials Name Provider Type    Keerthi Cobian PT Physical Therapist               Goals/Plan    No documentation.                Clinical Impression     Row Name 05/16/22 0937          Pain    Pretreatment Pain Rating 0/10 - no pain  -EE     Posttreatment Pain Rating 0/10 - no pain  -EE     Row Name 05/16/22 0937          Plan of Care Review    Plan of Care Reviewed With patient  -EE     Progress improving  -EE     Outcome Evaluation Pt continues to demonstrate slow but steady progress with strength and endurance. Able to tolerate increased ambulation distance today. Pt continues to require assist x1 and cues for safety with mobility; will continue to benefit from PT for continued strengthening and mobility training.  -EE     Row Name 05/16/22 0937          Vital Signs    Pre SpO2 (%) 94  -EE     O2 Delivery Pre Treatment supplemental O2  -EE     Intra SpO2 (%) 90  -EE     O2 Delivery Intra Treatment supplemental O2  -EE     Post SpO2 (%) 95  -EE     O2 Delivery Post Treatment supplemental O2  -EE     Pre Patient Position Supine  -EE     Intra Patient Position Standing  -EE     Post Patient Position Sitting  -EE     Row Name 05/16/22 0937          Positioning and Restraints    Pre-Treatment Position in bed  -EE     Post Treatment Position chair  -EE     In Chair reclined;call light within reach;encouraged to call for assist;exit alarm on;legs elevated;notified nsg  -EE           User Key  (r) = Recorded By, (t) = Taken By, (c) = Cosigned By    Initials Name  Provider Type    Keerthi Cobian, VELASQUEZ Physical Therapist               Outcome Measures     Row Name 05/16/22 0938          How much help from another person do you currently need...    Turning from your back to your side while in flat bed without using bedrails? 3  -EE     Moving from lying on back to sitting on the side of a flat bed without bedrails? 3  -EE     Moving to and from a bed to a chair (including a wheelchair)? 3  -EE     Standing up from a chair using your arms (e.g., wheelchair, bedside chair)? 3  -EE     Climbing 3-5 steps with a railing? 2  -EE     To walk in hospital room? 3  -EE     AM-PAC 6 Clicks Score (PT) 17  -EE     Highest level of mobility 5 --> Static standing  -EE           User Key  (r) = Recorded By, (t) = Taken By, (c) = Cosigned By    Initials Name Provider Type    Keerthi Cobian PT Physical Therapist                             Physical Therapy Education                 Title: PT OT SLP Therapies (In Progress)     Topic: Physical Therapy (Done)     Point: Mobility training (Done)     Learning Progress Summary           Patient Acceptance, E,TB, VU,NR by EE at 5/16/2022 0938    Acceptance, E,TB, VU,NR by EE at 5/13/2022 1555    Acceptance, E,D, VU,NR by PH at 5/11/2022 1547    Acceptance, E,D, VU,NR by MS at 5/9/2022 1538    Acceptance, E,D, VU,NR by MS at 5/7/2022 1525                   Point: Home exercise program (Done)     Learning Progress Summary           Patient Acceptance, E,TB, VU,NR by EE at 5/16/2022 0938    Acceptance, E,D, VU,NR by PH at 5/11/2022 1547    Acceptance, E,D, VU,NR by MS at 5/9/2022 1538    Acceptance, E,D, VU,NR by MS at 5/7/2022 1525                   Point: Body mechanics (Done)     Learning Progress Summary           Patient Acceptance, E,TB, VU,NR by EE at 5/16/2022 0938    Acceptance, E,D, VU,NR by PH at 5/11/2022 1547    Acceptance, E,D, VU,NR by MS at 5/9/2022 1538    Acceptance, E,D, VU,NR by MS at 5/7/2022 1525                   Point:  Precautions (Done)     Learning Progress Summary           Patient Acceptance, E,TB, VU,NR by  at 5/16/2022 0938    Acceptance, E,D, VU,NR by PH at 5/11/2022 1547    Acceptance, E,D, VU,NR by MS at 5/9/2022 1538    Acceptance, E,D, VU,NR by MS at 5/7/2022 1525                               User Key     Initials Effective Dates Name Provider Type Discipline     06/16/21 -  Keerthi Freedman, PT Physical Therapist PT    MS 06/16/21 -  Jf Hidalgo PT Physical Therapist PT     06/16/21 -  Janet Coronado PTA Physical Therapist Assistant PT              PT Recommendation and Plan     Plan of Care Reviewed With: patient  Progress: improving  Outcome Evaluation: Pt continues to demonstrate slow but steady progress with strength and endurance. Able to tolerate increased ambulation distance today. Pt continues to require assist x1 and cues for safety with mobility; will continue to benefit from PT for continued strengthening and mobility training.     Time Calculation:    PT Charges     Row Name 05/16/22 0938             Time Calculation    Start Time 0920  -EE      Stop Time 0934  -EE      Time Calculation (min) 14 min  -EE      PT Received On 05/16/22  -EE      PT - Next Appointment 05/17/22  -EE              Time Calculation- PT    Total Timed Code Minutes- PT 14 minute(s)  -EE              Timed Charges    86393 - Gait Training Minutes  14  -EE              Total Minutes    Timed Charges Total Minutes 14  -EE       Total Minutes 14  -EE            User Key  (r) = Recorded By, (t) = Taken By, (c) = Cosigned By    Initials Name Provider Type    EE Keerthi Freedman, PT Physical Therapist              Therapy Charges for Today     Code Description Service Date Service Provider Modifiers Qty    [id number] HC GAIT TRAINING EA 15 MIN 5/16/2022 Keerthi Freedman, PT GP 1          PT G-Codes  Outcome Measure Options: AM-PAC 6 Clicks Basic Mobility (PT)  AM-PAC 6 Clicks Score (PT): 17  AM-PAC 6 Clicks Score (OT): 18      Patient was not wearing a face mask during this therapy encounter. Therapist used appropriate personal protective equipment including mask and gloves.  Mask used was standard procedure mask. Appropriate PPE was worn during the entire therapy session. Hand hygiene was completed before and after therapy session. Patient is not in enhanced droplet precautions.       Keerthi Freedman, PT  5/16/2022

## 2022-05-16 NOTE — PLAN OF CARE
Goal Outcome Evaluation:  Plan of Care Reviewed With: patient        Progress: improving  Outcome Evaluation: vss. pt rested well over shift. 2l nc. wynne care completed. poss dc to [facility] today

## 2022-05-16 NOTE — DISCHARGE SUMMARY
[facility] Medicine Services  DISCHARGE SUMMARY    Patient Name: Yazmin Javier  : 1935  MRN: [id number]    Date of Admission: 2022  1:43 PM  Date of Discharge: 2022  Primary Care Physician: Morenita Silverio MD    Consults     Date and Time Order Name Status Description    2022  2:02 PM Inpatient Urology Consult Completed     2022  9:14 PM Inpatient Pulmonology Consult Completed     2022  9:10 PM Inpatient Cardiology Consult      2022  5:01 PM A (on-call MD unless specified) Details      4/3/2022  5:40 AM Inpatient Pulmonology Consult Completed           Hospital Course       Active Hospital Problems    Diagnosis  POA   • **Postinfectious reactive airway disease with acute exacerbation [J45.901]  Yes   • Pneumonia due to gram-negative bacteria, left lower lobe bronchoscopy cultures growing Serratia and Klebsiella ([facility]) [J15.6]  Yes   • Hyponatremia [E87.1]  Yes   • Prediabetes [R73.03]  Yes   • Acute on chronic combined systolic and diastolic CHF (congestive heart failure) ([facility]) [I50.43]  Yes   • Essential hypertension [I10]  Yes   • Chronic hypoxemic respiratory failure ([facility]) [J96.11]  Yes   • Hypothyroidism (acquired) [E03.9]  Yes   • Chronic kidney failure, stage 3 (moderate) ([facility]) [N18.30]  Yes   • Acute urinary retention [R33.8]  Yes   • DNR (do not resuscitate) [Z66]  Yes   • Atrial fibrillation with RVR ([facility]) [I48.91]  Yes      Resolved Hospital Problems    Diagnosis Date Resolved POA   • Hypokalemia [E87.6] 2022 Yes   • Hypomagnesemia [E83.42] 2022 Yes   • Hemoptysis [R04.2] 2022 Yes          Hospital Course:  Yazmin Javier is a 86 y.o. female  admitted with Reactive airway disease with acute exacerbation.     Gram-negative pneumonia with left lower lobe bronchoscopy cultures growing Klebsiella and Serratia, exacerbation of reactive airway disease with hemoptysis and chronic hypoxic respiratory:  Patient treated with the assistance of pulmonology.   She received systemic steroids and had improvement.  Bronchoscopy was performed due to hemoptysis and cultures grew gram-negative bacteria.  I spoken with pulmonology today and patient is cleared to discharge on Levaquin for coverage.  Pharmacist recommends 500 mg every other day for a total of 3 doses.  She will receive a dose today prior to discharge and next dose will be on 5/18 and final dose on 5/20.  Patient reports being on chronic oxygen.  Oxygen needs are relatively at baseline.  Continues with intermittent wheezing. Remains on steroids managed by Pulm. . CT chest follow up in 6-8 weeks per pulmonology.  Pulmonology will taper steroids at clinic follow-up in 1 week that they are arranging.  Pulmonology has cleared for discharge     Visual hallucinations: Resolved.  Reportedly having some intermittent encephalopathy due to underlying illness; improved per nursing.  Patient likely has a new baseline mental status.     CHF:  Furosemide diuresis; improved.  Patient has required lower dose of Lasix while in the hospital.  She will continue at discharge      Atrial fibrillation:  Echocardiogram normal ejection fraction.  Cardiology discontinued diltiazem.  Cardiology has transition to low-dose metoprolol anticoagulation on Eliquis held for hemoptysis and will continue on hold at discharge until cleared to restart Eliquis by outpatient pulmonology.  Seen by cardiology.  Cardiology will be following her up in clinic for further management of atrial fibrillation.     CKD:  Previous records reviewed and baseline renal function variable but seems to be mostly 1.3-1.5 range but at times as high as 1.8; monitoring with cardiac medicines.  Currently doing well.     Hypothyroid: Clinically euthyroid.  Continue replacement.  Normal TSH.     Chronic anemia: Anemia of chronic disease.  Baseline Hgb usually around the range of 9.     Urinary retention: Status post urology consult.  Voiding trial prior to discharge.  Urology  recommends to replace Padilla catheter if postvoid residual greater than 300.  She is to see Dr. Naga Miner [facility] in 1 to 2 weeks after discharge.  Urology has notified there  to make appointment.     At the time of discharge take all medications as prescribed, keep all follow-up appointments, and call your doctor or return to the hospital if you have any worsening or concerning symptoms.    Please note that this note was made using Dragon voice recognition software          Day of Discharge     HPI:   Breathing well.  Denies complaints.    ROS:    No current fevers or chills  No current nausea, vomiting, or diarrhea  No current chest pain or palpitations       Vital Signs:   Temp:  [97.7 °F (36.5 °C)-97.9 °F (36.6 °C)] 97.7 °F (36.5 °C)  Heart Rate:  [74-77] 77  Resp:  [14-20] 14  BP: (121-145)/(66-79) 121/76     Physical Exam:    Constitutional:Awake, alert, chronically ill-appearing    HENT: NCAT, mucous membranes moist, neck supple    Respiratory:No cough, mostly resolved wheezes,    much better inspiration  Cardiovascular: RRR, normal radial pulses    Gastrointestinal: Positive bowel sounds, soft, nontender, nondistended    Musculoskeletal: Elderly frail and chronically debilitated appearance, mild lower extremity edema, BMI is 31 obese    Psychiatric: Appropriate affect, cooperative, conversational    Neurologic: No slurred speech or facial droop, follows commands    Skin: No rashes or jaundice, warm      Pertinent  and/or Most Recent Results     Results from last 7 days   Lab Units 05/16/22  0418 05/15/22  0435 05/14/22  0333 05/13/22  0339 05/12/22  0354 05/11/22  0326 05/10/22  0345   WBC 10*3/mm3 7.85 6.07 8.94 9.08 12.49* 7.38 7.83   HEMOGLOBIN g/dL 7.9* 8.4* 7.6* 8.1* 9.1* 8.8* 8.7*   HEMATOCRIT % 26.0* 25.8* 24.9* 26.4* 28.2* 27.1* 26.4*   PLATELETS 10*3/mm3 190 209 196 217 272 227 205   SODIUM mmol/L 137 138 138 137 131* 135* 138   POTASSIUM mmol/L 4.3 4.2 4.5 4.6 4.6 4.4 4.2    CHLORIDE mmol/L 104 104 104 101 95* 98 101   CO2 mmol/L 20.0* 24.0 22.0 22.0 19.4* 24.4 25.0   BUN mg/dL 42* 43* 54* 56* 54* 40* 38*   CREATININE mg/dL 1.23* 1.41* 1.69* 1.81* 1.99* 1.70* 1.40*   GLUCOSE mg/dL 190* 118* 191* 222* 226* 223* 150*   CALCIUM mg/dL 8.3* 8.6 8.5* 8.7 9.5 9.3 8.8           Invalid input(s): PROT, LABALBU        Invalid input(s): TG, LDLCALC, LDLREALC        Brief Urine Lab Results  (Last result in the past 365 days)      Color   Clarity   Blood   Leuk Est   Nitrite   Protein   CREAT   Urine HCG        05/06/22 1121 Yellow   Clear   Negative   Negative   Negative   30 mg/dL (1+)                      05/14/2022 1540 05/16/2022 0839 BAL Culture, Quantitative - Lavage, Lung, L [[id number]]    (Abnormal)   Lavage from Lung, L    Final result Component Value   BAL Culture 50,000 CFU/mL Klebsiella pneumoniae ssp pneumoniae Abnormal     25,000 CFU/mL Serratia marcescens Abnormal     <25,000 CFU/mL Normal Respiratory Apryl   Gram Stain Rare (1+) WBCs seen    Rare (1+) Gram negative bacilli         Susceptibility     Klebsiella pneumoniae ssp pneumoniae Serratia marcescens     NATASHA NATASHA     Ampicillin 16  Resistant       Ampicillin + Sulbactam 4  Susceptible       Cefepime <=1  Susceptible <=1  Susceptible     Ceftazidime <=1  Susceptible <=1  Susceptible     Ceftriaxone <=1  Susceptible <=1  Susceptible     Gentamicin <=1  Susceptible 4  Susceptible     Levofloxacin <=0.12  Susceptible <=0.12  Susceptible     Piperacillin + Tazobactam <=4  Susceptible <=4  Susceptible     Tetracycline 4  Susceptible >=16  Resistant     Trimethoprim + Sulfamethoxazole <=20  Susceptible <=20  Susceptible                 Linear View               Microbiology Results Abnormal     Procedure Component Value - Date/Time    AFB Culture - Lavage, Lung, L [[id number]] Collected: 05/14/22 1540    Lab Status: Preliminary result Specimen: Lavage from Lung, L Updated: 05/15/22 1203     AFB Stain No acid fast bacilli seen    COVID  PRE-OP / PRE-PROCEDURE SCREENING ORDER (NO ISOLATION) - Swab, Nasopharynx [[id number]]  (Normal) Collected: 05/13/22 1753    Lab Status: Final result Specimen: Swab from Nasopharynx Updated: 05/13/22 1831    Narrative:      The following orders were created for panel order COVID PRE-OP / PRE-PROCEDURE SCREENING ORDER (NO ISOLATION) - Swab, Nasopharynx.  Procedure                               Abnormality         Status                     ---------                               -----------         ------                     COVID-19,[facility] IN-HOUSE...[[id number]]  Normal              Final result                 Please view results for these tests on the individual orders.    COVID-19,[facility] IN-HOUSE CEPHEID/MARY ELLEN NP SWAB IN TRANSPORT MEDIA 8-12 HR TAT - Swab, Nasopharynx [[id number]]  (Normal) Collected: 05/13/22 1753    Lab Status: Final result Specimen: Swab from Nasopharynx Updated: 05/13/22 1831     COVID19 Not Detected    Narrative:      Fact sheet for providers: https://www.fda.gov/media/502709/download    Fact sheet for patients: https://www.fda.gov/media/833022/download    Test performed by PCR.    Blood Culture - Blood, Arm, Left [[id number]]  (Normal) Collected: 05/05/22 1416    Lab Status: Final result Specimen: Blood from Arm, Left Updated: 05/10/22 1432     Blood Culture No growth at 5 days    Blood Culture - Blood, Arm, Left [[id number]]  (Normal) Collected: 05/05/22 1416    Lab Status: Final result Specimen: Blood from Arm, Left Updated: 05/10/22 1432     Blood Culture No growth at 5 days    Respiratory Panel PCR w/COVID-19(SARS-CoV-2) VIRG[facility]/DANIEL/DEJON/PAD/COR/MAD/ANGEL LUIS In-House, NP Swab in UTM/VTM, 3-4 HR TAT - Swab, Nasopharynx [[id number]]  (Normal) Collected: 05/07/22 1855    Lab Status: Final result Specimen: Swab from Nasopharynx Updated: 05/07/22 1951     ADENOVIRUS, PCR Not Detected     Coronavirus 229E Not Detected     Coronavirus HKU1 Not Detected     Coronavirus NL63 Not Detected     Coronavirus OC43  Not Detected     COVID19 Not Detected     Human Metapneumovirus Not Detected     Human Rhinovirus/Enterovirus Not Detected     Influenza A PCR Not Detected     Influenza B PCR Not Detected     Parainfluenza Virus 1 Not Detected     Parainfluenza Virus 2 Not Detected     Parainfluenza Virus 3 Not Detected     Parainfluenza Virus 4 Not Detected     RSV, PCR Not Detected     Bordetella pertussis pcr Not Detected     Bordetella parapertussis PCR Not Detected     Chlamydophila pneumoniae PCR Not Detected     Mycoplasma pneumo by PCR Not Detected    Narrative:      In the setting of a positive respiratory panel with a viral infection PLUS a negative procalcitonin without other underlying concern for bacterial infection, consider observing off antibiotics or discontinuation of antibiotics and continue supportive care. If the respiratory panel is positive for atypical bacterial infection (Bordetella pertussis, Chlamydophila pneumoniae, or Mycoplasma pneumoniae), consider antibiotic de-escalation to target atypical bacterial infection.    COVID PRE-OP / PRE-PROCEDURE SCREENING ORDER (NO ISOLATION) - Swab, Nasopharynx [[id number]]  (Normal) Collected: 05/05/22 1628    Lab Status: Final result Specimen: Swab from Nasopharynx Updated: 05/05/22 2204    Narrative:      The following orders were created for panel order COVID PRE-OP / PRE-PROCEDURE SCREENING ORDER (NO ISOLATION) - Swab, Nasopharynx.  Procedure                               Abnormality         Status                     ---------                               -----------         ------                     COVID-19,APTIMA PANTHER(...[[id number]]  Normal              Final result                 Please view results for these tests on the individual orders.    COVID-19,APTIMA PANTHER(JHON), VIRGIL/ JAQUAN, NP/OP SWAB IN UTM/VTM/SALINE TRANSPORT MEDIA,24 HR TAT - Swab, Nasopharynx [[id number]]  (Normal) Collected: 05/05/22 1628    Lab Status: Final result Specimen: Swab from  Nasopharynx Updated: 05/05/22 2204     COVID19 Not Detected    Narrative:      Fact sheet for providers: https://www.fda.gov/media/385261/download     Fact sheet for patients: https://www.fda.gov/media/194296/download    Test performed by RT PCR.          Imaging Results (All)     Procedure Component Value Units Date/Time    XR Chest 2 View [[id number]] Collected: 05/15/22 1105     Updated: 05/15/22 1111    Narrative:      AP AND LATERAL CHEST     CLINICAL HISTORY: Follow-up pneumonia.     Compared to a previous chest dated 05/13/2022.      There is patchy ill-defined increased density in the right lung that  appears slightly more prominent. However, minimal ill-defined increased  density in the left lung appears somewhat improved. There are no pleural  effusions. The heart is normal in size.     This report was finalized on 5/15/2022 11:08 AM by Dr. Tevin Lopez M.D.       XR Chest 2 View [[id number]] Collected: 05/13/22 1827     Updated: 05/13/22 1833    Narrative:      PA AND LATERAL CHEST     CLINICAL HISTORY: Hemoptysis. Follow-up infiltrates.     Compared to the previous chest dated 05/05/2022 and a CT scan dated  05/09/2022.     There is patchy ill-defined increased density in both lungs that appears  more prominent than on the previous chest x-ray and is suspicious for  worsening bilateral pneumonia. There are no pleural effusions. The heart  is normal in size.     IMPRESSIONS: Suspect worsening bilateral pneumonia.     This report was finalized on 5/13/2022 6:30 PM by Dr. Tevin Lopez M.D.       CT Soft Tissue Neck With Contrast [[id number]] Collected: 05/10/22 1914     Updated: 05/10/22 1924    Narrative:      CT SOFT TISSUE NECK W CONTRAST-     CLINICAL HISTORY: Persistent wheezing. Rule out foreign body as cause.     TECHNIQUE: Multiple axial 3 mm thick images were obtained through the  neck with IV contrast.     Radiation dose reduction techniques were utilized, including automated  exposure control  and exposure modulation based on body size.     COMPARISON: None     FINDINGS: No lymphadenopathy is identified in the neck. There are no  abnormal masses or fluid collections. The pharynx and larynx and trachea  are unremarkable. In particular, no foreign bodies are evident within  the airway, which appears widely patent. The epiglottis appears normal.  There is no retropharyngeal edema. The parotid glands and submandibular  glands and thyroid gland are unremarkable. There is no evidence of a  goiter. The visualized paranasal sinuses are well aerated.       Impression:      Unremarkable CT scan of the neck. No retained foreign bodies  are identified in the airway. The airway appears widely patent.     This report was finalized on 5/10/2022 7:21 PM by Dr. Tevin Lopez M.D.       CT Chest Without Contrast Diagnostic [[id number]] Collected: 05/09/22 1642     Updated: 05/09/22 1653    Narrative:      CT CHEST WITHOUT CONTRAST     HISTORY: Persistent wheezing, congestive heart failure     TECHNIQUE: Radiation dose reduction techniques were utilized, including  automated exposure control and exposure modulation based on body size.  Axial images were obtained through the chest without the administration  of IV contrast. Coronal and sagittal reformatted images obtained.     COMPARISON: 04/03/2022     FINDINGS: The previously noted bilateral pleural effusions have  resolved. Mediastinal lymphadenopathy has decreased. Mildly enlarged  main pulmonary artery again noted is nonspecific but can be seen with  pulmonary arterial hypertension. There is a small hiatal hernia.  Coronary artery calcifications are present. The bilateral infiltrates  seen on the prior study have decreased but have not fully resolved.  Persistent interstitial thickening and groundglass opacity remains. A  mild mosaic attenuation pattern of the lungs also remains present which  may reflect areas of air trapping. Limited imaging of the upper  abdomen  demonstrates cholelithiasis. There is no acute bony abnormality.       Impression:      Overall improved appearance of the chest. The bilateral pleural  effusions have resolved. The mediastinal lymphadenopathy has decreased.  The bilateral infiltrates have decreased but not fully resolved, which  may reflect areas of persistent pneumonia or edema. Continued follow-up  to clearing is recommended.           Radiation dose reduction techniques were utilized, including automated  exposure control and exposure modulation based on body size.     This report was finalized on 5/9/2022 4:49 PM by Dr. Addison Patel M.D.       XR Chest 1 View [[id number]] Collected: 05/05/22 1419     Updated: 05/05/22 1425    Narrative:      XR CHEST 1 VW-     HISTORY: Female who is 86 years-old,  sepsis     TECHNIQUE: Frontal view of the chest     COMPARISON: 04/03/2022     FINDINGS: Heart is enlarged. Aorta is tortuous, calcified. Pulmonary  vasculature is unremarkable. Aeration of the lungs is improved. Minimal  likely atelectasis in both lungs. No focal pulmonary consolidation,  pleural effusion, or pneumothorax. No acute osseous process.       Impression:      Interval improvement. Minimal likely atelectasis in both  lungs. Cardiomegaly. Tortuous aorta.     This report was finalized on 5/5/2022 2:22 PM by Dr. Arthur Villatoro M.D.             Results for orders placed during the hospital encounter of 03/31/22    Adult Transthoracic Echo Complete W/ Cont if Necessary Per Protocol    Interpretation Summary  · Calculated left ventricular EF = 60.1% Estimated left ventricular EF = 60% Estimated left ventricular EF was in agreement with the calculated left ventricular EF. Left ventricular systolic function is normal. The left ventricular cavity is small in size. Left ventricular wall thickness is consistent with moderate concentric hypertrophy. All left ventricular wall segments contract normally. Left ventricular diastolic  function was indeterminate.  · The right ventricular cavity is moderately dilated. Normal right ventricular systolic function noted.  · The left atrial cavity is moderately dilated  · The right atrial cavity is moderately dilated.  · No aortic valve regurgitation or stenosis is present. The aortic valve is abnormal in structure. There is calcification of the aortic valve.  · Severe mitral annular calcification is present. Mild mitral valve regurgitation is present. Mean mitral valve gradient is elevated at 6 mmHg at a heart rate of 117 bpm. This is likely due to tachycardia though cannot rule out early mitral valve stenosis  · Mild tricuspid valve regurgitation is present. Estimated right ventricular systolic pressure from tricuspid regurgitation is markedly elevated (>55 mmHg). Calculated right ventricular systolic pressure from tricuspid regurgitation is 56 mmHg.      Plan for Follow-up of Pending Labs/Results: Pulmonology clinic  Pending Labs     Order Current Status    Non-gynecologic Cytology Collected (05/14/22 1540)    TOM by IFA, Reflex 9-biomarkers profile In process    ANCA Panel In process    Cyclic Citrul Peptide Antibody, IgG / IgA In process    Glomerular Basement Membrane Antibodies In process    AFB Culture - Lavage, Lung, L Preliminary result        Discharge Details        Discharge Medications      New Medications      Instructions Start Date   acetaminophen 325 MG tablet  Commonly known as: TYLENOL   650 mg, Oral, Every 6 Hours PRN      ipratropium-albuterol 0.5-2.5 mg/3 ml nebulizer  Commonly known as: DUO-NEB   3 mL, Nebulization, 4 Times Daily - RT      levoFLOXacin 500 MG tablet  Commonly known as: LEVAQUIN   500 mg, Oral, Every Other Day, Given dose on 5/16, next dose 5/18      melatonin 3 MG tablet   3 mg, Oral, Nightly PRN      ondansetron 4 MG tablet  Commonly known as: ZOFRAN   4 mg, Oral, Every 8 Hours PRN      predniSONE 20 MG tablet  Commonly known as: DELTASONE   40 mg, Oral, 2 Times  Daily With Meals         Changes to Medications      Instructions Start Date   furosemide 40 MG tablet  Commonly known as: LASIX  What changed: when to take this   40 mg, Oral, Daily   Start Date: May 17, 2022     metoprolol tartrate 25 MG tablet  Commonly known as: LOPRESSOR  What changed:   · how much to take  · when to take this   12.5 mg, Oral, Every 12 Hours Scheduled         Continue These Medications      Instructions Start Date   albuterol sulfate  (90 Base) MCG/ACT inhaler  Commonly known as: PROVENTIL HFA;VENTOLIN HFA;PROAIR HFA   2 puffs, Inhalation, Every 4 Hours PRN      allopurinol 300 MG tablet  Commonly known as: ZYLOPRIM   300 mg, Oral, Daily      atorvastatin 80 MG tablet  Commonly known as: LIPITOR   80 mg, Oral, Nightly      folic acid 1 MG tablet  Commonly known as: FOLVITE   1 mg, Oral, Daily      levothyroxine 50 MCG tablet  Commonly known as: SYNTHROID, LEVOTHROID   50 mcg, Oral, Daily      PARoxetine 10 MG tablet  Commonly known as: PAXIL   10 mg, Oral, Daily      potassium chloride 10 MEQ CR tablet   20 mEq, Oral, Daily         Stop These Medications    amLODIPine 5 MG tablet  Commonly known as: NORVASC     apixaban 5 MG tablet tablet  Commonly known as: ELIQUIS     vitamin B-12 1000 MCG tablet  Commonly known as: CYANOCOBALAMIN            Allergies   Allergen Reactions   • Cefazolin Other (See Comments)     5/16/22 pt does not recall any history of allergy.  Denies known history of any drug-related reaction or previous intolerance to medication.  Tolerated zosyn 4/1-4/6/22.         Discharge Disposition:  Skilled Nursing Facility (DC - External)    Diet:  Hospital:  Diet Order   Procedures   • Diet Regular       Activity:  Activity Instructions     Up WIth Assist                 CODE STATUS:    Code Status and Medical Interventions:   Ordered at: 05/06/22 1134     Medical Intervention Limits:    NO intubation (DNI)     Level Of Support Discussed With:    Next of Kin (If No  Surrogate)     Code Status (Patient has no pulse and is not breathing):    No CPR (Do Not Attempt to Resuscitate)     Medical Interventions (Patient has pulse or is breathing):    Limited Support         Additional Instructions for the Follow-ups that You Need to Schedule     Discharge Follow-up with PCP   As directed       Currently Documented PCP:    Morenita Silverio MD    PCP Phone Number:    [phone]     Follow Up Details: 1 week for general hospital follow-up         Discharge Follow-up with Specialty: Follow-up with your cardiologist in 2 to 4 weeks   As directed      Specialty: Follow-up with your cardiologist in 2 to 4 weeks         Discharge Follow-up with Specified Provider: Pulmonology has moved up your appointment to within approximately 1 week.  Please call pulmonology clinic to confirm time with Dr. Hwang   As directed      To: Pulmonology has moved up your appointment to within approximately 1 week.  Please call pulmonology clinic to confirm time with Dr. Hwang         Discharge Follow-up with Specified Provider: Urology follow-up in 1 to 2 weeks   As directed      To: Urology follow-up in 1 to 2 weeks            Follow-up Information     Morenita Silverio MD .    Specialty: Internal Medicine  Why: 1 week for [facility] hospital follow-up  Contact information:  [address] [address]  [phone]                             Garry Howe MD  05/16/22      Time Spent on Discharge:  I spent 45 minutes on this discharge activity which included: face-to-face encounter with the patient, reviewing the data in the system, coordination of the care with the nursing staff as well as consultants, documentation, and entering orders.

## 2022-05-16 NOTE — PROGRESS NOTES
Yazmin Javier   86 y.o.  female    LOS: 10 days   Patient Care Team:  Morenita Silverio MD as PCP - General (Internal Medicine)      Subjective     Interval History:     Patient Complaints:  No complaints    Review of Systems:       Medication Review:   Current Facility-Administered Medications:   •  acetaminophen (TYLENOL) tablet 650 mg, 650 mg, Oral, Q4H PRN, Emelyn Dhillon MD, 650 mg at 05/08/22 0202  •  allopurinol (ZYLOPRIM) tablet 300 mg, 300 mg, Oral, Daily, Emelyn Dhillon MD, 300 mg at 05/16/22 0849  •  atorvastatin (LIPITOR) tablet 80 mg, 80 mg, Oral, Nightly, Emelyn Dhillon MD, 80 mg at 05/15/22 2051  •  dextrose (D50W) (25 g/50 mL) IV injection 25 g, 25 g, Intravenous, Q15 Min PRN, Garry Howe MD  •  dextrose (GLUTOSE) oral gel 15 g, 15 g, Oral, Q15 Min PRN, Garry Howe MD  •  folic acid (FOLVITE) tablet 1 mg, 1 mg, Oral, Daily, Emelyn Dhillon MD, 1 mg at 05/16/22 0849  •  furosemide (LASIX) tablet 40 mg, 40 mg, Oral, Daily, Garry Howe MD, 40 mg at 05/16/22 0848  •  glucagon (human recombinant) (GLUCAGEN DIAGNOSTIC) injection 1 mg, 1 mg, Intramuscular, Q15 Min PRN, Garry Howe MD  •  insulin lispro (ADMELOG) injection 0-7 Units, 0-7 Units, Subcutaneous, 4x Daily With Meals & Nightly, Garry Howe MD, 3 Units at 05/16/22 0848  •  ipratropium-albuterol (DUO-NEB) nebulizer solution 3 mL, 3 mL, Nebulization, 4x Daily - RT, Isauro Ramirez MD, 3 mL at 05/16/22 0735  •  ipratropium-albuterol (DUO-NEB) nebulizer solution 3 mL, 3 mL, Nebulization, Q4H PRN, Isauro Ramirez MD  •  levothyroxine (SYNTHROID, LEVOTHROID) tablet 50 mcg, 50 mcg, Oral, Q AM, Stingl, Emelyn Duque MD, 50 mcg at 05/16/22 0608  •  Magnesium Sulfate 2 gram Bolus, followed by 8 gram infusion (total Mg dose 10 grams)- Mg less than or equal to 1mg/dL, 2 g, Intravenous, PRN **OR** Magnesium Sulfate 2 gram / 50mL Infusion (GIVE X 3 BAGS TO  EQUAL 6GM TOTAL DOSE) - Mg 1.1 - 1.5 mg/dl, 2 g, Intravenous, PRN, Last Rate: 25 mL/hr at 05/05/22 2207, 2 g at 05/05/22 2207 **OR** Magnesium Sulfate 4 gram infusion- Mg 1.6-1.9 mg/dL, 4 g, Intravenous, PRN, Jewel Guadalupe MD  •  melatonin tablet 3 mg, 3 mg, Oral, Nightly, Jai Neal MD, 3 mg at 05/15/22 2051  •  metoprolol tartrate (LOPRESSOR) tablet 12.5 mg, 12.5 mg, Oral, Q12H, Garry Howe MD, 12.5 mg at 05/16/22 0849  •  nitroglycerin (NITROSTAT) SL tablet 0.4 mg, 0.4 mg, Sublingual, Q5 Min PRN, Emelyn Dhillon MD  •  ondansetron (ZOFRAN) tablet 4 mg, 4 mg, Oral, Q6H PRN **OR** ondansetron (ZOFRAN) injection 4 mg, 4 mg, Intravenous, Q6H PRN, Emelyn Dhillon MD  •  PARoxetine (PAXIL) tablet 10 mg, 10 mg, Oral, Daily, Emelyn Dhillon MD, 10 mg at 05/16/22 0849  •  potassium chloride (K-DUR,KLOR-CON) ER tablet 20 mEq, 20 mEq, Oral, BID With Meals, Rock Merrill MD, 20 mEq at 05/16/22 0849  •  predniSONE (DELTASONE) tablet 40 mg, 40 mg, Oral, BID With Meals, Ronny Hwang MD, 40 mg at 05/16/22 0849  •  [COMPLETED] Insert peripheral IV, , , Once **AND** sodium chloride 0.9 % flush 10 mL, 10 mL, Intravenous, PRN, Jewel Guadalupe MD, 10 mL at 05/15/22 0855  •  vitamin B-12 (CYANOCOBALAMIN) tablet 1,000 mcg, 1,000 mcg, Oral, Daily, Emelyn Dhillon MD, 1,000 mcg at 05/16/22 0848      Objective   Vital Sign Min/Max for last 24 hours  Temp  Min: 97.7 °F (36.5 °C)  Max: 97.9 °F (36.6 °C)   BP  Min: 121/76  Max: 145/79    Pulse  Min: 74  Max: 76     Wt Readings from Last 3 Encounters:   05/16/22 78.9 kg (173 lb 14.4 oz)   04/09/22 88 kg (194 lb 1.6 oz)   02/18/22 85.2 kg (187 lb 12.8 oz)        Intake/Output Summary (Last 24 hours) at 5/16/2022 0928  Last data filed at 5/15/2022 2318  Gross per 24 hour   Intake 240 ml   Output 1400 ml   Net -1160 ml     Physical Exam:      General Appearance:    Well developed and well nourished elderly wf sitting up in chair in  no acute distress   Head:    Normocephalic, atraumatic   Eyes:            Conjunctivae normal, anicteric, no xanthelasma   Neck:   supple, trachea midline, no thyromegaly, no carotid bruit, no JVD, no elevated CVP   Lungs:     Loose prof cough, rhonchi ,respirations regular, even and                  unlabored    Heart:    At fib, normal S1 and S2, No murmur, no gallop, no rub, no click   Chest Wall:    No abnormalities observed   Abdomen:     Normal bowel sounds, no masses, no organomegaly, soft        nontender, nondistended, no guarding, no rebound                tenderness   Rectal:     Deferred   Extremities:   No edema. Moves all extremities well, no cyanosis, no erythema   Pulses:   Pulses palpable and equal bilaterally   Skin:   No bleeding, bruising or rash   Neurologic:   awake alert and oriented x3, speech clear and approp, no facial drooping     : voids   Monitor:   At fib    Results Review:         Sodium Sodium   Date Value Ref Range Status   05/16/2022 137 136 - 145 mmol/L Final   05/15/2022 138 136 - 145 mmol/L Final   05/14/2022 138 136 - 145 mmol/L Final      Potassium Potassium   Date Value Ref Range Status   05/16/2022 4.3 3.5 - 5.2 mmol/L Final   05/15/2022 4.2 3.5 - 5.2 mmol/L Final   05/14/2022 4.5 3.5 - 5.2 mmol/L Final      Chloride Chloride   Date Value Ref Range Status   05/16/2022 104 98 - 107 mmol/L Final   05/15/2022 104 98 - 107 mmol/L Final   05/14/2022 104 98 - 107 mmol/L Final      Bicarbonate No results found for: PLASMABICARB   BUN BUN   Date Value Ref Range Status   05/16/2022 42 (H) 8 - 23 mg/dL Final   05/15/2022 43 (H) 8 - 23 mg/dL Final   05/14/2022 54 (H) 8 - 23 mg/dL Final      Creatinine Creatinine   Date Value Ref Range Status   05/16/2022 1.23 (H) 0.57 - 1.00 mg/dL Final   05/15/2022 1.41 (H) 0.57 - 1.00 mg/dL Final   05/14/2022 1.69 (H) 0.57 - 1.00 mg/dL Final      Calcium Calcium   Date Value Ref Range Status   05/16/2022 8.3 (L) 8.6 - 10.5 mg/dL Final   05/15/2022  8.6 8.6 - 10.5 mg/dL Final   05/14/2022 8.5 (L) 8.6 - 10.5 mg/dL Final      Magnesium Magnesium   Date Value Ref Range Status   05/14/2022 2.1 1.6 - 2.4 mg/dL Final        Results from last 7 days   Lab Units 05/16/22  0418   WBC 10*3/mm3 7.85   HEMOGLOBIN g/dL 7.9*   HEMATOCRIT % 26.0*   PLATELETS 10*3/mm3 190     Lab Results   Lab Value Date/Time    TROPONINT 0.013 05/05/2022 1626    TROPONINT 0.021 05/05/2022 1416    TROPONINT <0.010 03/31/2022 2054    TROPONINT <0.010 02/15/2022 0525    TROPONINT <0.010 02/14/2022 0458    TROPONINT <0.010 10/31/2021 2334    TROPONINT <0.010 10/31/2021 1907    TROPONINT <0.010 04/21/2021 2331    TROPONINT <0.010 04/04/2021 0910            Echo EF Estimated  Lab Results   Component Value Date    ECHOEFEST 60 04/03/2022         Assessment/ Plan  Assessment & Plan   Active Hospital Problems    Diagnosis  POA   • **Postinfectious reactive airway disease with acute exacerbation [J45.901]  Yes     Priority: Low   • Hyponatremia [E87.1]  Yes     Priority: Low   • Prediabetes [R73.03]  Yes     Priority: Low   • Acute on chronic combined systolic and diastolic CHF (congestive heart failure) ([facility]) [I50.43]  Yes     Priority: Low   • Essential hypertension [I10]  Yes     Priority: Low   • Chronic hypoxemic respiratory failure (HCC) [J96.11]  Yes     Priority: Low   • Hypothyroidism (acquired) [E03.9]  Yes     Priority: Low   • Chronic kidney failure, stage 3 (moderate) ([facility]) [N18.30]  Yes     Priority: Low   • Hypokalemia [E87.6]  Yes     Priority: Low   • Hypomagnesemia [E83.42]  Yes     Priority: Low   • Acute urinary retention [R33.8]  Yes     Priority: Low   • DNR (do not resuscitate) [Z66]  Yes     Priority: Low   • Atrial fibrillation with RVR ([facility]) [I48.91]  Yes     Priority: Low   • Hemoptysis [R04.2]  Unknown     Priority: Low     Added automatically from request for surgery [id number]             1. Atrial fibrillation with RVR   - back in afib with rvr     2.  Acute on chronic  combined systolic and diastolic CHF EF = 60.1%      3. Essential hypertension     4.  Chronic hypoxemic respiratory failure     COPD with acute exacerbation   LILIANA     5.  Hypothyroidism (acquired)     6. Chronic kidney failure, stage 3 (moderate)      7. Acute urinary retention     8.  DNR (do not resuscitate)     9. Hemoptysis   Bronchoscopy showed evidence of recent bleeding but no active bleeding.  Currently holding Eliquis.  vasculitis markers pending.   consider restarting  A.c. in a week or so per pulmonology-- will follow up as outpt and address-Okay discharge home per on prednisone 40 mg twice daily.   Respiratory viral PCR is negative    plan   Pt had pauses upto 4.2 seconds-cardizem CD d/c'd 5/14, now with longest pause 2.2 seconds-- currently no indication for ppm off ccb             Corinna Mcnamara, APRN  05/16/22  09:28 EDT  Patient interviewed and examined  Heart S1-S2 normal  Lungs few scattered rales noted  Abdomen soft  No edema  Agree with the Corinna Mcnamara's note

## 2022-05-16 NOTE — CASE MANAGEMENT/SOCIAL WORK
Case Management Discharge Note      Final Note: Spoke with Mary/Apollo, bed available at [facility] today, pt d/c order noted. Brittani w/c van arranged for 1700 with oxygen Confirmation #[id number]. Packet given to RN, notfied pt HCS, Anant Mulligan, who stated she will meet her at facility with her belongings. Mary notified of transfer time.    Provided Post Acute Provider List?: Yes  Post Acute Provider List: Nursing Home    Selected Continued Care - Admitted Since 5/5/2022     Destination Coordination complete.    Service Provider Selected Services Address Phone Fax Patient Preferred    [facility]  Skilled Nursing [address] [address] [phone] --          Durable Medical Equipment    No services have been selected for the patient.              Dialysis/Infusion    No services have been selected for the patient.              Home Medical Care    No services have been selected for the patient.              Therapy    No services have been selected for the patient.              Community Resources    No services have been selected for the patient.              Community & DME    No services have been selected for the patient.                Selected Continued Care - Prior Encounters Includes selections from prior encounters from 2/4/2022 to 5/16/2022    Discharged on 2/18/2022 Admission date: 2/14/2022 - Discharge disposition: Home-Health Care [facility]    Home Medical Care     Service Provider Selected Services Address Phone Fax Patient Preferred    [facility]  Home Living Aide Services [address], [address], [place] [address] [phone] -- --                    Transportation Services  [facility]    Final Discharge Disposition Code: 03 - skilled nursing facility (SNF)

## 2022-05-16 NOTE — PLAN OF CARE
Goal Outcome Evaluation:   Pt A&Ox4, on 2L NC, voiding trial completed today and the patient was unable to void, F/C inserted and patient is being discharged with it per DR Villafana with urology.  Will followup with Urology in 1 week.

## 2022-05-30 PROBLEM — U07.1 COVID-19 VIRUS INFECTION: Status: ACTIVE | Noted: 2022-01-01

## 2022-05-30 PROBLEM — E87.5 HYPERKALEMIA: Status: ACTIVE | Noted: 2022-01-01

## 2022-05-30 PROBLEM — N39.0 URINARY TRACT INFECTION WITHOUT HEMATURIA: Status: ACTIVE | Noted: 2022-01-01

## 2022-05-30 PROBLEM — R74.8 ELEVATED LIVER ENZYMES: Status: ACTIVE | Noted: 2022-01-01

## 2022-05-30 PROBLEM — R10.9 ABDOMINAL PAIN: Status: ACTIVE | Noted: 2022-01-01

## 2022-05-31 LAB — BACTERIA SPEC AEROBE CULT: NO GROWTH

## 2022-06-04 LAB
BACTERIA SPEC AEROBE CULT: ABNORMAL
BACTERIA SPEC AEROBE CULT: NORMAL
GRAM STN SPEC: ABNORMAL
ISOLATED FROM: ABNORMAL

## 2022-06-25 LAB
MYCOBACTERIUM SPEC CULT: NORMAL
NIGHT BLUE STAIN TISS: NORMAL

## 2022-06-27 NOTE — PROGRESS NOTES
"[facility] Clinical Pharmacy Services: Levofloxacin Consult     Yazmin Javier has a pharmacy consult to dose levofloxacin per Dr. Howe's request.     Indication: PNA     Relevant clinical data and objective history reviewed:  86 y.o. female 167.6 cm (66\") 78.9 kg (173 lb 14.4 oz)  Estimated Creatinine Clearance: 36.1 mL/min (A) (by C-G formula based on SCr of 1.23 mg/dL (H)).    Past Medical History:   Diagnosis Date    CHF (congestive heart failure) (HCC)     Sleep apnea     Stroke (HCC)      is allergic to cefazolin.  Assessment/Plan    Will start patient on levofloxacin 500mg PO q48h, which has been adjusted for any patient specific factors. Pt's CrCl is up to 36 ml/min today but Scr has recently improved; renal function borderline for 750mg vs 500mg.  Favor conservative approach per discussion with Dr. Howe. Pharmacy will sign off.    Thank you for allowing us to participate in the care of this patient.    Alison Finley, PharmD  Clinical Pharmacist    " Patient was seen in the clinic today to receive Hepatitis A vaccine. Patient tolerated well no adverse affects noted.

## 2023-08-10 NOTE — PLAN OF CARE
Goal Outcome Evaluation:           Progress: no change     Continues to have audible wheezing. Oxygen increased to 5L NC tonight sats in low 90's. Appears anxious at times. Tylenol for back discomfort. Having loose BM's. Up to BSC with assist. No other issues at this time.   Simponi Counseling:  I discussed with the patient the risks of golimumab including but not limited to myelosuppression, immunosuppression, autoimmune hepatitis, demyelinating diseases, lymphoma, and serious infections.  The patient understands that monitoring is required including a PPD at baseline and must alert us or the primary physician if symptoms of infection or other concerning signs are noted.

## 2023-12-18 NOTE — PROGRESS NOTES
Yazmin Cailen   86 y.o.  female    LOS: 1 day   Patient Care Team:  Morenita Silverio MD as PCP - General (Internal Medicine)      Subjective     Interval History:     Patient Complaints: Dyspnea, wheezing    Review of Systems:   The following systems were reviewed and negative;  cardiovascular    Objective     Vital Sign Min/Max for last 24 hours  Temp  Min: 97.5 °F (36.4 °C)  Max: 98.1 °F (36.7 °C)   BP  Min: 107/63  Max: 125/44    Pulse  Min: 68  Max: 86       Intake/Output Summary (Last 24 hours) at 5/7/2022 1457  Last data filed at 5/7/2022 1015  Gross per 24 hour   Intake 180 ml   Output 400 ml   Net -220 ml       Daily Weights:  Wt Readings from Last 4 Encounters:   05/07/22 0515 78.9 kg (174 lb)   05/06/22 0509 81.6 kg (179 lb 12.8 oz)   04/09/22 0517 88 kg (194 lb 1.6 oz)   04/08/22 0500 88.5 kg (195 lb)   04/07/22 0626 87.4 kg (192 lb 9.6 oz)   04/06/22 0545 87.8 kg (193 lb 9.6 oz)   04/05/22 0500 87.7 kg (193 lb 6.4 oz)   04/04/22 0537 88.1 kg (194 lb 3.2 oz)   04/02/22 0555 87.3 kg (192 lb 6.4 oz)   04/01/22 0637 86.8 kg (191 lb 6.4 oz)   04/01/22 0138 82.6 kg (182 lb)   03/31/22 2100 82.6 kg (182 lb)   02/18/22 0607 85.2 kg (187 lb 12.8 oz)   02/17/22 1046 85.7 kg (189 lb)   02/16/22 0519 85.7 kg (189 lb)   02/15/22 1129 88.9 kg (196 lb)   02/15/22 0555 88.9 kg (196 lb)   02/14/22 0425 85.3 kg (188 lb)   11/01/21 0810 89 kg (196 lb 3.4 oz)   11/01/21 0100 89.4 kg (197 lb)       Physical Exam:      General Appearance:    Well developed and well nourished in no acute distress   Head:    Normocephalic, atraumatic   Neck:   supple, trachea midline, no thyromegaly, no   carotid bruit, no JVD   Lungs:    Wheezing bilaterally with mild egophony at the bases without rales to auscultation,respirations regular,     Heart:    Regular rhythm and normal rate, normal S1 and S2,                          murmur, no gallop, no rub, no click/   Chest Wall:    No abnormalities observed   Abdomen:     Normal bowel sounds,  no masses, no organomegaly, soft        non-tender, non-distended, no guarding, no rebound                tenderness   Rectal:     Deferred   Extremities:   <1+ edema. Moves all extremities well, no cyanosis, no erythema   Pulses:   Pulses palpable and equal bilaterally   Skin:   No bleeding, bruising or rash   Neurologic:   awake alert and oriented x3, speech clear and approp, no facial drooping      Results Review:     Potassium Potassium   Date Value Ref Range Status   05/07/2022 3.4 (L) 3.5 - 5.2 mmol/L Final   05/06/2022 3.4 (L) 3.5 - 5.2 mmol/L Final   05/05/2022 3.7 3.5 - 5.2 mmol/L Final          Creatinine Creatinine   Date Value Ref Range Status   05/07/2022 1.71 (H) 0.57 - 1.00 mg/dL Final   05/06/2022 1.48 (H) 0.57 - 1.00 mg/dL Final   05/05/2022 1.47 (H) 0.57 - 1.00 mg/dL Final      Magnesium Magnesium   Date Value Ref Range Status   05/07/2022 2.3 1.6 - 2.4 mg/dL Final   05/06/2022 2.9 (H) 1.6 - 2.4 mg/dL Final   05/05/2022 1.5 (L) 1.6 - 2.4 mg/dL Final        Results from last 7 days   Lab Units 05/07/22  0334 05/06/22  0354 05/05/22  1416   POTASSIUM mmol/L 3.4* 3.4* 3.7   CREATININE mg/dL 1.71* 1.48* 1.47*   BILIRUBIN mg/dL  --   --  1.2   MAGNESIUM mg/dL 2.3 2.9* 1.5*       Results from last 7 days   Lab Units 05/07/22  0343   WBC 10*3/mm3 7.22   HEMOGLOBIN g/dL 10.2*   HEMATOCRIT % 31.0*   PLATELETS 10*3/mm3 216     Lab Results   Lab Value Date/Time    TROPONINT 0.013 05/05/2022 1626    TROPONINT 0.021 05/05/2022 1416    TROPONINT <0.010 03/31/2022 2054    TROPONINT <0.010 02/15/2022 0525    TROPONINT <0.010 02/14/2022 0458    TROPONINT <0.010 10/31/2021 2334    TROPONINT <0.010 10/31/2021 1907    TROPONINT <0.010 04/21/2021 2331    TROPONINT <0.010 04/04/2021 0910     Lab Results   Component Value Date    CHOL 156 04/22/2021     Lab Results   Component Value Date    HDL 37 (L) 04/22/2021     Lab Results   Component Value Date    LDL 97 04/22/2021     Lab Results   Component Value Date    TRIG 119  04/22/2021     No components found for: CHOLHDL  Lab Results   Component Value Date    TSH 1.390 05/06/2022     Lab Results   Component Value Date    INR 1.51 (H) 03/31/2022    INR 1.59 (H) 02/14/2022    INR 1.29 (H) 04/21/2021    PTT 41.0 (H) 03/31/2022    PTT 39.2 (H) 04/21/2021    PTT 33.5 06/12/2020       I reviewed the patient's new clinical results.    Echo EF Estimated  Lab Results   Component Value Date    ECHOEFEST 60 04/03/2022           Assessment/Plan       Atrial fibrillation with RVR (HCC)    Acute on chronic combined systolic and diastolic CHF (congestive heart failure) (HCC)    Essential hypertension    Chronic hypoxemic respiratory failure (HCC)    COPD with acute exacerbation (HCC)    Hypothyroidism (acquired)    Chronic kidney failure, stage 3 (moderate) (HCC)    Hypokalemia    Hypomagnesemia    Acute urinary retention    DNR (do not resuscitate)    Prediabetes    AF rate controlled. Amlodipine decreased due to BP. K+ treated. No UTI (urology).   May go to rehab Mon.    Wheezing.  Although amlodipine was decreased, she may benefit from discontinuing the beta-blocker, and amlodipine and initiating low-dose diltiazem short acting.  If tolerated, could go to long-acting once per day.    Munir Sparrow MD  05/07/22  14:57 EDT      Time: 25    Please note that portions of this note were completed with a voice recognition program.       yes

## 2024-09-04 NOTE — SIGNIFICANT NOTE
04/22/21 1229   OTHER   Discipline speech language pathologist   Rehab Time/Intention   Session Not Performed other (see comments)  (Received order for swallowing evaluation as part of stroke order set. Discussed with RN. Pt passed swallow screen and having no difficulty on regular/thin liquid diet and CT negative for acute CVA. No swallowing evaluation warranted at this time. Will monitor results of MRI to determine if speech/language evaluation needed.)     
   04/23/21 1324   OTHER   Discipline speech language pathologist   Rehab Time/Intention   Session Not Performed other (see comments)  (Patient passed screen and tolerating regular diet. Discussed with RN, no concerns regarding speech/lang/cog and plan for return to SNF. SLE/COG evals may be completed at next level of care if concerns regarding communication/cognition arise. SLP to s/o at this time.)     
[phone]

## (undated) DEVICE — TUBING, SUCTION, 1/4" X 10', STRAIGHT: Brand: MEDLINE

## (undated) DEVICE — SINGLE USE BIOPSY VALVE MAJ-210: Brand: SINGLE USE BIOPSY VALVE (STERILE)

## (undated) DEVICE — SINGLE USE SUCTION VALVE MAJ-209: Brand: SINGLE USE SUCTION VALVE (STERILE)

## (undated) DEVICE — ADAPT CLN BIOGUARD AIR/H2O DISP

## (undated) DEVICE — ADAPT SWVL FIBROPTIC BRONCH

## (undated) DEVICE — MSK AIRWY LARYNG LMA PILOT SZ4

## (undated) DEVICE — VITAL SIGNS™ JACKSON-REES CIRCUITS: Brand: VITAL SIGNS™

## (undated) DEVICE — SENSR O2 OXIMAX FNGR A/ 18IN NONSTR

## (undated) DEVICE — TRAP,MUCUS SPECIMEN, 80CC: Brand: MEDLINE